# Patient Record
Sex: MALE | Race: BLACK OR AFRICAN AMERICAN | NOT HISPANIC OR LATINO | Employment: UNEMPLOYED | URBAN - METROPOLITAN AREA
[De-identification: names, ages, dates, MRNs, and addresses within clinical notes are randomized per-mention and may not be internally consistent; named-entity substitution may affect disease eponyms.]

---

## 2019-05-12 ENCOUNTER — HOSPITAL ENCOUNTER (INPATIENT)
Facility: HOSPITAL | Age: 44
LOS: 3 days | Discharge: HOME | End: 2019-05-15
Attending: EMERGENCY MEDICINE | Admitting: INTERNAL MEDICINE

## 2019-05-12 DIAGNOSIS — N28.9 ACUTE RENAL INSUFFICIENCY: ICD-10-CM

## 2019-05-12 DIAGNOSIS — L02.212 ABSCESS OF BACK: Primary | ICD-10-CM

## 2019-05-12 DIAGNOSIS — L03.312 CELLULITIS OF BACK: ICD-10-CM

## 2019-05-12 DIAGNOSIS — L02.214 ABSCESS OF LEFT GROIN: ICD-10-CM

## 2019-05-12 DIAGNOSIS — E66.01 OBESITY, CLASS III, BMI 40-49.9 (MORBID OBESITY) (HCC): ICD-10-CM

## 2019-05-12 DIAGNOSIS — E11.65 TYPE 2 DIABETES MELLITUS WITH HYPERGLYCEMIA, WITHOUT LONG-TERM CURRENT USE OF INSULIN (HCC): ICD-10-CM

## 2019-05-12 DIAGNOSIS — A41.9 SEPSIS AFFECTING SKIN: ICD-10-CM

## 2019-05-12 PROBLEM — E87.20 LACTIC ACIDOSIS: Status: ACTIVE | Noted: 2019-05-12

## 2019-05-12 PROBLEM — M54.9 ACUTE RIGHT-SIDED BACK PAIN: Status: ACTIVE | Noted: 2019-05-12

## 2019-05-12 LAB
ALBUMIN SERPL-MCNC: 3.1 G/DL (ref 3.5–5.2)
ALBUMIN/GLOB SERPL: 0.6 G/DL
ALP SERPL-CCNC: 123 U/L (ref 39–117)
ALT SERPL W P-5'-P-CCNC: 25 U/L (ref 1–41)
ANION GAP SERPL CALCULATED.3IONS-SCNC: 13 MMOL/L
ANION GAP SERPL CALCULATED.3IONS-SCNC: 17 MMOL/L
AST SERPL-CCNC: 19 U/L (ref 1–40)
BASOPHILS # BLD AUTO: 0.03 10*3/MM3 (ref 0–0.2)
BASOPHILS NFR BLD AUTO: 0.3 % (ref 0–1.5)
BILIRUB SERPL-MCNC: 0.4 MG/DL (ref 0.2–1.2)
BUN BLD-MCNC: 23 MG/DL (ref 6–20)
BUN BLD-MCNC: 24 MG/DL (ref 6–20)
BUN/CREAT SERPL: 18 (ref 7–25)
BUN/CREAT SERPL: 21.1 (ref 7–25)
CALCIUM SPEC-SCNC: 8.5 MG/DL (ref 8.6–10.5)
CALCIUM SPEC-SCNC: 9.5 MG/DL (ref 8.6–10.5)
CHLORIDE SERPL-SCNC: 100 MMOL/L (ref 98–107)
CHLORIDE SERPL-SCNC: 92 MMOL/L (ref 98–107)
CK SERPL-CCNC: 58 U/L (ref 20–200)
CO2 SERPL-SCNC: 20 MMOL/L (ref 22–29)
CO2 SERPL-SCNC: 22 MMOL/L (ref 22–29)
CREAT BLD-MCNC: 1.14 MG/DL (ref 0.76–1.27)
CREAT BLD-MCNC: 1.28 MG/DL (ref 0.76–1.27)
D-LACTATE SERPL-SCNC: 2.2 MMOL/L (ref 0.5–2)
D-LACTATE SERPL-SCNC: 3.2 MMOL/L (ref 0.5–2)
DEPRECATED RDW RBC AUTO: 44.8 FL (ref 37–54)
EOSINOPHIL # BLD AUTO: 0.15 10*3/MM3 (ref 0–0.4)
EOSINOPHIL NFR BLD AUTO: 1.3 % (ref 0.3–6.2)
ERYTHROCYTE [DISTWIDTH] IN BLOOD BY AUTOMATED COUNT: 13.7 % (ref 12.3–15.4)
GFR SERPL CREATININE-BSD FRML MDRD: 74 ML/MIN/1.73
GFR SERPL CREATININE-BSD FRML MDRD: 85 ML/MIN/1.73
GLOBULIN UR ELPH-MCNC: 4.9 GM/DL
GLUCOSE BLD-MCNC: 349 MG/DL (ref 65–99)
GLUCOSE BLD-MCNC: 571 MG/DL (ref 65–99)
GLUCOSE BLDC GLUCOMTR-MCNC: 321 MG/DL (ref 70–130)
GLUCOSE BLDC GLUCOMTR-MCNC: 334 MG/DL (ref 70–130)
HBA1C MFR BLD: 13 % (ref 4.8–5.6)
HCT VFR BLD AUTO: 41.7 % (ref 37.5–51)
HGB BLD-MCNC: 14.3 G/DL (ref 13–17.7)
HOLD SPECIMEN: NORMAL
IMM GRANULOCYTES # BLD AUTO: 0.03 10*3/MM3 (ref 0–0.05)
IMM GRANULOCYTES NFR BLD AUTO: 0.3 % (ref 0–0.5)
LYMPHOCYTES # BLD AUTO: 1.8 10*3/MM3 (ref 0.7–3.1)
LYMPHOCYTES NFR BLD AUTO: 15.2 % (ref 19.6–45.3)
MAGNESIUM SERPL-MCNC: 1.5 MG/DL (ref 1.6–2.6)
MCH RBC QN AUTO: 30.6 PG (ref 26.6–33)
MCHC RBC AUTO-ENTMCNC: 34.3 G/DL (ref 31.5–35.7)
MCV RBC AUTO: 89.3 FL (ref 79–97)
MONOCYTES # BLD AUTO: 0.6 10*3/MM3 (ref 0.1–0.9)
MONOCYTES NFR BLD AUTO: 5.1 % (ref 5–12)
NEUTROPHILS # BLD AUTO: 9.25 10*3/MM3 (ref 1.7–7)
NEUTROPHILS NFR BLD AUTO: 78.1 % (ref 42.7–76)
PHOSPHATE SERPL-MCNC: 2.7 MG/DL (ref 2.5–4.5)
PLATELET # BLD AUTO: 180 10*3/MM3 (ref 140–450)
PMV BLD AUTO: 13 FL (ref 6–12)
POTASSIUM BLD-SCNC: 3.9 MMOL/L (ref 3.5–5.2)
POTASSIUM BLD-SCNC: 4.4 MMOL/L (ref 3.5–5.2)
PROCALCITONIN SERPL-MCNC: 0.35 NG/ML (ref 0.1–0.25)
PROT SERPL-MCNC: 8 G/DL (ref 6–8.5)
RBC # BLD AUTO: 4.67 10*6/MM3 (ref 4.14–5.8)
SODIUM BLD-SCNC: 129 MMOL/L (ref 136–145)
SODIUM BLD-SCNC: 135 MMOL/L (ref 136–145)
WBC NRBC COR # BLD: 11.83 10*3/MM3 (ref 3.4–10.8)
WHOLE BLOOD HOLD SPECIMEN: NORMAL
WHOLE BLOOD HOLD SPECIMEN: NORMAL

## 2019-05-12 PROCEDURE — 83036 HEMOGLOBIN GLYCOSYLATED A1C: CPT | Performed by: INTERNAL MEDICINE

## 2019-05-12 PROCEDURE — 87205 SMEAR GRAM STAIN: CPT | Performed by: EMERGENCY MEDICINE

## 2019-05-12 PROCEDURE — 25010000002 VANCOMYCIN 10 G RECONSTITUTED SOLUTION: Performed by: PHYSICIAN ASSISTANT

## 2019-05-12 PROCEDURE — 25010000002 HEPARIN (PORCINE) PER 1000 UNITS: Performed by: INTERNAL MEDICINE

## 2019-05-12 PROCEDURE — 25010000002 ONDANSETRON PER 1 MG: Performed by: EMERGENCY MEDICINE

## 2019-05-12 PROCEDURE — 87076 CULTURE ANAEROBE IDENT EACH: CPT | Performed by: EMERGENCY MEDICINE

## 2019-05-12 PROCEDURE — 83735 ASSAY OF MAGNESIUM: CPT | Performed by: INTERNAL MEDICINE

## 2019-05-12 PROCEDURE — 84145 PROCALCITONIN (PCT): CPT | Performed by: PHYSICIAN ASSISTANT

## 2019-05-12 PROCEDURE — 99223 1ST HOSP IP/OBS HIGH 75: CPT | Performed by: INTERNAL MEDICINE

## 2019-05-12 PROCEDURE — 63710000001 INSULIN REGULAR HUMAN PER 5 UNITS: Performed by: PHYSICIAN ASSISTANT

## 2019-05-12 PROCEDURE — 87040 BLOOD CULTURE FOR BACTERIA: CPT | Performed by: EMERGENCY MEDICINE

## 2019-05-12 PROCEDURE — 25010000002 CEFTRIAXONE PER 250 MG: Performed by: PHYSICIAN ASSISTANT

## 2019-05-12 PROCEDURE — 0J970ZX DRAINAGE OF BACK SUBCUTANEOUS TISSUE AND FASCIA, OPEN APPROACH, DIAGNOSTIC: ICD-10-PCS | Performed by: PHYSICIAN ASSISTANT

## 2019-05-12 PROCEDURE — 87040 BLOOD CULTURE FOR BACTERIA: CPT | Performed by: PHYSICIAN ASSISTANT

## 2019-05-12 PROCEDURE — 25010000002 MORPHINE PER 10 MG: Performed by: EMERGENCY MEDICINE

## 2019-05-12 PROCEDURE — 80048 BASIC METABOLIC PNL TOTAL CA: CPT | Performed by: INTERNAL MEDICINE

## 2019-05-12 PROCEDURE — 82570 ASSAY OF URINE CREATININE: CPT | Performed by: INTERNAL MEDICINE

## 2019-05-12 PROCEDURE — 81001 URINALYSIS AUTO W/SCOPE: CPT | Performed by: INTERNAL MEDICINE

## 2019-05-12 PROCEDURE — 82962 GLUCOSE BLOOD TEST: CPT

## 2019-05-12 PROCEDURE — 80053 COMPREHEN METABOLIC PANEL: CPT | Performed by: EMERGENCY MEDICINE

## 2019-05-12 PROCEDURE — 63710000001 INSULIN LISPRO (HUMAN) PER 5 UNITS: Performed by: INTERNAL MEDICINE

## 2019-05-12 PROCEDURE — 99284 EMERGENCY DEPT VISIT MOD MDM: CPT

## 2019-05-12 PROCEDURE — 83605 ASSAY OF LACTIC ACID: CPT | Performed by: PHYSICIAN ASSISTANT

## 2019-05-12 PROCEDURE — 82550 ASSAY OF CK (CPK): CPT | Performed by: INTERNAL MEDICINE

## 2019-05-12 PROCEDURE — 25010000002 HYDROMORPHONE 1 MG/ML SOLUTION: Performed by: EMERGENCY MEDICINE

## 2019-05-12 PROCEDURE — 85025 COMPLETE CBC W/AUTO DIFF WBC: CPT | Performed by: EMERGENCY MEDICINE

## 2019-05-12 PROCEDURE — 87070 CULTURE OTHR SPECIMN AEROBIC: CPT | Performed by: EMERGENCY MEDICINE

## 2019-05-12 PROCEDURE — 84100 ASSAY OF PHOSPHORUS: CPT | Performed by: INTERNAL MEDICINE

## 2019-05-12 PROCEDURE — 0J9C0ZX DRAINAGE OF PELVIC REGION SUBCUTANEOUS TISSUE AND FASCIA, OPEN APPROACH, DIAGNOSTIC: ICD-10-PCS | Performed by: PHYSICIAN ASSISTANT

## 2019-05-12 PROCEDURE — 84300 ASSAY OF URINE SODIUM: CPT | Performed by: INTERNAL MEDICINE

## 2019-05-12 RX ORDER — ACETAMINOPHEN 325 MG/1
650 TABLET ORAL EVERY 6 HOURS PRN
Status: DISCONTINUED | OUTPATIENT
Start: 2019-05-12 | End: 2019-05-15 | Stop reason: HOSPADM

## 2019-05-12 RX ORDER — METOPROLOL TARTRATE 50 MG/1
50 TABLET, FILM COATED ORAL EVERY MORNING
COMMUNITY
End: 2021-01-26 | Stop reason: HOSPADM

## 2019-05-12 RX ORDER — SODIUM CHLORIDE 0.9 % (FLUSH) 0.9 %
3-10 SYRINGE (ML) INJECTION AS NEEDED
Status: DISCONTINUED | OUTPATIENT
Start: 2019-05-12 | End: 2019-05-15 | Stop reason: HOSPADM

## 2019-05-12 RX ORDER — MAGNESIUM SULFATE HEPTAHYDRATE 40 MG/ML
2 INJECTION, SOLUTION INTRAVENOUS AS NEEDED
Status: DISCONTINUED | OUTPATIENT
Start: 2019-05-12 | End: 2019-05-15 | Stop reason: HOSPADM

## 2019-05-12 RX ORDER — MORPHINE SULFATE 4 MG/ML
4 INJECTION, SOLUTION INTRAMUSCULAR; INTRAVENOUS ONCE
Status: COMPLETED | OUTPATIENT
Start: 2019-05-12 | End: 2019-05-12

## 2019-05-12 RX ORDER — LANOLIN ALCOHOL/MO/W.PET/CERES
1000 CREAM (GRAM) TOPICAL DAILY
Status: ON HOLD | COMMUNITY
End: 2021-01-19

## 2019-05-12 RX ORDER — HEPARIN SODIUM 5000 [USP'U]/ML
5000 INJECTION, SOLUTION INTRAVENOUS; SUBCUTANEOUS EVERY 12 HOURS SCHEDULED
Status: DISCONTINUED | OUTPATIENT
Start: 2019-05-12 | End: 2019-05-15 | Stop reason: HOSPADM

## 2019-05-12 RX ORDER — HYDROMORPHONE HYDROCHLORIDE 1 MG/ML
0.5 INJECTION, SOLUTION INTRAMUSCULAR; INTRAVENOUS; SUBCUTANEOUS
Status: DISCONTINUED | OUTPATIENT
Start: 2019-05-12 | End: 2019-05-15 | Stop reason: HOSPADM

## 2019-05-12 RX ORDER — LANOLIN ALCOHOL/MO/W.PET/CERES
1000 CREAM (GRAM) TOPICAL DAILY
Status: DISCONTINUED | OUTPATIENT
Start: 2019-05-13 | End: 2019-05-15 | Stop reason: HOSPADM

## 2019-05-12 RX ORDER — DEXTROSE MONOHYDRATE 25 G/50ML
25 INJECTION, SOLUTION INTRAVENOUS
Status: DISCONTINUED | OUTPATIENT
Start: 2019-05-12 | End: 2019-05-15 | Stop reason: HOSPADM

## 2019-05-12 RX ORDER — SODIUM CHLORIDE 0.9 % (FLUSH) 0.9 %
3 SYRINGE (ML) INJECTION EVERY 12 HOURS SCHEDULED
Status: DISCONTINUED | OUTPATIENT
Start: 2019-05-12 | End: 2019-05-15 | Stop reason: HOSPADM

## 2019-05-12 RX ORDER — HYDROCHLOROTHIAZIDE 50 MG/1
50 TABLET ORAL NIGHTLY
COMMUNITY
End: 2021-01-26 | Stop reason: HOSPADM

## 2019-05-12 RX ORDER — METOPROLOL SUCCINATE 50 MG/1
50 TABLET, EXTENDED RELEASE ORAL DAILY
COMMUNITY
End: 2019-05-12

## 2019-05-12 RX ORDER — LOSARTAN POTASSIUM 50 MG/1
100 TABLET ORAL NIGHTLY
Status: DISCONTINUED | OUTPATIENT
Start: 2019-05-12 | End: 2019-05-12

## 2019-05-12 RX ORDER — MAGNESIUM SULFATE HEPTAHYDRATE 40 MG/ML
4 INJECTION, SOLUTION INTRAVENOUS AS NEEDED
Status: DISCONTINUED | OUTPATIENT
Start: 2019-05-12 | End: 2019-05-15 | Stop reason: HOSPADM

## 2019-05-12 RX ORDER — METOPROLOL TARTRATE 50 MG/1
50 TABLET, FILM COATED ORAL EVERY MORNING
Status: DISCONTINUED | OUTPATIENT
Start: 2019-05-13 | End: 2019-05-15 | Stop reason: HOSPADM

## 2019-05-12 RX ORDER — NICOTINE POLACRILEX 4 MG
15 LOZENGE BUCCAL
Status: DISCONTINUED | OUTPATIENT
Start: 2019-05-12 | End: 2019-05-15 | Stop reason: HOSPADM

## 2019-05-12 RX ORDER — LIDOCAINE HYDROCHLORIDE 10 MG/ML
10 INJECTION, SOLUTION EPIDURAL; INFILTRATION; INTRACAUDAL; PERINEURAL ONCE
Status: COMPLETED | OUTPATIENT
Start: 2019-05-12 | End: 2019-05-12

## 2019-05-12 RX ORDER — GLIMEPIRIDE 4 MG/1
4 TABLET ORAL 2 TIMES DAILY
COMMUNITY
End: 2023-01-29 | Stop reason: HOSPADM

## 2019-05-12 RX ORDER — HYDROCODONE BITARTRATE AND ACETAMINOPHEN 5; 325 MG/1; MG/1
1 TABLET ORAL EVERY 4 HOURS PRN
Status: DISCONTINUED | OUTPATIENT
Start: 2019-05-12 | End: 2019-05-15 | Stop reason: HOSPADM

## 2019-05-12 RX ORDER — SODIUM CHLORIDE 0.9 % (FLUSH) 0.9 %
10 SYRINGE (ML) INJECTION AS NEEDED
Status: DISCONTINUED | OUTPATIENT
Start: 2019-05-12 | End: 2019-05-15 | Stop reason: HOSPADM

## 2019-05-12 RX ORDER — ONDANSETRON 2 MG/ML
4 INJECTION INTRAMUSCULAR; INTRAVENOUS ONCE
Status: COMPLETED | OUTPATIENT
Start: 2019-05-12 | End: 2019-05-12

## 2019-05-12 RX ORDER — ATORVASTATIN CALCIUM 40 MG/1
40 TABLET, FILM COATED ORAL NIGHTLY
Status: DISCONTINUED | OUTPATIENT
Start: 2019-05-12 | End: 2019-05-15 | Stop reason: HOSPADM

## 2019-05-12 RX ORDER — SODIUM CHLORIDE 9 MG/ML
100 INJECTION, SOLUTION INTRAVENOUS CONTINUOUS
Status: DISCONTINUED | OUTPATIENT
Start: 2019-05-12 | End: 2019-05-15 | Stop reason: HOSPADM

## 2019-05-12 RX ADMIN — SODIUM CHLORIDE 1000 ML: 9 INJECTION, SOLUTION INTRAVENOUS at 16:29

## 2019-05-12 RX ADMIN — VANCOMYCIN HYDROCHLORIDE 2750 MG: 10 INJECTION, POWDER, LYOPHILIZED, FOR SOLUTION INTRAVENOUS at 19:24

## 2019-05-12 RX ADMIN — SODIUM CHLORIDE 2397 ML: 9 INJECTION, SOLUTION INTRAVENOUS at 18:27

## 2019-05-12 RX ADMIN — SODIUM CHLORIDE 100 ML/HR: 9 INJECTION, SOLUTION INTRAVENOUS at 21:46

## 2019-05-12 RX ADMIN — ONDANSETRON 4 MG: 2 INJECTION INTRAMUSCULAR; INTRAVENOUS at 15:20

## 2019-05-12 RX ADMIN — SODIUM CHLORIDE 2 G: 900 INJECTION INTRAVENOUS at 16:29

## 2019-05-12 RX ADMIN — ATORVASTATIN CALCIUM 40 MG: 40 TABLET, FILM COATED ORAL at 21:17

## 2019-05-12 RX ADMIN — INSULIN HUMAN 10 UNITS: 100 INJECTION, SOLUTION PARENTERAL at 17:48

## 2019-05-12 RX ADMIN — HYDROMORPHONE HYDROCHLORIDE 1 MG: 1 INJECTION, SOLUTION INTRAMUSCULAR; INTRAVENOUS; SUBCUTANEOUS at 16:27

## 2019-05-12 RX ADMIN — MORPHINE SULFATE 4 MG: 4 INJECTION INTRAVENOUS at 15:23

## 2019-05-12 RX ADMIN — LIDOCAINE HYDROCHLORIDE 10 ML: 10 INJECTION, SOLUTION EPIDURAL; INFILTRATION; INTRACAUDAL; PERINEURAL at 15:17

## 2019-05-12 RX ADMIN — SODIUM CHLORIDE 1000 ML: 9 INJECTION, SOLUTION INTRAVENOUS at 15:22

## 2019-05-12 RX ADMIN — HEPARIN SODIUM 5000 UNITS: 5000 INJECTION INTRAVENOUS; SUBCUTANEOUS at 21:17

## 2019-05-12 RX ADMIN — INSULIN LISPRO 5 UNITS: 100 INJECTION, SOLUTION INTRAVENOUS; SUBCUTANEOUS at 21:16

## 2019-05-12 NOTE — ED PROVIDER NOTES
Subjective   Pt is a 44 yo male presenting to ED with left back abscess. Pt noticed a small area to left back last week but over the last few days larger and more painful. The area has not been draining. He also has a small abscess to left groin that just started a few days ago with no drainage or significant redness / swelling. He denies hx of prior abscesses or Staph / MRSA.  He has had chills but no specific fever. He denies recent antibiotics. He denies IV drug use. PMHx significant for DM(has been on insulin in the past but took himself off), HTN, HLD, and obesity. He states his blood sugar typically runs about 200-300 at home.         History provided by:  Patient and spouse      Review of Systems   Constitutional: Positive for chills. Negative for fever.   HENT: Negative for congestion, ear pain, sore throat and trouble swallowing.    Eyes: Negative for pain, redness and visual disturbance.   Respiratory: Negative for cough and shortness of breath.    Cardiovascular: Negative for chest pain and leg swelling.   Gastrointestinal: Negative for abdominal pain, blood in stool, constipation, diarrhea, nausea and vomiting.   Genitourinary: Negative for difficulty urinating, dysuria and flank pain.   Musculoskeletal: Negative for arthralgias, back pain and joint swelling.   Skin: Positive for wound (Left back and right groin abscesses). Negative for rash.   Allergic/Immunologic: Negative.    Neurological: Negative for dizziness, syncope, weakness, numbness and headaches.   Psychiatric/Behavioral: Negative for confusion.   All other systems reviewed and are negative.      Past Medical History:   Diagnosis Date   • Diabetes mellitus (CMS/HCC)    • Hyperlipidemia    • Hypertension        Allergies   Allergen Reactions   • Lisinopril Swelling       History reviewed. No pertinent surgical history.    History reviewed. No pertinent family history.    Social History     Socioeconomic History   • Marital status:       Spouse name: Not on file   • Number of children: Not on file   • Years of education: Not on file   • Highest education level: Not on file   Tobacco Use   • Smoking status: Never Smoker   • Smokeless tobacco: Never Used   Substance and Sexual Activity   • Alcohol use: No   • Drug use: No           Objective   Physical Exam   Constitutional: He is oriented to person, place, and time. He appears well-developed and well-nourished.   Obesity   HENT:   Head: Atraumatic.   Nose: Nose normal.   Eyes: Conjunctivae, EOM and lids are normal. Pupils are equal, round, and reactive to light.   Neck: Normal range of motion. Neck supple.   Cardiovascular: Regular rhythm and normal heart sounds. Tachycardia present.   Pulmonary/Chest: Effort normal and breath sounds normal.   Abdominal: Soft. He exhibits no distension. There is no tenderness. There is no rebound and no guarding.       Musculoskeletal: Normal range of motion. He exhibits no edema, tenderness or deformity.   Neurological: He is alert and oriented to person, place, and time. He has normal strength. No sensory deficit.   Skin: Skin is warm, dry and intact.        Psychiatric: He has a normal mood and affect. His behavior is normal.   Nursing note and vitals reviewed.      Incision & Drainage  Date/Time: 5/12/2019 4:50 PM  Performed by: Zelda Aden PA  Authorized by: Macario Flowers MD     Consent:     Consent obtained:  Verbal    Consent given by:  Patient    Risks discussed:  Bleeding, incomplete drainage, infection, pain and damage to other organs  Location:     Type:  Abscess    Size:  2 cm    Location: Left groin   Pre-procedure details:     Skin preparation:  Betadine  Sedation:     Sedation type: Pre procedure Morphine IV.  Anesthesia (see MAR for exact dosages):     Anesthesia method:  Local infiltration    Local anesthetic:  Lidocaine 1% w/o epi  Procedure type:     Complexity:  Complex  Procedure details:     Incision types:  Single straight    Incision  depth:  Subcutaneous    Scalpel blade:  11    Wound management:  Probed and deloculated and irrigated with saline    Drainage:  Purulent    Drainage amount:  Moderate    Packing materials:  1/4 in iodoform gauze  Post-procedure details:     Patient tolerance of procedure:  Tolerated well, no immediate complications  Incision & Drainage  Date/Time: 5/12/2019 4:51 PM  Performed by: Zelda Aden PA  Authorized by: Macario Flowers MD     Consent:     Consent obtained:  Verbal    Consent given by:  Patient    Risks discussed:  Bleeding, incomplete drainage, pain, infection and damage to other organs  Location:     Type:  Abscess    Size:  6 cm     Location: Left mid back   Pre-procedure details:     Skin preparation:  Betadine  Sedation:     Sedation type: Pre procedure Morphine IV.  Anesthesia (see MAR for exact dosages):     Anesthesia method:  Local infiltration    Local anesthetic:  Lidocaine 1% w/o epi  Procedure type:     Complexity:  Complex  Procedure details:     Incision types:  Single straight    Incision depth:  Subcutaneous    Scalpel blade:  11    Wound management:  Probed and deloculated and irrigated with saline    Drainage:  Purulent    Drainage amount:  Moderate    Packing materials:  1/4 in iodoform gauze  Post-procedure details:     Patient tolerance of procedure:  Tolerated well, no immediate complications               ED Course      Re-examined patient several times and discussed results and tx plan. Will admit for IV antibiotics and management of hyperglycemia. Pt agreeable. Vancomycin and Rocephin initiated in ED. Insulin SC given and patient hydrated with saline.     Discussed patient with Dr. Flowers who is agreeable with ED course and admission.     Discussed patient with hospitalist Dr. aBrber    Reviewed old records. Noted Cr increase from 0.7 to 1.28 from several years ago.     Recent Results (from the past 24 hour(s))   Comprehensive Metabolic Panel    Collection Time: 05/12/19  1:44 PM    Result Value Ref Range    Glucose 571 (C) 65 - 99 mg/dL    BUN 23 (H) 6 - 20 mg/dL    Creatinine 1.28 (H) 0.76 - 1.27 mg/dL    Sodium 129 (L) 136 - 145 mmol/L    Potassium 4.4 3.5 - 5.2 mmol/L    Chloride 92 (L) 98 - 107 mmol/L    CO2 20.0 (L) 22.0 - 29.0 mmol/L    Calcium 9.5 8.6 - 10.5 mg/dL    Total Protein 8.0 6.0 - 8.5 g/dL    Albumin 3.10 (L) 3.50 - 5.20 g/dL    ALT (SGPT) 25 1 - 41 U/L    AST (SGOT) 19 1 - 40 U/L    Alkaline Phosphatase 123 (H) 39 - 117 U/L    Total Bilirubin 0.4 0.2 - 1.2 mg/dL    eGFR  African Amer 74 >60 mL/min/1.73    Globulin 4.9 gm/dL    A/G Ratio 0.6 g/dL    BUN/Creatinine Ratio 18.0 7.0 - 25.0    Anion Gap 17.0 mmol/L   CBC Auto Differential    Collection Time: 05/12/19  1:44 PM   Result Value Ref Range    WBC 11.83 (H) 3.40 - 10.80 10*3/mm3    RBC 4.67 4.14 - 5.80 10*6/mm3    Hemoglobin 14.3 13.0 - 17.7 g/dL    Hematocrit 41.7 37.5 - 51.0 %    MCV 89.3 79.0 - 97.0 fL    MCH 30.6 26.6 - 33.0 pg    MCHC 34.3 31.5 - 35.7 g/dL    RDW 13.7 12.3 - 15.4 %    RDW-SD 44.8 37.0 - 54.0 fl    MPV 13.0 (H) 6.0 - 12.0 fL    Platelets 180 140 - 450 10*3/mm3    Neutrophil % 78.1 (H) 42.7 - 76.0 %    Lymphocyte % 15.2 (L) 19.6 - 45.3 %    Monocyte % 5.1 5.0 - 12.0 %    Eosinophil % 1.3 0.3 - 6.2 %    Basophil % 0.3 0.0 - 1.5 %    Immature Grans % 0.3 0.0 - 0.5 %    Neutrophils, Absolute 9.25 (H) 1.70 - 7.00 10*3/mm3    Lymphocytes, Absolute 1.80 0.70 - 3.10 10*3/mm3    Monocytes, Absolute 0.60 0.10 - 0.90 10*3/mm3    Eosinophils, Absolute 0.15 0.00 - 0.40 10*3/mm3    Basophils, Absolute 0.03 0.00 - 0.20 10*3/mm3    Immature Grans, Absolute 0.03 0.00 - 0.05 10*3/mm3   Light Blue Top    Collection Time: 05/12/19  1:44 PM   Result Value Ref Range    Extra Tube hold for add-on    Green Top (Gel)    Collection Time: 05/12/19  1:44 PM   Result Value Ref Range    Extra Tube Hold for add-ons.    Lavender Top    Collection Time: 05/12/19  1:44 PM   Result Value Ref Range    Extra Tube hold for add-on   "  Gold Top - SST    Collection Time: 05/12/19  1:44 PM   Result Value Ref Range    Extra Tube Hold for add-ons.    Procalcitonin    Collection Time: 05/12/19  1:44 PM   Result Value Ref Range    Procalcitonin 0.35 (H) 0.10 - 0.25 ng/mL   Lactic Acid, Plasma    Collection Time: 05/12/19  2:54 PM   Result Value Ref Range    Lactate 3.2 (C) 0.5 - 2.0 mmol/L     Note: In addition to lab results from this visit, the labs listed above may include labs taken at another facility or during a different encounter within the last 24 hours. Please correlate lab times with ED admission and discharge times for further clarification of the services performed during this visit.    No orders to display     Vitals:    05/12/19 1336 05/12/19 1512 05/12/19 1621   BP: 140/89     BP Location: Left arm     Patient Position: Sitting     Pulse: 112 91 87   Resp: 18     Temp: 99.2 °F (37.3 °C)     TempSrc: Oral     SpO2: 96% 96% 98%   Weight: (!) 141 kg (311 lb)     Height: 185.4 cm (73\")       Medications   sodium chloride 0.9 % flush 10 mL (not administered)   vancomycin 2750 mg/500 mL 0.9% NS IVPB ([facility]) (not administered)   insulin regular (humuLIN R,novoLIN R) injection 10 Units (not administered)   sodium chloride 0.9% - IBW for BMI > 30 bolus 2,397 mL (not administered)   Morphine sulfate (PF) injection 4 mg (4 mg Intravenous Given 5/12/19 1523)   ondansetron (ZOFRAN) injection 4 mg (4 mg Intravenous Given 5/12/19 1520)   lidocaine PF 1% (XYLOCAINE) injection 10 mL (10 mL Injection Given 5/12/19 1517)   sodium chloride 0.9 % bolus 1,000 mL (0 mL Intravenous Stopped 5/12/19 1630)   sodium chloride 0.9 % bolus 1,000 mL (1,000 mL Intravenous New Bag 5/12/19 1629)   HYDROmorphone (DILAUDID) injection 1 mg (1 mg Intravenous Given 5/12/19 1627)   cefTRIAXone (ROCEPHIN) 2 g/100 mL 0.9% NS VTB (ZOEY) (2 g Intravenous New Bag 5/12/19 1986)     ECG/EMG Results (last 24 hours)     ** No results found for the last 24 hours. **        No orders to " display                   MDM      Final diagnoses:   Abscess of back   Cellulitis of back   Abscess of left groin   Type 2 diabetes mellitus with hyperglycemia, without long-term current use of insulin (CMS/[facility])   Acute renal insufficiency            Zelda Aden PA  05/12/19 5671

## 2019-05-13 ENCOUNTER — APPOINTMENT (OUTPATIENT)
Dept: ULTRASOUND IMAGING | Facility: HOSPITAL | Age: 44
End: 2019-05-13

## 2019-05-13 LAB
ANION GAP SERPL CALCULATED.3IONS-SCNC: 10 MMOL/L
BACTERIA UR QL AUTO: NORMAL /HPF
BILIRUB UR QL STRIP: NEGATIVE
BUN BLD-MCNC: 20 MG/DL (ref 6–20)
BUN/CREAT SERPL: 21.3 (ref 7–25)
CALCIUM SPEC-SCNC: 8.7 MG/DL (ref 8.6–10.5)
CHLORIDE SERPL-SCNC: 100 MMOL/L (ref 98–107)
CLARITY UR: CLEAR
CO2 SERPL-SCNC: 22 MMOL/L (ref 22–29)
COLOR UR: YELLOW
CREAT BLD-MCNC: 0.94 MG/DL (ref 0.76–1.27)
CREAT UR-MCNC: 68.5 MG/DL
D-LACTATE SERPL-SCNC: 0.9 MMOL/L (ref 0.5–2)
GFR SERPL CREATININE-BSD FRML MDRD: 106 ML/MIN/1.73
GLUCOSE BLD-MCNC: 336 MG/DL (ref 65–99)
GLUCOSE BLDC GLUCOMTR-MCNC: 313 MG/DL (ref 70–130)
GLUCOSE BLDC GLUCOMTR-MCNC: 316 MG/DL (ref 70–130)
GLUCOSE BLDC GLUCOMTR-MCNC: 317 MG/DL (ref 70–130)
GLUCOSE BLDC GLUCOMTR-MCNC: 373 MG/DL (ref 70–130)
GLUCOSE BLDC GLUCOMTR-MCNC: 387 MG/DL (ref 70–130)
GLUCOSE UR STRIP-MCNC: ABNORMAL MG/DL
HGB UR QL STRIP.AUTO: NEGATIVE
HYALINE CASTS UR QL AUTO: NORMAL /LPF
KETONES UR QL STRIP: NEGATIVE
LEUKOCYTE ESTERASE UR QL STRIP.AUTO: NEGATIVE
MAGNESIUM SERPL-MCNC: 2.6 MG/DL (ref 1.6–2.6)
MRSA DNA SPEC QL NAA+PROBE: NEGATIVE
NITRITE UR QL STRIP: NEGATIVE
PH UR STRIP.AUTO: 5.5 [PH] (ref 5–8)
PHOSPHATE SERPL-MCNC: 2.8 MG/DL (ref 2.5–4.5)
POTASSIUM BLD-SCNC: 3.7 MMOL/L (ref 3.5–5.2)
PROT UR QL STRIP: ABNORMAL
RBC # UR: NORMAL /HPF
REF LAB TEST METHOD: NORMAL
SODIUM BLD-SCNC: 132 MMOL/L (ref 136–145)
SODIUM UR-SCNC: 79 MMOL/L
SP GR UR STRIP: 1.03 (ref 1–1.03)
SQUAMOUS #/AREA URNS HPF: NORMAL /HPF
UROBILINOGEN UR QL STRIP: ABNORMAL
WBC UR QL AUTO: NORMAL /HPF

## 2019-05-13 PROCEDURE — 83605 ASSAY OF LACTIC ACID: CPT | Performed by: INTERNAL MEDICINE

## 2019-05-13 PROCEDURE — 25010000002 HEPARIN (PORCINE) PER 1000 UNITS: Performed by: INTERNAL MEDICINE

## 2019-05-13 PROCEDURE — 82962 GLUCOSE BLOOD TEST: CPT

## 2019-05-13 PROCEDURE — 63710000001 INSULIN DETEMIR PER 5 UNITS: Performed by: INTERNAL MEDICINE

## 2019-05-13 PROCEDURE — 25010000002 HYDROMORPHONE PER 4 MG: Performed by: INTERNAL MEDICINE

## 2019-05-13 PROCEDURE — 84100 ASSAY OF PHOSPHORUS: CPT | Performed by: INTERNAL MEDICINE

## 2019-05-13 PROCEDURE — G0108 DIAB MANAGE TRN  PER INDIV: HCPCS | Performed by: REGISTERED NURSE

## 2019-05-13 PROCEDURE — 25010000002 VANCOMYCIN 10 G RECONSTITUTED SOLUTION

## 2019-05-13 PROCEDURE — 25010000002 MAGNESIUM SULFATE 2 GM/50ML SOLUTION: Performed by: PHYSICIAN ASSISTANT

## 2019-05-13 PROCEDURE — 63710000001 INSULIN LISPRO (HUMAN) PER 5 UNITS: Performed by: INTERNAL MEDICINE

## 2019-05-13 PROCEDURE — 83735 ASSAY OF MAGNESIUM: CPT | Performed by: INTERNAL MEDICINE

## 2019-05-13 PROCEDURE — 99233 SBSQ HOSP IP/OBS HIGH 50: CPT | Performed by: INTERNAL MEDICINE

## 2019-05-13 PROCEDURE — 80048 BASIC METABOLIC PNL TOTAL CA: CPT | Performed by: INTERNAL MEDICINE

## 2019-05-13 PROCEDURE — 87641 MR-STAPH DNA AMP PROBE: CPT | Performed by: INTERNAL MEDICINE

## 2019-05-13 PROCEDURE — 25010000002 CEFTRIAXONE PER 250 MG: Performed by: INTERNAL MEDICINE

## 2019-05-13 PROCEDURE — 76604 US EXAM CHEST: CPT

## 2019-05-13 RX ADMIN — INSULIN LISPRO 5 UNITS: 100 INJECTION, SOLUTION INTRAVENOUS; SUBCUTANEOUS at 08:14

## 2019-05-13 RX ADMIN — HYDROMORPHONE HYDROCHLORIDE 0.5 MG: 1 INJECTION, SOLUTION INTRAMUSCULAR; INTRAVENOUS; SUBCUTANEOUS at 16:55

## 2019-05-13 RX ADMIN — MAGNESIUM SULFATE HEPTAHYDRATE 2 G: 40 INJECTION, SOLUTION INTRAVENOUS at 00:50

## 2019-05-13 RX ADMIN — MAGNESIUM SULFATE HEPTAHYDRATE 2 G: 40 INJECTION, SOLUTION INTRAVENOUS at 03:03

## 2019-05-13 RX ADMIN — INSULIN LISPRO 5 UNITS: 100 INJECTION, SOLUTION INTRAVENOUS; SUBCUTANEOUS at 12:54

## 2019-05-13 RX ADMIN — VANCOMYCIN HYDROCHLORIDE 1500 MG: 10 INJECTION, POWDER, LYOPHILIZED, FOR SOLUTION INTRAVENOUS at 20:02

## 2019-05-13 RX ADMIN — SODIUM CHLORIDE 100 ML/HR: 9 INJECTION, SOLUTION INTRAVENOUS at 08:14

## 2019-05-13 RX ADMIN — HYDROCODONE BITARTRATE AND ACETAMINOPHEN 1 TABLET: 5; 325 TABLET ORAL at 16:18

## 2019-05-13 RX ADMIN — HEPARIN SODIUM 5000 UNITS: 5000 INJECTION INTRAVENOUS; SUBCUTANEOUS at 20:02

## 2019-05-13 RX ADMIN — VANCOMYCIN HYDROCHLORIDE 1500 MG: 10 INJECTION, POWDER, LYOPHILIZED, FOR SOLUTION INTRAVENOUS at 08:13

## 2019-05-13 RX ADMIN — INSULIN DETEMIR 15 UNITS: 100 INJECTION, SOLUTION SUBCUTANEOUS at 21:27

## 2019-05-13 RX ADMIN — HYDROCODONE BITARTRATE AND ACETAMINOPHEN 1 TABLET: 5; 325 TABLET ORAL at 01:07

## 2019-05-13 RX ADMIN — ATORVASTATIN CALCIUM 40 MG: 40 TABLET, FILM COATED ORAL at 20:02

## 2019-05-13 RX ADMIN — METOPROLOL TARTRATE 50 MG: 50 TABLET ORAL at 05:00

## 2019-05-13 RX ADMIN — INSULIN LISPRO 5 UNITS: 100 INJECTION, SOLUTION INTRAVENOUS; SUBCUTANEOUS at 04:57

## 2019-05-13 RX ADMIN — CYANOCOBALAMIN TAB 1000 MCG 1000 MCG: 1000 TAB at 08:13

## 2019-05-13 RX ADMIN — SODIUM CHLORIDE 2 G: 900 INJECTION INTRAVENOUS at 16:18

## 2019-05-13 RX ADMIN — HYDROCODONE BITARTRATE AND ACETAMINOPHEN 1 TABLET: 5; 325 TABLET ORAL at 08:13

## 2019-05-13 RX ADMIN — MAGNESIUM SULFATE HEPTAHYDRATE 2 G: 40 INJECTION, SOLUTION INTRAVENOUS at 04:57

## 2019-05-13 RX ADMIN — SODIUM CHLORIDE 100 ML/HR: 9 INJECTION, SOLUTION INTRAVENOUS at 19:36

## 2019-05-13 RX ADMIN — INSULIN LISPRO 5 UNITS: 100 INJECTION, SOLUTION INTRAVENOUS; SUBCUTANEOUS at 00:51

## 2019-05-13 NOTE — PLAN OF CARE
Problem: Sepsis/Septic Shock (Adult)  Goal: Signs and Symptoms of Listed Potential Problems Will be Absent, Minimized or Managed (Sepsis/Septic Shock)  Outcome: Ongoing (interventions implemented as appropriate)   05/13/19 1651   Goal/Outcome Evaluation   Problems Assessed (Sepsis) all   Problems Present (Sepsis) glycemic control impaired;infection progression       Problem: Patient Care Overview  Goal: Plan of Care Review  Outcome: Ongoing (interventions implemented as appropriate)   05/13/19 1651   Coping/Psychosocial   Plan of Care Reviewed With patient   OTHER   Outcome Summary patient pain controlled with po pain meds, VSS, finger sticks changed ACHS, will continue to monitor    Plan of Care Review   Progress no change

## 2019-05-13 NOTE — PROGRESS NOTES
[facility] Medicine Services  PROGRESS NOTE    Patient Name: Angel Blair  : 1975  MRN: [id number]    Date of Admission: 2019  Length of Stay: 1  Primary Care Physician: Jessica Redding PA    Subjective   Subjective     CC:  Back pain    HPI:  Doing okay this am, states that back pain is still present. No F/C overnight.     Review of Systems  Gen- No fevers, chills  CV- No chest pain, palpitations  Resp- No cough, dyspnea  GI- No N/V/D, abd pain    Otherwise ROS is negative except as mentioned in the HPI.    Objective   Objective     Vital Signs:   Temp:  [98.1 °F (36.7 °C)-99.2 °F (37.3 °C)] 98.5 °F (36.9 °C)  Heart Rate:  [] 75  Resp:  [16-18] 16  BP: (138-161)/(80-96) 146/96        Physical Exam:  Constitutional: No acute distress, awake, alert, obese  HENT: NCAT, mucous membranes moist  Respiratory: Clear to auscultation bilaterally, respiratory effort normal   Cardiovascular: RRR, no murmurs, rubs, or gallops, palpable pedal pulses bilaterally  Gastrointestinal: Positive bowel sounds, soft, nontender, nondistended  Musculoskeletal: No bilateral ankle edema  Psychiatric: Appropriate affect, cooperative  Neurologic: Oriented x 3, strength symmetric in all extremities, Cranial Nerves grossly intact to confrontation, speech clear  Skin: left flank with soiled bandage over previously I&D'd area, wound site packed with soiled dressing, surrounded induration and mild erythema    Results Reviewed:  I have personally reviewed current lab, radiology, and data and agree.    Results from last 7 days   Lab Units 19  1344   WBC 10*3/mm3 11.83*   HEMOGLOBIN g/dL 14.3   HEMATOCRIT % 41.7   PLATELETS 10*3/mm3 180   PROCALCITONIN ng/mL 0.35*     Results from last 7 days   Lab Units 19  0546 19  2019 19  1344   SODIUM mmol/L 132* 135* 129*   POTASSIUM mmol/L 3.7 3.9 4.4   CHLORIDE mmol/L 100 100 92*   CO2 mmol/L 22.0 22.0 20.0*   BUN mg/dL 20 24* 23*    CREATININE mg/dL 0.94 1.14 1.28*   GLUCOSE mg/dL 336* 349* 571*   CALCIUM mg/dL 8.7 8.5* 9.5   ALT (SGPT) U/L  --   --  25   AST (SGOT) U/L  --   --  19     Estimated Creatinine Clearance: 151.9 mL/min (by C-G formula based on SCr of 0.94 mg/dL).    Microbiology Results Abnormal     Procedure Component Value - Date/Time    Wound Culture - Wound, Back [[id number]] Collected:  05/12/19 1630    Lab Status:  Preliminary result Specimen:  Wound from Back Updated:  05/12/19 2119     Gram Stain No WBCs or organisms seen          Imaging Results (last 24 hours)     ** No results found for the last 24 hours. **               I have reviewed the medications:    Current Facility-Administered Medications:   •  [START ON 5/14/2019] !Vancomycin trough 5/14 @ 0730. Please hold dose until pharmacy evaluates level, , Does not apply, Once, Reji Ordaz, [facility]  •  acetaminophen (TYLENOL) tablet 650 mg, 650 mg, Oral, Q6H PRN, Na Bennett MD  •  atorvastatin (LIPITOR) tablet 40 mg, 40 mg, Oral, Nightly, Na Bennett MD, 40 mg at 05/12/19 2117  •  cefTRIAXone (ROCEPHIN) 2 g/100 mL 0.9% NS VTB (ZOEY), 2 g, Intravenous, Q24H, Na Bennett MD  •  dextrose (D50W) 25 g/ 50mL Intravenous Solution 25 g, 25 g, Intravenous, Q15 Min PRN, Na Bennett MD  •  dextrose (GLUTOSE) oral gel 15 g, 15 g, Oral, Q15 Min PRN, Na Bennett MD  •  glucagon (human recombinant) (GLUCAGEN DIAGNOSTIC) injection 1 mg, 1 mg, Subcutaneous, PRN, Na Bennett MD  •  heparin (porcine) 5000 UNIT/ML injection 5,000 Units, 5,000 Units, Subcutaneous, Q12H, Na Bennett MD, 5,000 Units at 05/12/19 2117  •  HYDROcodone-acetaminophen (NORCO) 5-325 MG per tablet 1 tablet, 1 tablet, Oral, Q4H PRN, Na Bennett MD, 1 tablet at 05/13/19 0107  •  HYDROmorphone (DILAUDID) injection 0.5 mg, 0.5 mg, Intravenous, Q2H PRN, Na Bennett MD  •  insulin lispro (humaLOG) injection 0-7 Units, 0-7 Units, Subcutaneous, Q4H, Na Bennett MD, 5  Units at 05/13/19 0457  •  Magnesium Sulfate 2 gram Bolus, followed by 8 gram infusion (total Mg dose 10 grams)- Mg less than or equal to 1mg/dL, 2 g, Intravenous, PRN **OR** Magnesium Sulfate 2 gram / 50mL Infusion (GIVE X 3 BAGS TO EQUAL 6GM TOTAL DOSE) - Mg 1.1 - 1.5 mg/dl, 2 g, Intravenous, PRN, Last Rate: 25 mL/hr at 05/13/19 0457, 2 g at 05/13/19 0457 **OR** Magnesium Sulfate 4 gram infusion- Mg 1.6-1.9 mg/dL, 4 g, Intravenous, PRN, Queta Turner PA-C  •  metoprolol tartrate (LOPRESSOR) tablet 50 mg, 50 mg, Oral, QAMDonald Kathryn E, MD, 50 mg at 05/13/19 0500  •  Pharmacy to dose vancomycin, , Does not apply, Continuous PRN, Na Bennett MD  •  sodium chloride 0.9 % flush 10 mL, 10 mL, Intravenous, PRN, Macario Flowers MD  •  sodium chloride 0.9 % flush 3 mL, 3 mL, Intravenous, Q12H, Na Bennett MD  •  sodium chloride 0.9 % flush 3-10 mL, 3-10 mL, Intravenous, PRN, Na Bennett MD  •  sodium chloride 0.9 % infusion, 100 mL/hr, Intravenous, Continuous, Na Bennett MD, Last Rate: 100 mL/hr at 05/12/19 2146, 100 mL/hr at 05/12/19 2146  •  vancomycin 1500 mg/500 mL 0.9% NS IVPB ([facility]), 1,500 mg, Intravenous, Q12H, Reji Ordaz, [facility]  •  vitamin B-12 (CYANOCOBALAMIN) tablet 1,000 mcg, 1,000 mcg, Oral, Daily, Na Bennett MD      Assessment/Plan   Assessment / Plan     Active Hospital Problems    Diagnosis POA   • Sepsis affecting skin (CMS/HCC) [A41.9] Yes     Priority: High   • Abscess of back [L02.212] Yes     Priority: High   • Type 2 diabetes mellitus with hyperglycemia (CMS/HCC) [E11.65] Yes     Priority: Medium   • Left-sided back pain [M54.9] Yes     Priority: Medium   • Lactic acidosis [E87.2] Yes     Priority: Medium   • Obesity, Class III, BMI 40-49.9 (morbid obesity) (CMS/HCC) [E66.01] Yes     Priority: Low          Brief Hospital Course to date:  Angel Blair is a 43 y.o. male with PMH of T2DM and HTN who presented to the ED with complaints of back pain, was found to be  septic from back abscess.  Admitted to our service for further evaluation.    Plan:    Sepsis due to back abscess  --s/p I&D in ED  --continue Vanc/Rocephin for now  --likely d/c Vanc once MRSA nasal swab results return  --lactic acidosis was improving, but still not within normal. Repeat this am  --consulted General Surgery, appreciate their input    T2DM  --poorly controlled with HbA1C 13%  --consult DM educator  --adjust basal/bolus per SSI requirements      DVT Prophylaxis:  MICHAEL    Disposition: I expect the patient to be discharged TBD    CODE STATUS:   Code Status and Medical Interventions:   Ordered at: 05/12/19 2034     Code Status:    CPR     Medical Interventions (Level of Support Prior to Arrest):    Full         Electronically signed by Edwige Pond MD, 05/13/19, 7:46 AM.

## 2019-05-13 NOTE — CONSULTS
"Diabetes Education  Assessment/Teaching    Patient Name:  Angel Blair  YOB: 1975  MRN: [id number]  Admit Date:  5/12/2019      Assessment Date:  5/13/2019    Most Recent Value   General Information    Referral From:  A1c, Blood glucose, MD order   Height  185.4 cm (73\")   Height Method  Stated   Weight  144 kg (318 lb 3.2 oz)  (Abnormal)    Weight Method  Stated   Pregnancy Assessment   Diabetes History   What type of diabetes do you have?  Type 2   Length of Diabetes Diagnosis  1 - 5 years   Current DM knowledge  fair   Have you had diabetes education/teaching in the past?  no   Do you test your blood sugar at home?  yes   Frequency of checks  BID   Meter type  Reli On   Who performs the test?  Self   Typical readings   200s to 300s   Have you had low blood sugar? (<70mg/dl)  no   Have you had high blood sugar? (>140mg/dl)  yes   How often do you have high blood sugar?  frequently   When was your last high blood sugar?  today   Education Preferences   What areas of diabetes would you like to learn about?  avoiding high blood sugar, diabetes complications, medications for diabetes   Nutrition Information   Assessment Topics   Healthy Eating - Assessment  Needs education   Being Active - Assessment  Needs education   Taking Medication - Assessment  Needs education   Problem Solving - Assessment  Needs education   Reducing Risk - Assessment  Needs education   Healthy Coping - Assessment  Needs education   DM Goals   Contact Plan  Follow-up medical care [FU with physician at discharge]            Most Recent Value   DM Education Needs   Meter  Has own   Meter Type  Other (comment) [Reli On]   Frequency of Testing  Other (comment) [2 to 4 times daily]   Blood Glucose Target Range  Ranges per ADA guidelines   Medication  Insulin, Actions, Side effects, Administration, Vial, Pen   Problem Solving  Hypoglycemia, Hyperglycemia, Sick days, Signs, Symptoms, Treatment   Reducing Risks  A1C testing, Blood " pressure, Lipids, Eye exam, Foot care, Immunizations, Cardiovascular, Neuropathy, Retinopathy   Physical Activity  Walking   Physical Activity Frequency  Occasionally   Healthy Coping  Appropriate   Discharge Plan  Home   Motivation  Moderate   Teaching Method  Explanation, Discussion, Handouts, Teach back   Patient Response  Verbalized understanding, Needs reinforcement        Mr. Blair states he has had Type 2 diabetes for several years. His current A1c is 13%. He states  He takes glimepiride and metformin at home. He also states he is supposed to be taking Lantus insulin, but can't afford it. He reports he last took insulin about 3 to 4 weeks ago. Discussed insulins that are more affordable. Left information of prices from [employer] on vials of insulins. Discussed with CM Nikki, to help assist with affording insulin. He reports he has lost about 70 pounds and is now about 311#. He walks at work.   Discussed and taught patient about type 2 diabetes self-management, risk factors, and importance of blood glucose control to reduce complications. Target blood glucose readings and A1c goals per ADA were reviewed. Reviewed current A1c and discussed its significance. Signs, symptoms and treatment of hyperglycemia and hypoglycemia were discussed. Lifestyle changes such as physical activity with MD approval and healthy eating were encouraged. Stressed the importance of strict blood sugar control  to promote healing.Instructed to  check blood sugar 2 to4 times per day and always prior to administering insulin. Encouraged to keep record of blood glucose readings to take to follow up appointment with PCP. Encouraged him to attend and OP diabetes education class at discharge, left information about classes.       Electronically signed by:  Chani Wagoner RN  05/13/19 12:49 PM

## 2019-05-13 NOTE — H&P
[facility] Medicine Services  HISTORY AND PHYSICAL    Patient Name: Angel Blair  : 1975  MRN: [id number]  Primary Care Physician: Jessica Redding PA    Subjective   Subjective     Chief Complaint:back pain and abscess      HPI:  Angel Blair is a 43 y.o. male with history of hypertension for several years, hypertension and hyperlipidemia who has not felt well for the last week.  He started having pain on the back of his left ribs about a week ago.  It then progressed to have swelling in increasing pain and then it changed colors today so he came to the emergency room for evaluation wariness diagnosed with sepsis as well as an abscess.  The emergency room did perform an I&D of the abscess, sent specimen for cultures.  Patient was found to have extremely elevated glucose as well as lactic acidosis and is admitted for treatment of sepsis.  Patient reports he has been taking his prescribed medications as directed.  He has not checked his glucose lately he denies having polyuria or polydipsia like he usually does when his glucose is elevated.  He was been unable to afford insulin prescribed recently so is only been taking medications by mouth.  Otherwise he reports feeling fine and has no other complaints.  He denies fevers but did have chills.  Review of Systems   Otherwise complete 10-system ROS is negative except as mentioned in the HPI.    Personal History     Past Medical History:   Diagnosis Date   • Diabetes mellitus (CMS/HCC)    • Hyperlipidemia    • Hypertension        Past Surgical History:   Procedure Laterality Date   • SOFT TISSUE TUMOR RESECTION         Family History: family history is not on file.  His mother  in her 60s from chronic illness.  Father still living    Social History:  reports that he has never smoked. He has never used smokeless tobacco. He reports that he does not drink alcohol or use drugs.  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  Medications:  Medications Prior to Admission   Medication Sig Dispense Refill Last Dose   • aspirin 81 MG chewable tablet Chew 81 mg Every Night.   5/11/2019 at Unknown time   • atorvastatin (LIPITOR) 40 MG tablet Take 40 mg by mouth Every Night.   Past Week at Unknown time   • Garlic 1000 MG capsule Take 1 capsule by mouth Every Night.   5/11/2019 at Unknown time   • glimepiride (AMARYL) 4 MG tablet Take 4 mg by mouth 2 (Two) Times a Day.   5/12/2019 at Unknown time   • hydrochlorothiazide (HYDRODIURIL) 50 MG tablet Take 50 mg by mouth Every Night.   5/11/2019 at Unknown time   • losartan (COZAAR) 100 MG tablet Take 100 mg by mouth Every Night.   5/11/2019 at Unknown time   • metFORMIN (GLUCOPHAGE) 1000 MG tablet Take 1,000 mg by mouth 2 (two) times a day with meals.   5/12/2019 at Unknown time   • metoprolol tartrate (LOPRESSOR) 50 MG tablet Take 50 mg by mouth Every Morning.   5/12/2019 at Unknown time   • Multiple Vitamins-Minerals (MULTIVITAMIN ADULT PO) Take 1 tablet by mouth Daily.   5/11/2019 at Unknown time   • vitamin B-12 (CYANOCOBALAMIN) 1000 MCG tablet Take 1,000 mcg by mouth Daily.   5/11/2019 at Unknown time       Allergies   Allergen Reactions   • Lisinopril Swelling       Objective   Objective     Vital Signs:   Temp:  [98.5 °F (36.9 °C)-99.2 °F (37.3 °C)] 98.5 °F (36.9 °C)  Heart Rate:  [] 87  Resp:  [18] 18  BP: (138-140)/(89) 138/89   Physical Exam   Patient is alert and talkative in no distress at rest-he is obese laying on his right side in the bed.  Neck is without mass or JVD  Heart is Reg wo murmur  Lungs are clear wo wheeze or crackle  Abd is soft without HSM or mass, not tender or distended  MAEW  Skin is without rash-he does have an area of induration along the left posterior rib area of fluctuation palpable beyond the opening.    Was packed in his left groin without any surrounding erythema or fullness.  Neurologic exam in nonfocal -he has normal strength and sensation in  his lower extremities.  Speech is clear  Mood is appropriate    Results Reviewed:  I have personally reviewed current lab, radiology, and data and agree.    Results from last 7 days   Lab Units 05/12/19  1344   WBC 10*3/mm3 11.83*   HEMOGLOBIN g/dL 14.3   HEMATOCRIT % 41.7   PLATELETS 10*3/mm3 180     Results from last 7 days   Lab Units 05/12/19  1344   SODIUM mmol/L 129*   POTASSIUM mmol/L 4.4   CHLORIDE mmol/L 92*   CO2 mmol/L 20.0*   BUN mg/dL 23*   CREATININE mg/dL 1.28*   GLUCOSE mg/dL 571*   CALCIUM mg/dL 9.5   ALT (SGPT) U/L 25   AST (SGOT) U/L 19       Imaging Results (last 24 hours)     ** No results found for the last 24 hours. **             Assessment/Plan   Assessment / Plan     Active Hospital Problems    Diagnosis   • Sepsis affecting skin (CMS/[facility])   • Type 2 diabetes mellitus with hyperglycemia (CMS/[facility])   • Left-sided back pain   • Obesity, Class III, BMI 40-49.9 (morbid obesity) (CMS/[facility])   • Abscess of back         Assessment & Plan:  43-year-old with history of diabetes and hypertension who presented to the emergency room with sepsis, lactic acidosis and hyperglycemia related to his diabetes, as well as  mild renal insufficiency.  He has been cultured and started on vancomycin and Rocephin.  We will continue these medications until cultures return.   - Have ordered an MRSA nasal swab so hopefully we can stop the vancomycin very soon.  -Continue aggressive IV hydration as well as current fingersticks and every 4 hour insulin correction.  -Hold his ARB for his renal insufficiency or until creatinine normalizes  -Surgery to see him tomorrow as he may need further surgical debridement of this abscess  -cont local wound care  -Suspect he may also need some oxygen when he sleeps and a sleep study when he is discharged  -We will continue Lortab and Dilaudid for pain control-as well as Tylenol      DVT prophylaxis:HEP SQ  CODE STATUS:FUll code    Admission Status: INPATIENT status due to the need for  care which can only be reasonably provided in an hospital setting --he needs aggressive wound care, glucose control and serial exams to make sure he progresses in a reasonable manner.  I do believe this will last 2 midnights.     Electronically signed by Na Bennett MD 05/12/19 8:35 PM

## 2019-05-13 NOTE — PLAN OF CARE
Problem: Patient Care Overview  Goal: Plan of Care Review  Outcome: Ongoing (interventions implemented as appropriate)   05/13/19 2534   Coping/Psychosocial   Plan of Care Reviewed With patient   OTHER   Outcome Summary pt alert and oriented, stable vital signs, room air, abscess on his right mid back and left abdominal fold, general surgery consult in the morning, NPO, q4 accucheck, Mag replaced, Mag redraw in the morning. Will continue to monitor.

## 2019-05-13 NOTE — PROGRESS NOTES
Continued Stay Note   [facility]     Patient Name: Angel Blair  MRN: [id number]  Today's Date: 5/13/2019    Admit Date: 5/12/2019    Discharge Plan     Row Name 05/13/19 1610       Plan    Plan  Social work provided resources for insulin that has a cost of $25.00 at the [facility] that includes 70/30 insulin.    Patient/Family in Agreement with Plan  yes        Discharge Codes    No documentation.             JING Rodríguez

## 2019-05-13 NOTE — PLAN OF CARE
Problem: Patient Care Overview  Goal: Plan of Care Review  Outcome: Ongoing (interventions implemented as appropriate)   05/13/19 1712   Coping/Psychosocial   Plan of Care Reviewed With patient;other (see comments)  (CRYS Shaikh)   OTHER   Outcome Summary WOC nurse for I&D wounds to left mid lateral back and left lateral pannus skin fold. Back wound 1x.1x 2.5cm large amount of purulent bloody drainage periwound hard indurated red tender, left pannus .5x.1x.5cm no drainage, mayela wound soft minimal tender. Patient was premedicated with Lortab 30 minutes before wound care, then IV Dilaudid during wound care. Both wounds irrigated with NS, packed with 1/4 inch iodoform strip packing. Dry dressing to cover. This should be changed everyday. Leave tail of strip packing out of wound to wick out drainge. Will write orders for wound care for nursing. Educated patient on the need for medication for constipation if needed due to Narcotic use, also educated on increase of Blood Sugar could be a sign of infection and to seek treatment.  No WOC Nurse need. Please reconsult if needed. Thank you   Plan of Care Review   Progress no change

## 2019-05-13 NOTE — PROGRESS NOTES
Discharge Planning Assessment  [facility]     Patient Name: Angel Blair  MRN: [id number]  Today's Date: 5/13/2019    Admit Date: 5/12/2019    Discharge Needs Assessment     Row Name 05/13/19 1014       Living Environment    Lives With  spouse    Name(s) of Who Lives With Patient  SpouseWilda and children    Current Living Arrangements  home/apartment/condo    Primary Care Provided by  self    Provides Primary Care For  no one    Family Caregiver if Needed  spouse    Family Caregiver Names  Spouse, Wilda Blair    Quality of Family Relationships  supportive;involved;helpful    Able to Return to Prior Arrangements  yes    Living Arrangement Comments  Lives with spouse and children in house with steps he is able to manage without difficulty       Resource/Environmental Concerns    Transportation Concerns  car, none       Transition Planning    Patient/Family Anticipates Transition to  home with family    Patient/Family Anticipated Services at Transition  none    Transportation Anticipated  family or friend will provide       Discharge Needs Assessment    Readmission Within the Last 30 Days  no previous admission in last 30 days    Concerns to be Addressed  discharge planning    Equipment Currently Used at Home  bipap/cpap    Anticipated Changes Related to Illness  none    Equipment Needed After Discharge  none        Discharge Plan     Row Name 05/13/19 1016       Plan    Plan  Plan is home with family at discharge    Patient/Family in Agreement with Plan  yes    Plan Comments  Met with patient at bedside to initiate initial discharge planning.  Pt lives with spouse and children in house with steps he is able to manage independently.  Has a CPAP as only DME.  PCP is Eva Goldman.  No home health.  Following for D/C needs.      Final Discharge Disposition Code  01 - home or self-care        Destination      No service coordination in this encounter.      Durable Medical Equipment      No service coordination in  this encounter.      Dialysis/Infusion      No service coordination in this encounter.      Home Medical Care      No service coordination in this encounter.      Therapy      No service coordination in this encounter.      Community Resources      No service coordination in this encounter.          Demographic Summary     Row Name 05/13/19 1013       General Information    Admission Type  inpatient    Arrived From  emergency department    Referral Source  admission list    Reason for Consult  discharge planning    Preferred Language  English        Functional Status     Row Name 05/13/19 1014       Functional Status    Usual Activity Tolerance  good    Current Activity Tolerance  good    Functional Status Comments  Independent with ADL       Functional Status, IADL    Medications  independent    Meal Preparation  independent    Housekeeping  independent    Laundry  independent    Shopping  independent        Psychosocial    No documentation.       Abuse/Neglect    No documentation.       Legal    No documentation.       Substance Abuse    No documentation.       Patient Forms    No documentation.           Nikki Mandujano RN

## 2019-05-14 LAB
ANION GAP SERPL CALCULATED.3IONS-SCNC: 10 MMOL/L
BUN BLD-MCNC: 14 MG/DL (ref 6–20)
BUN/CREAT SERPL: 13.9 (ref 7–25)
CALCIUM SPEC-SCNC: 9 MG/DL (ref 8.6–10.5)
CHLORIDE SERPL-SCNC: 103 MMOL/L (ref 98–107)
CO2 SERPL-SCNC: 23 MMOL/L (ref 22–29)
CREAT BLD-MCNC: 1.01 MG/DL (ref 0.76–1.27)
DEPRECATED RDW RBC AUTO: 44.4 FL (ref 37–54)
ERYTHROCYTE [DISTWIDTH] IN BLOOD BY AUTOMATED COUNT: 13.5 % (ref 12.3–15.4)
GFR SERPL CREATININE-BSD FRML MDRD: 98 ML/MIN/1.73
GLUCOSE BLD-MCNC: 314 MG/DL (ref 65–99)
GLUCOSE BLDC GLUCOMTR-MCNC: 251 MG/DL (ref 70–130)
GLUCOSE BLDC GLUCOMTR-MCNC: 304 MG/DL (ref 70–130)
GLUCOSE BLDC GLUCOMTR-MCNC: 323 MG/DL (ref 70–130)
GLUCOSE BLDC GLUCOMTR-MCNC: 330 MG/DL (ref 70–130)
HCT VFR BLD AUTO: 35.5 % (ref 37.5–51)
HGB BLD-MCNC: 12.1 G/DL (ref 13–17.7)
MCH RBC QN AUTO: 30.3 PG (ref 26.6–33)
MCHC RBC AUTO-ENTMCNC: 34.1 G/DL (ref 31.5–35.7)
MCV RBC AUTO: 88.8 FL (ref 79–97)
PLATELET # BLD AUTO: 193 10*3/MM3 (ref 140–450)
PMV BLD AUTO: 12 FL (ref 6–12)
POTASSIUM BLD-SCNC: 4.2 MMOL/L (ref 3.5–5.2)
RBC # BLD AUTO: 4 10*6/MM3 (ref 4.14–5.8)
SODIUM BLD-SCNC: 136 MMOL/L (ref 136–145)
VANCOMYCIN TROUGH SERPL-MCNC: 16.1 MCG/ML (ref 5–20)
WBC NRBC COR # BLD: 7.97 10*3/MM3 (ref 3.4–10.8)

## 2019-05-14 PROCEDURE — 99233 SBSQ HOSP IP/OBS HIGH 50: CPT | Performed by: INTERNAL MEDICINE

## 2019-05-14 PROCEDURE — 85027 COMPLETE CBC AUTOMATED: CPT | Performed by: INTERNAL MEDICINE

## 2019-05-14 PROCEDURE — 25010000002 VANCOMYCIN 10 G RECONSTITUTED SOLUTION

## 2019-05-14 PROCEDURE — 82962 GLUCOSE BLOOD TEST: CPT

## 2019-05-14 PROCEDURE — 25010000002 HYDROMORPHONE PER 4 MG: Performed by: INTERNAL MEDICINE

## 2019-05-14 PROCEDURE — 63710000001 INSULIN DETEMIR PER 5 UNITS: Performed by: INTERNAL MEDICINE

## 2019-05-14 PROCEDURE — 25010000002 HEPARIN (PORCINE) PER 1000 UNITS: Performed by: INTERNAL MEDICINE

## 2019-05-14 PROCEDURE — 80048 BASIC METABOLIC PNL TOTAL CA: CPT | Performed by: INTERNAL MEDICINE

## 2019-05-14 PROCEDURE — 80202 ASSAY OF VANCOMYCIN: CPT

## 2019-05-14 RX ORDER — DOXYCYCLINE 100 MG/1
100 CAPSULE ORAL EVERY 12 HOURS SCHEDULED
Status: DISCONTINUED | OUTPATIENT
Start: 2019-05-14 | End: 2019-05-15 | Stop reason: HOSPADM

## 2019-05-14 RX ADMIN — INSULIN DETEMIR 20 UNITS: 100 INJECTION, SOLUTION SUBCUTANEOUS at 21:00

## 2019-05-14 RX ADMIN — DOXYCYCLINE 100 MG: 100 CAPSULE ORAL at 20:33

## 2019-05-14 RX ADMIN — CYANOCOBALAMIN TAB 1000 MCG 1000 MCG: 1000 TAB at 08:21

## 2019-05-14 RX ADMIN — ACETAMINOPHEN 650 MG: 325 TABLET ORAL at 15:26

## 2019-05-14 RX ADMIN — HYDROMORPHONE HYDROCHLORIDE 0.5 MG: 1 INJECTION, SOLUTION INTRAMUSCULAR; INTRAVENOUS; SUBCUTANEOUS at 06:03

## 2019-05-14 RX ADMIN — DOXYCYCLINE 100 MG: 100 CAPSULE ORAL at 11:43

## 2019-05-14 RX ADMIN — HYDROCODONE BITARTRATE AND ACETAMINOPHEN 1 TABLET: 5; 325 TABLET ORAL at 20:38

## 2019-05-14 RX ADMIN — ATORVASTATIN CALCIUM 40 MG: 40 TABLET, FILM COATED ORAL at 20:33

## 2019-05-14 RX ADMIN — VANCOMYCIN HYDROCHLORIDE 1500 MG: 10 INJECTION, POWDER, LYOPHILIZED, FOR SOLUTION INTRAVENOUS at 09:54

## 2019-05-14 RX ADMIN — METOPROLOL TARTRATE 50 MG: 50 TABLET ORAL at 05:53

## 2019-05-14 RX ADMIN — HEPARIN SODIUM 5000 UNITS: 5000 INJECTION INTRAVENOUS; SUBCUTANEOUS at 20:33

## 2019-05-14 RX ADMIN — SODIUM CHLORIDE 100 ML/HR: 9 INJECTION, SOLUTION INTRAVENOUS at 09:53

## 2019-05-14 RX ADMIN — HEPARIN SODIUM 5000 UNITS: 5000 INJECTION INTRAVENOUS; SUBCUTANEOUS at 08:21

## 2019-05-14 RX ADMIN — HYDROCODONE BITARTRATE AND ACETAMINOPHEN 1 TABLET: 5; 325 TABLET ORAL at 13:56

## 2019-05-14 RX ADMIN — HYDROCODONE BITARTRATE AND ACETAMINOPHEN 1 TABLET: 5; 325 TABLET ORAL at 05:53

## 2019-05-14 NOTE — PLAN OF CARE
Problem: Sepsis/Septic Shock (Adult)  Goal: Signs and Symptoms of Listed Potential Problems Will be Absent, Minimized or Managed (Sepsis/Septic Shock)  Outcome: Ongoing (interventions implemented as appropriate)   05/14/19 1741   Goal/Outcome Evaluation   Problems Assessed (Sepsis) all   Problems Present (Sepsis) glycemic control impaired;infection progression       Problem: Patient Care Overview  Goal: Plan of Care Review  Outcome: Ongoing (interventions implemented as appropriate)   05/14/19 1741   Coping/Psychosocial   Plan of Care Reviewed With patient   OTHER   Outcome Summary patient complains of a head ache, medication given, dressing changed this am per night shift nurse, VSS, , will continue to monitor    Plan of Care Review   Progress no change

## 2019-05-14 NOTE — PROGRESS NOTES
Continued Stay Note   [facility]     Patient Name: Angel Blair  MRN: [id number]  Today's Date: 5/14/2019    Admit Date: 5/12/2019    Discharge Plan     Row Name 05/14/19 1225       Plan    Plan  Plan is home with family at discharge.      Patient/Family in Agreement with Plan  yes    Plan Comments  Met with patient at bedside.  Feeling better.  Patient's wife will need to be taught to do wound care.  Following    Final Discharge Disposition Code  01 - home or self-care        Discharge Codes    No documentation.             Nikki Mandujano RN

## 2019-05-14 NOTE — PROGRESS NOTES
"Pharmacy Consult-Vancomycin Dosing  Angel Blair is a  43 y.o. male receiving vancomycin therapy.     Indication: Skin and soft tissue infection, possible abscess  Consulting Provider: Hospitalist  ID Consult: No    Goal Trough: 10-15 mcg/mL    Current Antimicrobial Therapy    Vancomycin (pharmacy dosing) - day 3  Ceftriaxone 1 g IV every 24 hours - day 3    Allergies  Allergies as of 05/12/2019 - Reviewed 05/12/2019   Allergen Reaction Noted   • Lisinopril Swelling 07/08/2016     Labs    Results from last 7 days   Lab Units 05/13/19  0546 05/12/19 2019 05/12/19  1344   BUN mg/dL 20 24* 23*   CREATININE mg/dL 0.94 1.14 1.28*     Results from last 7 days   Lab Units 05/14/19  0841 05/12/19  1344   WBC 10*3/mm3 7.97 11.83*     Evaluation of Dosing    Ht - 185.4 cm (73\")  Wt - (!) 144 kg (318 lb 3.2 oz)    Estimated Creatinine Clearance: 151.9 mL/min (by C-G formula based on SCr of 0.94 mg/dL).    Intake & Output (last 3 days)         05/11 0701 - 05/12 0700 05/12 0701 - 05/13 0700 05/13 0701 - 05/14 0700 05/14 0701 - 05/15 0700    I.V. (mL/kg)  1426.5 (9.9) 1263.4 (8.8) 757.1 (5.3)    IV Piggyback  2722      Total Intake(mL/kg)  4148.5 (28.8) 1263.4 (8.8) 757.1 (5.3)    Urine (mL/kg/hr)  1000      Total Output  1000      Net  +3148.5 +1263.4 +757.1            Urine Unmeasured Occurrence   2 x     Stool Unmeasured Occurrence   1 x           Microbiology and Radiology  Microbiology Results (last 10 days)       Procedure Component Value - Date/Time    MRSA Screen, PCR - Swab, Nares [[id number]]  (Normal) Collected:  05/13/19 0630    Lab Status:  Final result Specimen:  Swab from Nares Updated:  05/13/19 0925     MRSA, PCR Negative    Narrative:       MRSA Negative    Wound Culture - Wound, Back [[id number]] Collected:  05/12/19 1630    Lab Status:  Preliminary result Specimen:  Wound from Back Updated:  05/14/19 1003     Wound Culture No growth at 2 days     Gram Stain No WBCs or organisms seen    Blood Culture - Blood, " Blood, Venous Line [[id number]] Collected:  05/12/19 1454    Lab Status:  Preliminary result Specimen:  Blood, Venous Line Updated:  05/13/19 1516     Blood Culture No growth at 24 hours    Blood Culture - Blood, Arm, Left [[id number]] Collected:  05/12/19 1344    Lab Status:  Preliminary result Specimen:  Blood from Arm, Left Updated:  05/13/19 1415     Blood Culture No growth at 24 hours          Evaluation of Level    Lab Results   Component Value Date    [facility] 16.10 05/14/2019 5/14 Vancomycin trough (~12.5 hour level) = 16.1 mcg/mL       Vancomycin dose decreased to 1250 mg IV every 12 hours    Assessment/Plan:    Pharmacy to dose vancomycin for skin and soft tissue infection.  Patient received a loading dose of vancomycin 2750 mg IV x 1 in the ED, and was started on a maintenance dose of vancomycin 1500 mg IV every 12 hours.  Vancomycin trough was drawn appropriately (~12.5 hour level) and was slightly supratherapeutic at 16.1 mcg/mL. Anticipate some further accumulation as patient approaches steady state.   Will decrease dose to vancomycin 1250 mg IV every 12 hours.  Monitor renal function, cultures and sensitivities, and clinical status, and adjust regimen as necessary.  Pharmacy will continue to follow.    Berenice Aguirre, PharmD  5/14/2019  11:24 AM

## 2019-05-14 NOTE — PROGRESS NOTES
[facility]  PROGRESS NOTE    Patient Name: Angel Blair  : 1975  MRN: [id number]    Date of Admission: 2019  Length of Stay: 2  Primary Care Physician: Jessica Redding PA    Subjective   Subjective     CC:  Back pain    HPI:  Doing okay this am, denies any issues overnight.     Review of Systems  Gen- No fevers, chills  CV- No chest pain, palpitations  Resp- No cough, dyspnea  GI- No N/V/D, abd pain    Otherwise ROS is negative except as mentioned in the HPI.    Objective   Objective     Vital Signs:   Temp:  [97.6 °F (36.4 °C)-98.2 °F (36.8 °C)] 97.6 °F (36.4 °C)  Heart Rate:  [74-97] 74  Resp:  [18] 18  BP: (138-172)/() 140/92        Physical Exam:  Constitutional: No acute distress, awake, alert, obese  HENT: NCAT, mucous membranes moist  Respiratory: Clear to auscultation bilaterally, respiratory effort normal   Cardiovascular: RRR, no murmurs, rubs, or gallops, palpable pedal pulses bilaterally  Gastrointestinal: Positive bowel sounds, soft, nontender, nondistended  Musculoskeletal: No bilateral ankle edema  Psychiatric: Appropriate affect, cooperative  Neurologic: Oriented x 3, strength symmetric in all extremities, Cranial Nerves grossly intact to confrontation, speech clear  Skin: left flank with soiled bandage over previously I&D'd area, wound site packed with soiled dressing, surrounded induration and mild erythema    Results Reviewed:  I have personally reviewed current lab, radiology, and data and agree.    Results from last 7 days   Lab Units 19  0841 19  1344   WBC 10*3/mm3 7.97 11.83*   HEMOGLOBIN g/dL 12.1* 14.3   HEMATOCRIT % 35.5* 41.7   PLATELETS 10*3/mm3 193 180   PROCALCITONIN ng/mL  --  0.35*     Results from last 7 days   Lab Units 19  0546 19  2019 19  1344   SODIUM mmol/L 132* 135* 129*   POTASSIUM mmol/L 3.7 3.9 4.4   CHLORIDE mmol/L 100 100 92*   CO2 mmol/L 22.0 22.0 20.0*   BUN mg/dL 20 24* 23*    CREATININE mg/dL 0.94 1.14 1.28*   GLUCOSE mg/dL 336* 349* 571*   CALCIUM mg/dL 8.7 8.5* 9.5   ALT (SGPT) U/L  --   --  25   AST (SGOT) U/L  --   --  19     Estimated Creatinine Clearance: 151.9 mL/min (by C-G formula based on SCr of 0.94 mg/dL).    Microbiology Results Abnormal     Procedure Component Value - Date/Time    Wound Culture - Wound, Back [[id number]] Collected:  05/12/19 1630    Lab Status:  Preliminary result Specimen:  Wound from Back Updated:  05/14/19 1003     Wound Culture No growth at 2 days     Gram Stain No WBCs or organisms seen    Blood Culture - Blood, Blood, Venous Line [[id number]] Collected:  05/12/19 1454    Lab Status:  Preliminary result Specimen:  Blood, Venous Line Updated:  05/13/19 1516     Blood Culture No growth at 24 hours    Blood Culture - Blood, Arm, Left [[id number]] Collected:  05/12/19 1344    Lab Status:  Preliminary result Specimen:  Blood from Arm, Left Updated:  05/13/19 1415     Blood Culture No growth at 24 hours    MRSA Screen, PCR - Swab, Nares [[id number]]  (Normal) Collected:  05/13/19 0630    Lab Status:  Final result Specimen:  Swab from Nares Updated:  05/13/19 0925     MRSA, PCR Negative    Narrative:       MRSA Negative          Imaging Results (last 24 hours)     Procedure Component Value Units Date/Time    US Chest [[id number]] Collected:  05/14/19 0834     Updated:  05/14/19 1026    Narrative:       EXAMINATION: US CHEST-05/13/2019:     INDICATION: Evaluate for residual fluid collection at the  posterior/lateral aspect of the left chest.; L02.212-Cutaneous abscess  of back (any part, except buttock); L03.312-Cellulitis of back (any part  except buttock); L02.214-Cutaneous abscess of groin; E11.65-Type 2  diabetes mellitus with hyperglycemia; N28.9-Disorder of kidney and  ureter, unspecified.     TECHNIQUE: Directed ultrasound to the area of previously drained abscess  along the posterolateral left chest.     COMPARISON: NONE.     FINDINGS: There is a small round  "mixed echogenicity \"mass\" approximately  12 mm in diameter in the subcutaneous tissues in the area indicated,  with no obvious fluid level or other drainable fluid collection. The  outline is slightly irregular. There is clear evidence of internal color  Doppler flow suggesting at least part of this area is solid soft tissue.  This could represent a necrotic lymph node, rather than residual densely  debris filled collection. There is no evidence of cyst or mass  elsewhere. There is mild generalized edema.       Impression:       12 mm mixed echogenicity, at least partially solid appearing  \"mass\" in the area indicated, possibly necrotic lymph node, or finally  multiloculated debris-containing collection. Evidence of central color  Doppler flow suggests residual necrotic node or other small mass.  Consider follow-up imaging.     D:  05/14/2019  E:  05/14/2019                         I have reviewed the medications:    Current Facility-Administered Medications:   •  acetaminophen (TYLENOL) tablet 650 mg, 650 mg, Oral, Q6H PRN, Na Bennett MD  •  atorvastatin (LIPITOR) tablet 40 mg, 40 mg, Oral, Nightly, Na Bennett MD, 40 mg at 05/13/19 2002  •  dextrose (D50W) 25 g/ 50mL Intravenous Solution 25 g, 25 g, Intravenous, Q15 Min PRN, Na Bennett MD  •  dextrose (GLUTOSE) oral gel 15 g, 15 g, Oral, Q15 Min PRN, Na Bennett MD  •  doxycycline (MONODOX) capsule 100 mg, 100 mg, Oral, Q12H, Edwige Pond MD  •  glucagon (human recombinant) (GLUCAGEN DIAGNOSTIC) injection 1 mg, 1 mg, Subcutaneous, PRN, Na Bennett MD  •  heparin (porcine) 5000 UNIT/ML injection 5,000 Units, 5,000 Units, Subcutaneous, Q12H, Na Bennett MD, 5,000 Units at 05/14/19 0821  •  HYDROcodone-acetaminophen (NORCO) 5-325 MG per tablet 1 tablet, 1 tablet, Oral, Q4H PRN, Na Bennett MD, 1 tablet at 05/14/19 0553  •  HYDROmorphone (DILAUDID) injection 0.5 mg, 0.5 mg, Intravenous, Q2H PRN, Na Bennett " MD, 0.5 mg at 05/14/19 0603  •  insulin detemir (LEVEMIR) injection 20 Units, 20 Units, Subcutaneous, Nightly, Edwige Pond MD  •  insulin lispro (humaLOG) injection 0-7 Units, 0-7 Units, Subcutaneous, 4x Daily With Meals & Nightly, Edwige Pond MD, 5 Units at 05/14/19 0820  •  insulin lispro (humaLOG) injection 10 Units, 10 Units, Subcutaneous, TID With Meals, Edwige Pond MD  •  Magnesium Sulfate 2 gram Bolus, followed by 8 gram infusion (total Mg dose 10 grams)- Mg less than or equal to 1mg/dL, 2 g, Intravenous, PRN **OR** Magnesium Sulfate 2 gram / 50mL Infusion (GIVE X 3 BAGS TO EQUAL 6GM TOTAL DOSE) - Mg 1.1 - 1.5 mg/dl, 2 g, Intravenous, PRN, Last Rate: 25 mL/hr at 05/13/19 0457, 2 g at 05/13/19 0457 **OR** Magnesium Sulfate 4 gram infusion- Mg 1.6-1.9 mg/dL, 4 g, Intravenous, PRN, Queta Turner PA-C  •  metoprolol tartrate (LOPRESSOR) tablet 50 mg, 50 mg, Oral, QAM, Na Bennett MD, 50 mg at 05/14/19 0553  •  Pharmacy to dose vancomycin, , Does not apply, Continuous PRN, Edwige Pond MD  •  sodium chloride 0.9 % flush 10 mL, 10 mL, Intravenous, PRN, Macario Flowers MD  •  sodium chloride 0.9 % flush 3 mL, 3 mL, Intravenous, Q12H, Na Bennett MD  •  sodium chloride 0.9 % flush 3-10 mL, 3-10 mL, Intravenous, PRN, Na Bennett MD  •  sodium chloride 0.9 % infusion, 100 mL/hr, Intravenous, Continuous, Na Bennett MD, Last Rate: 100 mL/hr at 05/14/19 0953, 100 mL/hr at 05/14/19 0953  •  vitamin B-12 (CYANOCOBALAMIN) tablet 1,000 mcg, 1,000 mcg, Oral, Daily, Na Bennett MD, 1,000 mcg at 05/14/19 0821      Assessment/Plan   Assessment / Plan     Active Hospital Problems    Diagnosis POA   • Sepsis affecting skin (CMS/[facility]) [A41.9] Yes     Priority: High   • Abscess of back [L02.212] Yes     Priority: High   • Type 2 diabetes mellitus with hyperglycemia (CMS/[facility]) [E11.65] Yes     Priority: Medium   • Left-sided back pain [M54.9] Yes     Priority:  Medium   • Lactic acidosis [E87.2] Yes     Priority: Medium   • Obesity, Class III, BMI 40-49.9 (morbid obesity) (CMS/[facility]) [E66.01] Yes     Priority: Low          Brief Hospital Course to date:  Angel Blair is a 43 y.o. male with PMH of T2DM and HTN who presented to the ED with complaints of back pain, was found to be septic from back abscess.  Admitted to our service for further evaluation.    Plan:    Sepsis due to back abscess  --s/p I&D in ED  -- transition to oral Doxycycline today, stop Vanc/Rocephin.  Monitor.  Cultures thus far unremarkable.   --lactic acidosis resolved  --consulted General Surgery, appreciate their input. No further intervention per Dr. Jiang, they have now signed off.     T2DM  --poorly controlled with HbA1C 13%  --consulted DM educator  --adjusted basal/bolus per SSI requirements. Will need new Rx at discharge, previously unable to afford insulin.      DVT Prophylaxis:  MICHAEL    Disposition: I expect the patient to be discharged home tomorrow if doing okay on oral ABX     CODE STATUS:   Code Status and Medical Interventions:   Ordered at: 05/12/19 2034     Code Status:    CPR     Medical Interventions (Level of Support Prior to Arrest):    Full         Electronically signed by Edwige Pond MD, 05/14/19, 11:30 AM.

## 2019-05-14 NOTE — PLAN OF CARE
Problem: Patient Care Overview  Goal: Plan of Care Review  Outcome: Ongoing (interventions implemented as appropriate)   05/14/19 3828   Coping/Psychosocial   Plan of Care Reviewed With patient   OTHER   Outcome Summary patient alert and oriented, stable vital signs, wound care twice daily on his Left back and left lower abd fold, IV abx, US chest pending, independent, will continue to monitor

## 2019-05-14 NOTE — CONSULTS
General Surgery Consultation Note    Date of Service: 5/13/2019    Angel Johnnie  [id number]  1975      Referring Provider: Edwige Pond MD    Location of Consult: [facility]     Reason for Consultation: Abscess       History of Present Illness:  I am seeing, Angel Blair, in consultation for Edwige Pond MD regarding left posterior chest wall abscess.  The patient is a 43 year old male with a history of obesity and diabetes who presented to the ED with complaints of left back pain for one week.  The patient first noticed a small nodule at that location that has progressively enlarged over the past week. He denies recent insect bites or trauma.  There is no history of prior abscess in the past.  He presented to [facility] ED at which time he was noted to have a mild leukocytosis and abscess.  A bedside I&D was performed by the ED and the wound was packed.  General surgery was asked to evaluate the patient due to concerns the wound may need operative drainage.      Past Medical History:   Diagnosis Date   • Diabetes mellitus (CMS/HCC)    • Hyperlipidemia    • Hypertension    • Sleep apnea        Past Surgical History:   Procedure Laterality Date   • SOFT TISSUE TUMOR RESECTION  2010       Allergies   Allergen Reactions   • Lisinopril Swelling       No current facility-administered medications on file prior to encounter.      Current Outpatient Medications on File Prior to Encounter   Medication Sig Dispense Refill   • aspirin 81 MG chewable tablet Chew 81 mg Every Night.     • atorvastatin (LIPITOR) 40 MG tablet Take 40 mg by mouth Every Night.     • Garlic 1000 MG capsule Take 1 capsule by mouth Every Night.     • glimepiride (AMARYL) 4 MG tablet Take 4 mg by mouth 2 (Two) Times a Day.     • hydrochlorothiazide (HYDRODIURIL) 50 MG tablet Take 50 mg by mouth Every Night.     • losartan (COZAAR) 100 MG tablet Take 100 mg by mouth Every Night.     • metFORMIN (GLUCOPHAGE) 1000 MG tablet Take 1,000 mg by mouth  2 (two) times a day with meals.     • metoprolol tartrate (LOPRESSOR) 50 MG tablet Take 50 mg by mouth Every Morning.     • Multiple Vitamins-Minerals (MULTIVITAMIN ADULT PO) Take 1 tablet by mouth Daily.     • vitamin B-12 (CYANOCOBALAMIN) 1000 MCG tablet Take 1,000 mcg by mouth Daily.           Current Facility-Administered Medications:   •  [START ON 5/14/2019] ! vanc trough 5/14 @ 0830, hold 0900 dose until pharmacy assesses level, , Does not apply, Once, Berenice Aguirre, [facility]  •  acetaminophen (TYLENOL) tablet 650 mg, 650 mg, Oral, Q6H PRN, Na Bennett MD  •  atorvastatin (LIPITOR) tablet 40 mg, 40 mg, Oral, Nightly, Na Bennett MD, 40 mg at 05/13/19 2002  •  cefTRIAXone (ROCEPHIN) 2 g/100 mL 0.9% NS VTB (ZOEY), 2 g, Intravenous, Q24H, Na Bennett MD, 2 g at 05/13/19 1618  •  dextrose (D50W) 25 g/ 50mL Intravenous Solution 25 g, 25 g, Intravenous, Q15 Min PRN, Na Bennett MD  •  dextrose (GLUTOSE) oral gel 15 g, 15 g, Oral, Q15 Min PRN, Na Bennett MD  •  glucagon (human recombinant) (GLUCAGEN DIAGNOSTIC) injection 1 mg, 1 mg, Subcutaneous, PRN, Na Bennett MD  •  heparin (porcine) 5000 UNIT/ML injection 5,000 Units, 5,000 Units, Subcutaneous, Q12H, Na Bennett MD, 5,000 Units at 05/13/19 2002  •  HYDROcodone-acetaminophen (NORCO) 5-325 MG per tablet 1 tablet, 1 tablet, Oral, Q4H PRN, Na Bennett MD, 1 tablet at 05/13/19 1618  •  HYDROmorphone (DILAUDID) injection 0.5 mg, 0.5 mg, Intravenous, Q2H PRN, Na Bennett MD, 0.5 mg at 05/13/19 1655  •  insulin detemir (LEVEMIR) injection 15 Units, 15 Units, Subcutaneous, Nightly, Edwige Pond MD, 15 Units at 05/13/19 2127  •  insulin lispro (humaLOG) injection 0-7 Units, 0-7 Units, Subcutaneous, 4x Daily With Meals & Nightly, Edwige Pond MD, 6 Units at 05/13/19 2127  •  insulin lispro (humaLOG) injection 5 Units, 5 Units, Subcutaneous, TID With Meals, Edwige Pond MD, 5 Units at  05/13/19 1625  •  Magnesium Sulfate 2 gram Bolus, followed by 8 gram infusion (total Mg dose 10 grams)- Mg less than or equal to 1mg/dL, 2 g, Intravenous, PRN **OR** Magnesium Sulfate 2 gram / 50mL Infusion (GIVE X 3 BAGS TO EQUAL 6GM TOTAL DOSE) - Mg 1.1 - 1.5 mg/dl, 2 g, Intravenous, PRN, Last Rate: 25 mL/hr at 05/13/19 0457, 2 g at 05/13/19 0457 **OR** Magnesium Sulfate 4 gram infusion- Mg 1.6-1.9 mg/dL, 4 g, Intravenous, PRN, Queta Turner PA-C  •  metoprolol tartrate (LOPRESSOR) tablet 50 mg, 50 mg, Oral, QAMDonald Kathryn E, MD, 50 mg at 05/13/19 0500  •  Pharmacy to dose vancomycin, , Does not apply, Continuous PRN, Edwige Pond MD  •  sodium chloride 0.9 % flush 10 mL, 10 mL, Intravenous, PRN, Macario Flowers MD  •  sodium chloride 0.9 % flush 3 mL, 3 mL, Intravenous, Q12H, Na Bennett MD  •  sodium chloride 0.9 % flush 3-10 mL, 3-10 mL, Intravenous, PRN, Na Bennett MD  •  sodium chloride 0.9 % infusion, 100 mL/hr, Intravenous, Continuous, Na Bennett MD, Last Rate: 100 mL/hr at 05/13/19 1936, 100 mL/hr at 05/13/19 1936  •  vancomycin 1500 mg/500 mL 0.9% NS IVPB ([facility]), 15 mg/kg (Adjusted), Intravenous, Q12H, Berenice Aguirre RP, 1,500 mg at 05/13/19 2002  •  vitamin B-12 (CYANOCOBALAMIN) tablet 1,000 mcg, 1,000 mcg, Oral, Daily, Na Bennett MD, 1,000 mcg at 05/13/19 0813    Past Surgical History:   • SOFT TISSUE TUMOR RESECTION       History reviewed. No pertinent family history.    Social History     Socioeconomic History   • Marital status:      Spouse name: Not on file   • Number of children: Not on file   • Years of education: Not on file   • Highest education level: Not on file   Tobacco Use   • Smoking status: Never Smoker   • Smokeless tobacco: Never Used   Substance and Sexual Activity   • Alcohol use: No   • Drug use: No   Social History Narrative    , [place] in [place] 5 kids, 2 grands, one adopted daughter       Review of  "Systems:  Review of Systems   Constitutional: Positive for fatigue. Negative for fever.   HENT: Negative.    Eyes: Negative.    Respiratory: Negative.    Cardiovascular: Negative.    Gastrointestinal: Negative.    Endocrine: Positive for polyuria.   Genitourinary: Negative.    Musculoskeletal: Positive for back pain.   Skin: Negative.    Allergic/Immunologic: Negative.    Neurological: Negative.    Hematological: Negative.          BP (!) 172/107 (BP Location: Right arm, Patient Position: Sitting)   Pulse 78   Temp 97.8 °F (36.6 °C) (Oral)   Resp 18   Ht 185.4 cm (73\")   Wt (!) 144 kg (318 lb 3.2 oz)   SpO2 95%   BMI 41.98 kg/m²   Body mass index is 41.98 kg/m².    General: NAD, AAO x 3   Eyes:  PER, no icterus, and normal sclera.  Ears, Nose, Mouth, & Throat:  Normal appearance of ears.  Nares patent.  Nasal mucosa, septum, and turbinates normal.  Mucous membranes moist without exudate.  Neck supple without adenopathy.   Cardiovascular: Regular rate and rhythm without murmurs, rubs, or gallops.  Palpable pedal pulses.  Extremities warm and well perfused.  No edema.    Respiratory: Clear to auscultation bilaterally without rales, rhonchi, or wheezes.  Gastrointestinal: Soft, NT, ND.  Neurologic: CN II - XII grossly intact.  No focal neuro deficits.  Skin: There is an area of faint cellulitis surrounding an open skin defect of the left upper posterior chest wall.  There is no active drainage.  There is an I&D site of the left groin without surrounding cellulitis.      CBC  Results from last 7 days   Lab Units 05/12/19  1344   WBC 10*3/mm3 11.83*   HEMOGLOBIN g/dL 14.3   HEMATOCRIT % 41.7   PLATELETS 10*3/mm3 180       CMP  Results from last 7 days   Lab Units 05/13/19  0546  05/12/19  1344   SODIUM mmol/L 132*   < > 129*   POTASSIUM mmol/L 3.7   < > 4.4   CHLORIDE mmol/L 100   < > 92*   CO2 mmol/L 22.0   < > 20.0*   BUN mg/dL 20   < > 23*   CREATININE mg/dL 0.94   < > 1.28*   CALCIUM mg/dL 8.7   < > 9.5 "   BILIRUBIN mg/dL  --   --  0.4   ALK PHOS U/L  --   --  123*   ALT (SGPT) U/L  --   --  25   AST (SGOT) U/L  --   --  19   GLUCOSE mg/dL 336*   < > 571*    < > = values in this interval not displayed.       Radiology  Imaging Results (last 72 hours)     Procedure Component Value Units Date/Time    US Chest [[id number]] Updated:  05/13/19 185            Assessment:  Mr. Blair is a 43 year old male with a history of obesity and diabetes mellitus admitted for treatment of a posterior left chest wall abscess.  On examination, the abscess appears well drained.  Would recommend an US to assess for complete drainage of the abscess.  Will follow.    Plan:  - US to assess for continued fluid collection  - Continue wound care to left posterior chest and groin abscess  - Continue IV antibiotics per ID  - Will follow      Jennifer Jiang MD  05/13/19  10:08 PM

## 2019-05-14 NOTE — PROGRESS NOTES
"General Surgery Daily Progress Note    Subjective:  No complaints.    Objective:  /84 (BP Location: Right arm, Patient Position: Lying)   Pulse 75   Temp 98 °F (36.7 °C) (Oral)   Resp 18   Ht 185.4 cm (73\")   Wt (!) 144 kg (318 lb 3.2 oz)   SpO2 98%   BMI 41.98 kg/m²     Physical Exam:  General: NAD, AAO x 3   Skin: Erythema surrounding aopen skin defect of the left upper posterior chest wall has resolved.  There is surrounding induration without fluctuance.  There is active drainage.  There is an I&D site of the left groin without surrounding cellulitis.        Imaging Results (last 24 hours)     Procedure Component Value Units Date/Time    US Chest [[id number]] Collected:  05/14/19 0834     Updated:  05/14/19 1026    Narrative:       EXAMINATION: US CHEST-05/13/2019:     INDICATION: Evaluate for residual fluid collection at the  posterior/lateral aspect of the left chest.; L02.212-Cutaneous abscess  of back (any part, except buttock); L03.312-Cellulitis of back (any part  except buttock); L02.214-Cutaneous abscess of groin; E11.65-Type 2  diabetes mellitus with hyperglycemia; N28.9-Disorder of kidney and  ureter, unspecified.     TECHNIQUE: Directed ultrasound to the area of previously drained abscess  along the posterolateral left chest.     COMPARISON: NONE.     FINDINGS: There is a small round mixed echogenicity \"mass\" approximately  12 mm in diameter in the subcutaneous tissues in the area indicated,  with no obvious fluid level or other drainable fluid collection. The  outline is slightly irregular. There is clear evidence of internal color  Doppler flow suggesting at least part of this area is solid soft tissue.  This could represent a necrotic lymph node, rather than residual densely  debris filled collection. There is no evidence of cyst or mass  elsewhere. There is mild generalized edema.       Impression:       12 mm mixed echogenicity, at least partially solid appearing  \"mass\" in the area " indicated, possibly necrotic lymph node, or finally  multiloculated debris-containing collection. Evidence of central color  Doppler flow suggests residual necrotic node or other small mass.  Consider follow-up imaging.     D:  05/14/2019  E:  05/14/2019                    CBC:  Results from last 7 days   Lab Units 05/14/19  0841   WBC 10*3/mm3 7.97   HEMOGLOBIN g/dL 12.1*   HEMATOCRIT % 35.5*   PLATELETS 10*3/mm3 193       CMP:  Results from last 7 days   Lab Units 05/13/19  0546  05/12/19  1344   SODIUM mmol/L 132*   < > 129*   POTASSIUM mmol/L 3.7   < > 4.4   CHLORIDE mmol/L 100   < > 92*   CO2 mmol/L 22.0   < > 20.0*   BUN mg/dL 20   < > 23*   CREATININE mg/dL 0.94   < > 1.28*   CALCIUM mg/dL 8.7   < > 9.5   BILIRUBIN mg/dL  --   --  0.4   ALK PHOS U/L  --   --  123*   ALT (SGPT) U/L  --   --  25   AST (SGOT) U/L  --   --  19   GLUCOSE mg/dL 336*   < > 571*    < > = values in this interval not displayed.         Assessment  HD #1 for 43 year old male with a history of obesity and diabetes mellitus admitted for treatment of a posterior left chest wall abscess. US without residual fluid collection.  From a surgical standpoint, the collection is well drained.  Patient should continue with wound care to left posterior chest wall abscess and groin abscess.  There is no need for surgical follow-up.  Antibiotics per ID.  Will sign off.    Plan:   - Will sign off as abscess is well drained  - No need for surgical follow-up    Jennifer Jiang MD - 5/14/2019, 10:41 AM

## 2019-05-15 VITALS
HEART RATE: 82 BPM | HEIGHT: 73 IN | TEMPERATURE: 98.3 F | DIASTOLIC BLOOD PRESSURE: 103 MMHG | WEIGHT: 315 LBS | SYSTOLIC BLOOD PRESSURE: 146 MMHG | BODY MASS INDEX: 41.75 KG/M2 | RESPIRATION RATE: 16 BRPM | OXYGEN SATURATION: 97 %

## 2019-05-15 PROBLEM — A41.9 SEPSIS AFFECTING SKIN: Status: RESOLVED | Noted: 2019-05-12 | Resolved: 2019-05-15

## 2019-05-15 PROBLEM — E87.20 LACTIC ACIDOSIS: Status: RESOLVED | Noted: 2019-05-12 | Resolved: 2019-05-15

## 2019-05-15 LAB
ANION GAP SERPL CALCULATED.3IONS-SCNC: 9 MMOL/L
BUN BLD-MCNC: 11 MG/DL (ref 6–20)
BUN/CREAT SERPL: 15.9 (ref 7–25)
CALCIUM SPEC-SCNC: 9 MG/DL (ref 8.6–10.5)
CHLORIDE SERPL-SCNC: 105 MMOL/L (ref 98–107)
CO2 SERPL-SCNC: 21 MMOL/L (ref 22–29)
CREAT BLD-MCNC: 0.69 MG/DL (ref 0.76–1.27)
DEPRECATED RDW RBC AUTO: 44.2 FL (ref 37–54)
ERYTHROCYTE [DISTWIDTH] IN BLOOD BY AUTOMATED COUNT: 13.6 % (ref 12.3–15.4)
GFR SERPL CREATININE-BSD FRML MDRD: >150 ML/MIN/1.73
GLUCOSE BLD-MCNC: 266 MG/DL (ref 65–99)
GLUCOSE BLDC GLUCOMTR-MCNC: 240 MG/DL (ref 70–130)
GLUCOSE BLDC GLUCOMTR-MCNC: 284 MG/DL (ref 70–130)
HCT VFR BLD AUTO: 38.5 % (ref 37.5–51)
HGB BLD-MCNC: 12.8 G/DL (ref 13–17.7)
MCH RBC QN AUTO: 29.6 PG (ref 26.6–33)
MCHC RBC AUTO-ENTMCNC: 33.2 G/DL (ref 31.5–35.7)
MCV RBC AUTO: 88.9 FL (ref 79–97)
PLATELET # BLD AUTO: 217 10*3/MM3 (ref 140–450)
PMV BLD AUTO: 12.1 FL (ref 6–12)
POTASSIUM BLD-SCNC: 4.1 MMOL/L (ref 3.5–5.2)
RBC # BLD AUTO: 4.33 10*6/MM3 (ref 4.14–5.8)
SODIUM BLD-SCNC: 135 MMOL/L (ref 136–145)
WBC NRBC COR # BLD: 6.97 10*3/MM3 (ref 3.4–10.8)

## 2019-05-15 PROCEDURE — 85027 COMPLETE CBC AUTOMATED: CPT | Performed by: INTERNAL MEDICINE

## 2019-05-15 PROCEDURE — 99239 HOSP IP/OBS DSCHRG MGMT >30: CPT | Performed by: NURSE PRACTITIONER

## 2019-05-15 PROCEDURE — 25010000002 HEPARIN (PORCINE) PER 1000 UNITS: Performed by: INTERNAL MEDICINE

## 2019-05-15 PROCEDURE — 80048 BASIC METABOLIC PNL TOTAL CA: CPT

## 2019-05-15 PROCEDURE — 82962 GLUCOSE BLOOD TEST: CPT

## 2019-05-15 RX ORDER — LOSARTAN POTASSIUM 50 MG/1
100 TABLET ORAL
Status: DISCONTINUED | OUTPATIENT
Start: 2019-05-15 | End: 2019-05-15 | Stop reason: HOSPADM

## 2019-05-15 RX ORDER — HYDROCODONE BITARTRATE AND ACETAMINOPHEN 5; 325 MG/1; MG/1
1 TABLET ORAL EVERY 4 HOURS PRN
Qty: 10 TABLET | Refills: 0 | Status: SHIPPED | OUTPATIENT
Start: 2019-05-15 | End: 2019-05-20

## 2019-05-15 RX ORDER — HYDRALAZINE HYDROCHLORIDE 20 MG/ML
10 INJECTION INTRAMUSCULAR; INTRAVENOUS EVERY 6 HOURS PRN
Status: DISCONTINUED | OUTPATIENT
Start: 2019-05-15 | End: 2019-05-15 | Stop reason: HOSPADM

## 2019-05-15 RX ORDER — DOXYCYCLINE 100 MG/1
100 CAPSULE ORAL EVERY 12 HOURS SCHEDULED
Qty: 11 CAPSULE | Refills: 0 | Status: SHIPPED | OUTPATIENT
Start: 2019-05-15 | End: 2019-05-21

## 2019-05-15 RX ADMIN — METOPROLOL TARTRATE 50 MG: 50 TABLET ORAL at 05:54

## 2019-05-15 RX ADMIN — LOSARTAN POTASSIUM 100 MG: 50 TABLET ORAL at 11:04

## 2019-05-15 RX ADMIN — SODIUM CHLORIDE 100 ML/HR: 9 INJECTION, SOLUTION INTRAVENOUS at 08:59

## 2019-05-15 RX ADMIN — CYANOCOBALAMIN TAB 1000 MCG 1000 MCG: 1000 TAB at 08:14

## 2019-05-15 RX ADMIN — DOXYCYCLINE 100 MG: 100 CAPSULE ORAL at 08:14

## 2019-05-15 RX ADMIN — SODIUM CHLORIDE, PRESERVATIVE FREE 3 ML: 5 INJECTION INTRAVENOUS at 08:15

## 2019-05-15 RX ADMIN — HEPARIN SODIUM 5000 UNITS: 5000 INJECTION INTRAVENOUS; SUBCUTANEOUS at 08:15

## 2019-05-15 RX ADMIN — HYDROCODONE BITARTRATE AND ACETAMINOPHEN 1 TABLET: 5; 325 TABLET ORAL at 12:30

## 2019-05-15 NOTE — PROGRESS NOTES
Case Management Discharge Note    Final Note: Home    Destination      No service has been selected for the patient.      Durable Medical Equipment      No service has been selected for the patient.      Dialysis/Infusion      No service has been selected for the patient.      Home Medical Care      No service has been selected for the patient.      Therapy      No service has been selected for the patient.      Community Resources      No service has been selected for the patient.             Final Discharge Disposition Code: 01 - home or self-care

## 2019-05-15 NOTE — DISCHARGE SUMMARY
[facility] Medicine Services  DISCHARGE SUMMARY    Patient Name: Angel Blair  : 1975  MRN: [id number]    Date of Admission: 2019  Date of Discharge: 5/15/2019  Primary Care Physician: Jessica Redding PA    Consults     Date and Time Order Name Status Description    2019 0030 Inpatient General Surgery Consult Completed         Hospital Course   Presenting Problem:   Abscess of back [L02.212]    Active Hospital Problems    Diagnosis  POA   • Type 2 diabetes mellitus with hyperglycemia (CMS/[facility]) [E11.65]  Yes   • Left-sided back pain [M54.9]  Yes   • Obesity, Class III, BMI 40-49.9 (morbid obesity) (CMS/[facility]) [E66.01]  Yes   • Abscess of back [L02.212]  Yes      Resolved Hospital Problems    Diagnosis Date Resolved POA   • Sepsis affecting skin (CMS/[facility]) [A41.9] 05/15/2019 Yes   • Lactic acidosis [E87.2] 05/15/2019 Yes      Hospital Course:  Angel Blair is a 43 y.o. male with history of hypertension and hyperlipidemia who has not felt well for the last week.  He started having pain on the back of his left ribs about a week ago.  It then progressed to have swelling in increasing pain and then it changed colors today so he came to the emergency room for evaluation and was diagnosed with sepsis, as well as, an abscess.  The emergency room did perform an I&D of the abscess, sent specimen for cultures.  Patient was found to have extremely elevated glucose as well as lactic acidosis and is admitted for treatment of sepsis.  Patient reported he has been taking his prescribed medications as directed.  He has not checked his glucose lately he denies having polyuria or polydipsia like he usually does when his glucose is elevated.  He was been unable to afford insulin prescribed recently so is only been taking oral medications.  Otherwise he reported feeling fine and had no other complaints.  He denied fevers but did have chills.    Patient was started on IV antibiotics.  Wound was  I&D'd and was packed daily. Patient was feeling much better and transitioned to oral doxycycline.  Patient's wife was going to come in to be taught how to change the dressing, so it should be able to repack it.  Patient was MRSA negative.  Patient was unable to afford his insulin, so  was attempting to work with him to get some lower priced insulin.  Patient will be discharged home today and transported by his wife.  Discharge appointments and recommendations are listed below.    Discharge Follow Up Recommendations for labs/diagnostics:  --Follow-up with your family doctor within 1 week of discharge from the hospital  --Change your wound packing 1-2 times daily  --Take your antibiotic, doxycycline, until completely gone  Day of Discharge   HPI:   Patient sitting up in bed in NAD.  Positive BM.  No adverse events overnight.  Wife is supposed to come in and learn how to pack patient's wound.  Patient states he is ready for discharge.  No family in the room.    Review of Systems  Gen- No fevers, chills  CV- No chest pain, palpitations  Resp- No cough, dyspnea  GI- No N/V/D, abd pain  Back- +middle back tenderness  Otherwise ROS is negative except as mentioned in the HPI.    Vital Signs:   Temp:  [97.6 °F (36.4 °C)-98.5 °F (36.9 °C)] 98.4 °F (36.9 °C)  Heart Rate:  [71-93] 81  Resp:  [16-18] 16  BP: (140-178)/() 146/103   Physical Exam:  Constitutional: Alert, WD, WN obese male in NAD  Head: NCAT  ENT: [place], moist mucous membranes   Neck: Supple, non-tender, no thyromegaly, no lymphadenopathy, trachea midline  Respiratory: Non-labored, symmetrical chest expansion, CTAB  Cardiovascular: RRR, no M/R/G, +DP pulses bilaterally  Gastrointestinal: Obese, soft, NT, ND +BS  Musculoskeletal: KLEIN; no LE edema bilaterally  Neurologic: Oriented x4, strength symmetric in all extremities, follows all commands, CN II-XII intact, speech clear  Skin: No rashes on exposed skin; small bandage to middle back area  CDI  Psychiatric:Pleasant and cooperative; normal affect  Pertinent  and/or Most Recent Results     Results from last 7 days   Lab Units 05/15/19  0822 05/14/19  1403 05/14/19  0841 05/13/19  0546 05/12/19 2019 05/12/19  1344   WBC 10*3/mm3 6.97  --  7.97  --   --  11.83*   HEMOGLOBIN g/dL 12.8*  --  12.1*  --   --  14.3   HEMATOCRIT % 38.5  --  35.5*  --   --  41.7   PLATELETS 10*3/mm3 217  --  193  --   --  180   SODIUM mmol/L 135* 136  --  132* 135* 129*   POTASSIUM mmol/L 4.1 4.2  --  3.7 3.9 4.4   CHLORIDE mmol/L 105 103  --  100 100 92*   CO2 mmol/L 21.0* 23.0  --  22.0 22.0 20.0*   BUN mg/dL 11 14  --  20 24* 23*   CREATININE mg/dL 0.69* 1.01  --  0.94 1.14 1.28*   GLUCOSE mg/dL 266* 314*  --  336* 349* 571*   CALCIUM mg/dL 9.0 9.0  --  8.7 8.5* 9.5     Results from last 7 days   Lab Units 05/12/19  1344   BILIRUBIN mg/dL 0.4   ALK PHOS U/L 123*   ALT (SGPT) U/L 25   AST (SGOT) U/L 19           Invalid input(s): TG, LDLCALC, LDLREALC  Results from last 7 days   Lab Units 05/13/19  1001 05/12/19 2019 05/12/19  1900 05/12/19  1454 05/12/19  1344   HEMOGLOBIN A1C %  --  13.00*  --   --   --    PROCALCITONIN ng/mL  --   --   --   --  0.35*   LACTATE mmol/L 0.9  --  2.2* 3.2*  --        Brief Urine Lab Results  (Last result in the past 365 days)      Color   Clarity   Blood   Leuk Est   Nitrite   Protein   CREAT   Urine HCG        05/12/19 2201             68.5       05/12/19 2201 Yellow Clear Negative Negative Negative 100 mg/dL (2+)               Microbiology Results Abnormal     Procedure Component Value - Date/Time    Blood Culture - Blood, Blood, Venous Line [[id number]] Collected:  05/12/19 1454    Lab Status:  Preliminary result Specimen:  Blood, Venous Line Updated:  05/14/19 1515     Blood Culture No growth at 2 days    Blood Culture - Blood, Arm, Left [[id number]] Collected:  05/12/19 1344    Lab Status:  Preliminary result Specimen:  Blood from Arm, Left Updated:  05/14/19 1415     Blood Culture No growth  "at 2 days    Wound Culture - Wound, Back [[id number]] Collected:  05/12/19 1630    Lab Status:  Preliminary result Specimen:  Wound from Back Updated:  05/14/19 1003     Wound Culture No growth at 2 days     Gram Stain No WBCs or organisms seen    MRSA Screen, PCR - Swab, Nares [[id number]]  (Normal) Collected:  05/13/19 0630    Lab Status:  Final result Specimen:  Swab from Nares Updated:  05/13/19 0925     MRSA, PCR Negative    Narrative:       MRSA Negative          Imaging Results (all)     Procedure Component Value Units Date/Time    US Chest [[id number]] Collected:  05/14/19 0834     Updated:  05/14/19 2215    Narrative:       EXAMINATION: US CHEST-05/13/2019:     INDICATION: Evaluate for residual fluid collection at the  posterior/lateral aspect of the left chest.; L02.212-Cutaneous abscess  of back (any part, except buttock); L03.312-Cellulitis of back (any part  except buttock); L02.214-Cutaneous abscess of groin; E11.65-Type 2  diabetes mellitus with hyperglycemia; N28.9-Disorder of kidney and  ureter, unspecified.     TECHNIQUE: Directed ultrasound to the area of previously drained abscess  along the posterolateral left chest.     COMPARISON: NONE.     FINDINGS: There is a small round mixed echogenicity \"mass\" approximately  12 mm in diameter in the subcutaneous tissues in the area indicated,  with no obvious fluid level or other drainable fluid collection. The  outline is slightly irregular. There is clear evidence of internal color  Doppler flow suggesting at least part of this area is solid soft tissue.  This could represent a necrotic lymph node, rather than residual densely  debris filled collection. There is no evidence of cyst or mass  elsewhere. There is mild generalized edema.       Impression:       12 mm mixed echogenicity, at least partially solid appearing  \"mass\" in the area indicated, possibly necrotic lymph node, or finely  multiloculated debris-containing collection. Evidence of central " color  Doppler flow suggests residual necrotic node or other small mass.  Consider follow-up imaging.     D:  05/14/2019  E:  05/14/2019            This report was finalized on 5/14/2019 10:11 PM by DR. Ruperto Veloz MD.            Order Current Status    Blood Culture - Blood, Arm, Left Preliminary result    Blood Culture - Blood, Blood, Venous Line Preliminary result    Wound Culture - Wound, Back Preliminary result        Discharge Details        Discharge Medications      New Medications      Instructions Start Date   doxycycline 100 MG capsule  Commonly known as:  MONODOX   100 mg, Oral, Every 12 Hours Scheduled      HYDROcodone-acetaminophen 5-325 MG per tablet  Commonly known as:  NORCO   1 tablet, Oral, Every 4 Hours PRN      insulin detemir 100 UNIT/ML injection  Commonly known as:  LEVEMIR   20 Units, Subcutaneous, Nightly      insulin lispro 100 UNIT/ML injection  Commonly known as:  humaLOG   10 Units, Subcutaneous, 3 Times Daily With Meals         Continue These Medications      Instructions Start Date   aspirin 81 MG chewable tablet   81 mg, Oral, Nightly      atorvastatin 40 MG tablet  Commonly known as:  LIPITOR   40 mg, Oral, Nightly      Garlic 1000 MG capsule   1 capsule, Oral, Nightly      glimepiride 4 MG tablet  Commonly known as:  AMARYL   4 mg, Oral, 2 Times Daily      hydrochlorothiazide 50 MG tablet  Commonly known as:  HYDRODIURIL   50 mg, Oral, Nightly      losartan 100 MG tablet  Commonly known as:  COZAAR   100 mg, Oral, Nightly      metFORMIN 1000 MG tablet  Commonly known as:  GLUCOPHAGE   1,000 mg, Oral, 2 Times Daily With Meals      metoprolol tartrate 50 MG tablet  Commonly known as:  LOPRESSOR   50 mg, Oral, Every Morning      MULTIVITAMIN ADULT PO   1 tablet, Oral, Daily      vitamin B-12 1000 MCG tablet  Commonly known as:  CYANOCOBALAMIN   1,000 mcg, Oral, Daily           Allergies   Allergen Reactions   • Lisinopril Swelling     Discharge Disposition:  Home or Self  Care    Discharge Diet:  Diet Order   Procedures   • Diet Regular; Consistent Carbohydrate     Discharge Activity:   Activity Instructions     Activity as Tolerated          CODE STATUS:    Code Status and Medical Interventions:   Ordered at: 05/12/19 2034     Code Status:    CPR     Medical Interventions (Level of Support Prior to Arrest):    Full     No future appointments.    Additional Instructions for the Follow-ups that You Need to Schedule     Discharge Follow-up with PCP   As directed       Currently Documented PCP:    Jessica Redding PA    PCP Phone Number:    [phone]     Follow Up Details:  1 week; please schedule appointment prior to patient's discharge             Time Spent on Discharge:  35 minutes    Electronically signed by ROBBY Lozano, 05/15/19, 11:06 AM.

## 2019-05-15 NOTE — PLAN OF CARE
Problem: Patient Care Overview  Goal: Plan of Care Review  Outcome: Ongoing (interventions implemented as appropriate)   05/15/19 0018   Coping/Psychosocial   Plan of Care Reviewed With patient   OTHER   Outcome Summary pt alert and oriented, stable vital signs, room air/ 2L NC at night, bp elevated, PO abx doxy, independent, wound care in the morning to demonstrate to the wife how to pack iodoform strips wound care, possible d/c in the am. will continue to monitor

## 2019-05-16 ENCOUNTER — READMISSION MANAGEMENT (OUTPATIENT)
Dept: CALL CENTER | Facility: HOSPITAL | Age: 44
End: 2019-05-16

## 2019-05-16 LAB
BACTERIA SPEC AEROBE CULT: ABNORMAL
GRAM STN SPEC: ABNORMAL

## 2019-05-16 NOTE — OUTREACH NOTE
Prep Survey      Responses   Facility patient discharged from?  [facility]   Is patient eligible?  Yes   Discharge diagnosis  Sepsis s/p abcess of back,  I&D of abcess   Does the patient have one of the following disease processes/diagnoses(primary or secondary)?  Sepsis   Does the patient have Home health ordered?  No   Is there a DME ordered?  No   Medication alerts for this patient  Has not been taking insulin due to cost   Prep survey completed?  Yes          Gilma Black RN

## 2019-05-17 ENCOUNTER — READMISSION MANAGEMENT (OUTPATIENT)
Dept: CALL CENTER | Facility: HOSPITAL | Age: 44
End: 2019-05-17

## 2019-05-17 LAB
BACTERIA SPEC AEROBE CULT: NORMAL
BACTERIA SPEC AEROBE CULT: NORMAL

## 2019-05-17 NOTE — OUTREACH NOTE
Sepsis Week 1 Survey      Responses   Facility patient discharged from?  [facility]   Does the patient have one of the following disease processes/diagnoses(primary or secondary)?  Sepsis   Is there a successful TCM telephone encounter documented?  No   Week 1 attempt successful?  No   Unsuccessful attempts  Attempt 1          Nancy Cardoso RN

## 2019-05-20 ENCOUNTER — READMISSION MANAGEMENT (OUTPATIENT)
Dept: CALL CENTER | Facility: HOSPITAL | Age: 44
End: 2019-05-20

## 2019-05-20 NOTE — OUTREACH NOTE
Sepsis Week 1 Survey      Responses   Facility patient discharged from?  [facility]   Does the patient have one of the following disease processes/diagnoses(primary or secondary)?  Sepsis   Is there a successful TCM telephone encounter documented?  No   Week 1 attempt successful?  No   Unsuccessful attempts  Attempt 2          Seble Oneill RN

## 2019-05-22 ENCOUNTER — READMISSION MANAGEMENT (OUTPATIENT)
Dept: CALL CENTER | Facility: HOSPITAL | Age: 44
End: 2019-05-22

## 2019-05-22 NOTE — OUTREACH NOTE
Sepsis Week 2 Survey      Responses   Facility patient discharged from?  [facility]   Does the patient have one of the following disease processes/diagnoses(primary or secondary)?  Sepsis   Week 2 attempt successful?  No   Unsuccessful attempts  Attempt 1          Nikki Julian RN

## 2019-05-23 ENCOUNTER — READMISSION MANAGEMENT (OUTPATIENT)
Dept: CALL CENTER | Facility: HOSPITAL | Age: 44
End: 2019-05-23

## 2019-05-23 NOTE — OUTREACH NOTE
Sepsis Week 2 Survey      Responses   Facility patient discharged from?  [facility]   Does the patient have one of the following disease processes/diagnoses(primary or secondary)?  Sepsis   Week 2 attempt successful?  No   Unsuccessful attempts  Attempt 2          Willem Jorgensen RN

## 2019-05-24 ENCOUNTER — READMISSION MANAGEMENT (OUTPATIENT)
Dept: CALL CENTER | Facility: HOSPITAL | Age: 44
End: 2019-05-24

## 2019-05-24 NOTE — OUTREACH NOTE
Sepsis Week 2 Survey      Responses   Facility patient discharged from?  [facility]   Does the patient have one of the following disease processes/diagnoses(primary or secondary)?  Sepsis   Week 2 attempt successful?  No   Unsuccessful attempts  Attempt 3          Jaqueline Gustafson RN

## 2020-02-09 ENCOUNTER — OFFICE VISIT (OUTPATIENT)
Dept: RETAIL CLINIC | Facility: CLINIC | Age: 45
End: 2020-02-09

## 2020-02-09 VITALS
SYSTOLIC BLOOD PRESSURE: 136 MMHG | BODY MASS INDEX: 39.81 KG/M2 | HEART RATE: 114 BPM | TEMPERATURE: 99.7 F | OXYGEN SATURATION: 98 % | HEIGHT: 73 IN | WEIGHT: 300.4 LBS | DIASTOLIC BLOOD PRESSURE: 84 MMHG | RESPIRATION RATE: 24 BRPM

## 2020-02-09 DIAGNOSIS — J10.1 INFLUENZA A: Primary | ICD-10-CM

## 2020-02-09 LAB
EXPIRATION DATE: ABNORMAL
FLUAV AG NPH QL: POSITIVE
FLUBV AG NPH QL: NEGATIVE
INTERNAL CONTROL: ABNORMAL
Lab: ABNORMAL

## 2020-02-09 PROCEDURE — 87804 INFLUENZA ASSAY W/OPTIC: CPT | Performed by: NURSE PRACTITIONER

## 2020-02-09 PROCEDURE — 99203 OFFICE O/P NEW LOW 30 MIN: CPT | Performed by: NURSE PRACTITIONER

## 2020-02-09 RX ORDER — BENZONATATE 100 MG/1
200 CAPSULE ORAL 3 TIMES DAILY PRN
Qty: 42 CAPSULE | Refills: 0 | Status: SHIPPED | OUTPATIENT
Start: 2020-02-09 | End: 2020-02-16

## 2020-02-09 RX ORDER — DEXTROMETHORPHAN HYDROBROMIDE AND PROMETHAZINE HYDROCHLORIDE 15; 6.25 MG/5ML; MG/5ML
5 SYRUP ORAL NIGHTLY PRN
Qty: 120 ML | Refills: 0 | Status: SHIPPED | OUTPATIENT
Start: 2020-02-09 | End: 2020-02-16

## 2020-02-09 RX ORDER — OSELTAMIVIR PHOSPHATE 75 MG/1
75 CAPSULE ORAL 2 TIMES DAILY
Qty: 10 CAPSULE | Refills: 0 | Status: SHIPPED | OUTPATIENT
Start: 2020-02-09 | End: 2020-02-14

## 2020-02-09 NOTE — PATIENT INSTRUCTIONS
"Influenza, Adult  Influenza is also called \"the flu.\" It is an infection in the lungs, nose, and throat (respiratory tract). It is caused by a virus. The flu causes symptoms that are similar to symptoms of a cold. It also causes a high fever and body aches.  The flu spreads easily from person to person (is contagious). Getting a flu shot (influenza vaccination) every year is the best way to prevent the flu.  What are the causes?  This condition is caused by the influenza virus. You can get the virus by:  · Breathing in droplets that are in the air from the cough or sneeze of a person who has the virus.  · Touching something that has the virus on it (is contaminated) and then touching your mouth, nose, or eyes.  What increases the risk?  Certain things may make you more likely to get the flu. These include:  · Not washing your hands often.  · Having close contact with many people during cold and flu season.  · Touching your mouth, eyes, or nose without first washing your hands.  · Not getting a flu shot every year.  You may have a higher risk for the flu, along with serious problems such as a lung infection (pneumonia), if you:  · Are older than 65.  · Are pregnant.  · Have a weakened disease-fighting system (immune system) because of a disease or taking certain medicines.  · Have a long-term (chronic) illness, such as:  ? Heart, kidney, or lung disease.  ? Diabetes.  ? Asthma.  · Have a liver disorder.  · Are very overweight (morbidly obese).  · Have anemia. This is a condition that affects your red blood cells.  What are the signs or symptoms?  Symptoms usually begin suddenly and last 4-14 days. They may include:  · Fever and chills.  · Headaches, body aches, or muscle aches.  · Sore throat.  · Cough.  · Runny or stuffy (congested) nose.  · Chest discomfort.  · Not wanting to eat as much as normal (poor appetite).  · Weakness or feeling tired (fatigue).  · Dizziness.  · Feeling sick to your stomach (nauseous) or " throwing up (vomiting).  How is this treated?  If the flu is found early, you can be treated with medicine that can help reduce how bad the illness is and how long it lasts (antiviral medicine). This may be given by mouth (orally) or through an IV tube.  Taking care of yourself at home can help your symptoms get better. Your doctor may suggest:  · Taking over-the-counter medicines.  · Drinking plenty of fluids.  The flu often goes away on its own. If you have very bad symptoms or other problems, you may be treated in a hospital.  Follow these instructions at home:         Activity  · Rest as needed. Get plenty of sleep.  · Stay home from work or school as told by your doctor.  ? Do not leave home until you do not have a fever for 24 hours without taking medicine.  ? Leave home only to visit your doctor.  Eating and drinking  · Take an ORS (oral rehydration solution). This is a drink that is sold at pharmacies and stores.  · Drink enough fluid to keep your pee (urine) pale yellow.  · Drink clear fluids in small amounts as you are able. Clear fluids include:  ? Water.  ? Ice chips.  ? Fruit juice that has water added (diluted fruit juice).  ? Low-calorie sports drinks.  · Eat bland, easy-to-digest foods in small amounts as you are able. These foods include:  ? Bananas.  ? Applesauce.  ? Rice.  ? Lean meats.  ? Toast.  ? Crackers.  · Do not eat or drink:  ? Fluids that have a lot of sugar or caffeine.  ? Alcohol.  ? Spicy or fatty foods.  General instructions  · Take over-the-counter and prescription medicines only as told by your doctor.  · Use a cool mist humidifier to add moisture to the air in your home. This can make it easier for you to breathe.  · Cover your mouth and nose when you cough or sneeze.  · Wash your hands with soap and water often, especially after you cough or sneeze. If you cannot use soap and water, use alcohol-based hand .  · Keep all follow-up visits as told by your doctor. This is  "important.  How is this prevented?    · Get a flu shot every year. You may get the flu shot in late summer, fall, or winter. Ask your doctor when you should get your flu shot.  · Avoid contact with people who are sick during fall and winter (cold and flu season).  Contact a doctor if:  · You get new symptoms.  · You have:  ? Chest pain.  ? Watery poop (diarrhea).  ? A fever.  · Your cough gets worse.  · You start to have more mucus.  · You feel sick to your stomach.  · You throw up.  Get help right away if you:  · Have shortness of breath.  · Have trouble breathing.  · Have skin or nails that turn a bluish color.  · Have very bad pain or stiffness in your neck.  · Get a sudden headache.  · Get sudden pain in your face or ear.  · Cannot eat or drink without throwing up.  Summary  · Influenza (\"the flu\") is an infection in the lungs, nose, and throat. It is caused by a virus.  · Take over-the-counter and prescription medicines only as told by your doctor.  · Getting a flu shot every year is the best way to avoid getting the flu.  This information is not intended to replace advice given to you by your health care provider. Make sure you discuss any questions you have with your health care provider.  Document Released: 09/26/2009 Document Revised: 06/05/2019 Document Reviewed: 06/05/2019  "[employer] Interactive Patient Education © 2019 "[employer]    "

## 2020-02-09 NOTE — PROGRESS NOTES
Subjective   Angel Blair is a 44 y.o. male.   Chief Complaint   Patient presents with   • Flu Symptoms      43 yo male presents with complaint of body aches, headache, cough, and congestion duration of 2 days.  He reports temperature was 100.1 previous date.  He has taken no medication prior to arrival at clinic.  Refer to HPI/ROS for additional information.    Flu Symptoms   This is a new problem. Episode onset: 2 days. The problem has been gradually worsening. Associated symptoms include arthralgias, congestion, coughing, headaches and myalgias. The symptoms are aggravated by exertion. He has tried nothing for the symptoms.        The following portions of the patient's history were reviewed and updated as appropriate: allergies, current medications, past family history, past medical history, past social history, past surgical history and problem list.    Current Outpatient Medications:   •  aspirin 81 MG chewable tablet, Chew 81 mg Every Night., Disp: , Rfl:   •  Garlic 1000 MG capsule, Take 1 capsule by mouth Every Night., Disp: , Rfl:   •  glimepiride (AMARYL) 4 MG tablet, Take 4 mg by mouth 2 (Two) Times a Day., Disp: , Rfl:   •  hydrochlorothiazide (HYDRODIURIL) 50 MG tablet, Take 50 mg by mouth Every Night., Disp: , Rfl:   •  insulin detemir (LEVEMIR) 100 UNIT/ML injection, Inject 20 Units under the skin into the appropriate area as directed Every Night., Disp: 10 mL, Rfl: 12  •  insulin lispro (humaLOG) 100 UNIT/ML injection, Inject 10 Units under the skin into the appropriate area as directed 3 (Three) Times a Day With Meals., Disp: 10 mL, Rfl: 12  •  losartan (COZAAR) 100 MG tablet, Take 100 mg by mouth Every Night., Disp: , Rfl:   •  metFORMIN (GLUCOPHAGE) 1000 MG tablet, Take 1,000 mg by mouth 2 (two) times a day with meals., Disp: , Rfl:   •  metoprolol tartrate (LOPRESSOR) 50 MG tablet, Take 50 mg by mouth Every Morning., Disp: , Rfl:   •  Multiple Vitamins-Minerals (MULTIVITAMIN ADULT PO), Take 1  "tablet by mouth Daily., Disp: , Rfl:   •  vitamin B-12 (CYANOCOBALAMIN) 1000 MCG tablet, Take 1,000 mcg by mouth Daily., Disp: , Rfl:   •  atorvastatin (LIPITOR) 40 MG tablet, Take 40 mg by mouth Every Night., Disp: , Rfl:   •  benzonatate (TESSALON PERLES) 100 MG capsule, Take 2 capsules by mouth 3 (Three) Times a Day As Needed for Cough for up to 7 days., Disp: 42 capsule, Rfl: 0  •  oseltamivir (TAMIFLU) 75 MG capsule, Take 1 capsule by mouth 2 (Two) Times a Day for 5 days., Disp: 10 capsule, Rfl: 0  •  promethazine-dextromethorphan (PROMETHAZINE-DM) 6.25-15 MG/5ML syrup, Take 5 mL by mouth At Night As Needed for Cough for up to 7 days., Disp: 120 mL, Rfl: 0    Review of Systems   Constitutional: Negative.    HENT: Positive for congestion.    Eyes: Negative.    Respiratory: Positive for cough. Negative for apnea, choking, chest tightness, shortness of breath, wheezing and stridor.    Cardiovascular: Negative.    Gastrointestinal: Negative.    Musculoskeletal: Positive for arthralgias and myalgias.   Skin: Negative.    Neurological: Positive for headaches.   Psychiatric/Behavioral: Negative.      /84   Pulse 114   Temp 99.7 °F (37.6 °C) (Oral)   Resp 24   Ht 185.4 cm (73\")   Wt (!) 136 kg (300 lb 6.4 oz)   SpO2 98%   BMI 39.63 kg/m²      Objective   Allergies   Allergen Reactions   • Lisinopril Swelling       Physical Exam   Constitutional: He appears well-developed and well-nourished. He is cooperative. He appears ill.   HENT:   Head: Normocephalic.   Right Ear: External ear normal.   Left Ear: External ear normal.   Nose: Mucosal edema present. Right sinus exhibits no maxillary sinus tenderness and no frontal sinus tenderness. Left sinus exhibits no maxillary sinus tenderness and no frontal sinus tenderness.   Mouth/Throat: Oropharynx is clear and moist. No oropharyngeal exudate.   Eyes: Conjunctivae are normal.   Neck: Normal range of motion. Neck supple. No JVD present. No thyromegaly present. "   Cardiovascular: Regular rhythm and normal heart sounds. Tachycardia present.   Pulmonary/Chest: Effort normal and breath sounds normal. No stridor. No respiratory distress. He has no wheezes. He has no rales. He exhibits no tenderness.   Abdominal: Soft. Bowel sounds are normal. He exhibits no distension and no mass. There is no tenderness. There is no rebound and no guarding. No hernia.   Lymphadenopathy:     He has no cervical adenopathy.   Neurological: He is alert.   Skin: Skin is warm. Capillary refill takes less than 2 seconds.   Psychiatric: He has a normal mood and affect. His behavior is normal. Judgment and thought content normal.       Assessment/Plan   Angel was seen today for flu symptoms.    Diagnoses and all orders for this visit:    Influenza A  -     POCT Influenza A/B    Other orders  -     oseltamivir (TAMIFLU) 75 MG capsule; Take 1 capsule by mouth 2 (Two) Times a Day for 5 days.  -     promethazine-dextromethorphan (PROMETHAZINE-DM) 6.25-15 MG/5ML syrup; Take 5 mL by mouth At Night As Needed for Cough for up to 7 days.  -     benzonatate (TESSALON PERLES) 100 MG capsule; Take 2 capsules by mouth 3 (Three) Times a Day As Needed for Cough for up to 7 days.        Results for orders placed or performed in visit on 02/09/20   POCT Influenza A/B   Result Value Ref Range    Rapid Influenza A Ag Positive (A) Negative    Rapid Influenza B Ag Negative Negative    Internal Control Passed Passed    Lot Number [id number]     Expiration Date 5/6/22               An After Visit Summary was printed, reviewed, and given to the patient. Understanding verbalized and agrees with treatment plan.  If no improvement or becomes worse, follow up with primary or go to [facility]/ER.   February 9, 2020 2:18 PM

## 2021-01-19 ENCOUNTER — APPOINTMENT (OUTPATIENT)
Dept: GENERAL RADIOLOGY | Facility: HOSPITAL | Age: 46
End: 2021-01-19

## 2021-01-19 ENCOUNTER — APPOINTMENT (OUTPATIENT)
Dept: CT IMAGING | Facility: HOSPITAL | Age: 46
End: 2021-01-19

## 2021-01-19 ENCOUNTER — HOSPITAL ENCOUNTER (INPATIENT)
Facility: HOSPITAL | Age: 46
LOS: 7 days | Discharge: HOME | End: 2021-01-26
Attending: EMERGENCY MEDICINE | Admitting: INTERNAL MEDICINE

## 2021-01-19 DIAGNOSIS — N17.9 AKI (ACUTE KIDNEY INJURY) (HCC): ICD-10-CM

## 2021-01-19 DIAGNOSIS — J12.82 PNEUMONIA DUE TO COVID-19 VIRUS: Primary | ICD-10-CM

## 2021-01-19 DIAGNOSIS — N17.9 SEPSIS WITH ACUTE RENAL FAILURE, DUE TO UNSPECIFIED ORGANISM, UNSPECIFIED ACUTE RENAL FAILURE TYPE, UNSPECIFIED WHETHER SEPTIC SHOCK PRESENT (HCC): ICD-10-CM

## 2021-01-19 DIAGNOSIS — U07.1 PNEUMONIA DUE TO COVID-19 VIRUS: Primary | ICD-10-CM

## 2021-01-19 DIAGNOSIS — R65.20 SEPSIS WITH ACUTE RENAL FAILURE, DUE TO UNSPECIFIED ORGANISM, UNSPECIFIED ACUTE RENAL FAILURE TYPE, UNSPECIFIED WHETHER SEPTIC SHOCK PRESENT (HCC): ICD-10-CM

## 2021-01-19 DIAGNOSIS — A41.9 SEPSIS WITH ACUTE RENAL FAILURE, DUE TO UNSPECIFIED ORGANISM, UNSPECIFIED ACUTE RENAL FAILURE TYPE, UNSPECIFIED WHETHER SEPTIC SHOCK PRESENT (HCC): ICD-10-CM

## 2021-01-19 DIAGNOSIS — R77.8 ELEVATED TROPONIN: ICD-10-CM

## 2021-01-19 LAB
ALBUMIN SERPL-MCNC: 2.9 G/DL (ref 3.5–5.2)
ALBUMIN/GLOB SERPL: 0.6 G/DL
ALP SERPL-CCNC: 94 U/L (ref 39–117)
ALT SERPL W P-5'-P-CCNC: 18 U/L (ref 1–41)
ANION GAP SERPL CALCULATED.3IONS-SCNC: 15 MMOL/L (ref 5–15)
AST SERPL-CCNC: 21 U/L (ref 1–40)
B-OH-BUTYR SERPL-SCNC: 0.14 MMOL/L (ref 0.02–0.27)
BASOPHILS # BLD AUTO: 0.01 10*3/MM3 (ref 0–0.2)
BASOPHILS NFR BLD AUTO: 0.1 % (ref 0–1.5)
BILIRUB SERPL-MCNC: 0.2 MG/DL (ref 0–1.2)
BUN SERPL-MCNC: 29 MG/DL (ref 6–20)
BUN/CREAT SERPL: 12.5 (ref 7–25)
CALCIUM SPEC-SCNC: 9.3 MG/DL (ref 8.6–10.5)
CHLORIDE SERPL-SCNC: 92 MMOL/L (ref 98–107)
CO2 SERPL-SCNC: 24 MMOL/L (ref 22–29)
CREAT SERPL-MCNC: 2.32 MG/DL (ref 0.76–1.27)
D-LACTATE SERPL-SCNC: 0.9 MMOL/L (ref 0.5–2)
D-LACTATE SERPL-SCNC: 3.2 MMOL/L (ref 0.5–2)
DEPRECATED RDW RBC AUTO: 43.8 FL (ref 37–54)
EOSINOPHIL # BLD AUTO: 0 10*3/MM3 (ref 0–0.4)
EOSINOPHIL NFR BLD AUTO: 0 % (ref 0.3–6.2)
ERYTHROCYTE [DISTWIDTH] IN BLOOD BY AUTOMATED COUNT: 13.2 % (ref 12.3–15.4)
FERRITIN SERPL-MCNC: 666.9 NG/ML (ref 30–400)
GFR SERPL CREATININE-BSD FRML MDRD: 37 ML/MIN/1.73
GLOBULIN UR ELPH-MCNC: 4.8 GM/DL
GLUCOSE BLDC GLUCOMTR-MCNC: 265 MG/DL (ref 70–130)
GLUCOSE BLDC GLUCOMTR-MCNC: 276 MG/DL (ref 70–130)
GLUCOSE SERPL-MCNC: 431 MG/DL (ref 65–99)
HCT VFR BLD AUTO: 42.6 % (ref 37.5–51)
HGB BLD-MCNC: 13.9 G/DL (ref 13–17.7)
HOLD SPECIMEN: NORMAL
HOLD SPECIMEN: NORMAL
IMM GRANULOCYTES # BLD AUTO: 0.02 10*3/MM3 (ref 0–0.05)
IMM GRANULOCYTES NFR BLD AUTO: 0.3 % (ref 0–0.5)
LACTATE HOLD SPECIMEN: NORMAL
LDH SERPL-CCNC: 288 U/L (ref 135–225)
LYMPHOCYTES # BLD AUTO: 1.18 10*3/MM3 (ref 0.7–3.1)
LYMPHOCYTES NFR BLD AUTO: 14.8 % (ref 19.6–45.3)
MCH RBC QN AUTO: 29.3 PG (ref 26.6–33)
MCHC RBC AUTO-ENTMCNC: 32.6 G/DL (ref 31.5–35.7)
MCV RBC AUTO: 89.9 FL (ref 79–97)
MONOCYTES # BLD AUTO: 0.65 10*3/MM3 (ref 0.1–0.9)
MONOCYTES NFR BLD AUTO: 8.1 % (ref 5–12)
NEUTROPHILS NFR BLD AUTO: 6.13 10*3/MM3 (ref 1.7–7)
NEUTROPHILS NFR BLD AUTO: 76.7 % (ref 42.7–76)
NRBC BLD AUTO-RTO: 0 /100 WBC (ref 0–0.2)
NT-PROBNP SERPL-MCNC: 901.9 PG/ML (ref 0–450)
PLATELET # BLD AUTO: 120 10*3/MM3 (ref 140–450)
PMV BLD AUTO: 12.1 FL (ref 6–12)
POTASSIUM SERPL-SCNC: 4.7 MMOL/L (ref 3.5–5.2)
PROCALCITONIN SERPL-MCNC: 0.39 NG/ML (ref 0–0.25)
PROT SERPL-MCNC: 7.7 G/DL (ref 6–8.5)
QT INTERVAL: 330 MS
QTC INTERVAL: 458 MS
RBC # BLD AUTO: 4.74 10*6/MM3 (ref 4.14–5.8)
SODIUM SERPL-SCNC: 131 MMOL/L (ref 136–145)
TROPONIN T SERPL-MCNC: 0.07 NG/ML (ref 0–0.03)
WBC # BLD AUTO: 7.99 10*3/MM3 (ref 3.4–10.8)
WHOLE BLOOD HOLD SPECIMEN: NORMAL
WHOLE BLOOD HOLD SPECIMEN: NORMAL

## 2021-01-19 PROCEDURE — 63710000001 INSULIN LISPRO (HUMAN) PER 5 UNITS: Performed by: INTERNAL MEDICINE

## 2021-01-19 PROCEDURE — 63710000001 INSULIN REGULAR HUMAN PER 5 UNITS: Performed by: NURSE PRACTITIONER

## 2021-01-19 PROCEDURE — 80053 COMPREHEN METABOLIC PANEL: CPT | Performed by: EMERGENCY MEDICINE

## 2021-01-19 PROCEDURE — 84145 PROCALCITONIN (PCT): CPT | Performed by: NURSE PRACTITIONER

## 2021-01-19 PROCEDURE — 83615 LACTATE (LD) (LDH) ENZYME: CPT | Performed by: INTERNAL MEDICINE

## 2021-01-19 PROCEDURE — 84484 ASSAY OF TROPONIN QUANT: CPT | Performed by: EMERGENCY MEDICINE

## 2021-01-19 PROCEDURE — 25010000002 DEXAMETHASONE PER 1 MG: Performed by: INTERNAL MEDICINE

## 2021-01-19 PROCEDURE — 85025 COMPLETE CBC W/AUTO DIFF WBC: CPT | Performed by: EMERGENCY MEDICINE

## 2021-01-19 PROCEDURE — 25010000002 AZITHROMYCIN PER 500 MG: Performed by: NURSE PRACTITIONER

## 2021-01-19 PROCEDURE — 25010000002 HEPARIN (PORCINE) PER 1000 UNITS: Performed by: INTERNAL MEDICINE

## 2021-01-19 PROCEDURE — 93005 ELECTROCARDIOGRAM TRACING: CPT | Performed by: EMERGENCY MEDICINE

## 2021-01-19 PROCEDURE — 25010000002 CEFTRIAXONE: Performed by: NURSE PRACTITIONER

## 2021-01-19 PROCEDURE — 82010 KETONE BODYS QUAN: CPT | Performed by: NURSE PRACTITIONER

## 2021-01-19 PROCEDURE — 87040 BLOOD CULTURE FOR BACTERIA: CPT | Performed by: NURSE PRACTITIONER

## 2021-01-19 PROCEDURE — 94660 CPAP INITIATION&MGMT: CPT

## 2021-01-19 PROCEDURE — 25010000002 HYDRALAZINE PER 20 MG: Performed by: PHYSICIAN ASSISTANT

## 2021-01-19 PROCEDURE — 82962 GLUCOSE BLOOD TEST: CPT

## 2021-01-19 PROCEDURE — 83605 ASSAY OF LACTIC ACID: CPT | Performed by: EMERGENCY MEDICINE

## 2021-01-19 PROCEDURE — 0202U NFCT DS 22 TRGT SARS-COV-2: CPT | Performed by: INTERNAL MEDICINE

## 2021-01-19 PROCEDURE — 83880 ASSAY OF NATRIURETIC PEPTIDE: CPT | Performed by: EMERGENCY MEDICINE

## 2021-01-19 PROCEDURE — 99223 1ST HOSP IP/OBS HIGH 75: CPT | Performed by: INTERNAL MEDICINE

## 2021-01-19 PROCEDURE — 82728 ASSAY OF FERRITIN: CPT | Performed by: INTERNAL MEDICINE

## 2021-01-19 PROCEDURE — 63710000001 INSULIN DETEMIR PER 5 UNITS: Performed by: INTERNAL MEDICINE

## 2021-01-19 PROCEDURE — 99284 EMERGENCY DEPT VISIT MOD MDM: CPT

## 2021-01-19 PROCEDURE — 71045 X-RAY EXAM CHEST 1 VIEW: CPT

## 2021-01-19 RX ORDER — ATORVASTATIN CALCIUM 40 MG/1
40 TABLET, FILM COATED ORAL NIGHTLY
Status: DISCONTINUED | OUTPATIENT
Start: 2021-01-19 | End: 2021-01-26 | Stop reason: HOSPADM

## 2021-01-19 RX ORDER — SODIUM CHLORIDE 0.9 % (FLUSH) 0.9 %
10 SYRINGE (ML) INJECTION AS NEEDED
Status: DISCONTINUED | OUTPATIENT
Start: 2021-01-19 | End: 2021-01-26 | Stop reason: HOSPADM

## 2021-01-19 RX ORDER — ASPIRIN 81 MG/1
81 TABLET, CHEWABLE ORAL DAILY
Status: DISCONTINUED | OUTPATIENT
Start: 2021-01-20 | End: 2021-01-26 | Stop reason: HOSPADM

## 2021-01-19 RX ORDER — NICOTINE POLACRILEX 4 MG
15 LOZENGE BUCCAL
Status: DISCONTINUED | OUTPATIENT
Start: 2021-01-19 | End: 2021-01-20

## 2021-01-19 RX ORDER — SODIUM CHLORIDE 9 MG/ML
100 INJECTION, SOLUTION INTRAVENOUS CONTINUOUS
Status: DISCONTINUED | OUTPATIENT
Start: 2021-01-19 | End: 2021-01-20

## 2021-01-19 RX ORDER — ACETAMINOPHEN 650 MG/1
650 SUPPOSITORY RECTAL EVERY 4 HOURS PRN
Status: DISCONTINUED | OUTPATIENT
Start: 2021-01-19 | End: 2021-01-26 | Stop reason: HOSPADM

## 2021-01-19 RX ORDER — ACETAMINOPHEN 325 MG/1
650 TABLET ORAL EVERY 4 HOURS PRN
Status: DISCONTINUED | OUTPATIENT
Start: 2021-01-19 | End: 2021-01-26 | Stop reason: HOSPADM

## 2021-01-19 RX ORDER — ACETAMINOPHEN 160 MG/5ML
650 SOLUTION ORAL EVERY 4 HOURS PRN
Status: DISCONTINUED | OUTPATIENT
Start: 2021-01-19 | End: 2021-01-26 | Stop reason: HOSPADM

## 2021-01-19 RX ORDER — HYDRALAZINE HYDROCHLORIDE 20 MG/ML
10 INJECTION INTRAMUSCULAR; INTRAVENOUS ONCE
Status: COMPLETED | OUTPATIENT
Start: 2021-01-19 | End: 2021-01-19

## 2021-01-19 RX ORDER — DEXAMETHASONE SODIUM PHOSPHATE 4 MG/ML
6 INJECTION, SOLUTION INTRA-ARTICULAR; INTRALESIONAL; INTRAMUSCULAR; INTRAVENOUS; SOFT TISSUE DAILY
Status: DISCONTINUED | OUTPATIENT
Start: 2021-01-19 | End: 2021-01-20

## 2021-01-19 RX ORDER — METOPROLOL TARTRATE 50 MG/1
50 TABLET, FILM COATED ORAL EVERY 12 HOURS SCHEDULED
Status: DISCONTINUED | OUTPATIENT
Start: 2021-01-19 | End: 2021-01-26 | Stop reason: HOSPADM

## 2021-01-19 RX ORDER — DEXTROSE MONOHYDRATE 25 G/50ML
25 INJECTION, SOLUTION INTRAVENOUS
Status: DISCONTINUED | OUTPATIENT
Start: 2021-01-19 | End: 2021-01-20

## 2021-01-19 RX ORDER — SODIUM CHLORIDE 0.9 % (FLUSH) 0.9 %
10 SYRINGE (ML) INJECTION EVERY 12 HOURS SCHEDULED
Status: DISCONTINUED | OUTPATIENT
Start: 2021-01-19 | End: 2021-01-26 | Stop reason: HOSPADM

## 2021-01-19 RX ORDER — HEPARIN SODIUM 5000 [USP'U]/ML
5000 INJECTION, SOLUTION INTRAVENOUS; SUBCUTANEOUS EVERY 8 HOURS SCHEDULED
Status: DISCONTINUED | OUTPATIENT
Start: 2021-01-19 | End: 2021-01-26 | Stop reason: HOSPADM

## 2021-01-19 RX ADMIN — DEXAMETHASONE SODIUM PHOSPHATE 6 MG: 4 INJECTION, SOLUTION INTRA-ARTICULAR; INTRALESIONAL; INTRAMUSCULAR; INTRAVENOUS; SOFT TISSUE at 18:24

## 2021-01-19 RX ADMIN — METOPROLOL TARTRATE 50 MG: 50 TABLET, FILM COATED ORAL at 21:02

## 2021-01-19 RX ADMIN — ATORVASTATIN CALCIUM 40 MG: 40 TABLET, FILM COATED ORAL at 21:02

## 2021-01-19 RX ADMIN — CEFTRIAXONE 2 G: 2 INJECTION, POWDER, FOR SOLUTION INTRAMUSCULAR; INTRAVENOUS at 16:51

## 2021-01-19 RX ADMIN — HYDRALAZINE HYDROCHLORIDE 10 MG: 20 INJECTION INTRAMUSCULAR; INTRAVENOUS at 21:51

## 2021-01-19 RX ADMIN — INSULIN HUMAN 8 UNITS: 100 INJECTION, SOLUTION PARENTERAL at 16:07

## 2021-01-19 RX ADMIN — SODIUM CHLORIDE, PRESERVATIVE FREE 10 ML: 5 INJECTION INTRAVENOUS at 21:01

## 2021-01-19 RX ADMIN — AZITHROMYCIN 500 MG: 500 INJECTION, POWDER, LYOPHILIZED, FOR SOLUTION INTRAVENOUS at 18:24

## 2021-01-19 RX ADMIN — ACETAMINOPHEN 650 MG: 325 TABLET, FILM COATED ORAL at 21:02

## 2021-01-19 RX ADMIN — HEPARIN SODIUM 5000 UNITS: 5000 INJECTION INTRAVENOUS; SUBCUTANEOUS at 21:02

## 2021-01-19 RX ADMIN — INSULIN LISPRO 6 UNITS: 100 INJECTION, SOLUTION INTRAVENOUS; SUBCUTANEOUS at 21:51

## 2021-01-19 RX ADMIN — INSULIN DETEMIR 15 UNITS: 100 INJECTION, SOLUTION SUBCUTANEOUS at 21:52

## 2021-01-19 RX ADMIN — SODIUM CHLORIDE 100 ML/HR: 9 INJECTION, SOLUTION INTRAVENOUS at 21:01

## 2021-01-19 RX ADMIN — SODIUM CHLORIDE 1000 ML: 9 INJECTION, SOLUTION INTRAVENOUS at 16:06

## 2021-01-20 ENCOUNTER — APPOINTMENT (OUTPATIENT)
Dept: CT IMAGING | Facility: HOSPITAL | Age: 46
End: 2021-01-20

## 2021-01-20 ENCOUNTER — APPOINTMENT (OUTPATIENT)
Dept: ULTRASOUND IMAGING | Facility: HOSPITAL | Age: 46
End: 2021-01-20

## 2021-01-20 LAB
ALBUMIN SERPL-MCNC: 2.9 G/DL (ref 3.5–5.2)
ALBUMIN/GLOB SERPL: 0.6 G/DL
ALP SERPL-CCNC: 89 U/L (ref 39–117)
ALT SERPL W P-5'-P-CCNC: 17 U/L (ref 1–41)
ANION GAP SERPL CALCULATED.3IONS-SCNC: 14 MMOL/L (ref 5–15)
AST SERPL-CCNC: 19 U/L (ref 1–40)
B PARAPERT DNA SPEC QL NAA+PROBE: NOT DETECTED
B PERT DNA SPEC QL NAA+PROBE: NOT DETECTED
BASOPHILS # BLD AUTO: 0.02 10*3/MM3 (ref 0–0.2)
BASOPHILS NFR BLD AUTO: 0.2 % (ref 0–1.5)
BILIRUB SERPL-MCNC: 0.2 MG/DL (ref 0–1.2)
BUN SERPL-MCNC: 33 MG/DL (ref 6–20)
BUN/CREAT SERPL: 16.5 (ref 7–25)
C PNEUM DNA NPH QL NAA+NON-PROBE: NOT DETECTED
CALCIUM SPEC-SCNC: 8.7 MG/DL (ref 8.6–10.5)
CHLORIDE SERPL-SCNC: 96 MMOL/L (ref 98–107)
CK SERPL-CCNC: 77 U/L (ref 20–200)
CO2 SERPL-SCNC: 21 MMOL/L (ref 22–29)
CREAT SERPL-MCNC: 2 MG/DL (ref 0.76–1.27)
CRP SERPL-MCNC: 14.01 MG/DL (ref 0–0.5)
D DIMER PPP FEU-MCNC: 0.89 MCGFEU/ML (ref 0–0.56)
DEPRECATED RDW RBC AUTO: 43.4 FL (ref 37–54)
EOSINOPHIL # BLD AUTO: 0 10*3/MM3 (ref 0–0.4)
EOSINOPHIL NFR BLD AUTO: 0 % (ref 0.3–6.2)
ERYTHROCYTE [DISTWIDTH] IN BLOOD BY AUTOMATED COUNT: 13.4 % (ref 12.3–15.4)
FERRITIN SERPL-MCNC: 719.2 NG/ML (ref 30–400)
FIBRINOGEN PPP-MCNC: >900 MG/DL (ref 215–430)
FLUAV H1 2009 PAND RNA NPH QL NAA+PROBE: NOT DETECTED
FLUAV H1 HA GENE NPH QL NAA+PROBE: NOT DETECTED
FLUAV H3 RNA NPH QL NAA+PROBE: NOT DETECTED
FLUAV SUBTYP SPEC NAA+PROBE: NOT DETECTED
FLUBV RNA ISLT QL NAA+PROBE: NOT DETECTED
GFR SERPL CREATININE-BSD FRML MDRD: 44 ML/MIN/1.73
GLOBULIN UR ELPH-MCNC: 4.6 GM/DL
GLUCOSE BLDC GLUCOMTR-MCNC: 310 MG/DL (ref 70–130)
GLUCOSE BLDC GLUCOMTR-MCNC: 328 MG/DL (ref 70–130)
GLUCOSE BLDC GLUCOMTR-MCNC: 328 MG/DL (ref 70–130)
GLUCOSE BLDC GLUCOMTR-MCNC: 373 MG/DL (ref 70–130)
GLUCOSE BLDC GLUCOMTR-MCNC: 420 MG/DL (ref 70–130)
GLUCOSE BLDC GLUCOMTR-MCNC: 441 MG/DL (ref 70–130)
GLUCOSE SERPL-MCNC: 340 MG/DL (ref 65–99)
HADV DNA SPEC NAA+PROBE: NOT DETECTED
HBA1C MFR BLD: 11.2 % (ref 4.8–5.6)
HCOV 229E RNA SPEC QL NAA+PROBE: NOT DETECTED
HCOV HKU1 RNA SPEC QL NAA+PROBE: NOT DETECTED
HCOV NL63 RNA SPEC QL NAA+PROBE: NOT DETECTED
HCOV OC43 RNA SPEC QL NAA+PROBE: NOT DETECTED
HCT VFR BLD AUTO: 41 % (ref 37.5–51)
HGB BLD-MCNC: 13.5 G/DL (ref 13–17.7)
HMPV RNA NPH QL NAA+NON-PROBE: NOT DETECTED
HPIV1 RNA SPEC QL NAA+PROBE: NOT DETECTED
HPIV2 RNA SPEC QL NAA+PROBE: NOT DETECTED
HPIV3 RNA NPH QL NAA+PROBE: NOT DETECTED
HPIV4 P GENE NPH QL NAA+PROBE: NOT DETECTED
IMM GRANULOCYTES # BLD AUTO: 0.04 10*3/MM3 (ref 0–0.05)
IMM GRANULOCYTES NFR BLD AUTO: 0.5 % (ref 0–0.5)
LDH SERPL-CCNC: 287 U/L (ref 135–225)
LYMPHOCYTES # BLD AUTO: 1.02 10*3/MM3 (ref 0.7–3.1)
LYMPHOCYTES NFR BLD AUTO: 11.5 % (ref 19.6–45.3)
M PNEUMO IGG SER IA-ACNC: NOT DETECTED
MCH RBC QN AUTO: 29.1 PG (ref 26.6–33)
MCHC RBC AUTO-ENTMCNC: 32.9 G/DL (ref 31.5–35.7)
MCV RBC AUTO: 88.4 FL (ref 79–97)
MONOCYTES # BLD AUTO: 0.37 10*3/MM3 (ref 0.1–0.9)
MONOCYTES NFR BLD AUTO: 4.2 % (ref 5–12)
NEUTROPHILS NFR BLD AUTO: 7.41 10*3/MM3 (ref 1.7–7)
NEUTROPHILS NFR BLD AUTO: 83.6 % (ref 42.7–76)
NRBC BLD AUTO-RTO: 0 /100 WBC (ref 0–0.2)
PLATELET # BLD AUTO: 127 10*3/MM3 (ref 140–450)
PMV BLD AUTO: 12.2 FL (ref 6–12)
POTASSIUM SERPL-SCNC: 4.2 MMOL/L (ref 3.5–5.2)
PROT SERPL-MCNC: 7.5 G/DL (ref 6–8.5)
RBC # BLD AUTO: 4.64 10*6/MM3 (ref 4.14–5.8)
RHINOVIRUS RNA SPEC NAA+PROBE: NOT DETECTED
RSV RNA NPH QL NAA+NON-PROBE: NOT DETECTED
SARS-COV-2 RNA NPH QL NAA+NON-PROBE: DETECTED
SODIUM SERPL-SCNC: 131 MMOL/L (ref 136–145)
WBC # BLD AUTO: 8.86 10*3/MM3 (ref 3.4–10.8)

## 2021-01-20 PROCEDURE — 94660 CPAP INITIATION&MGMT: CPT

## 2021-01-20 PROCEDURE — 99232 SBSQ HOSP IP/OBS MODERATE 35: CPT | Performed by: HOSPITALIST

## 2021-01-20 PROCEDURE — 63710000001 INSULIN DETEMIR PER 5 UNITS: Performed by: HOSPITALIST

## 2021-01-20 PROCEDURE — 94799 UNLISTED PULMONARY SVC/PX: CPT

## 2021-01-20 PROCEDURE — 76775 US EXAM ABDO BACK WALL LIM: CPT

## 2021-01-20 PROCEDURE — 85379 FIBRIN DEGRADATION QUANT: CPT | Performed by: INTERNAL MEDICINE

## 2021-01-20 PROCEDURE — 85025 COMPLETE CBC W/AUTO DIFF WBC: CPT | Performed by: INTERNAL MEDICINE

## 2021-01-20 PROCEDURE — 63710000001 INSULIN LISPRO (HUMAN) PER 5 UNITS: Performed by: NURSE PRACTITIONER

## 2021-01-20 PROCEDURE — 86140 C-REACTIVE PROTEIN: CPT | Performed by: INTERNAL MEDICINE

## 2021-01-20 PROCEDURE — 71250 CT THORAX DX C-: CPT

## 2021-01-20 PROCEDURE — 25010000002 DEXAMETHASONE PER 1 MG: Performed by: INTERNAL MEDICINE

## 2021-01-20 PROCEDURE — 83036 HEMOGLOBIN GLYCOSYLATED A1C: CPT | Performed by: INTERNAL MEDICINE

## 2021-01-20 PROCEDURE — 82550 ASSAY OF CK (CPK): CPT | Performed by: INTERNAL MEDICINE

## 2021-01-20 PROCEDURE — 85384 FIBRINOGEN ACTIVITY: CPT | Performed by: INTERNAL MEDICINE

## 2021-01-20 PROCEDURE — 82962 GLUCOSE BLOOD TEST: CPT

## 2021-01-20 PROCEDURE — 25010000002 HEPARIN (PORCINE) PER 1000 UNITS: Performed by: INTERNAL MEDICINE

## 2021-01-20 PROCEDURE — 80053 COMPREHEN METABOLIC PANEL: CPT | Performed by: INTERNAL MEDICINE

## 2021-01-20 PROCEDURE — 83615 LACTATE (LD) (LDH) ENZYME: CPT | Performed by: INTERNAL MEDICINE

## 2021-01-20 PROCEDURE — 63710000001 INSULIN LISPRO (HUMAN) PER 5 UNITS: Performed by: INTERNAL MEDICINE

## 2021-01-20 PROCEDURE — 82728 ASSAY OF FERRITIN: CPT | Performed by: INTERNAL MEDICINE

## 2021-01-20 RX ORDER — DEXTROSE MONOHYDRATE 25 G/50ML
25 INJECTION, SOLUTION INTRAVENOUS
Status: DISCONTINUED | OUTPATIENT
Start: 2021-01-20 | End: 2021-01-26 | Stop reason: HOSPADM

## 2021-01-20 RX ORDER — SODIUM CHLORIDE 9 MG/ML
50 INJECTION, SOLUTION INTRAVENOUS CONTINUOUS
Status: ACTIVE | OUTPATIENT
Start: 2021-01-20 | End: 2021-01-21

## 2021-01-20 RX ORDER — BENZONATATE 100 MG/1
100 CAPSULE ORAL 3 TIMES DAILY PRN
Status: DISCONTINUED | OUTPATIENT
Start: 2021-01-20 | End: 2021-01-26 | Stop reason: HOSPADM

## 2021-01-20 RX ORDER — TERAZOSIN 2 MG/1
2 CAPSULE ORAL NIGHTLY
Status: DISCONTINUED | OUTPATIENT
Start: 2021-01-20 | End: 2021-01-21

## 2021-01-20 RX ORDER — TERAZOSIN 2 MG/1
2 CAPSULE ORAL ONCE
Status: COMPLETED | OUTPATIENT
Start: 2021-01-20 | End: 2021-01-20

## 2021-01-20 RX ORDER — BUTALBITAL, ACETAMINOPHEN AND CAFFEINE 50; 325; 40 MG/1; MG/1; MG/1
1 TABLET ORAL ONCE
Status: COMPLETED | OUTPATIENT
Start: 2021-01-20 | End: 2021-01-20

## 2021-01-20 RX ORDER — ISOSORBIDE MONONITRATE 30 MG/1
30 TABLET, EXTENDED RELEASE ORAL ONCE
Status: COMPLETED | OUTPATIENT
Start: 2021-01-20 | End: 2021-01-20

## 2021-01-20 RX ORDER — NICOTINE POLACRILEX 4 MG
15 LOZENGE BUCCAL
Status: DISCONTINUED | OUTPATIENT
Start: 2021-01-20 | End: 2021-01-26 | Stop reason: HOSPADM

## 2021-01-20 RX ADMIN — SODIUM CHLORIDE 50 ML/HR: 9 INJECTION, SOLUTION INTRAVENOUS at 18:19

## 2021-01-20 RX ADMIN — ACETAMINOPHEN 650 MG: 325 TABLET, FILM COATED ORAL at 09:23

## 2021-01-20 RX ADMIN — BENZONATATE 100 MG: 100 CAPSULE ORAL at 20:55

## 2021-01-20 RX ADMIN — TERAZOSIN HYDROCHLORIDE 2 MG: 2 CAPSULE ORAL at 17:35

## 2021-01-20 RX ADMIN — ASPIRIN 81 MG CHEWABLE TABLET 81 MG: 81 TABLET CHEWABLE at 09:23

## 2021-01-20 RX ADMIN — TERAZOSIN HYDROCHLORIDE 2 MG: 2 CAPSULE ORAL at 20:09

## 2021-01-20 RX ADMIN — METOPROLOL TARTRATE 50 MG: 50 TABLET, FILM COATED ORAL at 09:23

## 2021-01-20 RX ADMIN — INSULIN LISPRO 7 UNITS: 100 INJECTION, SOLUTION INTRAVENOUS; SUBCUTANEOUS at 11:29

## 2021-01-20 RX ADMIN — SODIUM CHLORIDE, PRESERVATIVE FREE 10 ML: 5 INJECTION INTRAVENOUS at 09:24

## 2021-01-20 RX ADMIN — DEXAMETHASONE SODIUM PHOSPHATE 6 MG: 4 INJECTION, SOLUTION INTRA-ARTICULAR; INTRALESIONAL; INTRAMUSCULAR; INTRAVENOUS; SOFT TISSUE at 09:23

## 2021-01-20 RX ADMIN — HEPARIN SODIUM 5000 UNITS: 5000 INJECTION INTRAVENOUS; SUBCUTANEOUS at 04:09

## 2021-01-20 RX ADMIN — BUTALBITAL, ACETAMINOPHEN, AND CAFFEINE 1 TABLET: 50; 325; 40 TABLET ORAL at 11:28

## 2021-01-20 RX ADMIN — INSULIN LISPRO 24 UNITS: 100 INJECTION, SOLUTION INTRAVENOUS; SUBCUTANEOUS at 20:55

## 2021-01-20 RX ADMIN — HEPARIN SODIUM 5000 UNITS: 5000 INJECTION INTRAVENOUS; SUBCUTANEOUS at 20:09

## 2021-01-20 RX ADMIN — INSULIN LISPRO 7 UNITS: 100 INJECTION, SOLUTION INTRAVENOUS; SUBCUTANEOUS at 09:23

## 2021-01-20 RX ADMIN — HEPARIN SODIUM 5000 UNITS: 5000 INJECTION INTRAVENOUS; SUBCUTANEOUS at 11:28

## 2021-01-20 RX ADMIN — INSULIN DETEMIR 22 UNITS: 100 INJECTION, SOLUTION SUBCUTANEOUS at 20:12

## 2021-01-20 RX ADMIN — ATORVASTATIN CALCIUM 40 MG: 40 TABLET, FILM COATED ORAL at 20:09

## 2021-01-20 RX ADMIN — ISOSORBIDE MONONITRATE 30 MG: 30 TABLET, EXTENDED RELEASE ORAL at 09:29

## 2021-01-20 RX ADMIN — ACETAMINOPHEN 650 MG: 325 TABLET, FILM COATED ORAL at 17:34

## 2021-01-20 RX ADMIN — METOPROLOL TARTRATE 50 MG: 50 TABLET, FILM COATED ORAL at 20:09

## 2021-01-20 RX ADMIN — INSULIN LISPRO 8 UNITS: 100 INJECTION, SOLUTION INTRAVENOUS; SUBCUTANEOUS at 17:35

## 2021-01-21 LAB
ABO GROUP BLD: NORMAL
ABO GROUP BLD: NORMAL
ALBUMIN SERPL-MCNC: 2.5 G/DL (ref 3.5–5.2)
ALBUMIN/GLOB SERPL: 0.6 G/DL
ALP SERPL-CCNC: 73 U/L (ref 39–117)
ALT SERPL W P-5'-P-CCNC: 13 U/L (ref 1–41)
ANION GAP SERPL CALCULATED.3IONS-SCNC: 12 MMOL/L (ref 5–15)
AST SERPL-CCNC: 15 U/L (ref 1–40)
BASOPHILS # BLD AUTO: 0.01 10*3/MM3 (ref 0–0.2)
BASOPHILS NFR BLD AUTO: 0.1 % (ref 0–1.5)
BILIRUB SERPL-MCNC: <0.2 MG/DL (ref 0–1.2)
BLD GP AB SCN SERPL QL: NEGATIVE
BUN SERPL-MCNC: 42 MG/DL (ref 6–20)
BUN/CREAT SERPL: 20 (ref 7–25)
CALCIUM SPEC-SCNC: 8.6 MG/DL (ref 8.6–10.5)
CHLORIDE SERPL-SCNC: 95 MMOL/L (ref 98–107)
CK MB SERPL-CCNC: 1.19 NG/ML
CK SERPL-CCNC: 58 U/L (ref 20–200)
CK SERPL-CCNC: 60 U/L (ref 20–200)
CO2 SERPL-SCNC: 22 MMOL/L (ref 22–29)
CREAT SERPL-MCNC: 2.1 MG/DL (ref 0.76–1.27)
CRP SERPL-MCNC: 8.09 MG/DL (ref 0–0.5)
D-LACTATE SERPL-SCNC: 1.2 MMOL/L (ref 0.5–2)
DEPRECATED RDW RBC AUTO: 43.9 FL (ref 37–54)
EOSINOPHIL # BLD AUTO: 0 10*3/MM3 (ref 0–0.4)
EOSINOPHIL NFR BLD AUTO: 0 % (ref 0.3–6.2)
ERYTHROCYTE [DISTWIDTH] IN BLOOD BY AUTOMATED COUNT: 13.6 % (ref 12.3–15.4)
FERRITIN SERPL-MCNC: 715.1 NG/ML (ref 30–400)
GFR SERPL CREATININE-BSD FRML MDRD: 42 ML/MIN/1.73
GLOBULIN UR ELPH-MCNC: 4.2 GM/DL
GLUCOSE BLDC GLUCOMTR-MCNC: 224 MG/DL (ref 70–130)
GLUCOSE BLDC GLUCOMTR-MCNC: 254 MG/DL (ref 70–130)
GLUCOSE BLDC GLUCOMTR-MCNC: 283 MG/DL (ref 70–130)
GLUCOSE BLDC GLUCOMTR-MCNC: 355 MG/DL (ref 70–130)
GLUCOSE SERPL-MCNC: 186 MG/DL (ref 65–99)
HCT VFR BLD AUTO: 35.6 % (ref 37.5–51)
HGB BLD-MCNC: 11.6 G/DL (ref 13–17.7)
IMM GRANULOCYTES # BLD AUTO: 0.06 10*3/MM3 (ref 0–0.05)
IMM GRANULOCYTES NFR BLD AUTO: 0.5 % (ref 0–0.5)
LYMPHOCYTES # BLD AUTO: 0.92 10*3/MM3 (ref 0.7–3.1)
LYMPHOCYTES NFR BLD AUTO: 8 % (ref 19.6–45.3)
MAGNESIUM SERPL-MCNC: 1.8 MG/DL (ref 1.6–2.6)
MCH RBC QN AUTO: 28.6 PG (ref 26.6–33)
MCHC RBC AUTO-ENTMCNC: 32.6 G/DL (ref 31.5–35.7)
MCV RBC AUTO: 87.7 FL (ref 79–97)
MONOCYTES # BLD AUTO: 0.67 10*3/MM3 (ref 0.1–0.9)
MONOCYTES NFR BLD AUTO: 5.8 % (ref 5–12)
NEUTROPHILS NFR BLD AUTO: 85.6 % (ref 42.7–76)
NEUTROPHILS NFR BLD AUTO: 9.8 10*3/MM3 (ref 1.7–7)
NRBC BLD AUTO-RTO: 0 /100 WBC (ref 0–0.2)
OSMOLALITY SERPL: 292 MOSM/KG (ref 275–295)
PHOSPHATE SERPL-MCNC: 3.2 MG/DL (ref 2.5–4.5)
PLATELET # BLD AUTO: 115 10*3/MM3 (ref 140–450)
PMV BLD AUTO: 12.8 FL (ref 6–12)
POTASSIUM SERPL-SCNC: 3.9 MMOL/L (ref 3.5–5.2)
PROCALCITONIN SERPL-MCNC: 0.42 NG/ML (ref 0–0.25)
PROT SERPL-MCNC: 6.7 G/DL (ref 6–8.5)
RBC # BLD AUTO: 4.06 10*6/MM3 (ref 4.14–5.8)
RH BLD: POSITIVE
RH BLD: POSITIVE
SODIUM SERPL-SCNC: 129 MMOL/L (ref 136–145)
T&S EXPIRATION DATE: NORMAL
TROPONIN T SERPL-MCNC: 0.04 NG/ML (ref 0–0.03)
WBC # BLD AUTO: 11.46 10*3/MM3 (ref 3.4–10.8)

## 2021-01-21 PROCEDURE — 82550 ASSAY OF CK (CPK): CPT | Performed by: INTERNAL MEDICINE

## 2021-01-21 PROCEDURE — 83735 ASSAY OF MAGNESIUM: CPT | Performed by: HOSPITALIST

## 2021-01-21 PROCEDURE — 84145 PROCALCITONIN (PCT): CPT | Performed by: HOSPITALIST

## 2021-01-21 PROCEDURE — 99232 SBSQ HOSP IP/OBS MODERATE 35: CPT | Performed by: HOSPITALIST

## 2021-01-21 PROCEDURE — 82553 CREATINE MB FRACTION: CPT | Performed by: HOSPITALIST

## 2021-01-21 PROCEDURE — 63710000001 INSULIN DETEMIR PER 5 UNITS: Performed by: HOSPITALIST

## 2021-01-21 PROCEDURE — 63710000001 INSULIN LISPRO (HUMAN) PER 5 UNITS: Performed by: HOSPITALIST

## 2021-01-21 PROCEDURE — 82728 ASSAY OF FERRITIN: CPT | Performed by: INTERNAL MEDICINE

## 2021-01-21 PROCEDURE — 25010000002 CEFTRIAXONE PER 250 MG: Performed by: HOSPITALIST

## 2021-01-21 PROCEDURE — 83605 ASSAY OF LACTIC ACID: CPT | Performed by: HOSPITALIST

## 2021-01-21 PROCEDURE — 82962 GLUCOSE BLOOD TEST: CPT

## 2021-01-21 PROCEDURE — 86140 C-REACTIVE PROTEIN: CPT | Performed by: INTERNAL MEDICINE

## 2021-01-21 PROCEDURE — 86900 BLOOD TYPING SEROLOGIC ABO: CPT | Performed by: INTERNAL MEDICINE

## 2021-01-21 PROCEDURE — 83930 ASSAY OF BLOOD OSMOLALITY: CPT | Performed by: HOSPITALIST

## 2021-01-21 PROCEDURE — 86901 BLOOD TYPING SEROLOGIC RH(D): CPT | Performed by: INTERNAL MEDICINE

## 2021-01-21 PROCEDURE — 82550 ASSAY OF CK (CPK): CPT | Performed by: HOSPITALIST

## 2021-01-21 PROCEDURE — 86900 BLOOD TYPING SEROLOGIC ABO: CPT

## 2021-01-21 PROCEDURE — 85025 COMPLETE CBC W/AUTO DIFF WBC: CPT | Performed by: INTERNAL MEDICINE

## 2021-01-21 PROCEDURE — 84100 ASSAY OF PHOSPHORUS: CPT | Performed by: HOSPITALIST

## 2021-01-21 PROCEDURE — 63710000001 DEXAMETHASONE PER 0.25 MG: Performed by: HOSPITALIST

## 2021-01-21 PROCEDURE — 86901 BLOOD TYPING SEROLOGIC RH(D): CPT

## 2021-01-21 PROCEDURE — 80053 COMPREHEN METABOLIC PANEL: CPT | Performed by: INTERNAL MEDICINE

## 2021-01-21 PROCEDURE — XW033E5 INTRODUCTION OF REMDESIVIR ANTI-INFECTIVE INTO PERIPHERAL VEIN, PERCUTANEOUS APPROACH, NEW TECHNOLOGY GROUP 5: ICD-10-PCS | Performed by: INTERNAL MEDICINE

## 2021-01-21 PROCEDURE — 25010000002 HEPARIN (PORCINE) PER 1000 UNITS: Performed by: INTERNAL MEDICINE

## 2021-01-21 PROCEDURE — 63710000001 INSULIN LISPRO (HUMAN) PER 5 UNITS: Performed by: NURSE PRACTITIONER

## 2021-01-21 PROCEDURE — 86850 RBC ANTIBODY SCREEN: CPT | Performed by: INTERNAL MEDICINE

## 2021-01-21 PROCEDURE — 84484 ASSAY OF TROPONIN QUANT: CPT | Performed by: HOSPITALIST

## 2021-01-21 RX ORDER — ACETAMINOPHEN 325 MG/1
325 TABLET ORAL ONCE
Status: COMPLETED | OUTPATIENT
Start: 2021-01-21 | End: 2021-01-21

## 2021-01-21 RX ORDER — TERAZOSIN 2 MG/1
4 CAPSULE ORAL NIGHTLY
Status: DISCONTINUED | OUTPATIENT
Start: 2021-01-21 | End: 2021-01-22

## 2021-01-21 RX ORDER — DOXYCYCLINE 100 MG/1
100 CAPSULE ORAL EVERY 12 HOURS SCHEDULED
Status: DISCONTINUED | OUTPATIENT
Start: 2021-01-21 | End: 2021-01-22

## 2021-01-21 RX ORDER — ISOSORBIDE MONONITRATE 30 MG/1
30 TABLET, EXTENDED RELEASE ORAL
Status: DISCONTINUED | OUTPATIENT
Start: 2021-01-21 | End: 2021-01-22

## 2021-01-21 RX ADMIN — SODIUM CHLORIDE, PRESERVATIVE FREE 10 ML: 5 INJECTION INTRAVENOUS at 20:01

## 2021-01-21 RX ADMIN — METOPROLOL TARTRATE 50 MG: 50 TABLET, FILM COATED ORAL at 08:42

## 2021-01-21 RX ADMIN — INSULIN LISPRO 2 UNITS: 100 INJECTION, SOLUTION INTRAVENOUS; SUBCUTANEOUS at 17:23

## 2021-01-21 RX ADMIN — INSULIN LISPRO 2 UNITS: 100 INJECTION, SOLUTION INTRAVENOUS; SUBCUTANEOUS at 11:56

## 2021-01-21 RX ADMIN — DEXAMETHASONE 6 MG: 4 TABLET ORAL at 08:41

## 2021-01-21 RX ADMIN — ASPIRIN 81 MG CHEWABLE TABLET 81 MG: 81 TABLET CHEWABLE at 08:42

## 2021-01-21 RX ADMIN — ACETAMINOPHEN 325 MG: 325 TABLET, FILM COATED ORAL at 04:38

## 2021-01-21 RX ADMIN — INSULIN LISPRO 2 UNITS: 100 INJECTION, SOLUTION INTRAVENOUS; SUBCUTANEOUS at 08:42

## 2021-01-21 RX ADMIN — INSULIN LISPRO 20 UNITS: 100 INJECTION, SOLUTION INTRAVENOUS; SUBCUTANEOUS at 20:00

## 2021-01-21 RX ADMIN — INSULIN LISPRO 8 UNITS: 100 INJECTION, SOLUTION INTRAVENOUS; SUBCUTANEOUS at 08:42

## 2021-01-21 RX ADMIN — HEPARIN SODIUM 5000 UNITS: 5000 INJECTION INTRAVENOUS; SUBCUTANEOUS at 11:50

## 2021-01-21 RX ADMIN — INSULIN DETEMIR 22 UNITS: 100 INJECTION, SOLUTION SUBCUTANEOUS at 20:04

## 2021-01-21 RX ADMIN — INSULIN LISPRO 12 UNITS: 100 INJECTION, SOLUTION INTRAVENOUS; SUBCUTANEOUS at 11:57

## 2021-01-21 RX ADMIN — BENZONATATE 100 MG: 100 CAPSULE ORAL at 20:00

## 2021-01-21 RX ADMIN — SODIUM CHLORIDE, PRESERVATIVE FREE 10 ML: 5 INJECTION INTRAVENOUS at 08:43

## 2021-01-21 RX ADMIN — DOXYCYCLINE 100 MG: 100 CAPSULE ORAL at 20:00

## 2021-01-21 RX ADMIN — REMDESIVIR 200 MG: 100 INJECTION, POWDER, LYOPHILIZED, FOR SOLUTION INTRAVENOUS at 17:25

## 2021-01-21 RX ADMIN — ACETAMINOPHEN 650 MG: 325 TABLET, FILM COATED ORAL at 08:42

## 2021-01-21 RX ADMIN — SODIUM CHLORIDE 1 G: 900 INJECTION INTRAVENOUS at 11:50

## 2021-01-21 RX ADMIN — ISOSORBIDE MONONITRATE 30 MG: 30 TABLET, EXTENDED RELEASE ORAL at 11:50

## 2021-01-21 RX ADMIN — DOXYCYCLINE 100 MG: 100 CAPSULE ORAL at 11:50

## 2021-01-21 RX ADMIN — TERAZOSIN HYDROCHLORIDE 4 MG: 2 CAPSULE ORAL at 20:00

## 2021-01-21 RX ADMIN — ACETAMINOPHEN 650 MG: 325 TABLET, FILM COATED ORAL at 17:23

## 2021-01-21 RX ADMIN — METOPROLOL TARTRATE 50 MG: 50 TABLET, FILM COATED ORAL at 20:00

## 2021-01-21 RX ADMIN — HEPARIN SODIUM 5000 UNITS: 5000 INJECTION INTRAVENOUS; SUBCUTANEOUS at 06:05

## 2021-01-21 RX ADMIN — ACETAMINOPHEN 650 MG: 325 TABLET, FILM COATED ORAL at 02:23

## 2021-01-21 RX ADMIN — ATORVASTATIN CALCIUM 40 MG: 40 TABLET, FILM COATED ORAL at 20:00

## 2021-01-21 RX ADMIN — INSULIN LISPRO 12 UNITS: 100 INJECTION, SOLUTION INTRAVENOUS; SUBCUTANEOUS at 17:23

## 2021-01-21 RX ADMIN — HEPARIN SODIUM 5000 UNITS: 5000 INJECTION INTRAVENOUS; SUBCUTANEOUS at 20:00

## 2021-01-22 LAB
ALBUMIN SERPL-MCNC: 2.4 G/DL (ref 3.5–5.2)
ALBUMIN/GLOB SERPL: 0.5 G/DL
ALP SERPL-CCNC: 69 U/L (ref 39–117)
ALT SERPL W P-5'-P-CCNC: 16 U/L (ref 1–41)
ANION GAP SERPL CALCULATED.3IONS-SCNC: 12 MMOL/L (ref 5–15)
AST SERPL-CCNC: 21 U/L (ref 1–40)
BASOPHILS # BLD AUTO: 0.02 10*3/MM3 (ref 0–0.2)
BASOPHILS NFR BLD AUTO: 0.2 % (ref 0–1.5)
BILIRUB SERPL-MCNC: 0.2 MG/DL (ref 0–1.2)
BUN SERPL-MCNC: 44 MG/DL (ref 6–20)
BUN/CREAT SERPL: 23.2 (ref 7–25)
CALCIUM SPEC-SCNC: 8.7 MG/DL (ref 8.6–10.5)
CHLORIDE SERPL-SCNC: 98 MMOL/L (ref 98–107)
CK SERPL-CCNC: 58 U/L (ref 20–200)
CO2 SERPL-SCNC: 22 MMOL/L (ref 22–29)
CREAT SERPL-MCNC: 1.9 MG/DL (ref 0.76–1.27)
CRP SERPL-MCNC: 12.63 MG/DL (ref 0–0.5)
D DIMER PPP FEU-MCNC: 1.23 MCGFEU/ML (ref 0–0.56)
DEPRECATED RDW RBC AUTO: 42.6 FL (ref 37–54)
EOSINOPHIL # BLD AUTO: 0.05 10*3/MM3 (ref 0–0.4)
EOSINOPHIL NFR BLD AUTO: 0.4 % (ref 0.3–6.2)
ERYTHROCYTE [DISTWIDTH] IN BLOOD BY AUTOMATED COUNT: 13.2 % (ref 12.3–15.4)
FERRITIN SERPL-MCNC: 866 NG/ML (ref 30–400)
FIBRINOGEN PPP-MCNC: 848 MG/DL (ref 215–430)
GFR SERPL CREATININE-BSD FRML MDRD: 47 ML/MIN/1.73
GLOBULIN UR ELPH-MCNC: 4.4 GM/DL
GLUCOSE BLDC GLUCOMTR-MCNC: 125 MG/DL (ref 70–130)
GLUCOSE BLDC GLUCOMTR-MCNC: 229 MG/DL (ref 70–130)
GLUCOSE BLDC GLUCOMTR-MCNC: 263 MG/DL (ref 70–130)
GLUCOSE BLDC GLUCOMTR-MCNC: 348 MG/DL (ref 70–130)
GLUCOSE SERPL-MCNC: 135 MG/DL (ref 65–99)
HCT VFR BLD AUTO: 33.1 % (ref 37.5–51)
HGB BLD-MCNC: 10.8 G/DL (ref 13–17.7)
IMM GRANULOCYTES # BLD AUTO: 0.18 10*3/MM3 (ref 0–0.05)
IMM GRANULOCYTES NFR BLD AUTO: 1.6 % (ref 0–0.5)
LDH SERPL-CCNC: 287 U/L (ref 135–225)
LYMPHOCYTES # BLD AUTO: 1 10*3/MM3 (ref 0.7–3.1)
LYMPHOCYTES NFR BLD AUTO: 8.7 % (ref 19.6–45.3)
MCH RBC QN AUTO: 28.9 PG (ref 26.6–33)
MCHC RBC AUTO-ENTMCNC: 32.6 G/DL (ref 31.5–35.7)
MCV RBC AUTO: 88.5 FL (ref 79–97)
MONOCYTES # BLD AUTO: 0.68 10*3/MM3 (ref 0.1–0.9)
MONOCYTES NFR BLD AUTO: 5.9 % (ref 5–12)
NEUTROPHILS NFR BLD AUTO: 83.2 % (ref 42.7–76)
NEUTROPHILS NFR BLD AUTO: 9.59 10*3/MM3 (ref 1.7–7)
NRBC BLD AUTO-RTO: 0 /100 WBC (ref 0–0.2)
PLATELET # BLD AUTO: 132 10*3/MM3 (ref 140–450)
PMV BLD AUTO: 12 FL (ref 6–12)
POTASSIUM SERPL-SCNC: 3.9 MMOL/L (ref 3.5–5.2)
PROT SERPL-MCNC: 6.8 G/DL (ref 6–8.5)
RBC # BLD AUTO: 3.74 10*6/MM3 (ref 4.14–5.8)
SODIUM SERPL-SCNC: 132 MMOL/L (ref 136–145)
WBC # BLD AUTO: 11.52 10*3/MM3 (ref 3.4–10.8)

## 2021-01-22 PROCEDURE — 85379 FIBRIN DEGRADATION QUANT: CPT | Performed by: INTERNAL MEDICINE

## 2021-01-22 PROCEDURE — 25010000002 CEFTRIAXONE PER 250 MG: Performed by: HOSPITALIST

## 2021-01-22 PROCEDURE — 99232 SBSQ HOSP IP/OBS MODERATE 35: CPT | Performed by: HOSPITALIST

## 2021-01-22 PROCEDURE — 82728 ASSAY OF FERRITIN: CPT | Performed by: INTERNAL MEDICINE

## 2021-01-22 PROCEDURE — 80053 COMPREHEN METABOLIC PANEL: CPT | Performed by: INTERNAL MEDICINE

## 2021-01-22 PROCEDURE — 83615 LACTATE (LD) (LDH) ENZYME: CPT | Performed by: INTERNAL MEDICINE

## 2021-01-22 PROCEDURE — 63710000001 INSULIN LISPRO (HUMAN) PER 5 UNITS: Performed by: HOSPITALIST

## 2021-01-22 PROCEDURE — 63710000001 INSULIN LISPRO (HUMAN) PER 5 UNITS: Performed by: NURSE PRACTITIONER

## 2021-01-22 PROCEDURE — 86140 C-REACTIVE PROTEIN: CPT | Performed by: INTERNAL MEDICINE

## 2021-01-22 PROCEDURE — XW13325 TRANSFUSION OF CONVALESCENT PLASMA (NONAUTOLOGOUS) INTO PERIPHERAL VEIN, PERCUTANEOUS APPROACH, NEW TECHNOLOGY GROUP 5: ICD-10-PCS | Performed by: INTERNAL MEDICINE

## 2021-01-22 PROCEDURE — 25010000002 HEPARIN (PORCINE) PER 1000 UNITS: Performed by: INTERNAL MEDICINE

## 2021-01-22 PROCEDURE — 63710000001 INSULIN DETEMIR PER 5 UNITS: Performed by: HOSPITALIST

## 2021-01-22 PROCEDURE — 82962 GLUCOSE BLOOD TEST: CPT

## 2021-01-22 PROCEDURE — 82550 ASSAY OF CK (CPK): CPT | Performed by: INTERNAL MEDICINE

## 2021-01-22 PROCEDURE — 85025 COMPLETE CBC W/AUTO DIFF WBC: CPT | Performed by: INTERNAL MEDICINE

## 2021-01-22 PROCEDURE — 63710000001 DEXAMETHASONE PER 0.25 MG: Performed by: HOSPITALIST

## 2021-01-22 PROCEDURE — 85384 FIBRINOGEN ACTIVITY: CPT | Performed by: INTERNAL MEDICINE

## 2021-01-22 RX ORDER — ISOSORBIDE MONONITRATE 60 MG/1
60 TABLET, EXTENDED RELEASE ORAL
Status: DISCONTINUED | OUTPATIENT
Start: 2021-01-23 | End: 2021-01-26 | Stop reason: HOSPADM

## 2021-01-22 RX ORDER — TERAZOSIN 5 MG/1
5 CAPSULE ORAL NIGHTLY
Status: DISCONTINUED | OUTPATIENT
Start: 2021-01-22 | End: 2021-01-26 | Stop reason: HOSPADM

## 2021-01-22 RX ADMIN — ACETAMINOPHEN 650 MG: 325 TABLET, FILM COATED ORAL at 02:38

## 2021-01-22 RX ADMIN — INSULIN LISPRO 2 UNITS: 100 INJECTION, SOLUTION INTRAVENOUS; SUBCUTANEOUS at 11:59

## 2021-01-22 RX ADMIN — SODIUM CHLORIDE, PRESERVATIVE FREE 10 ML: 5 INJECTION INTRAVENOUS at 20:56

## 2021-01-22 RX ADMIN — INSULIN LISPRO 16 UNITS: 100 INJECTION, SOLUTION INTRAVENOUS; SUBCUTANEOUS at 20:55

## 2021-01-22 RX ADMIN — HEPARIN SODIUM 5000 UNITS: 5000 INJECTION INTRAVENOUS; SUBCUTANEOUS at 20:58

## 2021-01-22 RX ADMIN — INSULIN LISPRO 2 UNITS: 100 INJECTION, SOLUTION INTRAVENOUS; SUBCUTANEOUS at 07:42

## 2021-01-22 RX ADMIN — INSULIN LISPRO 2 UNITS: 100 INJECTION, SOLUTION INTRAVENOUS; SUBCUTANEOUS at 18:08

## 2021-01-22 RX ADMIN — DOXYCYCLINE 100 MG: 100 CAPSULE ORAL at 07:39

## 2021-01-22 RX ADMIN — HEPARIN SODIUM 5000 UNITS: 5000 INJECTION INTRAVENOUS; SUBCUTANEOUS at 13:47

## 2021-01-22 RX ADMIN — INSULIN LISPRO 8 UNITS: 100 INJECTION, SOLUTION INTRAVENOUS; SUBCUTANEOUS at 18:08

## 2021-01-22 RX ADMIN — METOPROLOL TARTRATE 50 MG: 50 TABLET, FILM COATED ORAL at 07:39

## 2021-01-22 RX ADMIN — ASPIRIN 81 MG CHEWABLE TABLET 81 MG: 81 TABLET CHEWABLE at 07:40

## 2021-01-22 RX ADMIN — ACETAMINOPHEN 650 MG: 325 TABLET, FILM COATED ORAL at 18:08

## 2021-01-22 RX ADMIN — INSULIN LISPRO 12 UNITS: 100 INJECTION, SOLUTION INTRAVENOUS; SUBCUTANEOUS at 11:59

## 2021-01-22 RX ADMIN — REMDESIVIR 100 MG: 100 INJECTION, POWDER, LYOPHILIZED, FOR SOLUTION INTRAVENOUS at 11:59

## 2021-01-22 RX ADMIN — HEPARIN SODIUM 5000 UNITS: 5000 INJECTION INTRAVENOUS; SUBCUTANEOUS at 06:16

## 2021-01-22 RX ADMIN — SODIUM CHLORIDE 1 G: 900 INJECTION INTRAVENOUS at 11:07

## 2021-01-22 RX ADMIN — DEXAMETHASONE 6 MG: 4 TABLET ORAL at 07:39

## 2021-01-22 RX ADMIN — TERAZOSIN HYDROCHLORIDE 5 MG: 5 CAPSULE ORAL at 20:57

## 2021-01-22 RX ADMIN — METOPROLOL TARTRATE 50 MG: 50 TABLET, FILM COATED ORAL at 20:57

## 2021-01-22 RX ADMIN — ACETAMINOPHEN 650 MG: 325 TABLET, FILM COATED ORAL at 11:07

## 2021-01-22 RX ADMIN — ATORVASTATIN CALCIUM 40 MG: 40 TABLET, FILM COATED ORAL at 20:57

## 2021-01-22 RX ADMIN — INSULIN DETEMIR 30 UNITS: 100 INJECTION, SOLUTION SUBCUTANEOUS at 20:57

## 2021-01-22 RX ADMIN — ISOSORBIDE MONONITRATE 30 MG: 30 TABLET, EXTENDED RELEASE ORAL at 07:39

## 2021-01-23 LAB
ALBUMIN SERPL-MCNC: 2.5 G/DL (ref 3.5–5.2)
ALBUMIN/GLOB SERPL: 0.6 G/DL
ALP SERPL-CCNC: 68 U/L (ref 39–117)
ALT SERPL W P-5'-P-CCNC: 17 U/L (ref 1–41)
ANION GAP SERPL CALCULATED.3IONS-SCNC: 12 MMOL/L (ref 5–15)
AST SERPL-CCNC: 23 U/L (ref 1–40)
BACTERIA UR QL AUTO: ABNORMAL /HPF
BASOPHILS # BLD AUTO: 0.02 10*3/MM3 (ref 0–0.2)
BASOPHILS NFR BLD AUTO: 0.2 % (ref 0–1.5)
BH BB BLOOD EXPIRATION DATE: NORMAL
BH BB BLOOD EXPIRATION DATE: NORMAL
BH BB BLOOD TYPE BARCODE: 600
BH BB BLOOD TYPE BARCODE: 600
BH BB DISPENSE STATUS: NORMAL
BH BB DISPENSE STATUS: NORMAL
BH BB PRODUCT CODE: NORMAL
BH BB PRODUCT CODE: NORMAL
BH BB UNIT NUMBER: NORMAL
BH BB UNIT NUMBER: NORMAL
BILIRUB SERPL-MCNC: 0.2 MG/DL (ref 0–1.2)
BILIRUB UR QL STRIP: NEGATIVE
BUN SERPL-MCNC: 46 MG/DL (ref 6–20)
BUN/CREAT SERPL: 25.7 (ref 7–25)
CALCIUM SPEC-SCNC: 8.8 MG/DL (ref 8.6–10.5)
CHLORIDE SERPL-SCNC: 100 MMOL/L (ref 98–107)
CK SERPL-CCNC: 59 U/L (ref 20–200)
CLARITY UR: CLEAR
CO2 SERPL-SCNC: 22 MMOL/L (ref 22–29)
COLOR UR: YELLOW
CREAT SERPL-MCNC: 1.79 MG/DL (ref 0.76–1.27)
CRP SERPL-MCNC: 12.5 MG/DL (ref 0–0.5)
DEPRECATED RDW RBC AUTO: 43.5 FL (ref 37–54)
EOSINOPHIL # BLD AUTO: 0 10*3/MM3 (ref 0–0.4)
EOSINOPHIL NFR BLD AUTO: 0 % (ref 0.3–6.2)
ERYTHROCYTE [DISTWIDTH] IN BLOOD BY AUTOMATED COUNT: 13.6 % (ref 12.3–15.4)
FERRITIN SERPL-MCNC: 999.2 NG/ML (ref 30–400)
GFR SERPL CREATININE-BSD FRML MDRD: 50 ML/MIN/1.73
GLOBULIN UR ELPH-MCNC: 4.2 GM/DL
GLUCOSE BLDC GLUCOMTR-MCNC: 103 MG/DL (ref 70–130)
GLUCOSE BLDC GLUCOMTR-MCNC: 118 MG/DL (ref 70–130)
GLUCOSE BLDC GLUCOMTR-MCNC: 185 MG/DL (ref 70–130)
GLUCOSE BLDC GLUCOMTR-MCNC: 196 MG/DL (ref 70–130)
GLUCOSE SERPL-MCNC: 118 MG/DL (ref 65–99)
GLUCOSE UR STRIP-MCNC: ABNORMAL MG/DL
HCT VFR BLD AUTO: 33.6 % (ref 37.5–51)
HGB BLD-MCNC: 10.7 G/DL (ref 13–17.7)
HGB UR QL STRIP.AUTO: ABNORMAL
HYALINE CASTS UR QL AUTO: ABNORMAL /LPF
IMM GRANULOCYTES # BLD AUTO: 0.16 10*3/MM3 (ref 0–0.05)
IMM GRANULOCYTES NFR BLD AUTO: 1.4 % (ref 0–0.5)
KETONES UR QL STRIP: NEGATIVE
LEUKOCYTE ESTERASE UR QL STRIP.AUTO: NEGATIVE
LYMPHOCYTES # BLD AUTO: 1.3 10*3/MM3 (ref 0.7–3.1)
LYMPHOCYTES NFR BLD AUTO: 11.2 % (ref 19.6–45.3)
MAGNESIUM SERPL-MCNC: 1.9 MG/DL (ref 1.6–2.6)
MCH RBC QN AUTO: 27.9 PG (ref 26.6–33)
MCHC RBC AUTO-ENTMCNC: 31.8 G/DL (ref 31.5–35.7)
MCV RBC AUTO: 87.7 FL (ref 79–97)
MONOCYTES # BLD AUTO: 0.72 10*3/MM3 (ref 0.1–0.9)
MONOCYTES NFR BLD AUTO: 6.2 % (ref 5–12)
NEUTROPHILS NFR BLD AUTO: 81 % (ref 42.7–76)
NEUTROPHILS NFR BLD AUTO: 9.38 10*3/MM3 (ref 1.7–7)
NITRITE UR QL STRIP: NEGATIVE
NRBC BLD AUTO-RTO: 0 /100 WBC (ref 0–0.2)
PH UR STRIP.AUTO: 6 [PH] (ref 5–8)
PLATELET # BLD AUTO: 168 10*3/MM3 (ref 140–450)
PMV BLD AUTO: 12.3 FL (ref 6–12)
POTASSIUM SERPL-SCNC: 3.9 MMOL/L (ref 3.5–5.2)
PROT SERPL-MCNC: 6.7 G/DL (ref 6–8.5)
PROT UR QL STRIP: ABNORMAL
RBC # BLD AUTO: 3.83 10*6/MM3 (ref 4.14–5.8)
RBC # UR: ABNORMAL /HPF
REF LAB TEST METHOD: ABNORMAL
RENAL EPI CELLS #/AREA URNS HPF: ABNORMAL /HPF
SODIUM SERPL-SCNC: 134 MMOL/L (ref 136–145)
SP GR UR STRIP: 1.02 (ref 1–1.03)
SQUAMOUS #/AREA URNS HPF: ABNORMAL /HPF
UNIT  ABO: NORMAL
UNIT  ABO: NORMAL
UNIT  RH: NORMAL
UNIT  RH: NORMAL
UROBILINOGEN UR QL STRIP: ABNORMAL
WBC # BLD AUTO: 11.58 10*3/MM3 (ref 3.4–10.8)
WBC UR QL AUTO: ABNORMAL /HPF

## 2021-01-23 PROCEDURE — 25010000002 HEPARIN (PORCINE) PER 1000 UNITS: Performed by: INTERNAL MEDICINE

## 2021-01-23 PROCEDURE — 82728 ASSAY OF FERRITIN: CPT | Performed by: INTERNAL MEDICINE

## 2021-01-23 PROCEDURE — 63710000001 INSULIN LISPRO (HUMAN) PER 5 UNITS: Performed by: HOSPITALIST

## 2021-01-23 PROCEDURE — 86140 C-REACTIVE PROTEIN: CPT | Performed by: INTERNAL MEDICINE

## 2021-01-23 PROCEDURE — 82962 GLUCOSE BLOOD TEST: CPT

## 2021-01-23 PROCEDURE — 80053 COMPREHEN METABOLIC PANEL: CPT | Performed by: INTERNAL MEDICINE

## 2021-01-23 PROCEDURE — 85025 COMPLETE CBC W/AUTO DIFF WBC: CPT | Performed by: INTERNAL MEDICINE

## 2021-01-23 PROCEDURE — 99232 SBSQ HOSP IP/OBS MODERATE 35: CPT | Performed by: HOSPITALIST

## 2021-01-23 PROCEDURE — 63710000001 INSULIN DETEMIR PER 5 UNITS: Performed by: HOSPITALIST

## 2021-01-23 PROCEDURE — 82550 ASSAY OF CK (CPK): CPT | Performed by: INTERNAL MEDICINE

## 2021-01-23 PROCEDURE — 83735 ASSAY OF MAGNESIUM: CPT | Performed by: HOSPITALIST

## 2021-01-23 PROCEDURE — 63710000001 INSULIN LISPRO (HUMAN) PER 5 UNITS: Performed by: NURSE PRACTITIONER

## 2021-01-23 PROCEDURE — 81001 URINALYSIS AUTO W/SCOPE: CPT | Performed by: HOSPITALIST

## 2021-01-23 RX ADMIN — HEPARIN SODIUM 5000 UNITS: 5000 INJECTION INTRAVENOUS; SUBCUTANEOUS at 20:01

## 2021-01-23 RX ADMIN — BENZONATATE 100 MG: 100 CAPSULE ORAL at 18:42

## 2021-01-23 RX ADMIN — ASPIRIN 81 MG CHEWABLE TABLET 81 MG: 81 TABLET CHEWABLE at 08:11

## 2021-01-23 RX ADMIN — INSULIN LISPRO 10 UNITS: 100 INJECTION, SOLUTION INTRAVENOUS; SUBCUTANEOUS at 08:10

## 2021-01-23 RX ADMIN — INSULIN LISPRO 4 UNITS: 100 INJECTION, SOLUTION INTRAVENOUS; SUBCUTANEOUS at 21:55

## 2021-01-23 RX ADMIN — ACETAMINOPHEN ORAL SOLUTION 650 MG: 650 SOLUTION ORAL at 18:43

## 2021-01-23 RX ADMIN — INSULIN LISPRO 10 UNITS: 100 INJECTION, SOLUTION INTRAVENOUS; SUBCUTANEOUS at 11:51

## 2021-01-23 RX ADMIN — METOPROLOL TARTRATE 50 MG: 50 TABLET, FILM COATED ORAL at 20:01

## 2021-01-23 RX ADMIN — METOPROLOL TARTRATE 50 MG: 50 TABLET, FILM COATED ORAL at 08:10

## 2021-01-23 RX ADMIN — SODIUM CHLORIDE, PRESERVATIVE FREE 10 ML: 5 INJECTION INTRAVENOUS at 08:11

## 2021-01-23 RX ADMIN — INSULIN LISPRO 4 UNITS: 100 INJECTION, SOLUTION INTRAVENOUS; SUBCUTANEOUS at 18:38

## 2021-01-23 RX ADMIN — HEPARIN SODIUM 5000 UNITS: 5000 INJECTION INTRAVENOUS; SUBCUTANEOUS at 13:48

## 2021-01-23 RX ADMIN — REMDESIVIR 100 MG: 100 INJECTION, POWDER, LYOPHILIZED, FOR SOLUTION INTRAVENOUS at 11:51

## 2021-01-23 RX ADMIN — HEPARIN SODIUM 5000 UNITS: 5000 INJECTION INTRAVENOUS; SUBCUTANEOUS at 05:15

## 2021-01-23 RX ADMIN — INSULIN LISPRO 10 UNITS: 100 INJECTION, SOLUTION INTRAVENOUS; SUBCUTANEOUS at 18:37

## 2021-01-23 RX ADMIN — INSULIN DETEMIR 30 UNITS: 100 INJECTION, SOLUTION SUBCUTANEOUS at 20:01

## 2021-01-23 RX ADMIN — TERAZOSIN HYDROCHLORIDE 5 MG: 5 CAPSULE ORAL at 20:01

## 2021-01-23 RX ADMIN — DEXAMETHASONE 6 MG: 4 TABLET ORAL at 08:10

## 2021-01-23 RX ADMIN — ATORVASTATIN CALCIUM 40 MG: 40 TABLET, FILM COATED ORAL at 20:01

## 2021-01-23 RX ADMIN — ACETAMINOPHEN 650 MG: 325 TABLET, FILM COATED ORAL at 03:55

## 2021-01-23 RX ADMIN — SODIUM CHLORIDE, PRESERVATIVE FREE 10 ML: 5 INJECTION INTRAVENOUS at 20:51

## 2021-01-23 RX ADMIN — ISOSORBIDE MONONITRATE 60 MG: 60 TABLET, EXTENDED RELEASE ORAL at 08:10

## 2021-01-24 LAB
ALBUMIN SERPL-MCNC: 2.1 G/DL (ref 3.5–5.2)
ALBUMIN/GLOB SERPL: 0.4 G/DL
ALP SERPL-CCNC: 70 U/L (ref 39–117)
ALT SERPL W P-5'-P-CCNC: 17 U/L (ref 1–41)
ANION GAP SERPL CALCULATED.3IONS-SCNC: 11 MMOL/L (ref 5–15)
AST SERPL-CCNC: 28 U/L (ref 1–40)
BACTERIA SPEC AEROBE CULT: NORMAL
BACTERIA SPEC AEROBE CULT: NORMAL
BASOPHILS # BLD AUTO: 0.02 10*3/MM3 (ref 0–0.2)
BASOPHILS NFR BLD AUTO: 0.2 % (ref 0–1.5)
BILIRUB SERPL-MCNC: 0.2 MG/DL (ref 0–1.2)
BUN SERPL-MCNC: 44 MG/DL (ref 6–20)
BUN/CREAT SERPL: 28.2 (ref 7–25)
CALCIUM SPEC-SCNC: 9.1 MG/DL (ref 8.6–10.5)
CHLORIDE SERPL-SCNC: 96 MMOL/L (ref 98–107)
CK SERPL-CCNC: 73 U/L (ref 20–200)
CO2 SERPL-SCNC: 22 MMOL/L (ref 22–29)
CREAT SERPL-MCNC: 1.56 MG/DL (ref 0.76–1.27)
CRP SERPL-MCNC: 13.44 MG/DL (ref 0–0.5)
D DIMER PPP FEU-MCNC: 1.92 MCGFEU/ML (ref 0–0.56)
DEPRECATED RDW RBC AUTO: 43.8 FL (ref 37–54)
EOSINOPHIL # BLD AUTO: 0.01 10*3/MM3 (ref 0–0.4)
EOSINOPHIL NFR BLD AUTO: 0.1 % (ref 0.3–6.2)
ERYTHROCYTE [DISTWIDTH] IN BLOOD BY AUTOMATED COUNT: 13.5 % (ref 12.3–15.4)
FERRITIN SERPL-MCNC: 1024 NG/ML (ref 30–400)
FIBRINOGEN PPP-MCNC: >900 MG/DL (ref 215–430)
GFR SERPL CREATININE-BSD FRML MDRD: 59 ML/MIN/1.73
GLOBULIN UR ELPH-MCNC: 4.7 GM/DL
GLUCOSE BLDC GLUCOMTR-MCNC: 107 MG/DL (ref 70–130)
GLUCOSE BLDC GLUCOMTR-MCNC: 130 MG/DL (ref 70–130)
GLUCOSE BLDC GLUCOMTR-MCNC: 133 MG/DL (ref 70–130)
GLUCOSE BLDC GLUCOMTR-MCNC: 163 MG/DL (ref 70–130)
GLUCOSE BLDC GLUCOMTR-MCNC: 189 MG/DL (ref 70–130)
GLUCOSE SERPL-MCNC: 126 MG/DL (ref 65–99)
HCT VFR BLD AUTO: 31.8 % (ref 37.5–51)
HGB BLD-MCNC: 10.1 G/DL (ref 13–17.7)
IMM GRANULOCYTES # BLD AUTO: 0.28 10*3/MM3 (ref 0–0.05)
IMM GRANULOCYTES NFR BLD AUTO: 2.4 % (ref 0–0.5)
LDH SERPL-CCNC: 302 U/L (ref 135–225)
LYMPHOCYTES # BLD AUTO: 1.26 10*3/MM3 (ref 0.7–3.1)
LYMPHOCYTES NFR BLD AUTO: 10.8 % (ref 19.6–45.3)
MCH RBC QN AUTO: 28.1 PG (ref 26.6–33)
MCHC RBC AUTO-ENTMCNC: 31.8 G/DL (ref 31.5–35.7)
MCV RBC AUTO: 88.6 FL (ref 79–97)
MONOCYTES # BLD AUTO: 0.73 10*3/MM3 (ref 0.1–0.9)
MONOCYTES NFR BLD AUTO: 6.3 % (ref 5–12)
NEUTROPHILS NFR BLD AUTO: 80.2 % (ref 42.7–76)
NEUTROPHILS NFR BLD AUTO: 9.33 10*3/MM3 (ref 1.7–7)
NRBC BLD AUTO-RTO: 0 /100 WBC (ref 0–0.2)
PLATELET # BLD AUTO: 219 10*3/MM3 (ref 140–450)
PMV BLD AUTO: 11.8 FL (ref 6–12)
POTASSIUM SERPL-SCNC: 3.7 MMOL/L (ref 3.5–5.2)
PROT SERPL-MCNC: 6.8 G/DL (ref 6–8.5)
RBC # BLD AUTO: 3.59 10*6/MM3 (ref 4.14–5.8)
SODIUM SERPL-SCNC: 129 MMOL/L (ref 136–145)
WBC # BLD AUTO: 11.63 10*3/MM3 (ref 3.4–10.8)

## 2021-01-24 PROCEDURE — 63710000001 INSULIN DETEMIR PER 5 UNITS: Performed by: HOSPITALIST

## 2021-01-24 PROCEDURE — 80053 COMPREHEN METABOLIC PANEL: CPT | Performed by: INTERNAL MEDICINE

## 2021-01-24 PROCEDURE — 99232 SBSQ HOSP IP/OBS MODERATE 35: CPT | Performed by: HOSPITALIST

## 2021-01-24 PROCEDURE — 85379 FIBRIN DEGRADATION QUANT: CPT | Performed by: INTERNAL MEDICINE

## 2021-01-24 PROCEDURE — 63710000001 INSULIN LISPRO (HUMAN) PER 5 UNITS: Performed by: HOSPITALIST

## 2021-01-24 PROCEDURE — 25010000002 HEPARIN (PORCINE) PER 1000 UNITS: Performed by: INTERNAL MEDICINE

## 2021-01-24 PROCEDURE — 82728 ASSAY OF FERRITIN: CPT | Performed by: INTERNAL MEDICINE

## 2021-01-24 PROCEDURE — 85384 FIBRINOGEN ACTIVITY: CPT | Performed by: INTERNAL MEDICINE

## 2021-01-24 PROCEDURE — 63710000001 INSULIN LISPRO (HUMAN) PER 5 UNITS: Performed by: NURSE PRACTITIONER

## 2021-01-24 PROCEDURE — 82962 GLUCOSE BLOOD TEST: CPT

## 2021-01-24 PROCEDURE — 63710000001 DEXAMETHASONE PER 0.25 MG: Performed by: HOSPITALIST

## 2021-01-24 PROCEDURE — 85025 COMPLETE CBC W/AUTO DIFF WBC: CPT | Performed by: INTERNAL MEDICINE

## 2021-01-24 PROCEDURE — 82550 ASSAY OF CK (CPK): CPT | Performed by: INTERNAL MEDICINE

## 2021-01-24 PROCEDURE — 86140 C-REACTIVE PROTEIN: CPT | Performed by: INTERNAL MEDICINE

## 2021-01-24 PROCEDURE — 83615 LACTATE (LD) (LDH) ENZYME: CPT | Performed by: INTERNAL MEDICINE

## 2021-01-24 RX ADMIN — TERAZOSIN HYDROCHLORIDE 5 MG: 5 CAPSULE ORAL at 20:54

## 2021-01-24 RX ADMIN — INSULIN LISPRO 10 UNITS: 100 INJECTION, SOLUTION INTRAVENOUS; SUBCUTANEOUS at 17:41

## 2021-01-24 RX ADMIN — ASPIRIN 81 MG CHEWABLE TABLET 81 MG: 81 TABLET CHEWABLE at 09:08

## 2021-01-24 RX ADMIN — METOPROLOL TARTRATE 50 MG: 50 TABLET, FILM COATED ORAL at 20:54

## 2021-01-24 RX ADMIN — METOPROLOL TARTRATE 50 MG: 50 TABLET, FILM COATED ORAL at 09:08

## 2021-01-24 RX ADMIN — INSULIN LISPRO 4 UNITS: 100 INJECTION, SOLUTION INTRAVENOUS; SUBCUTANEOUS at 17:42

## 2021-01-24 RX ADMIN — HEPARIN SODIUM 5000 UNITS: 5000 INJECTION INTRAVENOUS; SUBCUTANEOUS at 13:17

## 2021-01-24 RX ADMIN — HEPARIN SODIUM 5000 UNITS: 5000 INJECTION INTRAVENOUS; SUBCUTANEOUS at 20:55

## 2021-01-24 RX ADMIN — DEXAMETHASONE 6 MG: 4 TABLET ORAL at 09:08

## 2021-01-24 RX ADMIN — INSULIN LISPRO 10 UNITS: 100 INJECTION, SOLUTION INTRAVENOUS; SUBCUTANEOUS at 09:08

## 2021-01-24 RX ADMIN — INSULIN DETEMIR 30 UNITS: 100 INJECTION, SOLUTION SUBCUTANEOUS at 21:02

## 2021-01-24 RX ADMIN — INSULIN LISPRO 10 UNITS: 100 INJECTION, SOLUTION INTRAVENOUS; SUBCUTANEOUS at 12:11

## 2021-01-24 RX ADMIN — HEPARIN SODIUM 5000 UNITS: 5000 INJECTION INTRAVENOUS; SUBCUTANEOUS at 06:11

## 2021-01-24 RX ADMIN — SODIUM CHLORIDE, PRESERVATIVE FREE 10 ML: 5 INJECTION INTRAVENOUS at 20:55

## 2021-01-24 RX ADMIN — ATORVASTATIN CALCIUM 40 MG: 40 TABLET, FILM COATED ORAL at 20:54

## 2021-01-24 RX ADMIN — ISOSORBIDE MONONITRATE 60 MG: 60 TABLET, EXTENDED RELEASE ORAL at 09:08

## 2021-01-24 RX ADMIN — REMDESIVIR 100 MG: 100 INJECTION, POWDER, LYOPHILIZED, FOR SOLUTION INTRAVENOUS at 12:11

## 2021-01-24 RX ADMIN — INSULIN LISPRO 4 UNITS: 100 INJECTION, SOLUTION INTRAVENOUS; SUBCUTANEOUS at 21:02

## 2021-01-24 RX ADMIN — SODIUM CHLORIDE, PRESERVATIVE FREE 10 ML: 5 INJECTION INTRAVENOUS at 09:09

## 2021-01-25 LAB
ALBUMIN SERPL-MCNC: 2.4 G/DL (ref 3.5–5.2)
ALBUMIN/GLOB SERPL: 0.5 G/DL
ALP SERPL-CCNC: 112 U/L (ref 39–117)
ALT SERPL W P-5'-P-CCNC: 18 U/L (ref 1–41)
ANION GAP SERPL CALCULATED.3IONS-SCNC: 11 MMOL/L (ref 5–15)
AST SERPL-CCNC: 27 U/L (ref 1–40)
BASOPHILS # BLD AUTO: 0.02 10*3/MM3 (ref 0–0.2)
BASOPHILS NFR BLD AUTO: 0.2 % (ref 0–1.5)
BILIRUB SERPL-MCNC: 0.2 MG/DL (ref 0–1.2)
BUN SERPL-MCNC: 46 MG/DL (ref 6–20)
BUN/CREAT SERPL: 29.9 (ref 7–25)
CALCIUM SPEC-SCNC: 9.1 MG/DL (ref 8.6–10.5)
CHLORIDE SERPL-SCNC: 99 MMOL/L (ref 98–107)
CK SERPL-CCNC: 87 U/L (ref 20–200)
CO2 SERPL-SCNC: 23 MMOL/L (ref 22–29)
CREAT SERPL-MCNC: 1.54 MG/DL (ref 0.76–1.27)
CRP SERPL-MCNC: 11.96 MG/DL (ref 0–0.5)
DEPRECATED RDW RBC AUTO: 43.8 FL (ref 37–54)
EOSINOPHIL # BLD AUTO: 0.01 10*3/MM3 (ref 0–0.4)
EOSINOPHIL NFR BLD AUTO: 0.1 % (ref 0.3–6.2)
ERYTHROCYTE [DISTWIDTH] IN BLOOD BY AUTOMATED COUNT: 13.5 % (ref 12.3–15.4)
FERRITIN SERPL-MCNC: 1146 NG/ML (ref 30–400)
GFR SERPL CREATININE-BSD FRML MDRD: 60 ML/MIN/1.73
GLOBULIN UR ELPH-MCNC: 4.4 GM/DL
GLUCOSE BLDC GLUCOMTR-MCNC: 103 MG/DL (ref 70–130)
GLUCOSE BLDC GLUCOMTR-MCNC: 106 MG/DL (ref 70–130)
GLUCOSE BLDC GLUCOMTR-MCNC: 226 MG/DL (ref 70–130)
GLUCOSE BLDC GLUCOMTR-MCNC: 293 MG/DL (ref 70–130)
GLUCOSE SERPL-MCNC: 105 MG/DL (ref 65–99)
HCT VFR BLD AUTO: 34.7 % (ref 37.5–51)
HGB BLD-MCNC: 11 G/DL (ref 13–17.7)
IMM GRANULOCYTES # BLD AUTO: 0.5 10*3/MM3 (ref 0–0.05)
IMM GRANULOCYTES NFR BLD AUTO: 4.3 % (ref 0–0.5)
LYMPHOCYTES # BLD AUTO: 1.5 10*3/MM3 (ref 0.7–3.1)
LYMPHOCYTES NFR BLD AUTO: 13 % (ref 19.6–45.3)
MCH RBC QN AUTO: 28 PG (ref 26.6–33)
MCHC RBC AUTO-ENTMCNC: 31.7 G/DL (ref 31.5–35.7)
MCV RBC AUTO: 88.3 FL (ref 79–97)
MONOCYTES # BLD AUTO: 0.8 10*3/MM3 (ref 0.1–0.9)
MONOCYTES NFR BLD AUTO: 6.9 % (ref 5–12)
NEUTROPHILS NFR BLD AUTO: 75.5 % (ref 42.7–76)
NEUTROPHILS NFR BLD AUTO: 8.75 10*3/MM3 (ref 1.7–7)
NRBC BLD AUTO-RTO: 0 /100 WBC (ref 0–0.2)
PLATELET # BLD AUTO: 250 10*3/MM3 (ref 140–450)
PMV BLD AUTO: 11.7 FL (ref 6–12)
POTASSIUM SERPL-SCNC: 4.1 MMOL/L (ref 3.5–5.2)
PROT SERPL-MCNC: 6.8 G/DL (ref 6–8.5)
RBC # BLD AUTO: 3.93 10*6/MM3 (ref 4.14–5.8)
SODIUM SERPL-SCNC: 133 MMOL/L (ref 136–145)
WBC # BLD AUTO: 11.58 10*3/MM3 (ref 3.4–10.8)

## 2021-01-25 PROCEDURE — 25010000002 HEPARIN (PORCINE) PER 1000 UNITS: Performed by: INTERNAL MEDICINE

## 2021-01-25 PROCEDURE — 82962 GLUCOSE BLOOD TEST: CPT

## 2021-01-25 PROCEDURE — 63710000001 DEXAMETHASONE PER 0.25 MG: Performed by: HOSPITALIST

## 2021-01-25 PROCEDURE — 82550 ASSAY OF CK (CPK): CPT | Performed by: INTERNAL MEDICINE

## 2021-01-25 PROCEDURE — 82728 ASSAY OF FERRITIN: CPT | Performed by: INTERNAL MEDICINE

## 2021-01-25 PROCEDURE — 63710000001 INSULIN DETEMIR PER 5 UNITS: Performed by: HOSPITALIST

## 2021-01-25 PROCEDURE — 63710000001 INSULIN LISPRO (HUMAN) PER 5 UNITS: Performed by: NURSE PRACTITIONER

## 2021-01-25 PROCEDURE — 85025 COMPLETE CBC W/AUTO DIFF WBC: CPT | Performed by: INTERNAL MEDICINE

## 2021-01-25 PROCEDURE — 86140 C-REACTIVE PROTEIN: CPT | Performed by: INTERNAL MEDICINE

## 2021-01-25 PROCEDURE — 80053 COMPREHEN METABOLIC PANEL: CPT | Performed by: INTERNAL MEDICINE

## 2021-01-25 PROCEDURE — 99233 SBSQ HOSP IP/OBS HIGH 50: CPT | Performed by: INTERNAL MEDICINE

## 2021-01-25 PROCEDURE — 63710000001 INSULIN LISPRO (HUMAN) PER 5 UNITS: Performed by: HOSPITALIST

## 2021-01-25 RX ADMIN — HEPARIN SODIUM 5000 UNITS: 5000 INJECTION INTRAVENOUS; SUBCUTANEOUS at 13:05

## 2021-01-25 RX ADMIN — INSULIN LISPRO 10 UNITS: 100 INJECTION, SOLUTION INTRAVENOUS; SUBCUTANEOUS at 16:49

## 2021-01-25 RX ADMIN — DEXAMETHASONE 6 MG: 4 TABLET ORAL at 09:14

## 2021-01-25 RX ADMIN — HEPARIN SODIUM 5000 UNITS: 5000 INJECTION INTRAVENOUS; SUBCUTANEOUS at 05:57

## 2021-01-25 RX ADMIN — INSULIN LISPRO 10 UNITS: 100 INJECTION, SOLUTION INTRAVENOUS; SUBCUTANEOUS at 13:05

## 2021-01-25 RX ADMIN — REMDESIVIR 100 MG: 100 INJECTION, POWDER, LYOPHILIZED, FOR SOLUTION INTRAVENOUS at 13:05

## 2021-01-25 RX ADMIN — HEPARIN SODIUM 5000 UNITS: 5000 INJECTION INTRAVENOUS; SUBCUTANEOUS at 21:03

## 2021-01-25 RX ADMIN — INSULIN DETEMIR 30 UNITS: 100 INJECTION, SOLUTION SUBCUTANEOUS at 21:03

## 2021-01-25 RX ADMIN — INSULIN LISPRO 8 UNITS: 100 INJECTION, SOLUTION INTRAVENOUS; SUBCUTANEOUS at 16:49

## 2021-01-25 RX ADMIN — METOPROLOL TARTRATE 50 MG: 50 TABLET, FILM COATED ORAL at 21:03

## 2021-01-25 RX ADMIN — SODIUM CHLORIDE, PRESERVATIVE FREE 10 ML: 5 INJECTION INTRAVENOUS at 09:15

## 2021-01-25 RX ADMIN — TERAZOSIN HYDROCHLORIDE 5 MG: 5 CAPSULE ORAL at 21:03

## 2021-01-25 RX ADMIN — INSULIN LISPRO 10 UNITS: 100 INJECTION, SOLUTION INTRAVENOUS; SUBCUTANEOUS at 09:14

## 2021-01-25 RX ADMIN — INSULIN LISPRO 12 UNITS: 100 INJECTION, SOLUTION INTRAVENOUS; SUBCUTANEOUS at 21:03

## 2021-01-25 RX ADMIN — ATORVASTATIN CALCIUM 40 MG: 40 TABLET, FILM COATED ORAL at 21:03

## 2021-01-25 RX ADMIN — ISOSORBIDE MONONITRATE 60 MG: 60 TABLET, EXTENDED RELEASE ORAL at 09:14

## 2021-01-25 RX ADMIN — METOPROLOL TARTRATE 50 MG: 50 TABLET, FILM COATED ORAL at 09:14

## 2021-01-25 RX ADMIN — ASPIRIN 81 MG CHEWABLE TABLET 81 MG: 81 TABLET CHEWABLE at 09:14

## 2021-01-25 RX ADMIN — SODIUM CHLORIDE, PRESERVATIVE FREE 10 ML: 5 INJECTION INTRAVENOUS at 21:03

## 2021-01-26 ENCOUNTER — READMISSION MANAGEMENT (OUTPATIENT)
Dept: CALL CENTER | Facility: HOSPITAL | Age: 46
End: 2021-01-26

## 2021-01-26 VITALS
RESPIRATION RATE: 18 BRPM | SYSTOLIC BLOOD PRESSURE: 128 MMHG | BODY MASS INDEX: 39.49 KG/M2 | HEIGHT: 73 IN | WEIGHT: 298 LBS | TEMPERATURE: 98 F | HEART RATE: 74 BPM | DIASTOLIC BLOOD PRESSURE: 93 MMHG | OXYGEN SATURATION: 95 %

## 2021-01-26 LAB
ALBUMIN SERPL-MCNC: 2.4 G/DL (ref 3.5–5.2)
ALBUMIN/GLOB SERPL: 0.5 G/DL
ALP SERPL-CCNC: 72 U/L (ref 39–117)
ALT SERPL W P-5'-P-CCNC: 21 U/L (ref 1–41)
ANION GAP SERPL CALCULATED.3IONS-SCNC: 12 MMOL/L (ref 5–15)
AST SERPL-CCNC: 27 U/L (ref 1–40)
BASOPHILS # BLD AUTO: 0.04 10*3/MM3 (ref 0–0.2)
BASOPHILS NFR BLD AUTO: 0.3 % (ref 0–1.5)
BILIRUB SERPL-MCNC: 0.2 MG/DL (ref 0–1.2)
BUN SERPL-MCNC: 52 MG/DL (ref 6–20)
BUN/CREAT SERPL: 33.5 (ref 7–25)
CALCIUM SPEC-SCNC: 9.4 MG/DL (ref 8.6–10.5)
CHLORIDE SERPL-SCNC: 102 MMOL/L (ref 98–107)
CK SERPL-CCNC: 88 U/L (ref 20–200)
CO2 SERPL-SCNC: 21 MMOL/L (ref 22–29)
CREAT SERPL-MCNC: 1.55 MG/DL (ref 0.76–1.27)
CRP SERPL-MCNC: 6.11 MG/DL (ref 0–0.5)
D DIMER PPP FEU-MCNC: 2.99 MCGFEU/ML (ref 0–0.56)
DEPRECATED RDW RBC AUTO: 44.2 FL (ref 37–54)
EOSINOPHIL # BLD AUTO: 0.04 10*3/MM3 (ref 0–0.4)
EOSINOPHIL NFR BLD AUTO: 0.3 % (ref 0.3–6.2)
ERYTHROCYTE [DISTWIDTH] IN BLOOD BY AUTOMATED COUNT: 13.3 % (ref 12.3–15.4)
FERRITIN SERPL-MCNC: 1167 NG/ML (ref 30–400)
FIBRINOGEN PPP-MCNC: 862 MG/DL (ref 215–430)
GFR SERPL CREATININE-BSD FRML MDRD: 59 ML/MIN/1.73
GLOBULIN UR ELPH-MCNC: 4.7 GM/DL
GLUCOSE BLDC GLUCOMTR-MCNC: 117 MG/DL (ref 70–130)
GLUCOSE BLDC GLUCOMTR-MCNC: 134 MG/DL (ref 70–130)
GLUCOSE SERPL-MCNC: 139 MG/DL (ref 65–99)
HCT VFR BLD AUTO: 36.7 % (ref 37.5–51)
HGB BLD-MCNC: 11.9 G/DL (ref 13–17.7)
IMM GRANULOCYTES # BLD AUTO: 0.51 10*3/MM3 (ref 0–0.05)
IMM GRANULOCYTES NFR BLD AUTO: 3.8 % (ref 0–0.5)
LDH SERPL-CCNC: 344 U/L (ref 135–225)
LYMPHOCYTES # BLD AUTO: 1.72 10*3/MM3 (ref 0.7–3.1)
LYMPHOCYTES NFR BLD AUTO: 12.8 % (ref 19.6–45.3)
MCH RBC QN AUTO: 29 PG (ref 26.6–33)
MCHC RBC AUTO-ENTMCNC: 32.4 G/DL (ref 31.5–35.7)
MCV RBC AUTO: 89.5 FL (ref 79–97)
MONOCYTES # BLD AUTO: 0.56 10*3/MM3 (ref 0.1–0.9)
MONOCYTES NFR BLD AUTO: 4.2 % (ref 5–12)
NEUTROPHILS NFR BLD AUTO: 10.59 10*3/MM3 (ref 1.7–7)
NEUTROPHILS NFR BLD AUTO: 78.6 % (ref 42.7–76)
NRBC BLD AUTO-RTO: 0 /100 WBC (ref 0–0.2)
PLATELET # BLD AUTO: 289 10*3/MM3 (ref 140–450)
PMV BLD AUTO: 11.5 FL (ref 6–12)
POTASSIUM SERPL-SCNC: 4.2 MMOL/L (ref 3.5–5.2)
PROT SERPL-MCNC: 7.1 G/DL (ref 6–8.5)
RBC # BLD AUTO: 4.1 10*6/MM3 (ref 4.14–5.8)
SODIUM SERPL-SCNC: 135 MMOL/L (ref 136–145)
WBC # BLD AUTO: 13.46 10*3/MM3 (ref 3.4–10.8)

## 2021-01-26 PROCEDURE — 80053 COMPREHEN METABOLIC PANEL: CPT | Performed by: INTERNAL MEDICINE

## 2021-01-26 PROCEDURE — 99239 HOSP IP/OBS DSCHRG MGMT >30: CPT | Performed by: INTERNAL MEDICINE

## 2021-01-26 PROCEDURE — 25010000002 HEPARIN (PORCINE) PER 1000 UNITS: Performed by: INTERNAL MEDICINE

## 2021-01-26 PROCEDURE — 85025 COMPLETE CBC W/AUTO DIFF WBC: CPT | Performed by: INTERNAL MEDICINE

## 2021-01-26 PROCEDURE — 63710000001 INSULIN LISPRO (HUMAN) PER 5 UNITS: Performed by: HOSPITALIST

## 2021-01-26 PROCEDURE — 85384 FIBRINOGEN ACTIVITY: CPT | Performed by: INTERNAL MEDICINE

## 2021-01-26 PROCEDURE — 82962 GLUCOSE BLOOD TEST: CPT

## 2021-01-26 PROCEDURE — 85379 FIBRIN DEGRADATION QUANT: CPT | Performed by: INTERNAL MEDICINE

## 2021-01-26 PROCEDURE — 82550 ASSAY OF CK (CPK): CPT | Performed by: INTERNAL MEDICINE

## 2021-01-26 PROCEDURE — 86140 C-REACTIVE PROTEIN: CPT | Performed by: INTERNAL MEDICINE

## 2021-01-26 PROCEDURE — 83615 LACTATE (LD) (LDH) ENZYME: CPT | Performed by: INTERNAL MEDICINE

## 2021-01-26 PROCEDURE — 82728 ASSAY OF FERRITIN: CPT | Performed by: INTERNAL MEDICINE

## 2021-01-26 RX ORDER — METOPROLOL TARTRATE 50 MG/1
50 TABLET, FILM COATED ORAL EVERY 12 HOURS SCHEDULED
Qty: 60 TABLET | Refills: 0 | Status: ON HOLD | OUTPATIENT
Start: 2021-01-26 | End: 2023-01-23

## 2021-01-26 RX ORDER — DEXAMETHASONE 6 MG/1
6 TABLET ORAL
Qty: 2 TABLET | Refills: 0 | Status: SHIPPED | OUTPATIENT
Start: 2021-01-27 | End: 2021-01-29

## 2021-01-26 RX ORDER — ISOSORBIDE MONONITRATE 60 MG/1
60 TABLET, EXTENDED RELEASE ORAL
Qty: 30 TABLET | Refills: 0 | Status: ON HOLD | OUTPATIENT
Start: 2021-01-27 | End: 2023-01-23

## 2021-01-26 RX ORDER — BENZONATATE 100 MG/1
100 CAPSULE ORAL 3 TIMES DAILY PRN
Qty: 15 CAPSULE | Refills: 0 | Status: SHIPPED | OUTPATIENT
Start: 2021-01-26 | End: 2021-07-26

## 2021-01-26 RX ORDER — TERAZOSIN 5 MG/1
5 CAPSULE ORAL NIGHTLY
Qty: 30 CAPSULE | Refills: 0 | Status: SHIPPED | OUTPATIENT
Start: 2021-01-26 | End: 2023-01-29 | Stop reason: HOSPADM

## 2021-01-26 RX ADMIN — INSULIN LISPRO 10 UNITS: 100 INJECTION, SOLUTION INTRAVENOUS; SUBCUTANEOUS at 08:25

## 2021-01-26 RX ADMIN — INSULIN LISPRO 10 UNITS: 100 INJECTION, SOLUTION INTRAVENOUS; SUBCUTANEOUS at 13:39

## 2021-01-26 RX ADMIN — HEPARIN SODIUM 5000 UNITS: 5000 INJECTION INTRAVENOUS; SUBCUTANEOUS at 13:39

## 2021-01-26 RX ADMIN — ASPIRIN 81 MG CHEWABLE TABLET 81 MG: 81 TABLET CHEWABLE at 08:25

## 2021-01-26 RX ADMIN — SODIUM CHLORIDE, PRESERVATIVE FREE 10 ML: 5 INJECTION INTRAVENOUS at 08:27

## 2021-01-26 RX ADMIN — DEXAMETHASONE 6 MG: 4 TABLET ORAL at 08:25

## 2021-01-26 RX ADMIN — METOPROLOL TARTRATE 50 MG: 50 TABLET, FILM COATED ORAL at 08:25

## 2021-01-26 RX ADMIN — ISOSORBIDE MONONITRATE 60 MG: 60 TABLET, EXTENDED RELEASE ORAL at 08:25

## 2021-01-26 RX ADMIN — HEPARIN SODIUM 5000 UNITS: 5000 INJECTION INTRAVENOUS; SUBCUTANEOUS at 05:21

## 2021-01-27 ENCOUNTER — READMISSION MANAGEMENT (OUTPATIENT)
Dept: CALL CENTER | Facility: HOSPITAL | Age: 46
End: 2021-01-27

## 2021-01-27 NOTE — OUTREACH NOTE
Prep Survey      Responses   [facility] facility patient discharged from?  [facility]   Is LACE score < 7 ?  No   Emergency Room discharge w/ pulse ox?  No   Eligibility  Readm Mgmt   Discharge diagnosis  Pneumonia due to COVID-19 virus   Does the patient have one of the following disease processes/diagnoses(primary or secondary)?  COVID-19   Does the patient have Home health ordered?  No   Is there a DME ordered?  Yes   What DME was ordered?  O2 through [facility]    Prep survey completed?  Yes          Brenda Anthony RN

## 2021-01-27 NOTE — OUTREACH NOTE
COVID-19 Week 1 Survey      Responses   [facility], [place], operated by [facility] patient discharged from?  [place]   Does the patient have one of the following disease processes/diagnoses(primary or secondary)?  COVID-19   COVID-19 underlying condition?  None   Call Number  Call 1   Week 1 Call successful?  No   Discharge diagnosis  Pneumonia due to COVID-19 virus          Nataly Arenas RN

## 2021-01-28 ENCOUNTER — READMISSION MANAGEMENT (OUTPATIENT)
Dept: CALL CENTER | Facility: HOSPITAL | Age: 46
End: 2021-01-28

## 2021-01-28 NOTE — OUTREACH NOTE
COVID-19 Week 1 Survey      Responses   [facility] patient discharged from?  [place]   Does the patient have one of the following disease processes/diagnoses(primary or secondary)?  COVID-19   COVID-19 underlying condition?  None   Call Number  Call 2   Week 1 Call successful?  No          Juana Diehl RN

## 2021-01-29 ENCOUNTER — READMISSION MANAGEMENT (OUTPATIENT)
Dept: CALL CENTER | Facility: HOSPITAL | Age: 46
End: 2021-01-29

## 2021-01-29 NOTE — OUTREACH NOTE
COVID-19 Week 1 Survey      Responses   [facility], [place], operated by [facility] patient discharged from?  [place]   Does the patient have one of the following disease processes/diagnoses(primary or secondary)?  COVID-19   COVID-19 underlying condition?  None   Call Number  Call 3   Week 1 Call successful?  No   Discharge diagnosis  Pneumonia due to COVID-19 virus          Jaqueline Gustafson RN

## 2021-02-11 ENCOUNTER — HOSPITAL ENCOUNTER (OUTPATIENT)
Dept: GENERAL RADIOLOGY | Facility: HOSPITAL | Age: 46
Discharge: HOME | End: 2021-02-11
Admitting: PHYSICIAN ASSISTANT

## 2021-02-11 ENCOUNTER — TRANSCRIBE ORDERS (OUTPATIENT)
Dept: ADMINISTRATIVE | Facility: HOSPITAL | Age: 46
End: 2021-02-11

## 2021-02-11 DIAGNOSIS — U07.1 CLINICAL DIAGNOSIS OF COVID-19: ICD-10-CM

## 2021-02-11 DIAGNOSIS — U07.1 CLINICAL DIAGNOSIS OF COVID-19: Primary | ICD-10-CM

## 2021-02-11 PROCEDURE — 71046 X-RAY EXAM CHEST 2 VIEWS: CPT

## 2021-02-18 ENCOUNTER — APPOINTMENT (OUTPATIENT)
Dept: GENERAL RADIOLOGY | Facility: HOSPITAL | Age: 46
End: 2021-02-18

## 2021-02-18 ENCOUNTER — APPOINTMENT (OUTPATIENT)
Dept: CT IMAGING | Facility: HOSPITAL | Age: 46
End: 2021-02-18

## 2021-02-18 ENCOUNTER — HOSPITAL ENCOUNTER (EMERGENCY)
Facility: HOSPITAL | Age: 46
Discharge: HOME | End: 2021-02-18
Attending: EMERGENCY MEDICINE | Admitting: EMERGENCY MEDICINE

## 2021-02-18 VITALS
HEART RATE: 94 BPM | OXYGEN SATURATION: 97 % | BODY MASS INDEX: 41.48 KG/M2 | HEIGHT: 73 IN | TEMPERATURE: 98.6 F | SYSTOLIC BLOOD PRESSURE: 165 MMHG | WEIGHT: 313 LBS | RESPIRATION RATE: 18 BRPM | DIASTOLIC BLOOD PRESSURE: 101 MMHG

## 2021-02-18 DIAGNOSIS — I10 UNCONTROLLED HYPERTENSION: ICD-10-CM

## 2021-02-18 DIAGNOSIS — U09.9 PERSISTENT SHORTNESS OF BREATH AFTER COVID-19: ICD-10-CM

## 2021-02-18 DIAGNOSIS — Z86.16 HISTORY OF COVID-19: Primary | ICD-10-CM

## 2021-02-18 DIAGNOSIS — R06.02 PERSISTENT SHORTNESS OF BREATH AFTER COVID-19: ICD-10-CM

## 2021-02-18 LAB
ALBUMIN SERPL-MCNC: 3.1 G/DL (ref 3.5–5.2)
ALBUMIN/GLOB SERPL: 0.9 G/DL
ALP SERPL-CCNC: 80 U/L (ref 39–117)
ALT SERPL W P-5'-P-CCNC: 15 U/L (ref 1–41)
ANION GAP SERPL CALCULATED.3IONS-SCNC: 8 MMOL/L (ref 5–15)
AST SERPL-CCNC: 22 U/L (ref 1–40)
BASOPHILS # BLD AUTO: 0.04 10*3/MM3 (ref 0–0.2)
BASOPHILS NFR BLD AUTO: 0.7 % (ref 0–1.5)
BILIRUB SERPL-MCNC: 0.2 MG/DL (ref 0–1.2)
BUN SERPL-MCNC: 20 MG/DL (ref 6–20)
BUN/CREAT SERPL: 13.5 (ref 7–25)
CALCIUM SPEC-SCNC: 8.7 MG/DL (ref 8.6–10.5)
CHLORIDE SERPL-SCNC: 108 MMOL/L (ref 98–107)
CO2 SERPL-SCNC: 24 MMOL/L (ref 22–29)
CREAT SERPL-MCNC: 1.48 MG/DL (ref 0.76–1.27)
CRP SERPL-MCNC: 0.47 MG/DL (ref 0–0.5)
DEPRECATED RDW RBC AUTO: 51.1 FL (ref 37–54)
EOSINOPHIL # BLD AUTO: 0.18 10*3/MM3 (ref 0–0.4)
EOSINOPHIL NFR BLD AUTO: 3.2 % (ref 0.3–6.2)
ERYTHROCYTE [DISTWIDTH] IN BLOOD BY AUTOMATED COUNT: 15.6 % (ref 12.3–15.4)
FERRITIN SERPL-MCNC: 313.4 NG/ML (ref 30–400)
GFR SERPL CREATININE-BSD FRML MDRD: 62 ML/MIN/1.73
GLOBULIN UR ELPH-MCNC: 3.6 GM/DL
GLUCOSE SERPL-MCNC: 172 MG/DL (ref 65–99)
HCT VFR BLD AUTO: 33.6 % (ref 37.5–51)
HGB BLD-MCNC: 10.9 G/DL (ref 13–17.7)
HOLD SPECIMEN: NORMAL
IMM GRANULOCYTES # BLD AUTO: 0.01 10*3/MM3 (ref 0–0.05)
IMM GRANULOCYTES NFR BLD AUTO: 0.2 % (ref 0–0.5)
LDH SERPL-CCNC: 373 U/L (ref 135–225)
LYMPHOCYTES # BLD AUTO: 1.72 10*3/MM3 (ref 0.7–3.1)
LYMPHOCYTES NFR BLD AUTO: 30.7 % (ref 19.6–45.3)
MCH RBC QN AUTO: 29.4 PG (ref 26.6–33)
MCHC RBC AUTO-ENTMCNC: 32.4 G/DL (ref 31.5–35.7)
MCV RBC AUTO: 90.6 FL (ref 79–97)
MONOCYTES # BLD AUTO: 0.44 10*3/MM3 (ref 0.1–0.9)
MONOCYTES NFR BLD AUTO: 7.9 % (ref 5–12)
NEUTROPHILS NFR BLD AUTO: 3.21 10*3/MM3 (ref 1.7–7)
NEUTROPHILS NFR BLD AUTO: 57.3 % (ref 42.7–76)
NRBC BLD AUTO-RTO: 0 /100 WBC (ref 0–0.2)
NT-PROBNP SERPL-MCNC: 1284 PG/ML (ref 0–450)
PLATELET # BLD AUTO: 167 10*3/MM3 (ref 140–450)
PMV BLD AUTO: 12.6 FL (ref 6–12)
POTASSIUM SERPL-SCNC: 4.4 MMOL/L (ref 3.5–5.2)
PROCALCITONIN SERPL-MCNC: 0.12 NG/ML (ref 0–0.25)
PROT SERPL-MCNC: 6.7 G/DL (ref 6–8.5)
RBC # BLD AUTO: 3.71 10*6/MM3 (ref 4.14–5.8)
SODIUM SERPL-SCNC: 140 MMOL/L (ref 136–145)
TROPONIN T SERPL-MCNC: 0.07 NG/ML (ref 0–0.03)
WBC # BLD AUTO: 5.6 10*3/MM3 (ref 3.4–10.8)
WHOLE BLOOD HOLD SPECIMEN: NORMAL
WHOLE BLOOD HOLD SPECIMEN: NORMAL

## 2021-02-18 PROCEDURE — 99284 EMERGENCY DEPT VISIT MOD MDM: CPT

## 2021-02-18 PROCEDURE — 96375 TX/PRO/DX INJ NEW DRUG ADDON: CPT

## 2021-02-18 PROCEDURE — 0 IOPAMIDOL PER 1 ML: Performed by: EMERGENCY MEDICINE

## 2021-02-18 PROCEDURE — 82728 ASSAY OF FERRITIN: CPT | Performed by: PHYSICIAN ASSISTANT

## 2021-02-18 PROCEDURE — 25010000002 HYDRALAZINE PER 20 MG: Performed by: PHYSICIAN ASSISTANT

## 2021-02-18 PROCEDURE — 84484 ASSAY OF TROPONIN QUANT: CPT | Performed by: EMERGENCY MEDICINE

## 2021-02-18 PROCEDURE — 85025 COMPLETE CBC W/AUTO DIFF WBC: CPT | Performed by: EMERGENCY MEDICINE

## 2021-02-18 PROCEDURE — 83615 LACTATE (LD) (LDH) ENZYME: CPT | Performed by: PHYSICIAN ASSISTANT

## 2021-02-18 PROCEDURE — 80053 COMPREHEN METABOLIC PANEL: CPT | Performed by: EMERGENCY MEDICINE

## 2021-02-18 PROCEDURE — 96374 THER/PROPH/DIAG INJ IV PUSH: CPT

## 2021-02-18 PROCEDURE — 25010000002 DEXAMETHASONE PER 1 MG: Performed by: PHYSICIAN ASSISTANT

## 2021-02-18 PROCEDURE — 93005 ELECTROCARDIOGRAM TRACING: CPT | Performed by: EMERGENCY MEDICINE

## 2021-02-18 PROCEDURE — 71275 CT ANGIOGRAPHY CHEST: CPT

## 2021-02-18 PROCEDURE — 86140 C-REACTIVE PROTEIN: CPT | Performed by: PHYSICIAN ASSISTANT

## 2021-02-18 PROCEDURE — 83880 ASSAY OF NATRIURETIC PEPTIDE: CPT | Performed by: EMERGENCY MEDICINE

## 2021-02-18 PROCEDURE — 84145 PROCALCITONIN (PCT): CPT | Performed by: PHYSICIAN ASSISTANT

## 2021-02-18 PROCEDURE — 71045 X-RAY EXAM CHEST 1 VIEW: CPT

## 2021-02-18 RX ORDER — HYDRALAZINE HYDROCHLORIDE 20 MG/ML
20 INJECTION INTRAMUSCULAR; INTRAVENOUS ONCE
Status: COMPLETED | OUTPATIENT
Start: 2021-02-18 | End: 2021-02-18

## 2021-02-18 RX ORDER — DEXAMETHASONE SODIUM PHOSPHATE 4 MG/ML
6 INJECTION, SOLUTION INTRA-ARTICULAR; INTRALESIONAL; INTRAMUSCULAR; INTRAVENOUS; SOFT TISSUE ONCE
Status: COMPLETED | OUTPATIENT
Start: 2021-02-18 | End: 2021-02-18

## 2021-02-18 RX ORDER — SODIUM CHLORIDE 0.9 % (FLUSH) 0.9 %
10 SYRINGE (ML) INJECTION AS NEEDED
Status: DISCONTINUED | OUTPATIENT
Start: 2021-02-18 | End: 2021-02-18 | Stop reason: HOSPADM

## 2021-02-18 RX ORDER — CLONIDINE HYDROCHLORIDE 0.1 MG/1
0.1 TABLET ORAL ONCE
Status: COMPLETED | OUTPATIENT
Start: 2021-02-18 | End: 2021-02-18

## 2021-02-18 RX ORDER — AZITHROMYCIN 250 MG/1
TABLET, FILM COATED ORAL
Qty: 6 TABLET | Refills: 0 | Status: SHIPPED | OUTPATIENT
Start: 2021-02-18 | End: 2021-07-26

## 2021-02-18 RX ORDER — SPIRONOLACTONE 50 MG/1
50 TABLET, FILM COATED ORAL DAILY
COMMUNITY
End: 2022-08-15

## 2021-02-18 RX ADMIN — CLONIDINE HYDROCHLORIDE 0.1 MG: 0.1 TABLET ORAL at 17:11

## 2021-02-18 RX ADMIN — IOPAMIDOL 80 ML: 755 INJECTION, SOLUTION INTRAVENOUS at 15:55

## 2021-02-18 RX ADMIN — DEXAMETHASONE SODIUM PHOSPHATE 6 MG: 4 INJECTION, SOLUTION INTRAMUSCULAR; INTRAVENOUS at 16:52

## 2021-02-18 RX ADMIN — HYDRALAZINE HYDROCHLORIDE 20 MG: 20 INJECTION INTRAMUSCULAR; INTRAVENOUS at 18:01

## 2021-02-18 NOTE — DISCHARGE INSTRUCTIONS
Symptomatic care is recommended. Take all medications as prescribed and instructed. Follow up with your primary care and pulmonology as directed or return to Emergency Department with worsening of symptoms.

## 2021-02-19 NOTE — ED PROVIDER NOTES
Subjective   Patient is a 45-year-old male who presents emergency room today with complaints of increased shortness of breath.  Patient shares that he was diagnosed with COVID-19 on January 15 at the [facility].  He states that he was hospitalized and was feeling better.  He was discharged home with supplemental oxygen as needed.  He states that over the last several days he feels as if he has had increased shortness of breath and that since returning home from the hospital with any kind of exertion he feels short of breath.  He also reports that he has difficulty laying down flat and that propping himself upright helps to relieve some of his symptoms.  He denies any additional associated symptoms on interview and exam.      History provided by:  Patient  Shortness of Breath  Severity:  Moderate  Onset quality:  Gradual  Duration:  5 weeks  Timing:  Intermittent  Progression:  Unchanged  Chronicity:  Recurrent  Context: activity and URI    Relieved by:  Sitting up and oxygen  Worsened by:  Exertion (Laying flat)  Ineffective treatments:  None tried  Associated symptoms: no chest pain, no cough and no fever    Risk factors: obesity        Review of Systems   Constitutional: Positive for activity change. Negative for chills, fatigue and fever.   HENT: Negative.    Eyes: Negative.    Respiratory: Positive for shortness of breath. Negative for cough.    Cardiovascular: Negative for chest pain.   Gastrointestinal: Negative.    Genitourinary: Negative.    Musculoskeletal: Negative.    Skin: Negative.    Neurological: Negative.    Psychiatric/Behavioral: Negative.    All other systems reviewed and are negative.      Past Medical History:   Diagnosis Date   • Diabetes mellitus (CMS/HCC)    • Hyperlipidemia    • Hypertension    • Sleep apnea        Allergies   Allergen Reactions   • Lisinopril Swelling       Past Surgical History:   Procedure Laterality Date   • SOFT TISSUE TUMOR RESECTION  2010       Family History   Problem  Relation Age of Onset   • Lung disease Mother    • No Known Problems Father        Social History     Socioeconomic History   • Marital status:      Spouse name: Not on file   • Number of children: Not on file   • Years of education: Not on file   • Highest education level: Not on file   Tobacco Use   • Smoking status: Never Smoker   • Smokeless tobacco: Never Used   Substance and Sexual Activity   • Alcohol use: No   • Drug use: No   Social History Narrative    , works in factory in [place] 5 kids, 2 grands, one adopted daughter           Objective   Physical Exam  Vitals signs and nursing note reviewed.   Constitutional:       General: He is not in acute distress.     Appearance: Normal appearance. He is well-developed. He is obese. He is not ill-appearing or toxic-appearing.   HENT:      Head: Normocephalic and atraumatic.   Eyes:      General: No scleral icterus.     Extraocular Movements: Extraocular movements intact.      Conjunctiva/sclera: Conjunctivae normal.   Neck:      Musculoskeletal: Normal range of motion and neck supple.   Cardiovascular:      Rate and Rhythm: Normal rate and regular rhythm.   Pulmonary:      Effort: Pulmonary effort is normal. No respiratory distress.      Breath sounds: Normal breath sounds.   Chest:      Chest wall: No tenderness.   Abdominal:      General: There is no distension.      Palpations: Abdomen is soft.   Musculoskeletal: Normal range of motion.         General: No deformity.   Skin:     General: Skin is warm and dry.   Neurological:      General: No focal deficit present.      Mental Status: He is alert.   Psychiatric:         Mood and Affect: Mood normal.         Behavior: Behavior normal.         Thought Content: Thought content normal.         Judgment: Judgment normal.         Procedures           ED Course  ED Course as of Feb 18 2101   Thu Feb 18, 2021   5451 Patient received 1 dose of clonidine for blood pressure of 191/118.  Post administration  of clonidine on reassessment patient blood pressure 187/119.  Additional 20 mg hydralazine ordered.  Per review of patient's discharge disposition on discharge from the hospital patient was not hypertensive.  Patient shares that he has had uncontrolled hypertension for as long as he can remember.    [JG]   2053 Patient is a 45-year-old male who presents the emergency room today with complaints of increased shortness of breath following being diagnosed with COVID-19 earlier in January.  No acute emergent abnormalities appreciated on physical exam.  Patient maintain oxygen saturation at baseline and has home oxygen for supplemental use post Covid.  With exertion and 2 L O2 via nasal cannula patient maintains oxygen sats greater than 96%.  Elevated troponin consistent with previous labs and presentations, symptoms with him on EKG.  Otherwise without acute or emergent findings on labs.  CT scan of chest with contrast confirmed no pulmonary embolism but continued patchy opacifications consistent with Covid pneumonia.  Patient hypertensive with blood pressures elevated to 196/128 while in the ED and patient received clonidine and hydralazine with improvement in pressures to 165/101 at time of discharge disposition.  Patient instructed to continue to remain compliant with his antihypertensive medications.  Case reviewed with Dr. Roland who recommended coverage with antibiotic for possible superinfection and was agreeable with disposition home and outpatient follow-up to his primary care and pulmonology.  Patient is afebrile, nontoxic appearing, vital signs stable and able to maintain O2 sats of 97% on room air while at rest. Patient will be discharged home with outpatient follow up to their primary care provider and pulmonology as recommended. Patient is agreeable to plan of care of outpatient follow up, provided clear return precautions and demonstrated understanding.    [JG]      ED Course User Index  [JG] Alexi Lopez,  PA      Recent Results (from the past 24 hour(s))   Comprehensive Metabolic Panel    Collection Time: 02/18/21  1:24 PM    Specimen: Blood   Result Value Ref Range    Glucose 172 (H) 65 - 99 mg/dL    BUN 20 6 - 20 mg/dL    Creatinine 1.48 (H) 0.76 - 1.27 mg/dL    Sodium 140 136 - 145 mmol/L    Potassium 4.4 3.5 - 5.2 mmol/L    Chloride 108 (H) 98 - 107 mmol/L    CO2 24.0 22.0 - 29.0 mmol/L    Calcium 8.7 8.6 - 10.5 mg/dL    Total Protein 6.7 6.0 - 8.5 g/dL    Albumin 3.10 (L) 3.50 - 5.20 g/dL    ALT (SGPT) 15 1 - 41 U/L    AST (SGOT) 22 1 - 40 U/L    Alkaline Phosphatase 80 39 - 117 U/L    Total Bilirubin 0.2 0.0 - 1.2 mg/dL    eGFR  African Amer 62 >60 mL/min/1.73    Globulin 3.6 gm/dL    A/G Ratio 0.9 g/dL    BUN/Creatinine Ratio 13.5 7.0 - 25.0    Anion Gap 8.0 5.0 - 15.0 mmol/L   BNP    Collection Time: 02/18/21  1:24 PM    Specimen: Blood   Result Value Ref Range    proBNP 1,284.0 (H) 0.0 - 450.0 pg/mL   Troponin    Collection Time: 02/18/21  1:24 PM    Specimen: Blood   Result Value Ref Range    Troponin T 0.066 (C) 0.000 - 0.030 ng/mL   Light Blue Top    Collection Time: 02/18/21  1:24 PM   Result Value Ref Range    Extra Tube hold for add-on    Green Top (Gel)    Collection Time: 02/18/21  1:24 PM   Result Value Ref Range    Extra Tube Hold for add-ons.    Lavender Top    Collection Time: 02/18/21  1:24 PM   Result Value Ref Range    Extra Tube hold for add-on    Gold Top - SST    Collection Time: 02/18/21  1:24 PM   Result Value Ref Range    Extra Tube Hold for add-ons.    Gray Top - Ice    Collection Time: 02/18/21  1:24 PM   Result Value Ref Range    Extra Tube Hold for add-ons.    CBC Auto Differential    Collection Time: 02/18/21  1:24 PM    Specimen: Blood   Result Value Ref Range    WBC 5.60 3.40 - 10.80 10*3/mm3    RBC 3.71 (L) 4.14 - 5.80 10*6/mm3    Hemoglobin 10.9 (L) 13.0 - 17.7 g/dL    Hematocrit 33.6 (L) 37.5 - 51.0 %    MCV 90.6 79.0 - 97.0 fL    MCH 29.4 26.6 - 33.0 pg    MCHC 32.4 31.5 -  35.7 g/dL    RDW 15.6 (H) 12.3 - 15.4 %    RDW-SD 51.1 37.0 - 54.0 fl    MPV 12.6 (H) 6.0 - 12.0 fL    Platelets 167 140 - 450 10*3/mm3    Neutrophil % 57.3 42.7 - 76.0 %    Lymphocyte % 30.7 19.6 - 45.3 %    Monocyte % 7.9 5.0 - 12.0 %    Eosinophil % 3.2 0.3 - 6.2 %    Basophil % 0.7 0.0 - 1.5 %    Immature Grans % 0.2 0.0 - 0.5 %    Neutrophils, Absolute 3.21 1.70 - 7.00 10*3/mm3    Lymphocytes, Absolute 1.72 0.70 - 3.10 10*3/mm3    Monocytes, Absolute 0.44 0.10 - 0.90 10*3/mm3    Eosinophils, Absolute 0.18 0.00 - 0.40 10*3/mm3    Basophils, Absolute 0.04 0.00 - 0.20 10*3/mm3    Immature Grans, Absolute 0.01 0.00 - 0.05 10*3/mm3    nRBC 0.0 0.0 - 0.2 /100 WBC   Lactate Dehydrogenase    Collection Time: 02/18/21  1:24 PM    Specimen: Blood   Result Value Ref Range     (H) 135 - 225 U/L   Procalcitonin    Collection Time: 02/18/21  1:24 PM    Specimen: Blood   Result Value Ref Range    Procalcitonin 0.12 0.00 - 0.25 ng/mL   C-reactive Protein    Collection Time: 02/18/21  1:24 PM    Specimen: Blood   Result Value Ref Range    C-Reactive Protein 0.47 0.00 - 0.50 mg/dL   Ferritin    Collection Time: 02/18/21  1:24 PM    Specimen: Blood   Result Value Ref Range    Ferritin 313.40 30.00 - 400.00 ng/mL     Note: In addition to lab results from this visit, the labs listed above may include labs taken at another facility or during a different encounter within the last 24 hours. Please correlate lab times with ED admission and discharge times for further clarification of the services performed during this visit.    CT Angiogram Chest   Final Result   1. No PE.       2. Patchy opacifications within the mid and lower lung periphery   groundglass attenuation most consistent with Covid pneumonia or atypical   viral etiology.       3. Although atypical for Covid pneumonia presentation there are small to   moderate right and small left pleural effusions which may represent   components of combined mild decompensation or  interstitial edema   pattern.       D:  02/18/2021   E:  02/18/2021           This report was finalized on 2/18/2021 4:34 PM by Dr. Sunny Andrea.          XR Chest 1 View   Final Result   Bilateral mid and lower lung predominant pulmonary   opacifications stable to slightly increased from prior comparison of   airspace disease such as bronchopneumonia without large pleural   effusion.       D:  02/18/2021   E:  02/18/2021       This report was finalized on 2/18/2021 4:33 PM by Dr. Sunny Andrea.            Vitals:    02/18/21 1806 02/18/21 1807 02/18/21 1815 02/18/21 1830   BP: (!) 170/107  (!) 165/101 (!) 165/101   BP Location:       Patient Position:       Pulse: 78 79 90 94   Resp:       Temp:       TempSrc:       SpO2:  95% 96% 97%   Weight:       Height:         Medications   sodium chloride 0.9 % flush 10 mL (has no administration in time range)   iopamidol (ISOVUE-370) 76 % injection 100 mL (80 mL Intravenous Given 2/18/21 1555)   dexamethasone (DECADRON) injection 6 mg (6 mg Intravenous Given 2/18/21 1652)   cloNIDine (CATAPRES) tablet 0.1 mg (0.1 mg Oral Given 2/18/21 1711)   hydrALAZINE (APRESOLINE) injection 20 mg (20 mg Intravenous Given 2/18/21 1801)     ECG/EMG Results (last 24 hours)     Procedure Component Value Units Date/Time    ECG 12 Lead [[id number]] Collected: 02/18/21 1317     Updated: 02/18/21 1329        ECG 12 Lead                                                  MDM  Number of Diagnoses or Management Options  History of COVID-19: established and worsening  Persistent shortness of breath after COVID-19: new and requires workup  Uncontrolled hypertension: established and worsening     Amount and/or Complexity of Data Reviewed  Clinical lab tests: reviewed  Tests in the radiology section of CPT®: reviewed  Tests in the medicine section of CPT®: reviewed  Decide to obtain previous medical records or to obtain history from someone other than the patient: yes    Risk of Complications, Morbidity,  and/or Mortality  Presenting problems: moderate  Diagnostic procedures: moderate  Management options: moderate    Patient Progress  Patient progress: stable      Final diagnoses:   History of COVID-19   Persistent shortness of breath after COVID-19   Uncontrolled hypertension            Alexi Lopez PA  02/18/21 3636

## 2021-02-20 LAB
QT INTERVAL: 398 MS
QTC INTERVAL: 459 MS

## 2021-03-03 ENCOUNTER — OFFICE VISIT (OUTPATIENT)
Dept: PULMONOLOGY | Facility: CLINIC | Age: 46
End: 2021-03-03

## 2021-03-03 VITALS
SYSTOLIC BLOOD PRESSURE: 152 MMHG | TEMPERATURE: 97.3 F | BODY MASS INDEX: 41.75 KG/M2 | OXYGEN SATURATION: 96 % | RESPIRATION RATE: 18 BRPM | HEART RATE: 89 BPM | HEIGHT: 73 IN | DIASTOLIC BLOOD PRESSURE: 110 MMHG | WEIGHT: 315 LBS

## 2021-03-03 DIAGNOSIS — U07.1 PNEUMONIA DUE TO COVID-19 VIRUS: Primary | ICD-10-CM

## 2021-03-03 DIAGNOSIS — G47.33 OSA ON CPAP: ICD-10-CM

## 2021-03-03 DIAGNOSIS — R06.02 SHORTNESS OF BREATH: ICD-10-CM

## 2021-03-03 DIAGNOSIS — J90 BILATERAL PLEURAL EFFUSION: ICD-10-CM

## 2021-03-03 DIAGNOSIS — Z99.89 OSA ON CPAP: ICD-10-CM

## 2021-03-03 DIAGNOSIS — J90 BILATERAL PLEURAL EFFUSION: Primary | ICD-10-CM

## 2021-03-03 DIAGNOSIS — J12.82 PNEUMONIA DUE TO COVID-19 VIRUS: Primary | ICD-10-CM

## 2021-03-03 DIAGNOSIS — R60.9 PERIPHERAL EDEMA: ICD-10-CM

## 2021-03-03 LAB
ANION GAP SERPL CALCULATED.3IONS-SCNC: 8.6 MMOL/L (ref 5–15)
BASOPHILS # BLD AUTO: 0.07 10*3/MM3 (ref 0–0.2)
BASOPHILS NFR BLD AUTO: 0.9 % (ref 0–1.5)
BUN SERPL-MCNC: 21 MG/DL (ref 6–20)
BUN/CREAT SERPL: 13 (ref 7–25)
CALCIUM SPEC-SCNC: 9.8 MG/DL (ref 8.6–10.5)
CHLORIDE SERPL-SCNC: 104 MMOL/L (ref 98–107)
CO2 SERPL-SCNC: 25.4 MMOL/L (ref 22–29)
CREAT SERPL-MCNC: 1.61 MG/DL (ref 0.76–1.27)
DEPRECATED RDW RBC AUTO: 45.7 FL (ref 37–54)
EOSINOPHIL # BLD AUTO: 0.08 10*3/MM3 (ref 0–0.4)
EOSINOPHIL NFR BLD AUTO: 1 % (ref 0.3–6.2)
ERYTHROCYTE [DISTWIDTH] IN BLOOD BY AUTOMATED COUNT: 14.4 % (ref 12.3–15.4)
GFR SERPL CREATININE-BSD FRML MDRD: 57 ML/MIN/1.73
GLUCOSE SERPL-MCNC: 195 MG/DL (ref 65–99)
HCT VFR BLD AUTO: 34 % (ref 37.5–51)
HGB BLD-MCNC: 11.4 G/DL (ref 13–17.7)
INR PPP: 1.03 (ref 0.85–1.16)
LYMPHOCYTES # BLD AUTO: 1.79 10*3/MM3 (ref 0.7–3.1)
LYMPHOCYTES NFR BLD AUTO: 22.4 % (ref 19.6–45.3)
MCH RBC QN AUTO: 29.7 PG (ref 26.6–33)
MCHC RBC AUTO-ENTMCNC: 33.5 G/DL (ref 31.5–35.7)
MCV RBC AUTO: 88.5 FL (ref 79–97)
MONOCYTES # BLD AUTO: 0.43 10*3/MM3 (ref 0.1–0.9)
MONOCYTES NFR BLD AUTO: 5.4 % (ref 5–12)
NEUTROPHILS NFR BLD AUTO: 5.6 10*3/MM3 (ref 1.7–7)
NEUTROPHILS NFR BLD AUTO: 70 % (ref 42.7–76)
NT-PROBNP SERPL-MCNC: 976.8 PG/ML (ref 0–450)
PLATELET # BLD AUTO: 187 10*3/MM3 (ref 140–450)
PMV BLD AUTO: 13.6 FL (ref 6–12)
POTASSIUM SERPL-SCNC: 4.9 MMOL/L (ref 3.5–5.2)
PROTHROMBIN TIME: 13.2 SECONDS (ref 11.5–14)
RBC # BLD AUTO: 3.84 10*6/MM3 (ref 4.14–5.8)
SODIUM SERPL-SCNC: 138 MMOL/L (ref 136–145)
WBC # BLD AUTO: 7.99 10*3/MM3 (ref 3.4–10.8)

## 2021-03-03 PROCEDURE — 99205 OFFICE O/P NEW HI 60 MIN: CPT | Performed by: INTERNAL MEDICINE

## 2021-03-03 PROCEDURE — 83880 ASSAY OF NATRIURETIC PEPTIDE: CPT | Performed by: INTERNAL MEDICINE

## 2021-03-03 PROCEDURE — 94375 RESPIRATORY FLOW VOLUME LOOP: CPT | Performed by: INTERNAL MEDICINE

## 2021-03-03 PROCEDURE — 85610 PROTHROMBIN TIME: CPT | Performed by: INTERNAL MEDICINE

## 2021-03-03 PROCEDURE — 80048 BASIC METABOLIC PNL TOTAL CA: CPT | Performed by: INTERNAL MEDICINE

## 2021-03-03 PROCEDURE — 85025 COMPLETE CBC W/AUTO DIFF WBC: CPT | Performed by: INTERNAL MEDICINE

## 2021-03-03 PROCEDURE — 94726 PLETHYSMOGRAPHY LUNG VOLUMES: CPT | Performed by: INTERNAL MEDICINE

## 2021-03-03 PROCEDURE — 94729 DIFFUSING CAPACITY: CPT | Performed by: INTERNAL MEDICINE

## 2021-03-03 RX ORDER — FUROSEMIDE 40 MG/1
40 TABLET ORAL DAILY
Qty: 30 TABLET | Refills: 1 | Status: SHIPPED | OUTPATIENT
Start: 2021-03-03 | End: 2021-08-23

## 2021-03-03 RX ORDER — TRIAMCINOLONE ACETONIDE 1 MG/G
CREAM TOPICAL
COMMUNITY
Start: 2021-02-09 | End: 2021-07-26

## 2021-03-03 NOTE — PROGRESS NOTES
New Pulmonary Patient Office Visit      Patient Name: Angel Blair    Referring Physician: Jessica Redding PA    Chief Complaint:    Chief Complaint   Patient presents with   • Post COVID-10   • Pneumonia       History of Present Illness: Angel Blair is a 45 y.o. male who is here today to establish care with Pulmonary.     45-year-old male with past medical history of hypertension, diabetes, dyslipidemia, sleep apnea on CPAP therapy coming here for initial evaluation.  Patient was diagnosed with COVID-19 pneumonia on January 19.  Patient was admitted in the hospital with significant bilateral groundglass opacities and hypoxemia requiring supplemental oxygen.  Patient was treated with remdesivir, Decadron and 2 units of convalescent plasma.  ID team evaluated the patient as well.  Patient recovered and was still requiring supplemental oxygen and was discharged home on supplemental oxygen.  He states that when he went home he started developing more shortness of breath and difficulty doing things.  He is also started developing lower extremity edema.  Patient was not sent home on any blood thinners.  Patient has had fevers before going to the hospital.  In the hospital he did not have any fevers and none since.  States that all his family had the Covid infection but they have all recovered from it.  Patient works in  at a local [employer] but has not been able to go back to work because of his extreme shortness of breath.  He feels that he is unable to take a deep breath in.  Cough is minor.  Denies any sputum production.  Denies any hemoptysis.  Denies any fevers or chills.  Appetite is good and not losing any weight.  Denies any other ongoing exposures.  No history of smoking.  No secondhand smoke exposure.  No other illicit drug use.  Denies any chest pain.  Does feel tightness in his chest but no wheezing however.  No history of asthma in the past.    Patient was subsequently seen in the  emergency room on February 27 with complaints of shortness of breath.  He underwent chest x-rays and was noted to have bilateral pleural effusions on the CT scan as well.  His blood pressure was significantly elevated and was treated for that with clonidine but discharged home on oral antibiotics for possible secondary infection.  Patient still is not feeling well and is referred here for further evaluation.  Patient has been using oxygen 2 L at home intermittently.  He is also using his CPAP machine at nightly.      Subjective      Review of Systems:   Review of Systems   Constitutional: Positive for fatigue.   HENT: Negative.    Respiratory: Positive for shortness of breath.    Cardiovascular: Positive for leg swelling.   Gastrointestinal: Negative.    Endocrine: Negative.    Musculoskeletal: Positive for arthralgias.   Skin: Negative.    Neurological: Negative.    Hematological: Negative.    Psychiatric/Behavioral: Negative.    All other systems reviewed and are negative.      Past Medical History:   Past Medical History:   Diagnosis Date   • Diabetes mellitus (CMS/HCC)    • Hyperlipidemia    • Hypertension    • Sleep apnea        Past Surgical History:   Past Surgical History:   Procedure Laterality Date   • SOFT TISSUE TUMOR RESECTION  2010       Family History:   Family History   Problem Relation Age of Onset   • Lung disease Mother    • No Known Problems Father    • Asthma Brother        Social History:   Social History     Socioeconomic History   • Marital status:      Spouse name: Not on file   • Number of children: Not on file   • Years of education: Not on file   • Highest education level: Not on file   Tobacco Use   • Smoking status: Never Smoker   • Smokeless tobacco: Never Used   Substance and Sexual Activity   • Alcohol use: No   • Drug use: No   Social History Narrative    , works in factory in [place] 5 kids, 2 grands, one adopted daughter       Medications:     Current Outpatient  Medications:   •  aspirin 81 MG chewable tablet, Chew 81 mg Every Night., Disp: , Rfl:   •  atorvastatin (LIPITOR) 40 MG tablet, Take 40 mg by mouth Every Night., Disp: , Rfl:   •  azithromycin (Zithromax Z-Kan) 250 MG tablet, Take 2 tablets by mouth on day 1, then 1 tablet daily on days 2-5, Disp: 6 tablet, Rfl: 0  •  benzonatate (TESSALON) 100 MG capsule, Take 1 capsule by mouth 3 (Three) Times a Day As Needed for Cough., Disp: 15 capsule, Rfl: 0  •  glimepiride (AMARYL) 4 MG tablet, Take 4 mg by mouth 2 (Two) Times a Day., Disp: , Rfl:   •  insulin detemir (LEVEMIR) 100 UNIT/ML injection, Inject 20 Units under the skin into the appropriate area as directed Every Night., Disp: 10 mL, Rfl: 12  •  insulin lispro (humaLOG) 100 UNIT/ML injection, Inject 10 Units under the skin into the appropriate area as directed 3 (Three) Times a Day With Meals., Disp: 10 mL, Rfl: 12  •  isosorbide mononitrate (IMDUR) 60 MG 24 hr tablet, Take 1 tablet by mouth Daily., Disp: 30 tablet, Rfl: 0  •  metFORMIN (GLUCOPHAGE) 1000 MG tablet, Take 1,000 mg by mouth 2 (two) times a day with meals., Disp: , Rfl:   •  Multiple Vitamins-Minerals (MULTIVITAMIN ADULT PO), Take 1 tablet by mouth Daily., Disp: , Rfl:   •  spironolactone (ALDACTONE) 50 MG tablet, Take 50 mg by mouth Daily., Disp: , Rfl:   •  terazosin (HYTRIN) 5 MG capsule, Take 1 capsule by mouth Every Night., Disp: 30 capsule, Rfl: 0  •  triamcinolone (KENALOG) 0.1 % cream, APPLY TO RASH 3 TIMES A DAY, Disp: , Rfl:   •  furosemide (Lasix) 40 MG tablet, Take 1 tablet by mouth Daily., Disp: 30 tablet, Rfl: 1  •  metoprolol tartrate (LOPRESSOR) 50 MG tablet, Take 1 tablet by mouth Every 12 (Twelve) Hours for 30 days., Disp: 60 tablet, Rfl: 0    Allergies:   Allergies   Allergen Reactions   • Lisinopril Swelling       Objective     Physical Exam:  Vital Signs:   Vitals:    03/03/21 1005   BP: (!) 152/110   BP Location: Left arm   Patient Position: Sitting   Cuff Size: Adult   Pulse: 89  "  Resp: 18   Temp: 97.3 °F (36.3 °C)   SpO2: 96%  Comment: room air at rest   Weight: (!) 147 kg (324 lb)   Height: 185.4 cm (73\")       Physical Exam  Vitals signs and nursing note reviewed.   Constitutional:       General: He is not in acute distress.     Appearance: He is well-developed. He is obese. He is not diaphoretic.   HENT:      Head: Normocephalic and atraumatic.      Comments: Mallampati 3 airway     Nose: Nose normal.      Mouth/Throat:      Pharynx: No oropharyngeal exudate.   Eyes:      General: No scleral icterus.        Right eye: No discharge.         Left eye: No discharge.      Pupils: Pupils are equal, round, and reactive to light.   Neck:      Musculoskeletal: Neck supple.      Thyroid: No thyromegaly.      Trachea: No tracheal deviation.   Cardiovascular:      Rate and Rhythm: Normal rate and regular rhythm.      Heart sounds: Normal heart sounds. No murmur. No friction rub. No gallop.    Pulmonary:      Effort: Pulmonary effort is normal. No respiratory distress.      Breath sounds: No stridor. Rales (bilateral lower lobes) present. No wheezing.      Comments: Decreased breath sounds on bases more so on the right side    Chest:      Chest wall: No tenderness.   Abdominal:      General: There is no distension.      Palpations: Abdomen is soft.      Comments: obese   Musculoskeletal:         General: Swelling (3+ edema) present. No tenderness.      Comments: Clubbing: none   Lymphadenopathy:      Cervical: No cervical adenopathy.   Neurological:      Mental Status: He is alert and oriented to person, place, and time.      Cranial Nerves: No cranial nerve deficit.      Coordination: Coordination normal.   Psychiatric:         Behavior: Behavior normal.         Thought Content: Thought content normal.         Judgment: Judgment normal.         Results Review:   I reviewed the patient's new clinical results.    Chest x-ray done today reviewed personally and showed worsening pleural effusion on the " right side.  Diffuse bilateral lung opacities concerning for pulmonary congestion.  Left-sided effusion compared to before seems to be a little bit better.    Multiple CT scans reviewed.  Initial chest CT scan showed peripheral groundglass opacities suggesting of COVID-19 infection that was done in January.  Repeat CT scan done in February showed persisting opacities and groundglass inflammation along with bilateral pleural effusions which were new.  No pulmonary embolism noted however.    PFT IMPRESSION:   1. Available data suggests severe restriction.    2. Lung volume reveals restriction.       3. Airway resistance is normal.  4. DLCO is moderately reduced and could be suggestive of emphysema, interstitial lung disease, significant anemia, Pulmonary vascular disease etc. Clinical correlation will be required.      Assessment / Plan      Assessment:   Problem List Items Addressed This Visit        Other    Pneumonia due to COVID-19 virus - Primary    Relevant Medications    benzonatate (TESSALON) 100 MG capsule    Other Relevant Orders    XR Chest PA & Lateral    Pulmonary Function Test (Completed)      Other Visit Diagnoses     Shortness of breath        Relevant Orders    CBC & Differential    Basic Metabolic Panel    proBNP    Protime-INR    Adult Transthoracic Echo Complete w/ Color, Spectral and Contrast if necessary per protocol    D-dimer, Quantitative    CBC Auto Differential    Bilateral pleural effusion        Peripheral edema        LEFTY on CPAP              Plan:   1.  Patient with recent COVID-19 infection now presenting with worsening shortness of breath.  Pleural effusion is getting worse on the right side.  He does have significant pulmonary vascular congestion and bilateral crackles suggesting CHF pattern.  His proBNP level in the emergency room was significantly elevated as well again pointing towards fluid overload.  He did have kidney disease likely due to ongoing inflammation which was  improving at that time.  He has significant peripheral edema.  Recently apparently his spironolactone dose was increased to twice a day.  Advised that spironolactone is a week diuretic.  I would rather have him use Lasix to see if he can get rid of some extra fluid.  I will start him on 40 mg daily with close monitoring of his renal function.  We will check complete blood count, basic metabolic panel, proBNP level, PT/INR and D-dimer levels today.  His D-dimer levels were previously elevated but no definite evidence of thromboembolic disease at this time.  His other inflammatory markers such as CRP and ferritin levels were improving.  We will try to diurese him and see how things go.  I do not think there is any secondary infection here.  Does not look like post COVID-19 organizing pneumonia picture either.  No need for systemic steroids which could be more detrimental to him given underlying diabetes.  If things do not improve then will consider protracted course of steroids.    2.  Patient had mild troponin leak in the emergency room and along with proBNP level elevated I am concerned there is a cardiac involvement from COVID-19 infection.  I would like to get echocardiogram to evaluate his cardiac function and to evaluate for any systolic or diastolic congestive heart failure.    3.  I would also like to get thoracentesis done on the right side to give him relief as well as for diagnostic purposes.  Orders placed for intervention radiology to do that under image guidance due to patient's body habitus.    4.  Blood pressure is still elevated but improved from before.  That will need further adjustment of medications per primary care.    5.  Continue CPAP use at nighttime.    6.  Continue supplemental oxygen as needed with exertional activity.  Currently on rest at room air saturations are 96%.  Patient is not tachycardic and not toxic appearing either.  Patient does have inhaler at home and he can use that as  needed as well though there is no definite evidence of bronchospasm.  Intermittent wheezing could also be due to fluid overload.    Thank you for allowing me to participate in the care of this challenging patient.  We will follow him closely with above studies.  65 minutes of time spent visiting with the patient, reviewing studies, ordering labs and interpreting, multiple studies which were interpreted independently and coordinating patient's care.        Follow Up:   1 week with BMP And CXR    Discussed plan of care in detail with patient today. Patient verbally understands and agrees.     Michel Rubalcava MD  Pulmonary Critical Care and Sleep Medicine      Please note that portions of this note may have been completed with a voice recognition program. Efforts were made to edit the dictations, but occasionally words are mistranscribed.

## 2021-03-03 NOTE — H&P (VIEW-ONLY)
New Pulmonary Patient Office Visit      Patient Name: Angel Blair    Referring Physician: Jessica Redding PA    Chief Complaint:    Chief Complaint   Patient presents with   • Post COVID-10   • Pneumonia       History of Present Illness: Angel Blair is a 45 y.o. male who is here today to establish care with Pulmonary.     45-year-old male with past medical history of hypertension, diabetes, dyslipidemia, sleep apnea on CPAP therapy coming here for initial evaluation.  Patient was diagnosed with COVID-19 pneumonia on January 19.  Patient was admitted in the hospital with significant bilateral groundglass opacities and hypoxemia requiring supplemental oxygen.  Patient was treated with remdesivir, Decadron and 2 units of convalescent plasma.  ID team evaluated the patient as well.  Patient recovered and was still requiring supplemental oxygen and was discharged home on supplemental oxygen.  He states that when he went home he started developing more shortness of breath and difficulty doing things.  He is also started developing lower extremity edema.  Patient was not sent home on any blood thinners.  Patient has had fevers before going to the hospital.  In the hospital he did not have any fevers and none since.  States that all his family had the Covid infection but they have all recovered from it.  Patient works in  at a local [employer] but has not been able to go back to work because of his extreme shortness of breath.  He feels that he is unable to take a deep breath in.  Cough is minor.  Denies any sputum production.  Denies any hemoptysis.  Denies any fevers or chills.  Appetite is good and not losing any weight.  Denies any other ongoing exposures.  No history of smoking.  No secondhand smoke exposure.  No other illicit drug use.  Denies any chest pain.  Does feel tightness in his chest but no wheezing however.  No history of asthma in the past.    Patient was subsequently seen in the  emergency room on February 27 with complaints of shortness of breath.  He underwent chest x-rays and was noted to have bilateral pleural effusions on the CT scan as well.  His blood pressure was significantly elevated and was treated for that with clonidine but discharged home on oral antibiotics for possible secondary infection.  Patient still is not feeling well and is referred here for further evaluation.  Patient has been using oxygen 2 L at home intermittently.  He is also using his CPAP machine at nightly.      Subjective      Review of Systems:   Review of Systems   Constitutional: Positive for fatigue.   HENT: Negative.    Respiratory: Positive for shortness of breath.    Cardiovascular: Positive for leg swelling.   Gastrointestinal: Negative.    Endocrine: Negative.    Musculoskeletal: Positive for arthralgias.   Skin: Negative.    Neurological: Negative.    Hematological: Negative.    Psychiatric/Behavioral: Negative.    All other systems reviewed and are negative.      Past Medical History:   Past Medical History:   Diagnosis Date   • Diabetes mellitus (CMS/HCC)    • Hyperlipidemia    • Hypertension    • Sleep apnea        Past Surgical History:   Past Surgical History:   Procedure Laterality Date   • SOFT TISSUE TUMOR RESECTION  2010       Family History:   Family History   Problem Relation Age of Onset   • Lung disease Mother    • No Known Problems Father    • Asthma Brother        Social History:   Social History     Socioeconomic History   • Marital status:      Spouse name: Not on file   • Number of children: Not on file   • Years of education: Not on file   • Highest education level: Not on file   Tobacco Use   • Smoking status: Never Smoker   • Smokeless tobacco: Never Used   Substance and Sexual Activity   • Alcohol use: No   • Drug use: No   Social History Narrative    , works in factory in [place] 5 kids, 2 grands, one adopted daughter       Medications:     Current Outpatient  Medications:   •  aspirin 81 MG chewable tablet, Chew 81 mg Every Night., Disp: , Rfl:   •  atorvastatin (LIPITOR) 40 MG tablet, Take 40 mg by mouth Every Night., Disp: , Rfl:   •  azithromycin (Zithromax Z-Kan) 250 MG tablet, Take 2 tablets by mouth on day 1, then 1 tablet daily on days 2-5, Disp: 6 tablet, Rfl: 0  •  benzonatate (TESSALON) 100 MG capsule, Take 1 capsule by mouth 3 (Three) Times a Day As Needed for Cough., Disp: 15 capsule, Rfl: 0  •  glimepiride (AMARYL) 4 MG tablet, Take 4 mg by mouth 2 (Two) Times a Day., Disp: , Rfl:   •  insulin detemir (LEVEMIR) 100 UNIT/ML injection, Inject 20 Units under the skin into the appropriate area as directed Every Night., Disp: 10 mL, Rfl: 12  •  insulin lispro (humaLOG) 100 UNIT/ML injection, Inject 10 Units under the skin into the appropriate area as directed 3 (Three) Times a Day With Meals., Disp: 10 mL, Rfl: 12  •  isosorbide mononitrate (IMDUR) 60 MG 24 hr tablet, Take 1 tablet by mouth Daily., Disp: 30 tablet, Rfl: 0  •  metFORMIN (GLUCOPHAGE) 1000 MG tablet, Take 1,000 mg by mouth 2 (two) times a day with meals., Disp: , Rfl:   •  Multiple Vitamins-Minerals (MULTIVITAMIN ADULT PO), Take 1 tablet by mouth Daily., Disp: , Rfl:   •  spironolactone (ALDACTONE) 50 MG tablet, Take 50 mg by mouth Daily., Disp: , Rfl:   •  terazosin (HYTRIN) 5 MG capsule, Take 1 capsule by mouth Every Night., Disp: 30 capsule, Rfl: 0  •  triamcinolone (KENALOG) 0.1 % cream, APPLY TO RASH 3 TIMES A DAY, Disp: , Rfl:   •  furosemide (Lasix) 40 MG tablet, Take 1 tablet by mouth Daily., Disp: 30 tablet, Rfl: 1  •  metoprolol tartrate (LOPRESSOR) 50 MG tablet, Take 1 tablet by mouth Every 12 (Twelve) Hours for 30 days., Disp: 60 tablet, Rfl: 0    Allergies:   Allergies   Allergen Reactions   • Lisinopril Swelling       Objective     Physical Exam:  Vital Signs:   Vitals:    03/03/21 1005   BP: (!) 152/110   BP Location: Left arm   Patient Position: Sitting   Cuff Size: Adult   Pulse: 89  "  Resp: 18   Temp: 97.3 °F (36.3 °C)   SpO2: 96%  Comment: room air at rest   Weight: (!) 147 kg (324 lb)   Height: 185.4 cm (73\")       Physical Exam  Vitals signs and nursing note reviewed.   Constitutional:       General: He is not in acute distress.     Appearance: He is well-developed. He is obese. He is not diaphoretic.   HENT:      Head: Normocephalic and atraumatic.      Comments: Mallampati 3 airway     Nose: Nose normal.      Mouth/Throat:      Pharynx: No oropharyngeal exudate.   Eyes:      General: No scleral icterus.        Right eye: No discharge.         Left eye: No discharge.      Pupils: Pupils are equal, round, and reactive to light.   Neck:      Musculoskeletal: Neck supple.      Thyroid: No thyromegaly.      Trachea: No tracheal deviation.   Cardiovascular:      Rate and Rhythm: Normal rate and regular rhythm.      Heart sounds: Normal heart sounds. No murmur. No friction rub. No gallop.    Pulmonary:      Effort: Pulmonary effort is normal. No respiratory distress.      Breath sounds: No stridor. Rales (bilateral lower lobes) present. No wheezing.      Comments: Decreased breath sounds on bases more so on the right side    Chest:      Chest wall: No tenderness.   Abdominal:      General: There is no distension.      Palpations: Abdomen is soft.      Comments: obese   Musculoskeletal:         General: Swelling (3+ edema) present. No tenderness.      Comments: Clubbing: none   Lymphadenopathy:      Cervical: No cervical adenopathy.   Neurological:      Mental Status: He is alert and oriented to person, place, and time.      Cranial Nerves: No cranial nerve deficit.      Coordination: Coordination normal.   Psychiatric:         Behavior: Behavior normal.         Thought Content: Thought content normal.         Judgment: Judgment normal.         Results Review:   I reviewed the patient's new clinical results.    Chest x-ray done today reviewed personally and showed worsening pleural effusion on the " right side.  Diffuse bilateral lung opacities concerning for pulmonary congestion.  Left-sided effusion compared to before seems to be a little bit better.    Multiple CT scans reviewed.  Initial chest CT scan showed peripheral groundglass opacities suggesting of COVID-19 infection that was done in January.  Repeat CT scan done in February showed persisting opacities and groundglass inflammation along with bilateral pleural effusions which were new.  No pulmonary embolism noted however.    PFT IMPRESSION:   1. Available data suggests severe restriction.    2. Lung volume reveals restriction.       3. Airway resistance is normal.  4. DLCO is moderately reduced and could be suggestive of emphysema, interstitial lung disease, significant anemia, Pulmonary vascular disease etc. Clinical correlation will be required.      Assessment / Plan      Assessment:   Problem List Items Addressed This Visit        Other    Pneumonia due to COVID-19 virus - Primary    Relevant Medications    benzonatate (TESSALON) 100 MG capsule    Other Relevant Orders    XR Chest PA & Lateral    Pulmonary Function Test (Completed)      Other Visit Diagnoses     Shortness of breath        Relevant Orders    CBC & Differential    Basic Metabolic Panel    proBNP    Protime-INR    Adult Transthoracic Echo Complete w/ Color, Spectral and Contrast if necessary per protocol    D-dimer, Quantitative    CBC Auto Differential    Bilateral pleural effusion        Peripheral edema        LEFTY on CPAP              Plan:   1.  Patient with recent COVID-19 infection now presenting with worsening shortness of breath.  Pleural effusion is getting worse on the right side.  He does have significant pulmonary vascular congestion and bilateral crackles suggesting CHF pattern.  His proBNP level in the emergency room was significantly elevated as well again pointing towards fluid overload.  He did have kidney disease likely due to ongoing inflammation which was  improving at that time.  He has significant peripheral edema.  Recently apparently his spironolactone dose was increased to twice a day.  Advised that spironolactone is a week diuretic.  I would rather have him use Lasix to see if he can get rid of some extra fluid.  I will start him on 40 mg daily with close monitoring of his renal function.  We will check complete blood count, basic metabolic panel, proBNP level, PT/INR and D-dimer levels today.  His D-dimer levels were previously elevated but no definite evidence of thromboembolic disease at this time.  His other inflammatory markers such as CRP and ferritin levels were improving.  We will try to diurese him and see how things go.  I do not think there is any secondary infection here.  Does not look like post COVID-19 organizing pneumonia picture either.  No need for systemic steroids which could be more detrimental to him given underlying diabetes.  If things do not improve then will consider protracted course of steroids.    2.  Patient had mild troponin leak in the emergency room and along with proBNP level elevated I am concerned there is a cardiac involvement from COVID-19 infection.  I would like to get echocardiogram to evaluate his cardiac function and to evaluate for any systolic or diastolic congestive heart failure.    3.  I would also like to get thoracentesis done on the right side to give him relief as well as for diagnostic purposes.  Orders placed for intervention radiology to do that under image guidance due to patient's body habitus.    4.  Blood pressure is still elevated but improved from before.  That will need further adjustment of medications per primary care.    5.  Continue CPAP use at nighttime.    6.  Continue supplemental oxygen as needed with exertional activity.  Currently on rest at room air saturations are 96%.  Patient is not tachycardic and not toxic appearing either.  Patient does have inhaler at home and he can use that as  needed as well though there is no definite evidence of bronchospasm.  Intermittent wheezing could also be due to fluid overload.    Thank you for allowing me to participate in the care of this challenging patient.  We will follow him closely with above studies.  65 minutes of time spent visiting with the patient, reviewing studies, ordering labs and interpreting, multiple studies which were interpreted independently and coordinating patient's care.        Follow Up:   1 week with BMP And CXR    Discussed plan of care in detail with patient today. Patient verbally understands and agrees.     Michel Rubalcava MD  Pulmonary Critical Care and Sleep Medicine      Please note that portions of this note may have been completed with a voice recognition program. Efforts were made to edit the dictations, but occasionally words are mistranscribed.

## 2021-03-16 ENCOUNTER — APPOINTMENT (OUTPATIENT)
Dept: PREADMISSION TESTING | Facility: HOSPITAL | Age: 46
End: 2021-03-16

## 2021-03-19 ENCOUNTER — HOSPITAL ENCOUNTER (OUTPATIENT)
Dept: CT IMAGING | Facility: HOSPITAL | Age: 46
Discharge: HOME | End: 2021-03-19
Admitting: NURSE PRACTITIONER

## 2021-03-19 VITALS
BODY MASS INDEX: 40.61 KG/M2 | DIASTOLIC BLOOD PRESSURE: 108 MMHG | SYSTOLIC BLOOD PRESSURE: 164 MMHG | HEIGHT: 73 IN | RESPIRATION RATE: 18 BRPM | TEMPERATURE: 96 F | WEIGHT: 306.4 LBS | HEART RATE: 88 BPM | OXYGEN SATURATION: 94 %

## 2021-03-19 DIAGNOSIS — J90 BILATERAL PLEURAL EFFUSION: ICD-10-CM

## 2021-03-19 LAB — GLUCOSE BLDC GLUCOMTR-MCNC: 192 MG/DL (ref 70–130)

## 2021-03-19 PROCEDURE — 82962 GLUCOSE BLOOD TEST: CPT

## 2021-03-19 PROCEDURE — 75989 ABSCESS DRAINAGE UNDER X-RAY: CPT

## 2021-03-19 RX ORDER — FENTANYL CITRATE 50 UG/ML
INJECTION, SOLUTION INTRAMUSCULAR; INTRAVENOUS
Status: DISCONTINUED
Start: 2021-03-19 | End: 2021-03-19 | Stop reason: WASHOUT

## 2021-03-19 RX ORDER — SODIUM CHLORIDE 0.9 % (FLUSH) 0.9 %
3 SYRINGE (ML) INJECTION EVERY 12 HOURS SCHEDULED
Status: DISCONTINUED | OUTPATIENT
Start: 2021-03-19 | End: 2021-03-20 | Stop reason: HOSPADM

## 2021-03-19 RX ORDER — MIDAZOLAM HYDROCHLORIDE 1 MG/ML
INJECTION INTRAMUSCULAR; INTRAVENOUS
Status: DISCONTINUED
Start: 2021-03-19 | End: 2021-03-19 | Stop reason: WASHOUT

## 2021-03-19 RX ORDER — SODIUM CHLORIDE 0.9 % (FLUSH) 0.9 %
10 SYRINGE (ML) INJECTION AS NEEDED
Status: DISCONTINUED | OUTPATIENT
Start: 2021-03-19 | End: 2021-03-20 | Stop reason: HOSPADM

## 2021-03-19 NOTE — POST-PROCEDURE NOTE
Interventional Radiology Operative Note    Date: 03/19/21     Time: 10:32 EDT     Pre-op Diagnosis: 45-year-old male with past medical history of hypertension, diabetes, dyslipidemia, sleep apnea on CPAP therapy coming here for initial evaluation.  Patient was diagnosed with COVID-19 pneumonia on January 19.  Patient was admitted in the hospital with significant bilateral groundglass opacities and hypoxemia requiring supplemental oxygen.   Right side pleural fluid collection seen on CXR 3/3/21  Post-op Diagnosis: Same    Procedure: CT guided right side thoracentesis - Aborted; insufficient fluid for safe procedure    Surgeon: SHIRA Meehan M.D.  Assistants: None    Sedation: None    Estimated Blood Loss (EBL): Trace     Urine Output (UOP): N/A (short procedure)    IVF: N/A (short procedure)    Findings: Trace right side pleural fluid    Specimens: None    Complications: No immediate    Disposition: Recovery. Stable.

## 2021-03-19 NOTE — NURSING NOTE
Pt brought to CT3 for right thoracentesis.  Images taken and reviewed by Dr Meehan and thoracentesis was canceled due to lack of fluid.  Report called to [facility] and patient returned to room for discharge.

## 2021-03-19 NOTE — NURSING NOTE
Patient did not require thoracentesis today.  IV removed and patient sent home.  No additional instructions needed. Encouraged patient to contact PCP or go to ER with the development of SOA, cough or chest pain.

## 2021-04-23 RX ORDER — FUROSEMIDE 40 MG/1
TABLET ORAL
Qty: 30 TABLET | Refills: 1 | OUTPATIENT
Start: 2021-04-23

## 2021-07-26 ENCOUNTER — OFFICE VISIT (OUTPATIENT)
Dept: CARDIOLOGY | Facility: CLINIC | Age: 46
End: 2021-07-26

## 2021-07-26 VITALS
HEIGHT: 73 IN | DIASTOLIC BLOOD PRESSURE: 104 MMHG | SYSTOLIC BLOOD PRESSURE: 156 MMHG | OXYGEN SATURATION: 97 % | WEIGHT: 315 LBS | HEART RATE: 67 BPM | BODY MASS INDEX: 41.75 KG/M2

## 2021-07-26 DIAGNOSIS — I10 ESSENTIAL HYPERTENSION: ICD-10-CM

## 2021-07-26 DIAGNOSIS — R06.09 DYSPNEA ON EXERTION: Primary | ICD-10-CM

## 2021-07-26 DIAGNOSIS — E78.00 PURE HYPERCHOLESTEROLEMIA: ICD-10-CM

## 2021-07-26 DIAGNOSIS — E66.01 OBESITY, CLASS III, BMI 40-49.9 (MORBID OBESITY) (HCC): ICD-10-CM

## 2021-07-26 PROCEDURE — 93000 ELECTROCARDIOGRAM COMPLETE: CPT | Performed by: INTERNAL MEDICINE

## 2021-07-26 PROCEDURE — 99244 OFF/OP CNSLTJ NEW/EST MOD 40: CPT | Performed by: INTERNAL MEDICINE

## 2021-07-26 RX ORDER — CHLORTHALIDONE 25 MG/1
25 TABLET ORAL DAILY
Qty: 30 TABLET | Refills: 5 | Status: SHIPPED | OUTPATIENT
Start: 2021-07-26 | End: 2022-01-24

## 2021-07-26 NOTE — PROGRESS NOTES
Subjective:     Encounter Date:07/26/2021    Primary Care Physician: Jessica Redding PA      Patient ID: Angel Blair is a 46 y.o. male.    Chief Complaint:Shortness of Breath    PROBLEM LIST:  1. Dyspnea  a. 3/11/2021 echo EF 55%.  Mild LVH.  Mild MR.  Left atrium moderately dilated.  b. 3/3/2021 PFTs with severe restriction.  Normal airway resistance, moderate reduction in DLCO.  2. Hypertension  3. Dyslipidemia  4. Sleep apnea, on CPAP  5. Morbid obesity  6. COVID-19 1/2021  7. Surgeries:  a. 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      Allergies   Allergen Reactions   • Lisinopril Swelling         Current Outpatient Medications:   •  aspirin 81 MG chewable tablet, Chew 81 mg Every Night., Disp: , Rfl:   •  atorvastatin (LIPITOR) 40 MG tablet, Take 40 mg by mouth Every Night., Disp: , Rfl:   •  furosemide (Lasix) 40 MG tablet, Take 1 tablet by mouth Daily., Disp: 30 tablet, Rfl: 1  •  glimepiride (AMARYL) 4 MG tablet, Take 4 mg by mouth 2 (Two) Times a Day., Disp: , Rfl:   •  insulin detemir (LEVEMIR) 100 UNIT/ML injection, Inject 20 Units under the skin into the appropriate area as directed Every Night., Disp: 10 mL, Rfl: 12  •  insulin lispro (humaLOG) 100 UNIT/ML injection, Inject 10 Units under the skin into the appropriate area as directed 3 (Three) Times a Day With Meals., Disp: 10 mL, Rfl: 12  •  isosorbide mononitrate (IMDUR) 60 MG 24 hr tablet, Take 1 tablet by mouth Daily., Disp: 30 tablet, Rfl: 0  •  metFORMIN (GLUCOPHAGE) 1000 MG tablet, Take 1,000 mg by mouth 2 (two) times a day with meals., Disp: , Rfl:   •  metoprolol tartrate (LOPRESSOR) 50 MG tablet, Take 1 tablet by mouth Every 12 (Twelve) Hours for 30 days., Disp: 60 tablet, Rfl: 0  •  Multiple Vitamins-Minerals (MULTIVITAMIN ADULT PO), Take 1 tablet by mouth Daily., Disp: , Rfl:   •  spironolactone (ALDACTONE) 50 MG tablet, Take 50 mg by mouth Daily., Disp: , Rfl:   •  terazosin (HYTRIN) 5 MG capsule, Take 1 capsule by mouth Every  Night., Disp: 30 capsule, Rfl: 0        History of Present Illness    Patient is a 46-year-old -American male who we are seeing today for further evaluation of dyspnea.  Patient has no previous history of coronary artery disease.  He denies any known history of ischemic evaluation.  He was in his usual state of health until January whenever he was diagnosed with COVID-19.  Since that time he notes persistent shortness of breath.  He underwent an echocardiogram which showed normal LVEF.  He had some mild LVH but no significant valvular disease.  He also underwent PFTs which suggested abnormalities.  He was then subsequently referred here for further cardiac evaluation.  Denies any significant chest pain, pressure.  Primarily complains of shortness of breath and fatigue with exertion.  No reported syncope, near syncope.  Has lower extremity edema which worsens throughout the day and then improves with elevation.  Due to his symptoms and concerns for post Covid cardiac involvement we were asked to see him for further evaluation.    The following portions of the patient's history were reviewed and updated as appropriate: allergies, current medications, past family history, past medical history, past social history, past surgical history and problem list.    Family History   Problem Relation Age of Onset   • Lung disease Mother    • No Known Problems Father    • Asthma Brother        Social History     Tobacco Use   • Smoking status: Never Smoker   • Smokeless tobacco: Never Used   Vaping Use   • Vaping Use: Never used   Substance Use Topics   • Alcohol use: No   • Drug use: No         Review of Systems   Constitutional: Positive for malaise/fatigue. Negative for fever.   HENT: Negative for nosebleeds.    Eyes: Negative for redness and visual disturbance.   Cardiovascular: Positive for dyspnea on exertion and leg swelling. Negative for orthopnea, palpitations and paroxysmal nocturnal dyspnea.   Respiratory:  "Positive for shortness of breath and snoring. Negative for cough, sputum production and wheezing.    Hematologic/Lymphatic: Negative for bleeding problem.   Skin: Negative for flushing, itching and rash.   Musculoskeletal: Negative for falls, joint pain and muscle cramps.   Gastrointestinal: Negative for abdominal pain, diarrhea, heartburn, nausea and vomiting.   Genitourinary: Negative for hematuria.   Neurological: Positive for headaches. Negative for excessive daytime sleepiness, dizziness, tremors and weakness.   Psychiatric/Behavioral: Negative for substance abuse. The patient is not nervous/anxious.           Objective:   BP (!) 156/104 (BP Location: Left arm, Patient Position: Sitting, Cuff Size: Adult)   Pulse 67   Ht 185.4 cm (73\")   Wt (!) 144 kg (317 lb 3.2 oz)   SpO2 97%   BMI 41.85 kg/m²         Vitals reviewed.   Constitutional:       Appearance: Healthy appearance. Well-developed and not in distress.   Eyes:      Conjunctiva/sclera: Conjunctivae normal.      Pupils: Pupils are equal, round, and reactive to light.   HENT:      Head: Normocephalic and atraumatic.    Mouth/Throat:      Pharynx: Oropharynx is clear.   Neck:      Thyroid: Thyroid normal. No thyromegaly.      Vascular: Normal carotid pulses. No carotid bruit or JVD. JVD normal.      Lymphadenopathy: No cervical adenopathy.   Pulmonary:      Effort: No respiratory distress.      Breath sounds: No wheezing. No rales.   Chest:      Chest wall: Not tender to palpatation.   Cardiovascular:      Normal rate. Regular rhythm.      No gallop.   Pulses:     Carotid: 2+ bilaterally.     Dorsalis pedis: 2+ bilaterally.     Posterior tibial: 2+ bilaterally.  Abdominal:      General: There is no distension or abdominal bruit.      Palpations: There is no abdominal mass.      Tenderness: There is no abdominal tenderness. There is no rebound.   Musculoskeletal:         General: No tenderness or deformity.      Extremities: No clubbing present.Skin:    "  General: Skin is warm and dry. There is no cyanosis.      Findings: No rash.   Neurological:      Mental Status: Alert, oriented to person, place, and time and oriented to person, place and time.           ECG 12 Lead    Date/Time: 7/26/2021 4:33 PM  Performed by: Mirza Amezquita MD  Authorized by: Mirza Amezquita MD   Comparison: compared with previous ECG from 2/18/2021  Similar to previous ECG  Rhythm: sinus rhythm  Other findings: left ventricular hypertrophy    Clinical impression: abnormal EKG  Comments: Lateral ST and T wave changes.                  Assessment:   Assessment/Plan      Diagnoses and all orders for this visit:    1. Dyspnea on exertion (Primary)  -     ECG 12 Lead    2. Essential hypertension    3. Obesity, Class III, BMI 40-49.9 (morbid obesity) (CMS/[facility])    4. Pure hypercholesterolemia      1.  Dyspnea on exertion, unclear etiology, possible angina equivalent versus due to residual Covid pulmonary disease, versus hypertension/diastolic heart failure.  New onset in the last 6 months, started with his Covid pneumonia.  2.  Hypertension, with LVH.  Poorly controlled.  3.  History of volume overload/possible heart failure.  Significant p.o. water and sodium intake.  Improved post diuresis.  4.  Dyslipidemia on high-dose statin  5.  Insulin-dependent diabetes  6.  Morbid obesity  7.  Pulmonary function test 3/21 showing severe restriction and moderate reduction in DLCO    Recommendations:  1.  Given multiple cardiac risk factors and new symptoms we will check a cardiac PET nuclear perfusion study (morbid obesity) at patient's earliest convenience.  2.  Long discussion regarding need to significant decrease water and sodium intake.  3.  Discussed daily weights, and to take his furosemide as needed weight gain.  (Apparently some renal dysfunction is occurring on daily furosemide).  4.  For hypertension and volume issues will institute chlorthalidone 25 mg daily.  5.  If above fails to correct his  hypertension we will add a calcium channel blocker.  (Reluctant to add ARB given his history of angioedema with his lisinopril)  6.  Revisit after the above, and of cardiovascular testing fails to recognize a source for his symptoms, would repeat his pulmonary function test and consider evaluation for renal artery stenosis       Ale BUSTAMANTE scribed portions of this dictation for Dr. Mirza Amezquita.     I have seen and examined the patient, I have reviewed the note, discussed the case with the advance practice clinician, made necessary changes and I agree with the final note.    Mirza Amezquita MD  07/26/21  17:04 EDT        Dictated utilizing Dragon dictation

## 2021-08-06 ENCOUNTER — HOSPITAL ENCOUNTER (OUTPATIENT)
Dept: CARDIOLOGY | Facility: HOSPITAL | Age: 46
Discharge: HOME | End: 2021-08-06
Admitting: INTERNAL MEDICINE

## 2021-08-06 VITALS
HEIGHT: 73 IN | WEIGHT: 315 LBS | DIASTOLIC BLOOD PRESSURE: 99 MMHG | SYSTOLIC BLOOD PRESSURE: 162 MMHG | BODY MASS INDEX: 41.75 KG/M2 | HEART RATE: 67 BPM

## 2021-08-06 DIAGNOSIS — R06.09 DYSPNEA ON EXERTION: ICD-10-CM

## 2021-08-06 DIAGNOSIS — E66.01 OBESITY, CLASS III, BMI 40-49.9 (MORBID OBESITY) (HCC): ICD-10-CM

## 2021-08-06 DIAGNOSIS — I10 ESSENTIAL HYPERTENSION: ICD-10-CM

## 2021-08-06 LAB
BH CV REST NUCLEAR ISOTOPE DOSE: 33 MCI
BH CV STRESS BP STAGE 1: NORMAL
BH CV STRESS BP STAGE 3: NORMAL
BH CV STRESS COMMENTS STAGE 1: NORMAL
BH CV STRESS DOSE REGADENOSON STAGE 1: 0.4
BH CV STRESS DURATION MIN STAGE 1: 1
BH CV STRESS DURATION MIN STAGE 2: 1
BH CV STRESS DURATION MIN STAGE 3: 1
BH CV STRESS DURATION MIN STAGE 4: 1
BH CV STRESS HR STAGE 1: 89
BH CV STRESS HR STAGE 2: 90
BH CV STRESS HR STAGE 3: 89
BH CV STRESS HR STAGE 4: 88
BH CV STRESS NUCLEAR ISOTOPE DOSE: 32.7 MCI
BH CV STRESS O2 STAGE 1: 100
BH CV STRESS O2 STAGE 2: 100
BH CV STRESS O2 STAGE 3: 100
BH CV STRESS O2 STAGE 4: 100
BH CV STRESS PROTOCOL 1: NORMAL
BH CV STRESS RECOVERY BP: NORMAL MMHG
BH CV STRESS RECOVERY HR: 83 BPM
BH CV STRESS RECOVERY O2: 100 %
BH CV STRESS STAGE 1: 1
BH CV STRESS STAGE 2: 2
BH CV STRESS STAGE 3: 3
BH CV STRESS STAGE 4: 4
MAXIMAL PREDICTED HEART RATE: 174 BPM
PERCENT MAX PREDICTED HR: 52.87 %
STRESS BASELINE BP: NORMAL MMHG
STRESS BASELINE HR: 67 BPM
STRESS O2 SAT REST: 99 %
STRESS PERCENT HR: 62 %
STRESS POST EXERCISE DUR MIN: 4 MIN
STRESS POST EXERCISE DUR SEC: 0 SEC
STRESS POST O2 SAT PEAK: 100 %
STRESS POST PEAK BP: NORMAL MMHG
STRESS POST PEAK HR: 92 BPM
STRESS TARGET HR: 148 BPM

## 2021-08-06 PROCEDURE — 78492 MYOCRD IMG PET MLT RST&STRS: CPT

## 2021-08-06 PROCEDURE — 93018 CV STRESS TEST I&R ONLY: CPT | Performed by: INTERNAL MEDICINE

## 2021-08-06 PROCEDURE — A9555 RB82 RUBIDIUM: HCPCS | Performed by: INTERNAL MEDICINE

## 2021-08-06 PROCEDURE — 78492 MYOCRD IMG PET MLT RST&STRS: CPT | Performed by: INTERNAL MEDICINE

## 2021-08-06 PROCEDURE — 93016 CV STRESS TEST SUPVJ ONLY: CPT | Performed by: INTERNAL MEDICINE

## 2021-08-06 PROCEDURE — 93017 CV STRESS TEST TRACING ONLY: CPT

## 2021-08-06 PROCEDURE — 0 RUBIDIUM CHLORIDE: Performed by: INTERNAL MEDICINE

## 2021-08-06 PROCEDURE — 25010000002 REGADENOSON 0.4 MG/5ML SOLUTION: Performed by: INTERNAL MEDICINE

## 2021-08-06 RX ADMIN — REGADENOSON 0.4 MG: 0.08 INJECTION, SOLUTION INTRAVENOUS at 10:41

## 2021-08-06 RX ADMIN — RUBIDIUM CHLORIDE RB-82 1 DOSE: 150 INJECTION, SOLUTION INTRAVENOUS at 10:44

## 2021-08-06 RX ADMIN — RUBIDIUM CHLORIDE RB-82 1 DOSE: 150 INJECTION, SOLUTION INTRAVENOUS at 10:32

## 2021-08-23 RX ORDER — FUROSEMIDE 40 MG/1
TABLET ORAL
Qty: 30 TABLET | Refills: 1 | Status: SHIPPED | OUTPATIENT
Start: 2021-08-23 | End: 2021-09-07

## 2021-09-07 ENCOUNTER — OFFICE VISIT (OUTPATIENT)
Dept: CARDIOLOGY | Facility: CLINIC | Age: 46
End: 2021-09-07

## 2021-09-07 VITALS
DIASTOLIC BLOOD PRESSURE: 108 MMHG | WEIGHT: 310 LBS | BODY MASS INDEX: 41.08 KG/M2 | SYSTOLIC BLOOD PRESSURE: 172 MMHG | HEART RATE: 65 BPM | HEIGHT: 73 IN

## 2021-09-07 DIAGNOSIS — R06.09 DOE (DYSPNEA ON EXERTION): ICD-10-CM

## 2021-09-07 DIAGNOSIS — I10 ESSENTIAL HYPERTENSION: ICD-10-CM

## 2021-09-07 DIAGNOSIS — E66.01 OBESITY, CLASS III, BMI 40-49.9 (MORBID OBESITY) (HCC): Primary | ICD-10-CM

## 2021-09-07 DIAGNOSIS — E78.00 PURE HYPERCHOLESTEROLEMIA: ICD-10-CM

## 2021-09-07 DIAGNOSIS — I10 ESSENTIAL HYPERTENSION: Primary | ICD-10-CM

## 2021-09-07 PROCEDURE — 99214 OFFICE O/P EST MOD 30 MIN: CPT | Performed by: INTERNAL MEDICINE

## 2021-09-07 RX ORDER — NIFEDIPINE 30 MG/1
30 TABLET, FILM COATED, EXTENDED RELEASE ORAL DAILY
Qty: 30 TABLET | Refills: 5 | Status: SHIPPED | OUTPATIENT
Start: 2021-09-07 | End: 2022-03-04

## 2021-09-07 NOTE — PROGRESS NOTES
Subjective:     Encounter Date:09/07/2021      Patient ID: Angel Blair is a 46 y.o. male.    Chief Complaint: Follow-up      PROBLEM LIST:  1. Dyspnea  a. 3/11/2021 echo EF 55%.  Mild LVH.  Mild MR.  Left atrium moderately dilated.  b. 3/3/2021 PFTs with severe restriction.  Normal airway resistance, moderate reduction in DLCO.  c. Cardiac PET: Normal perfusion.  Decreased LVEF-37% at rest/48%  stress  2. Hypertension  3. Dyslipidemia  4. Sleep apnea, on CPAP  5. Morbid obesity  6. COVID-19 1/2021  7. Surgeries:  a. 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    History of Present Illness  Patient returns today for follow up with a history of hypertension and dyspnea on exertion here for follow-up.  Since her last visit, he has had a normal echocardiogram and myocardial PET study ruling out ischemia.  He has been significantly hypertensive.  He notes no change in his chronic functional class III dyspnea being short of breath walking 1 flight of stairs.  He has not seen his pulmonologist in the interim.  Edema has improved significantly..     Allergies   Allergen Reactions   • Lisinopril Swelling         Current Outpatient Medications:   •  aspirin 81 MG chewable tablet, Chew 81 mg Every Night., Disp: , Rfl:   •  atorvastatin (LIPITOR) 40 MG tablet, Take 40 mg by mouth Every Night., Disp: , Rfl:   •  chlorthalidone (HYGROTON) 25 MG tablet, Take 1 tablet by mouth Daily., Disp: 30 tablet, Rfl: 5  •  glimepiride (AMARYL) 4 MG tablet, Take 4 mg by mouth 2 (Two) Times a Day., Disp: , Rfl:   •  insulin detemir (LEVEMIR) 100 UNIT/ML injection, Inject 20 Units under the skin into the appropriate area as directed Every Night., Disp: 10 mL, Rfl: 12  •  insulin lispro (humaLOG) 100 UNIT/ML injection, Inject 10 Units under the skin into the appropriate area as directed 3 (Three) Times a Day With Meals., Disp: 10 mL, Rfl: 12  •  isosorbide mononitrate (IMDUR) 60 MG 24 hr tablet, Take 1 tablet by mouth Daily., Disp: 30  "tablet, Rfl: 0  •  metFORMIN (GLUCOPHAGE) 1000 MG tablet, Take 1,000 mg by mouth 2 (two) times a day with meals., Disp: , Rfl:   •  Multiple Vitamins-Minerals (MULTIVITAMIN ADULT PO), Take 1 tablet by mouth Daily., Disp: , Rfl:   •  spironolactone (ALDACTONE) 50 MG tablet, Take 50 mg by mouth Daily., Disp: , Rfl:   •  terazosin (HYTRIN) 5 MG capsule, Take 1 capsule by mouth Every Night., Disp: 30 capsule, Rfl: 0  •  furosemide (LASIX) 40 MG tablet, TAKE 1 TABLET BY MOUTH EVERY DAY, Disp: 30 tablet, Rfl: 1  •  metoprolol tartrate (LOPRESSOR) 50 MG tablet, Take 1 tablet by mouth Every 12 (Twelve) Hours for 30 days., Disp: 60 tablet, Rfl: 0    The following portions of the patient's history were reviewed and updated as appropriate: allergies, current medications, past family history, past medical history, past social history, past surgical history and problem list.    ROS       Objective:   Blood pressure (!) 172/108, pulse 65, height 185.4 cm (73\"), weight (!) 141 kg (310 lb).      Vitals reviewed.   Constitutional:       Appearance: Well-developed and not in distress.   Neck:      Thyroid: No thyromegaly.      Vascular: No carotid bruit or JVD.   Pulmonary:      Breath sounds: Normal breath sounds.   Cardiovascular:      Regular rhythm.      S4 Gallop. No S3 gallop.   Abdominal:      General: Bowel sounds are normal.      Palpations: Abdomen is soft. There is no abdominal mass.      Tenderness: There is no abdominal tenderness.   Musculoskeletal:         General: No deformity.      Extremities: No clubbing present.Skin:     General: Skin is warm and dry.      Findings: No rash.   Neurological:      Mental Status: Alert and oriented to person, place, and time.         Lab Review:    Procedures        Assessment:   Diagnoses and all orders for this visit:    1. Obesity, Class III, BMI 40-49.9 (morbid obesity) (CMS/HCC) (Primary)    2. GILL (dyspnea on exertion)    3. Essential hypertension    4. Pure " hypercholesterolemia        Impression  1. Hypertension, severe, uncontrolled  2. 2.  Morbid obesity  3. 3.  Sleep apnea on CPAP  4. 4.  Recent Covid pneumonia with dyspnea on exertion persisting after this.  PFTs performed this spring showed severe restriction.  5. Dyspnea on exertion, unclear if due to pulmonary disease or severe hypertension/new hypertensive cardiomyopathy  6. Dyslipidemia on high-dose statin        Plan:  1. Add nifedipine 30 mg XL for blood pressure.  2. Repeat blood pressure check in 2 weeks if still elevated increase nifedipine dose  3. Check renal artery duplex  4. Check chest x-ray and pulmonary function test to verify persistently abnormal, if so needs ASAP pulmonary reevaluation  5. Revisit in 1 MO, or sooner as needed.    Mirza Amezquita MD

## 2021-09-23 ENCOUNTER — TRANSCRIBE ORDERS (OUTPATIENT)
Dept: CARDIOLOGY | Facility: CLINIC | Age: 46
End: 2021-09-23

## 2021-09-23 ENCOUNTER — HOSPITAL ENCOUNTER (OUTPATIENT)
Dept: GENERAL RADIOLOGY | Facility: HOSPITAL | Age: 46
Discharge: HOME | End: 2021-09-23
Admitting: INTERNAL MEDICINE

## 2021-09-23 DIAGNOSIS — Z13.83 SCREENING FOR CHRONIC BRONCHITIS AND EMPHYSEMA: Primary | ICD-10-CM

## 2021-09-23 DIAGNOSIS — Z13.83 SCREENING FOR CHRONIC BRONCHITIS AND EMPHYSEMA: ICD-10-CM

## 2021-09-23 PROCEDURE — 71046 X-RAY EXAM CHEST 2 VIEWS: CPT

## 2021-09-27 ENCOUNTER — HOSPITAL ENCOUNTER (OUTPATIENT)
Dept: CARDIOLOGY | Facility: HOSPITAL | Age: 46
Discharge: HOME | End: 2021-09-27
Admitting: INTERNAL MEDICINE

## 2021-09-27 VITALS — HEIGHT: 73 IN | WEIGHT: 310.85 LBS | BODY MASS INDEX: 41.2 KG/M2

## 2021-09-27 DIAGNOSIS — R06.09 DOE (DYSPNEA ON EXERTION): ICD-10-CM

## 2021-09-27 DIAGNOSIS — I10 ESSENTIAL HYPERTENSION: ICD-10-CM

## 2021-09-27 LAB
BH CV ECHO MEAS - DIST REN A EDV LEFT: 14.1 CM/SEC
BH CV ECHO MEAS - DIST REN A PSV LEFT: 106 CM/SEC
BH CV ECHO MEAS - DIST REN A RI LEFT: 0.87
BH CV ECHO MEAS - HILAR A PSV LEFT: 10.5 CM/SEC
BH CV ECHO MEAS - MID REN A EDV LEFT: 20.2 CM/SEC
BH CV ECHO MEAS - MID REN A PSV LEFT: 105 CM/SEC
BH CV ECHO MEAS - MID REN A RI LEFT: 0.81
BH CV ECHO MEAS - PROX REN A EDV LEFT: 14.7 CM/SEC
BH CV ECHO MEAS - PROX REN A PSV LEFT: 105 CM/SEC
BH CV ECHO MEAS - PROX REN A RI LEFT: 0.86
BH CV VAS BP RIGHT ARM: NORMAL MMHG
BH CV VAS RENAL AORTIC MID EDV: 17 CM/S
BH CV VAS RENAL AORTIC MID PSV: 59 CM/S
BH CV VAS RENAL HILUM LEFT EDV: 10 CM/S
BH CV VAS RENAL HILUM LEFT PSV: 53.4 CM/S
BH CV VAS RENAL HILUM RIGHT EDV: 18.7 CM/S
BH CV VAS RENAL HILUM RIGHT PSV: 62.5 CM/S
BH CV XCLRA SUP ARC RI LEFT: 0.68
BH CV XLRA MEAS - KID L LEFT: 11.6 CM
BH CV XLRA MEAS - SUP ARC PSV LEFT: 15.7 CM/SEC
BH CV XLRA MEAS - SUP SEG EDV LEFT: 10.5 CM/SEC
BH CV XLRA MEAS - SUP SEG PSV LEFT: 36.9 CM/SEC
BH CV XLRA MEAS - SUP SEG RI LEFT: 0.72
BH CV XLRA MEAS DIST REN A EDV RIGHT: 17.9 CM/SEC
BH CV XLRA MEAS DIST REN A PSV RIGHT: 82.2 CM/SEC
BH CV XLRA MEAS DIST REN A RI RIGHT: 0.78
BH CV XLRA MEAS HILAR A EDV RIGHT: 18.7 CM/SEC
BH CV XLRA MEAS HILAR A PSV RIGHT: 62.5 CM/SEC
BH CV XLRA MEAS HILAR A RI RIGHT: 0.7
BH CV XLRA MEAS INF ARC EDV LEFT: 3.7 CM/SEC
BH CV XLRA MEAS INF ARC EDV RIGHT: 4.8 CM/SEC
BH CV XLRA MEAS INF ARC PSV LEFT: 11.1 CM/SEC
BH CV XLRA MEAS INF ARC PSV RIGHT: 15.6 CM/SEC
BH CV XLRA MEAS INF ARC RI LEFT: 0.67
BH CV XLRA MEAS INF ARC RI RIGHT: 0.69
BH CV XLRA MEAS INF SEG EDV LEFT: 7 CM/SEC
BH CV XLRA MEAS INF SEG EDV RIGHT: 10 CM/SEC
BH CV XLRA MEAS INF SEG PSV LEFT: 30.9 CM/SEC
BH CV XLRA MEAS INF SEG PSV RIGHT: 34.7 CM/SEC
BH CV XLRA MEAS INF SEG RI LEFT: 0.77
BH CV XLRA MEAS INF SEG RI RIGHT: 0.71
BH CV XLRA MEAS KID H RIGHT: 7.8 CM
BH CV XLRA MEAS KID L RIGHT: 11.1 CM
BH CV XLRA MEAS KID W RIGHT: 6.3 CM
BH CV XLRA MEAS MID REN A EDV RIGHT: 23.7 CM/S
BH CV XLRA MEAS MID REN A PSV RIGHT: 85.1 CM/SEC
BH CV XLRA MEAS PROX REN A EDV RIGHT: 26.4 CM/SEC
BH CV XLRA MEAS PROX REN A PSV RIGHT: 128 CM/SEC
BH CV XLRA MEAS PROX REN A RI RIGHT: 0.79
BH CV XLRA MEAS RAR LEFT: 1.8
BH CV XLRA MEAS RAR RIGHT: 2.16
BH CV XLRA MEAS RENAL A ORG EDV LEFT: 15.3 CM/S
BH CV XLRA MEAS RENAL A ORG EDV RIGHT: 19.6 CM/SEC
BH CV XLRA MEAS RENAL A ORG PSV LEFT: 80.7 CM/SEC
BH CV XLRA MEAS RENAL A ORG PSV RIGHT: 88.5 CM/SEC
BH CV XLRA MEAS RENAL A ORG RI RIGHT: 0.78
BH CV XLRA MEAS SUP ARC EDV RIGHT: 6 CM/SEC
BH CV XLRA MEAS SUP ARC PSV RIGHT: 19.6 CM/SEC
BH CV XLRA MEAS SUP ARC RI RIGHT: 0.69
BH CV XLRA MEAS SUP SEG EDV RIGHT: 16.9 CM/SEC
BH CV XLRA MEAS SUP SEG PSV RIGHT: 61.6 CM/SEC
BH CV XLRA MEAS SUP SEG RI RIGHT: 0.73
BH CV XLRA SUP ARC EDV LEFT: 5 CM/SEC
LEFT RENAL UPPER PARENCHYMA MAX: 105.7 CM/S
LEFT RENAL UPPER PARENCHYMA MIN: 5 CM/S
LEFT RENAL UPPER PARENCHYMA RI: 0.95
RIGHT RENAL UPPER PARENCHYMA MAX: 19.6 CM/S
RIGHT RENAL UPPER PARENCHYMA MIN: 6 CM/S
RIGHT RENAL UPPER PARENCHYMA RI: 0.69

## 2021-09-27 PROCEDURE — 93975 VASCULAR STUDY: CPT | Performed by: INTERNAL MEDICINE

## 2021-09-27 PROCEDURE — 93975 VASCULAR STUDY: CPT

## 2021-09-28 ENCOUNTER — TELEPHONE (OUTPATIENT)
Dept: CARDIOLOGY | Facility: CLINIC | Age: 46
End: 2021-09-28

## 2021-09-28 NOTE — TELEPHONE ENCOUNTER
Spoke with patient, advised of below  ----- Message from Mirza Amezquita MD sent at 9/28/2021  9:48 AM EDT -----  No evidence of renal artery stenosis      Patient wants to know if ok to return to work, he states BP much better since starting nifedipine.

## 2022-01-24 RX ORDER — CHLORTHALIDONE 25 MG/1
TABLET ORAL
Qty: 90 TABLET | Refills: 0 | Status: SHIPPED | OUTPATIENT
Start: 2022-01-24 | End: 2022-04-25

## 2022-03-04 RX ORDER — NIFEDIPINE 30 MG/1
TABLET, FILM COATED, EXTENDED RELEASE ORAL
Qty: 90 TABLET | Refills: 1 | Status: SHIPPED | OUTPATIENT
Start: 2022-03-04 | End: 2022-09-14

## 2022-03-23 ENCOUNTER — OFFICE VISIT (OUTPATIENT)
Dept: CARDIOLOGY | Facility: CLINIC | Age: 47
End: 2022-03-23

## 2022-03-23 VITALS
SYSTOLIC BLOOD PRESSURE: 140 MMHG | OXYGEN SATURATION: 97 % | DIASTOLIC BLOOD PRESSURE: 88 MMHG | WEIGHT: 313 LBS | HEIGHT: 73 IN | BODY MASS INDEX: 41.48 KG/M2 | HEART RATE: 75 BPM

## 2022-03-23 DIAGNOSIS — R06.09 DOE (DYSPNEA ON EXERTION): Primary | ICD-10-CM

## 2022-03-23 DIAGNOSIS — Z86.79 HISTORY OF HEART FAILURE: ICD-10-CM

## 2022-03-23 DIAGNOSIS — R06.09 DOE (DYSPNEA ON EXERTION): ICD-10-CM

## 2022-03-23 DIAGNOSIS — I10 ESSENTIAL HYPERTENSION: Primary | ICD-10-CM

## 2022-03-23 DIAGNOSIS — I10 ESSENTIAL HYPERTENSION: ICD-10-CM

## 2022-03-23 DIAGNOSIS — E66.01 OBESITY, CLASS III, BMI 40-49.9 (MORBID OBESITY): ICD-10-CM

## 2022-03-23 PROCEDURE — 99214 OFFICE O/P EST MOD 30 MIN: CPT | Performed by: INTERNAL MEDICINE

## 2022-03-23 NOTE — PROGRESS NOTES
Subjective:     Encounter Date:03/23/2022    Primary Care Physician: Jessica Redding PA      Patient ID: Angel Blair is a 46 y.o. male.    Chief Complaint:Follow-up    PROBLEM LIST:  1. Dyspnea/NICM  a. 3/11/2021 echo EF 55%.  Mild LVH.  Mild MR.  Left atrium moderately dilated.  b. 3/3/2021 PFTs with severe restriction.  Normal airway resistance, moderate reduction in DLCO.  c. Cardiac PET: Normal perfusion.  Decreased LVEF-37% at rest/48%  stress  2. Hypertension  3. Dyslipidemia  4. Diabetes, insulin-dependent  5. Sleep apnea, on CPAP  6. Morbid obesity  7. COVID-19 1/2021  8. Surgeries:  a. 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      Allergies   Allergen Reactions   • Lisinopril Swelling         Current Outpatient Medications:   •  aspirin 81 MG chewable tablet, Chew 81 mg Every Night., Disp: , Rfl:   •  atorvastatin (LIPITOR) 40 MG tablet, Take 40 mg by mouth Every Night., Disp: , Rfl:   •  chlorthalidone (HYGROTON) 25 MG tablet, TAKE 1 TABLET BY MOUTH EVERY DAY, Disp: 90 tablet, Rfl: 0  •  glimepiride (AMARYL) 4 MG tablet, Take 4 mg by mouth 2 (Two) Times a Day., Disp: , Rfl:   •  insulin detemir (LEVEMIR) 100 UNIT/ML injection, Inject 20 Units under the skin into the appropriate area as directed Every Night., Disp: 10 mL, Rfl: 12  •  insulin lispro (humaLOG) 100 UNIT/ML injection, Inject 10 Units under the skin into the appropriate area as directed 3 (Three) Times a Day With Meals., Disp: 10 mL, Rfl: 12  •  isosorbide mononitrate (IMDUR) 60 MG 24 hr tablet, Take 1 tablet by mouth Daily., Disp: 30 tablet, Rfl: 0  •  metFORMIN (GLUCOPHAGE) 1000 MG tablet, Take 1,000 mg by mouth 2 (two) times a day with meals., Disp: , Rfl:   •  metoprolol tartrate (LOPRESSOR) 50 MG tablet, Take 1 tablet by mouth Every 12 (Twelve) Hours for 30 days., Disp: 60 tablet, Rfl: 0  •  Multiple Vitamins-Minerals (MULTIVITAMIN ADULT PO), Take 1 tablet by mouth Daily., Disp: , Rfl:   •  NIFEdipine CC (ADALAT CC) 30 MG 24 hr  "tablet, TAKE 1 TABLET BY MOUTH EVERY DAY, Disp: 90 tablet, Rfl: 1  •  spironolactone (ALDACTONE) 50 MG tablet, Take 50 mg by mouth Daily., Disp: , Rfl:   •  terazosin (HYTRIN) 5 MG capsule, Take 1 capsule by mouth Every Night., Disp: 30 capsule, Rfl: 0        History of Present Illness    Patient returns today for follow-up of dyspnea on exertion and previous nonischemic cardiomyopathy thought to be hypertensive induced.  Since her last visit, he notes he overall is \"doing okay\".  Denies any further dyspnea orthopnea PND.  Denies chest pain.  Blood pressure has been significantly better controlled.  Overall is happy with how he is feeling currently.  Is considering weight loss surgery.    The following portions of the patient's history were reviewed and updated as appropriate: allergies, current medications, past family history, past medical history, past social history, past surgical history and problem list.      Social History     Tobacco Use   • Smoking status: Never Smoker   • Smokeless tobacco: Never Used   Vaping Use   • Vaping Use: Never used   Substance Use Topics   • Alcohol use: No   • Drug use: No         ROS       Objective:   /88   Pulse 75   Ht 185.4 cm (73\")   Wt (!) 142 kg (313 lb)   SpO2 97%   BMI 41.30 kg/m²         Vitals reviewed.   Constitutional:       Appearance: Well-developed and not in distress.   Neck:      Thyroid: No thyromegaly.      Vascular: No carotid bruit or JVD.   Pulmonary:      Breath sounds: Normal breath sounds.   Cardiovascular:      Regular rhythm.      S4 Gallop. No S3 gallop.   Abdominal:      General: Bowel sounds are normal.      Palpations: Abdomen is soft. There is no abdominal mass.      Tenderness: There is no abdominal tenderness.   Musculoskeletal:         General: No deformity.      Extremities: No clubbing present.Skin:     General: Skin is warm and dry.      Findings: No rash.   Neurological:      Mental Status: Alert and oriented to person, place, " and time.         Procedures          Assessment:   Assessment/Plan      Diagnoses and all orders for this visit:    1. Essential hypertension (Primary)    2. Obesity, Class III, BMI 40-49.9 (morbid obesity) (HCC)    3. GILL (dyspnea on exertion)      1.  Dyspnea exertion, chronic.  Combination of hypertension morbid obesity   2.  History of cardiomyopathy, presumed hypertensive.  Resolved.  No current volume overload.  Did have history of heart failure which is now resolved  3.  Class IV renal failure.  Most recent creatinine of 3.0, with creatinine clearance of 27.  4.  Morbid obesity  5.  Hypertension, reasonly well controlled currently.    Recommendations:  1.  At this time we will make no change in medical therapy.  2.  Continue to encourage weight loss and increase in activity  3.  We will check echocardiogram prior to next visit to evaluate LVEF.  4.  Multiple coronary disease risk factors but no ischemia by PET last year.  No current symptoms we will continue to manage medically  5.  Revisit annually apparent symptom change    Mirza Amezquita MD             Dictated utilizing Dragon dictation

## 2022-04-25 RX ORDER — CHLORTHALIDONE 25 MG/1
TABLET ORAL
Qty: 90 TABLET | Refills: 0 | Status: SHIPPED | OUTPATIENT
Start: 2022-04-25 | End: 2022-07-22

## 2022-07-22 RX ORDER — CHLORTHALIDONE 25 MG/1
TABLET ORAL
Qty: 90 TABLET | Refills: 0 | Status: SHIPPED | OUTPATIENT
Start: 2022-07-22 | End: 2022-10-05

## 2022-08-15 ENCOUNTER — OFFICE VISIT (OUTPATIENT)
Dept: ORTHOPEDIC SURGERY | Facility: CLINIC | Age: 47
End: 2022-08-15

## 2022-08-15 VITALS
BODY MASS INDEX: 40.16 KG/M2 | DIASTOLIC BLOOD PRESSURE: 88 MMHG | HEIGHT: 73 IN | WEIGHT: 303 LBS | SYSTOLIC BLOOD PRESSURE: 140 MMHG

## 2022-08-15 DIAGNOSIS — M17.12 PRIMARY OSTEOARTHRITIS OF LEFT KNEE: Primary | ICD-10-CM

## 2022-08-15 PROCEDURE — 99204 OFFICE O/P NEW MOD 45 MIN: CPT | Performed by: ORTHOPAEDIC SURGERY

## 2022-08-15 PROCEDURE — 20610 DRAIN/INJ JOINT/BURSA W/O US: CPT | Performed by: ORTHOPAEDIC SURGERY

## 2022-08-15 RX ORDER — TRIAMCINOLONE ACETONIDE 40 MG/ML
40 INJECTION, SUSPENSION INTRA-ARTICULAR; INTRAMUSCULAR
Status: COMPLETED | OUTPATIENT
Start: 2022-08-15 | End: 2022-08-15

## 2022-08-15 RX ORDER — PREDNISONE 20 MG/1
TABLET ORAL
Status: ON HOLD | COMMUNITY
Start: 2022-08-09 | End: 2023-01-23

## 2022-08-15 RX ORDER — ROPIVACAINE HYDROCHLORIDE 5 MG/ML
4 INJECTION, SOLUTION EPIDURAL; INFILTRATION; PERINEURAL
Status: COMPLETED | OUTPATIENT
Start: 2022-08-15 | End: 2022-08-15

## 2022-08-15 RX ADMIN — TRIAMCINOLONE ACETONIDE 40 MG: 40 INJECTION, SUSPENSION INTRA-ARTICULAR; INTRAMUSCULAR at 14:40

## 2022-08-15 RX ADMIN — ROPIVACAINE HYDROCHLORIDE 4 ML: 5 INJECTION, SOLUTION EPIDURAL; INFILTRATION; PERINEURAL at 14:40

## 2022-08-15 NOTE — PROGRESS NOTES
Procedure   Large Joint Arthrocentesis: L knee  Date/Time: 8/15/2022 2:40 PM  Consent given by: patient  Site marked: site marked  Timeout: Immediately prior to procedure a time out was called to verify the correct patient, procedure, equipment, support staff and site/side marked as required   Supporting Documentation  Indications: pain   Procedure Details  Location: knee - L knee  Preparation: Patient was prepped and draped in the usual sterile fashion  Needle size: 22 G  Approach: anterolateral  Medications administered: 4 mL ropivacaine 0.5 %; 40 mg triamcinolone acetonide 40 MG/ML  Patient tolerance: patient tolerated the procedure well with no immediate complications

## 2022-08-15 NOTE — PROGRESS NOTES
[facility] Note    Subjective     Chief Complaint   Patient presents with   • Left Knee - Pain        HPI    Angel Balir is a 47 y.o. male who presents with new problem of: left knee pain.  Onset: atraumatic and gradual in nature. The issue has been ongoing for 1 month(s). Pain is a 4/10 on the pain scale. Pain is described as aching, throbbing and shooting. Associated symptoms include pain, swelling, grinding and giving way/buckling. The pain is worse with walking, standing, climbing stairs, sleeping, working and rising from seated position; nothing improve the pain. Previous treatments have included: nothing.  No previous injections.  He has been using Voltaren gel and ice, as well as Tylenol arthritis, with improved.  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  He has been unable to work.  Pain improved over the past couple of weeks.    I have reviewed the following portions of the patient's history and agree with: History of Present Illness and Review of Systems    Patient Active Problem List   Diagnosis   • Type 2 diabetes mellitus with hyperglycemia (HCC)   • Left-sided back pain   • Obesity, Class III, BMI 40-49.9 (morbid obesity) (HCC)   • Abscess of back   • COVID-19   • Pneumonia due to COVID-19 virus   • Essential hypertension     Past Medical History:   Diagnosis Date   • Diabetes mellitus (HCC)    • Hyperlipidemia    • Hypertension    • Knee swelling 7/20/22   • Sleep apnea       Past Surgical History:   Procedure Laterality Date   • SOFT TISSUE TUMOR RESECTION  2010      Family History   Problem Relation Age of Onset   • Lung disease Mother    • No Known Problems Father    • Asthma Brother      Social History     Socioeconomic History   • Marital status:    Tobacco Use   • Smoking status: Never Smoker   • Smokeless tobacco: Never Used   Vaping Use   • Vaping Use: Never used   Substance and Sexual Activity   • Alcohol use: No   • Drug use: No   • Sexual  activity: Yes     Partners: Female      Current Outpatient Medications on File Prior to Visit   Medication Sig Dispense Refill   • aspirin 81 MG chewable tablet Chew 81 mg Every Night.     • atorvastatin (LIPITOR) 40 MG tablet Take 40 mg by mouth Every Night.     • chlorthalidone (HYGROTON) 25 MG tablet TAKE 1 TABLET BY MOUTH EVERY DAY 90 tablet 0   • glimepiride (AMARYL) 4 MG tablet Take 4 mg by mouth 2 (Two) Times a Day.     • insulin detemir (LEVEMIR) 100 UNIT/ML injection Inject 20 Units under the skin into the appropriate area as directed Every Night. 10 mL 12   • insulin lispro (humaLOG) 100 UNIT/ML injection Inject 10 Units under the skin into the appropriate area as directed 3 (Three) Times a Day With Meals. 10 mL 12   • isosorbide mononitrate (IMDUR) 60 MG 24 hr tablet Take 1 tablet by mouth Daily. 30 tablet 0   • metFORMIN (GLUCOPHAGE) 1000 MG tablet Take 1,000 mg by mouth 2 (two) times a day with meals.     • Multiple Vitamins-Minerals (MULTIVITAMIN ADULT PO) Take 1 tablet by mouth Daily.     • mupirocin (BACTROBAN) 2 % ointment APPLY TO LESION 2-3 TIMES DAILY.     • NIFEdipine CC (ADALAT CC) 30 MG 24 hr tablet TAKE 1 TABLET BY MOUTH EVERY DAY 90 tablet 1   • terazosin (HYTRIN) 5 MG capsule Take 1 capsule by mouth Every Night. 30 capsule 0   • metoprolol tartrate (LOPRESSOR) 50 MG tablet Take 1 tablet by mouth Every 12 (Twelve) Hours for 30 days. 60 tablet 0   • predniSONE (DELTASONE) 20 MG tablet      • [DISCONTINUED] spironolactone (ALDACTONE) 50 MG tablet Take 50 mg by mouth Daily.       No current facility-administered medications on file prior to visit.      Allergies   Allergen Reactions   • Lisinopril Swelling        Review of Systems   Constitutional: Negative for activity change, appetite change, chills, diaphoresis, fatigue, fever and unexpected weight change.   HENT: Negative for congestion, dental problem, drooling, ear discharge, ear pain, facial swelling, hearing loss, mouth sores,  "nosebleeds, postnasal drip, rhinorrhea, sinus pressure, sneezing, sore throat, tinnitus, trouble swallowing and voice change.    Eyes: Negative for photophobia, pain, discharge, redness, itching and visual disturbance.   Respiratory: Negative for apnea, cough, choking, chest tightness, shortness of breath, wheezing and stridor.    Cardiovascular: Negative for chest pain, palpitations and leg swelling.   Gastrointestinal: Negative for abdominal distention, abdominal pain, anal bleeding, blood in stool, constipation, diarrhea, nausea, rectal pain and vomiting.   Endocrine: Negative for cold intolerance, heat intolerance, polydipsia, polyphagia and polyuria.   Genitourinary: Negative for decreased urine volume, difficulty urinating, dysuria, enuresis, flank pain, frequency, genital sores, hematuria and urgency.   Musculoskeletal: Positive for arthralgias. Negative for back pain, gait problem, joint swelling, myalgias, neck pain and neck stiffness.   Skin: Negative for color change, pallor, rash and wound.   Allergic/Immunologic: Negative for environmental allergies, food allergies and immunocompromised state.   Neurological: Negative for dizziness, tremors, seizures, syncope, facial asymmetry, speech difficulty, weakness, light-headedness, numbness and headaches.   Hematological: Negative for adenopathy. Does not bruise/bleed easily.   Psychiatric/Behavioral: Negative for agitation, behavioral problems, confusion, decreased concentration, dysphoric mood, hallucinations, self-injury, sleep disturbance and suicidal ideas. The patient is not nervous/anxious and is not hyperactive.         Objective      Physical Exam  /88   Ht 185.4 cm (72.99\")   Wt (!) 137 kg (303 lb)   BMI 39.98 kg/m²     Body mass index is 39.98 kg/m².    General:   Mental Status:  Alert   Appearance: Cooperative, in no acute distress   Build and Nutrition: Obese by BMI male   Orientation: Alert and oriented to person, place and " time   Posture: Normal   Gait: Mild limp on the left    Integument:   Left knee: No skin lesions, no rash, no ecchymosis    Neurologic:   Sensation:    Left foot: Intact to light touch on the dorsal and plantar aspect   Motor:  Left lower extremity: 5/5 quadriceps, hamstrings, ankle dorsiflexors, and ankle plantar flexors  Vascular:   Left lower extremity: 2+ dorsalis pedis pulse, prompt capillary refill    Lower Extremities:   Left Knee:    Tenderness:  None    Effusion:  None    Swelling:  None    Crepitus:  Soft    Atrophy:  None    Range of motion:  Extension: 0°       Flexion: 120°  Instability:  No varus laxity, no valgus laxity, negative anterior drawer  Deformities:  None      Imaging/Studies      Imaging Results (Last 24 Hours)     Procedure Component Value Units Date/Time    XR Knee 4+ View Left [[id number]] Resulted: 08/15/22 1422     Updated: 08/15/22 1423    Narrative:      Left Knee Radiographs  Indication: left knee pain  Views: Standing AP's and skiers of both knees, with lateral and sunrise   views of the left knee    Comparison: no prior studies available    Findings:   Near bone-on-bone contact medial compartment, tricompartmental   degeneration, no acute bony abnormalities.  No unusual bony features.          Assessment and Plan     Diagnoses and all orders for this visit:    1. Primary osteoarthritis of left knee (Primary)  -     XR Knee 4+ View Left  -     Large Joint Arthrocentesis: L knee        1. Primary osteoarthritis of left knee        I reviewed my findings with the patient.  He has degenerative changes in the left knee, and most likely an arthritic flare with some improvement with the use of Voltaren gel and ice as well as the Tylenol arthritis.  At this point I offered him an intra-articular injection, and he wished to proceed.  He works on assembly line at [employer], and I did provide a work note for light duty starting tomorrow.  I will see him back in 2 weeks primarily for  work assessment and to see if he has had any improvement after the injection.  I will see him back sooner for any problems.    Procedure Note:  The potential benefits of performing a therapeutic left knee joint injection, as well as potential risks (including, but not limited to infection, swelling, pain, bleeding, bruising, nerve/blood vessel damage, skin color changes, transient elevation in blood glucose levels, and fat atrophy) were discussed with the patient.  After informed consent, timeout procedure was performed, and the skin on the left knee was prepped with chlorhexidine soap and alcohol, after which ethyl chloride was applied to the skin at the injection site. Via the anterolateral approach, 1ml of Kenalog 40mg/ml mixed with 4ml 0.5% ropivacaine plain was injected into the knee joint.  The patient tolerated the procedure well, experiencing 80% improvement a few minutes following the injection. There were no complications.  Band-Aid was applied to the injection site. Post-procedural instructions were given to the patient and/or their caregiver.      Return in about 2 weeks (around 8/29/2022).      Karson Hines MD  08/15/22  15:15 EDT

## 2022-09-14 RX ORDER — NIFEDIPINE 30 MG/1
TABLET, FILM COATED, EXTENDED RELEASE ORAL
Qty: 90 TABLET | Refills: 1 | Status: SHIPPED | OUTPATIENT
Start: 2022-09-14 | End: 2023-01-29 | Stop reason: HOSPADM

## 2022-10-05 RX ORDER — CHLORTHALIDONE 25 MG/1
TABLET ORAL
Qty: 90 TABLET | Refills: 0 | Status: SHIPPED | OUTPATIENT
Start: 2022-10-05 | End: 2023-01-29 | Stop reason: HOSPADM

## 2023-01-03 RX ORDER — CHLORTHALIDONE 25 MG/1
TABLET ORAL
Qty: 90 TABLET | Refills: 0 | OUTPATIENT
Start: 2023-01-03

## 2023-01-22 ENCOUNTER — APPOINTMENT (OUTPATIENT)
Dept: GENERAL RADIOLOGY | Facility: HOSPITAL | Age: 48
DRG: 280 | End: 2023-01-22

## 2023-01-22 ENCOUNTER — HOSPITAL ENCOUNTER (INPATIENT)
Facility: HOSPITAL | Age: 48
LOS: 7 days | Discharge: HOME | DRG: 280 | End: 2023-01-29
Attending: EMERGENCY MEDICINE | Admitting: STUDENT IN AN ORGANIZED HEALTH CARE EDUCATION/TRAINING PROGRAM
Payer: COMMERCIAL

## 2023-01-22 DIAGNOSIS — R06.00 DYSPNEA, UNSPECIFIED TYPE: ICD-10-CM

## 2023-01-22 DIAGNOSIS — I50.9 ACUTE CONGESTIVE HEART FAILURE, UNSPECIFIED HEART FAILURE TYPE: Primary | ICD-10-CM

## 2023-01-22 DIAGNOSIS — R77.8 ELEVATED TROPONIN: ICD-10-CM

## 2023-01-22 DIAGNOSIS — N17.9 AKI (ACUTE KIDNEY INJURY): ICD-10-CM

## 2023-01-22 PROBLEM — N18.30 CKD (CHRONIC KIDNEY DISEASE) STAGE 3, GFR 30-59 ML/MIN (HCC): Status: ACTIVE | Noted: 2023-01-22

## 2023-01-22 PROBLEM — I50.23 ACUTE ON CHRONIC SYSTOLIC CONGESTIVE HEART FAILURE (HCC): Status: ACTIVE | Noted: 2023-01-22

## 2023-01-22 PROBLEM — R79.89 ELEVATED TROPONIN: Status: ACTIVE | Noted: 2023-01-22

## 2023-01-22 LAB
ALBUMIN SERPL-MCNC: 3.6 G/DL (ref 3.5–5.2)
ALBUMIN/GLOB SERPL: 0.9 G/DL
ALP SERPL-CCNC: 109 U/L (ref 39–117)
ALT SERPL W P-5'-P-CCNC: 13 U/L (ref 1–41)
ANION GAP SERPL CALCULATED.3IONS-SCNC: 12 MMOL/L (ref 5–15)
AST SERPL-CCNC: 15 U/L (ref 1–40)
BACTERIA UR QL AUTO: NORMAL /HPF
BASOPHILS # BLD AUTO: 0.06 10*3/MM3 (ref 0–0.2)
BASOPHILS NFR BLD AUTO: 0.6 % (ref 0–1.5)
BILIRUB SERPL-MCNC: 0.2 MG/DL (ref 0–1.2)
BILIRUB UR QL STRIP: NEGATIVE
BUN SERPL-MCNC: 64 MG/DL (ref 6–20)
BUN/CREAT SERPL: 16.5 (ref 7–25)
CALCIUM SPEC-SCNC: 9.1 MG/DL (ref 8.6–10.5)
CHLORIDE SERPL-SCNC: 110 MMOL/L (ref 98–107)
CLARITY UR: CLEAR
CO2 SERPL-SCNC: 20 MMOL/L (ref 22–29)
COLOR UR: YELLOW
CREAT SERPL-MCNC: 3.89 MG/DL (ref 0.76–1.27)
D-LACTATE SERPL-SCNC: 1.1 MMOL/L (ref 0.5–2)
DEPRECATED RDW RBC AUTO: 46.3 FL (ref 37–54)
EGFRCR SERPLBLD CKD-EPI 2021: 18.3 ML/MIN/1.73
EOSINOPHIL # BLD AUTO: 0.16 10*3/MM3 (ref 0–0.4)
EOSINOPHIL NFR BLD AUTO: 1.7 % (ref 0.3–6.2)
ERYTHROCYTE [DISTWIDTH] IN BLOOD BY AUTOMATED COUNT: 13.7 % (ref 12.3–15.4)
FLUAV SUBTYP SPEC NAA+PROBE: NOT DETECTED
FLUBV RNA ISLT QL NAA+PROBE: NOT DETECTED
GLOBULIN UR ELPH-MCNC: 3.8 GM/DL
GLUCOSE BLDC GLUCOMTR-MCNC: 124 MG/DL (ref 70–130)
GLUCOSE BLDC GLUCOMTR-MCNC: 59 MG/DL (ref 70–130)
GLUCOSE BLDC GLUCOMTR-MCNC: 83 MG/DL (ref 70–130)
GLUCOSE SERPL-MCNC: 91 MG/DL (ref 65–99)
GLUCOSE UR STRIP-MCNC: NEGATIVE MG/DL
HCT VFR BLD AUTO: 37.6 % (ref 37.5–51)
HGB BLD-MCNC: 12 G/DL (ref 13–17.7)
HGB UR QL STRIP.AUTO: ABNORMAL
HOLD SPECIMEN: NORMAL
HYALINE CASTS UR QL AUTO: NORMAL /LPF
IMM GRANULOCYTES # BLD AUTO: 0.02 10*3/MM3 (ref 0–0.05)
IMM GRANULOCYTES NFR BLD AUTO: 0.2 % (ref 0–0.5)
KETONES UR QL STRIP: NEGATIVE
LEUKOCYTE ESTERASE UR QL STRIP.AUTO: NEGATIVE
LYMPHOCYTES # BLD AUTO: 1.7 10*3/MM3 (ref 0.7–3.1)
LYMPHOCYTES NFR BLD AUTO: 18.1 % (ref 19.6–45.3)
MCH RBC QN AUTO: 29.3 PG (ref 26.6–33)
MCHC RBC AUTO-ENTMCNC: 31.9 G/DL (ref 31.5–35.7)
MCV RBC AUTO: 91.7 FL (ref 79–97)
MONOCYTES # BLD AUTO: 0.46 10*3/MM3 (ref 0.1–0.9)
MONOCYTES NFR BLD AUTO: 4.9 % (ref 5–12)
NEUTROPHILS NFR BLD AUTO: 7 10*3/MM3 (ref 1.7–7)
NEUTROPHILS NFR BLD AUTO: 74.5 % (ref 42.7–76)
NITRITE UR QL STRIP: NEGATIVE
NRBC BLD AUTO-RTO: 0 /100 WBC (ref 0–0.2)
NT-PROBNP SERPL-MCNC: 5438 PG/ML (ref 0–450)
PH UR STRIP.AUTO: 6 [PH] (ref 5–8)
PLATELET # BLD AUTO: 165 10*3/MM3 (ref 140–450)
PMV BLD AUTO: 12.4 FL (ref 6–12)
POTASSIUM SERPL-SCNC: 4.7 MMOL/L (ref 3.5–5.2)
PROT SERPL-MCNC: 7.4 G/DL (ref 6–8.5)
PROT UR QL STRIP: ABNORMAL
QT INTERVAL: 384 MS
QT INTERVAL: 404 MS
QTC INTERVAL: 437 MS
QTC INTERVAL: 454 MS
RBC # BLD AUTO: 4.1 10*6/MM3 (ref 4.14–5.8)
RBC # UR STRIP: NORMAL /HPF
REF LAB TEST METHOD: NORMAL
SARS-COV-2 RNA PNL SPEC NAA+PROBE: NOT DETECTED
SODIUM SERPL-SCNC: 142 MMOL/L (ref 136–145)
SP GR UR STRIP: 1.01 (ref 1–1.03)
SQUAMOUS #/AREA URNS HPF: NORMAL /HPF
TROPONIN T SERPL-MCNC: 0.12 NG/ML (ref 0–0.03)
TROPONIN T SERPL-MCNC: 0.12 NG/ML (ref 0–0.03)
UROBILINOGEN UR QL STRIP: ABNORMAL
WBC # UR STRIP: NORMAL /HPF
WBC NRBC COR # BLD: 9.4 10*3/MM3 (ref 3.4–10.8)
WHOLE BLOOD HOLD COAG: NORMAL
WHOLE BLOOD HOLD SPECIMEN: NORMAL

## 2023-01-22 PROCEDURE — 25010000002 FUROSEMIDE PER 20 MG: Performed by: NURSE PRACTITIONER

## 2023-01-22 PROCEDURE — 36415 COLL VENOUS BLD VENIPUNCTURE: CPT

## 2023-01-22 PROCEDURE — 87040 BLOOD CULTURE FOR BACTERIA: CPT | Performed by: FAMILY MEDICINE

## 2023-01-22 PROCEDURE — 80053 COMPREHEN METABOLIC PANEL: CPT | Performed by: EMERGENCY MEDICINE

## 2023-01-22 PROCEDURE — 84540 ASSAY OF URINE/UREA-N: CPT | Performed by: STUDENT IN AN ORGANIZED HEALTH CARE EDUCATION/TRAINING PROGRAM

## 2023-01-22 PROCEDURE — 25010000002 CEFTRIAXONE PER 250 MG: Performed by: FAMILY MEDICINE

## 2023-01-22 PROCEDURE — 84300 ASSAY OF URINE SODIUM: CPT | Performed by: STUDENT IN AN ORGANIZED HEALTH CARE EDUCATION/TRAINING PROGRAM

## 2023-01-22 PROCEDURE — 82570 ASSAY OF URINE CREATININE: CPT | Performed by: STUDENT IN AN ORGANIZED HEALTH CARE EDUCATION/TRAINING PROGRAM

## 2023-01-22 PROCEDURE — 85025 COMPLETE CBC W/AUTO DIFF WBC: CPT | Performed by: EMERGENCY MEDICINE

## 2023-01-22 PROCEDURE — 83605 ASSAY OF LACTIC ACID: CPT | Performed by: FAMILY MEDICINE

## 2023-01-22 PROCEDURE — 83880 ASSAY OF NATRIURETIC PEPTIDE: CPT | Performed by: EMERGENCY MEDICINE

## 2023-01-22 PROCEDURE — 84484 ASSAY OF TROPONIN QUANT: CPT | Performed by: NURSE PRACTITIONER

## 2023-01-22 PROCEDURE — 99285 EMERGENCY DEPT VISIT HI MDM: CPT

## 2023-01-22 PROCEDURE — 93005 ELECTROCARDIOGRAM TRACING: CPT | Performed by: EMERGENCY MEDICINE

## 2023-01-22 PROCEDURE — 84484 ASSAY OF TROPONIN QUANT: CPT | Performed by: EMERGENCY MEDICINE

## 2023-01-22 PROCEDURE — 81001 URINALYSIS AUTO W/SCOPE: CPT | Performed by: FAMILY MEDICINE

## 2023-01-22 PROCEDURE — 71045 X-RAY EXAM CHEST 1 VIEW: CPT

## 2023-01-22 PROCEDURE — 84156 ASSAY OF PROTEIN URINE: CPT | Performed by: STUDENT IN AN ORGANIZED HEALTH CARE EDUCATION/TRAINING PROGRAM

## 2023-01-22 PROCEDURE — 82962 GLUCOSE BLOOD TEST: CPT

## 2023-01-22 PROCEDURE — 87636 SARSCOV2 & INF A&B AMP PRB: CPT | Performed by: NURSE PRACTITIONER

## 2023-01-22 PROCEDURE — 93005 ELECTROCARDIOGRAM TRACING: CPT | Performed by: NURSE PRACTITIONER

## 2023-01-22 PROCEDURE — 99223 1ST HOSP IP/OBS HIGH 75: CPT | Performed by: FAMILY MEDICINE

## 2023-01-22 RX ORDER — NICOTINE POLACRILEX 4 MG
15 LOZENGE BUCCAL
Status: DISCONTINUED | OUTPATIENT
Start: 2023-01-22 | End: 2023-01-29 | Stop reason: HOSPADM

## 2023-01-22 RX ORDER — ISOSORBIDE MONONITRATE 60 MG/1
60 TABLET, EXTENDED RELEASE ORAL
Status: DISCONTINUED | OUTPATIENT
Start: 2023-01-23 | End: 2023-01-22

## 2023-01-22 RX ORDER — BUMETANIDE 0.25 MG/ML
2 INJECTION INTRAMUSCULAR; INTRAVENOUS ONCE
Status: COMPLETED | OUTPATIENT
Start: 2023-01-22 | End: 2023-01-22

## 2023-01-22 RX ORDER — ACETAMINOPHEN 325 MG/1
650 TABLET ORAL EVERY 4 HOURS PRN
Status: DISCONTINUED | OUTPATIENT
Start: 2023-01-22 | End: 2023-01-29 | Stop reason: HOSPADM

## 2023-01-22 RX ORDER — BUMETANIDE 0.25 MG/ML
1 INJECTION INTRAMUSCULAR; INTRAVENOUS ONCE
Status: DISCONTINUED | OUTPATIENT
Start: 2023-01-22 | End: 2023-01-22

## 2023-01-22 RX ORDER — ASPIRIN 81 MG/1
81 TABLET, CHEWABLE ORAL NIGHTLY
Status: DISCONTINUED | OUTPATIENT
Start: 2023-01-22 | End: 2023-01-24

## 2023-01-22 RX ORDER — SODIUM CHLORIDE 0.9 % (FLUSH) 0.9 %
10 SYRINGE (ML) INJECTION AS NEEDED
Status: DISCONTINUED | OUTPATIENT
Start: 2023-01-22 | End: 2023-01-29 | Stop reason: HOSPADM

## 2023-01-22 RX ORDER — METOPROLOL TARTRATE 50 MG/1
50 TABLET, FILM COATED ORAL EVERY 12 HOURS SCHEDULED
Status: DISCONTINUED | OUTPATIENT
Start: 2023-01-22 | End: 2023-01-29 | Stop reason: HOSPADM

## 2023-01-22 RX ORDER — HYDROCODONE BITARTRATE AND ACETAMINOPHEN 5; 325 MG/1; MG/1
1 TABLET ORAL EVERY 4 HOURS PRN
Status: DISCONTINUED | OUTPATIENT
Start: 2023-01-22 | End: 2023-01-29 | Stop reason: HOSPADM

## 2023-01-22 RX ORDER — DEXTROSE MONOHYDRATE 25 G/50ML
25 INJECTION, SOLUTION INTRAVENOUS
Status: DISCONTINUED | OUTPATIENT
Start: 2023-01-22 | End: 2023-01-29 | Stop reason: HOSPADM

## 2023-01-22 RX ORDER — ASPIRIN 81 MG/1
324 TABLET, CHEWABLE ORAL ONCE
Status: COMPLETED | OUTPATIENT
Start: 2023-01-22 | End: 2023-01-22

## 2023-01-22 RX ORDER — ATORVASTATIN CALCIUM 40 MG/1
40 TABLET, FILM COATED ORAL NIGHTLY
Status: DISCONTINUED | OUTPATIENT
Start: 2023-01-22 | End: 2023-01-29 | Stop reason: HOSPADM

## 2023-01-22 RX ORDER — ONDANSETRON 2 MG/ML
4 INJECTION INTRAMUSCULAR; INTRAVENOUS EVERY 6 HOURS PRN
Status: DISCONTINUED | OUTPATIENT
Start: 2023-01-22 | End: 2023-01-29 | Stop reason: HOSPADM

## 2023-01-22 RX ORDER — INSULIN LISPRO 100 [IU]/ML
0-9 INJECTION, SOLUTION INTRAVENOUS; SUBCUTANEOUS
Status: DISCONTINUED | OUTPATIENT
Start: 2023-01-22 | End: 2023-01-29 | Stop reason: HOSPADM

## 2023-01-22 RX ORDER — ISOSORBIDE MONONITRATE 60 MG/1
60 TABLET, EXTENDED RELEASE ORAL
Status: DISCONTINUED | OUTPATIENT
Start: 2023-01-22 | End: 2023-01-23

## 2023-01-22 RX ORDER — SODIUM CHLORIDE 9 MG/ML
40 INJECTION, SOLUTION INTRAVENOUS AS NEEDED
Status: DISCONTINUED | OUTPATIENT
Start: 2023-01-22 | End: 2023-01-29 | Stop reason: HOSPADM

## 2023-01-22 RX ORDER — METOPROLOL TARTRATE 50 MG/1
50 TABLET, FILM COATED ORAL EVERY 12 HOURS SCHEDULED
Status: DISCONTINUED | OUTPATIENT
Start: 2023-01-22 | End: 2023-01-22

## 2023-01-22 RX ORDER — DOXYCYCLINE 100 MG/1
100 CAPSULE ORAL EVERY 12 HOURS SCHEDULED
Status: COMPLETED | OUTPATIENT
Start: 2023-01-22 | End: 2023-01-27

## 2023-01-22 RX ORDER — SODIUM CHLORIDE 0.9 % (FLUSH) 0.9 %
10 SYRINGE (ML) INJECTION EVERY 12 HOURS SCHEDULED
Status: DISCONTINUED | OUTPATIENT
Start: 2023-01-22 | End: 2023-01-29 | Stop reason: HOSPADM

## 2023-01-22 RX ORDER — HYDRALAZINE HYDROCHLORIDE 50 MG/1
50 TABLET, FILM COATED ORAL EVERY 8 HOURS SCHEDULED
Status: DISCONTINUED | OUTPATIENT
Start: 2023-01-22 | End: 2023-01-23

## 2023-01-22 RX ORDER — ONDANSETRON 4 MG/1
4 TABLET, FILM COATED ORAL EVERY 6 HOURS PRN
Status: DISCONTINUED | OUTPATIENT
Start: 2023-01-22 | End: 2023-01-29 | Stop reason: HOSPADM

## 2023-01-22 RX ORDER — FUROSEMIDE 10 MG/ML
40 INJECTION INTRAMUSCULAR; INTRAVENOUS ONCE
Status: COMPLETED | OUTPATIENT
Start: 2023-01-22 | End: 2023-01-22

## 2023-01-22 RX ADMIN — HYDROCODONE BITARTRATE AND ACETAMINOPHEN 1 TABLET: 5; 325 TABLET ORAL at 22:29

## 2023-01-22 RX ADMIN — METOPROLOL TARTRATE 50 MG: 50 TABLET, FILM COATED ORAL at 17:14

## 2023-01-22 RX ADMIN — ATORVASTATIN CALCIUM 40 MG: 40 TABLET, FILM COATED ORAL at 21:05

## 2023-01-22 RX ADMIN — ISOSORBIDE MONONITRATE 60 MG: 60 TABLET, EXTENDED RELEASE ORAL at 17:14

## 2023-01-22 RX ADMIN — ASPIRIN 81 MG CHEWABLE TABLET 324 MG: 81 TABLET CHEWABLE at 13:38

## 2023-01-22 RX ADMIN — FUROSEMIDE 40 MG: 10 INJECTION, SOLUTION INTRAMUSCULAR; INTRAVENOUS at 13:40

## 2023-01-22 RX ADMIN — BUMETANIDE 0.5 MG/HR: 0.25 INJECTION INTRAMUSCULAR; INTRAVENOUS at 18:04

## 2023-01-22 RX ADMIN — BUMETANIDE 2 MG: 0.25 INJECTION INTRAMUSCULAR; INTRAVENOUS at 16:44

## 2023-01-22 RX ADMIN — HYDRALAZINE HYDROCHLORIDE 50 MG: 50 TABLET, FILM COATED ORAL at 15:24

## 2023-01-22 RX ADMIN — ASPIRIN 81 MG CHEWABLE TABLET 81 MG: 81 TABLET CHEWABLE at 21:05

## 2023-01-22 RX ADMIN — ACETAMINOPHEN 325MG 650 MG: 325 TABLET ORAL at 21:05

## 2023-01-22 RX ADMIN — Medication 10 ML: at 21:05

## 2023-01-22 RX ADMIN — SODIUM CHLORIDE 1 G: 900 INJECTION INTRAVENOUS at 16:52

## 2023-01-22 RX ADMIN — DOXYCYCLINE 100 MG: 100 CAPSULE ORAL at 21:05

## 2023-01-22 RX ADMIN — NITROGLYCERIN 0.5 INCH: 20 OINTMENT TOPICAL at 13:39

## 2023-01-22 RX ADMIN — ACETAMINOPHEN 325MG 650 MG: 325 TABLET ORAL at 16:45

## 2023-01-22 RX ADMIN — HYDRALAZINE HYDROCHLORIDE 50 MG: 50 TABLET, FILM COATED ORAL at 21:05

## 2023-01-23 ENCOUNTER — APPOINTMENT (OUTPATIENT)
Dept: CARDIOLOGY | Facility: HOSPITAL | Age: 48
DRG: 280 | End: 2023-01-23
Payer: COMMERCIAL

## 2023-01-23 LAB
ANION GAP SERPL CALCULATED.3IONS-SCNC: 15 MMOL/L (ref 5–15)
BASOPHILS # BLD MANUAL: 0 10*3/MM3 (ref 0–0.2)
BASOPHILS NFR BLD MANUAL: 0 % (ref 0–1.5)
BH CV ECHO MEAS - AI P1/2T: 538.4 MSEC
BH CV ECHO MEAS - AO MAX PG: 4.2 MMHG
BH CV ECHO MEAS - AO MEAN PG: 2 MMHG
BH CV ECHO MEAS - AO ROOT DIAM: 3.9 CM
BH CV ECHO MEAS - AO V2 MAX: 102 CM/SEC
BH CV ECHO MEAS - AO V2 VTI: 17.8 CM
BH CV ECHO MEAS - AVA(I,D): 2.9 CM2
BH CV ECHO MEAS - EDV(CUBED): 125 ML
BH CV ECHO MEAS - EDV(MOD-SP2): 102 ML
BH CV ECHO MEAS - EDV(MOD-SP4): 127 ML
BH CV ECHO MEAS - EF(MOD-BP): 50.3 %
BH CV ECHO MEAS - EF(MOD-SP2): 50.2 %
BH CV ECHO MEAS - EF(MOD-SP4): 50.1 %
BH CV ECHO MEAS - ESV(CUBED): 50.7 ML
BH CV ECHO MEAS - ESV(MOD-SP2): 50.8 ML
BH CV ECHO MEAS - ESV(MOD-SP4): 63.4 ML
BH CV ECHO MEAS - FS: 26 %
BH CV ECHO MEAS - IVS/LVPW: 1 CM
BH CV ECHO MEAS - IVSD: 1.5 CM
BH CV ECHO MEAS - LA DIMENSION: 5.3 CM
BH CV ECHO MEAS - LAT PEAK E' VEL: 7.5 CM/SEC
BH CV ECHO MEAS - LV DIASTOLIC VOL/BSA (35-75): 49.7 CM2
BH CV ECHO MEAS - LV MASS(C)D: 322.6 GRAMS
BH CV ECHO MEAS - LV MAX PG: 1.89 MMHG
BH CV ECHO MEAS - LV MEAN PG: 1 MMHG
BH CV ECHO MEAS - LV SYSTOLIC VOL/BSA (12-30): 24.8 CM2
BH CV ECHO MEAS - LV V1 MAX: 68.7 CM/SEC
BH CV ECHO MEAS - LV V1 VTI: 12.5 CM
BH CV ECHO MEAS - LVIDD: 5 CM
BH CV ECHO MEAS - LVIDS: 3.7 CM
BH CV ECHO MEAS - LVOT AREA: 4.2 CM2
BH CV ECHO MEAS - LVOT DIAM: 2.3 CM
BH CV ECHO MEAS - LVPWD: 1.5 CM
BH CV ECHO MEAS - MED PEAK E' VEL: 4.9 CM/SEC
BH CV ECHO MEAS - MV A MAX VEL: 75.5 CM/SEC
BH CV ECHO MEAS - MV DEC SLOPE: 399 CM/SEC2
BH CV ECHO MEAS - MV DEC TIME: 0.19 MSEC
BH CV ECHO MEAS - MV E MAX VEL: 61.8 CM/SEC
BH CV ECHO MEAS - MV E/A: 0.82
BH CV ECHO MEAS - MV MAX PG: 2.49 MMHG
BH CV ECHO MEAS - MV MEAN PG: 1 MMHG
BH CV ECHO MEAS - MV P1/2T: 47.1 MSEC
BH CV ECHO MEAS - MV V2 VTI: 16 CM
BH CV ECHO MEAS - MVA(P1/2T): 4.7 CM2
BH CV ECHO MEAS - MVA(VTI): 3.2 CM2
BH CV ECHO MEAS - PA ACC TIME: 0.08 SEC
BH CV ECHO MEAS - PA PR(ACCEL): 42.6 MMHG
BH CV ECHO MEAS - RAP SYSTOLE: 3 MMHG
BH CV ECHO MEAS - RVSP: 10 MMHG
BH CV ECHO MEAS - SI(MOD-SP2): 20 ML/M2
BH CV ECHO MEAS - SI(MOD-SP4): 24.9 ML/M2
BH CV ECHO MEAS - SV(LVOT): 51.9 ML
BH CV ECHO MEAS - SV(MOD-SP2): 51.2 ML
BH CV ECHO MEAS - SV(MOD-SP4): 63.6 ML
BH CV ECHO MEAS - TAPSE (>1.6): 2.5 CM
BH CV ECHO MEAS - TR MAX PG: 7.2 MMHG
BH CV ECHO MEAS - TR MAX VEL: 131.5 CM/SEC
BH CV ECHO MEASUREMENTS AVERAGE E/E' RATIO: 9.97
BH CV VAS BP RIGHT ARM: NORMAL MMHG
BH CV XLRA - RV BASE: 3.8 CM
BH CV XLRA - RV LENGTH: 8.8 CM
BH CV XLRA - RV MID: 3.4 CM
BH CV XLRA - TDI S': 9.9 CM/SEC
BUN SERPL-MCNC: 65 MG/DL (ref 6–20)
BUN/CREAT SERPL: 15.7 (ref 7–25)
CALCIUM SPEC-SCNC: 8.6 MG/DL (ref 8.6–10.5)
CHLORIDE SERPL-SCNC: 105 MMOL/L (ref 98–107)
CK SERPL-CCNC: 145 U/L (ref 20–200)
CO2 SERPL-SCNC: 20 MMOL/L (ref 22–29)
CREAT SERPL-MCNC: 4.13 MG/DL (ref 0.76–1.27)
CREAT UR-MCNC: 25.2 MG/DL
DEPRECATED RDW RBC AUTO: 45.4 FL (ref 37–54)
EGFRCR SERPLBLD CKD-EPI 2021: 17 ML/MIN/1.73
EOSINOPHIL # BLD MANUAL: 0.07 10*3/MM3 (ref 0–0.4)
EOSINOPHIL NFR BLD MANUAL: 1 % (ref 0.3–6.2)
ERYTHROCYTE [DISTWIDTH] IN BLOOD BY AUTOMATED COUNT: 13.8 % (ref 12.3–15.4)
GLUCOSE BLDC GLUCOMTR-MCNC: 109 MG/DL (ref 70–130)
GLUCOSE BLDC GLUCOMTR-MCNC: 148 MG/DL (ref 70–130)
GLUCOSE BLDC GLUCOMTR-MCNC: 167 MG/DL (ref 70–130)
GLUCOSE BLDC GLUCOMTR-MCNC: 172 MG/DL (ref 70–130)
GLUCOSE SERPL-MCNC: 156 MG/DL (ref 65–99)
HBA1C MFR BLD: 6.4 % (ref 4.8–5.6)
HCT VFR BLD AUTO: 32 % (ref 37.5–51)
HGB BLD-MCNC: 10.5 G/DL (ref 13–17.7)
LEFT ATRIUM VOLUME INDEX: 29.1 ML/M2
LV EF 2D ECHO EST: 55 %
LYMPHOCYTES # BLD MANUAL: 1.07 10*3/MM3 (ref 0.7–3.1)
LYMPHOCYTES NFR BLD MANUAL: 4 % (ref 5–12)
MAXIMAL PREDICTED HEART RATE: 173 BPM
MCH RBC QN AUTO: 29.4 PG (ref 26.6–33)
MCHC RBC AUTO-ENTMCNC: 32.8 G/DL (ref 31.5–35.7)
MCV RBC AUTO: 89.6 FL (ref 79–97)
MONOCYTES # BLD: 0.29 10*3/MM3 (ref 0.1–0.9)
NEUTROPHILS # BLD AUTO: 5.71 10*3/MM3 (ref 1.7–7)
NEUTROPHILS NFR BLD MANUAL: 80 % (ref 42.7–76)
NRBC SPEC MANUAL: 0 /100 WBC (ref 0–0.2)
PLAT MORPH BLD: NORMAL
PLATELET # BLD AUTO: 164 10*3/MM3 (ref 140–450)
PMV BLD AUTO: 12.5 FL (ref 6–12)
POTASSIUM SERPL-SCNC: 4.4 MMOL/L (ref 3.5–5.2)
PROT ?TM UR-MCNC: 255.4 MG/DL
RBC # BLD AUTO: 3.57 10*6/MM3 (ref 4.14–5.8)
RBC MORPH BLD: NORMAL
SODIUM SERPL-SCNC: 140 MMOL/L (ref 136–145)
SODIUM UR-SCNC: 107 MMOL/L
SODIUM UR-SCNC: 119 MMOL/L
STRESS TARGET HR: 147 BPM
UUN 24H UR-MCNC: 192 MG/DL
VARIANT LYMPHS NFR BLD MANUAL: 15 % (ref 19.6–45.3)
WBC MORPH BLD: NORMAL
WBC NRBC COR # BLD: 7.14 10*3/MM3 (ref 3.4–10.8)

## 2023-01-23 PROCEDURE — 83036 HEMOGLOBIN GLYCOSYLATED A1C: CPT | Performed by: FAMILY MEDICINE

## 2023-01-23 PROCEDURE — 63710000001 INSULIN LISPRO (HUMAN) PER 5 UNITS: Performed by: FAMILY MEDICINE

## 2023-01-23 PROCEDURE — 80048 BASIC METABOLIC PNL TOTAL CA: CPT | Performed by: FAMILY MEDICINE

## 2023-01-23 PROCEDURE — 85027 COMPLETE CBC AUTOMATED: CPT | Performed by: FAMILY MEDICINE

## 2023-01-23 PROCEDURE — 82962 GLUCOSE BLOOD TEST: CPT

## 2023-01-23 PROCEDURE — 99233 SBSQ HOSP IP/OBS HIGH 50: CPT | Performed by: INTERNAL MEDICINE

## 2023-01-23 PROCEDURE — 82570 ASSAY OF URINE CREATININE: CPT | Performed by: FAMILY MEDICINE

## 2023-01-23 PROCEDURE — 93306 TTE W/DOPPLER COMPLETE: CPT

## 2023-01-23 PROCEDURE — 84156 ASSAY OF PROTEIN URINE: CPT | Performed by: FAMILY MEDICINE

## 2023-01-23 PROCEDURE — 85007 BL SMEAR W/DIFF WBC COUNT: CPT | Performed by: FAMILY MEDICINE

## 2023-01-23 PROCEDURE — 82550 ASSAY OF CK (CPK): CPT | Performed by: STUDENT IN AN ORGANIZED HEALTH CARE EDUCATION/TRAINING PROGRAM

## 2023-01-23 PROCEDURE — 25010000002 CEFTRIAXONE PER 250 MG: Performed by: FAMILY MEDICINE

## 2023-01-23 PROCEDURE — 84166 PROTEIN E-PHORESIS/URINE/CSF: CPT | Performed by: FAMILY MEDICINE

## 2023-01-23 PROCEDURE — 84300 ASSAY OF URINE SODIUM: CPT | Performed by: FAMILY MEDICINE

## 2023-01-23 PROCEDURE — 99222 1ST HOSP IP/OBS MODERATE 55: CPT | Performed by: INTERNAL MEDICINE

## 2023-01-23 PROCEDURE — 93306 TTE W/DOPPLER COMPLETE: CPT | Performed by: INTERNAL MEDICINE

## 2023-01-23 RX ORDER — ISOSORBIDE MONONITRATE 60 MG/1
120 TABLET, EXTENDED RELEASE ORAL
Status: DISCONTINUED | OUTPATIENT
Start: 2023-01-24 | End: 2023-01-29 | Stop reason: HOSPADM

## 2023-01-23 RX ORDER — LABETALOL HYDROCHLORIDE 5 MG/ML
10 INJECTION, SOLUTION INTRAVENOUS ONCE
Status: COMPLETED | OUTPATIENT
Start: 2023-01-23 | End: 2023-01-23

## 2023-01-23 RX ORDER — NIFEDIPINE 60 MG/1
60 TABLET, EXTENDED RELEASE ORAL
Status: DISCONTINUED | OUTPATIENT
Start: 2023-01-23 | End: 2023-01-26

## 2023-01-23 RX ORDER — METOPROLOL TARTRATE 50 MG/1
50 TABLET, FILM COATED ORAL 2 TIMES DAILY
COMMUNITY
End: 2023-01-29 | Stop reason: HOSPADM

## 2023-01-23 RX ADMIN — HYDRALAZINE HYDROCHLORIDE 75 MG: 50 TABLET, FILM COATED ORAL at 22:21

## 2023-01-23 RX ADMIN — DOXYCYCLINE 100 MG: 100 CAPSULE ORAL at 20:40

## 2023-01-23 RX ADMIN — ISOSORBIDE MONONITRATE 60 MG: 60 TABLET, EXTENDED RELEASE ORAL at 08:40

## 2023-01-23 RX ADMIN — NICARDIPINE HYDROCHLORIDE 15 MG/HR: 25 INJECTION, SOLUTION INTRAVENOUS at 18:44

## 2023-01-23 RX ADMIN — NICARDIPINE HYDROCHLORIDE 12.5 MG/HR: 25 INJECTION, SOLUTION INTRAVENOUS at 22:44

## 2023-01-23 RX ADMIN — Medication 10 ML: at 20:40

## 2023-01-23 RX ADMIN — NICARDIPINE HYDROCHLORIDE 5 MG/HR: 25 INJECTION, SOLUTION INTRAVENOUS at 10:15

## 2023-01-23 RX ADMIN — SODIUM CHLORIDE 1 G: 900 INJECTION INTRAVENOUS at 17:19

## 2023-01-23 RX ADMIN — METOPROLOL TARTRATE 50 MG: 50 TABLET, FILM COATED ORAL at 08:40

## 2023-01-23 RX ADMIN — HYDRALAZINE HYDROCHLORIDE 50 MG: 50 TABLET, FILM COATED ORAL at 05:33

## 2023-01-23 RX ADMIN — ASPIRIN 81 MG CHEWABLE TABLET 81 MG: 81 TABLET CHEWABLE at 20:40

## 2023-01-23 RX ADMIN — INSULIN LISPRO 2 UNITS: 100 INJECTION, SOLUTION INTRAVENOUS; SUBCUTANEOUS at 12:04

## 2023-01-23 RX ADMIN — BUMETANIDE 0.5 MG/HR: 0.25 INJECTION INTRAMUSCULAR; INTRAVENOUS at 10:15

## 2023-01-23 RX ADMIN — NICARDIPINE HYDROCHLORIDE 15 MG/HR: 25 INJECTION, SOLUTION INTRAVENOUS at 20:40

## 2023-01-23 RX ADMIN — ATORVASTATIN CALCIUM 40 MG: 40 TABLET, FILM COATED ORAL at 20:40

## 2023-01-23 RX ADMIN — HYDRALAZINE HYDROCHLORIDE 50 MG: 50 TABLET, FILM COATED ORAL at 14:30

## 2023-01-23 RX ADMIN — DOXYCYCLINE 100 MG: 100 CAPSULE ORAL at 08:40

## 2023-01-23 RX ADMIN — NIFEDIPINE 60 MG: 60 TABLET, EXTENDED RELEASE ORAL at 16:24

## 2023-01-23 RX ADMIN — Medication 10 ML: at 08:41

## 2023-01-23 RX ADMIN — METOPROLOL TARTRATE 50 MG: 50 TABLET, FILM COATED ORAL at 20:40

## 2023-01-23 RX ADMIN — NICARDIPINE HYDROCHLORIDE 5 MG/HR: 25 INJECTION, SOLUTION INTRAVENOUS at 15:15

## 2023-01-23 RX ADMIN — LABETALOL HYDROCHLORIDE 10 MG: 5 INJECTION, SOLUTION INTRAVENOUS at 03:37

## 2023-01-24 ENCOUNTER — APPOINTMENT (OUTPATIENT)
Dept: ULTRASOUND IMAGING | Facility: HOSPITAL | Age: 48
DRG: 280 | End: 2023-01-24

## 2023-01-24 LAB
ANION GAP SERPL CALCULATED.3IONS-SCNC: 14 MMOL/L (ref 5–15)
BASOPHILS # BLD AUTO: 0.05 10*3/MM3 (ref 0–0.2)
BASOPHILS NFR BLD AUTO: 0.7 % (ref 0–1.5)
BUN SERPL-MCNC: 70 MG/DL (ref 6–20)
BUN/CREAT SERPL: 15.4 (ref 7–25)
C3 SERPL-MCNC: 130 MG/DL (ref 82–167)
C4 SERPL-MCNC: 29 MG/DL (ref 14–44)
CALCIUM SPEC-SCNC: 8.3 MG/DL (ref 8.6–10.5)
CHLORIDE SERPL-SCNC: 107 MMOL/L (ref 98–107)
CO2 SERPL-SCNC: 20 MMOL/L (ref 22–29)
CREAT SERPL-MCNC: 4.55 MG/DL (ref 0.76–1.27)
CREAT UR-MCNC: 34.4 MG/DL
DEPRECATED RDW RBC AUTO: 44.2 FL (ref 37–54)
EGFRCR SERPLBLD CKD-EPI 2021: 15.2 ML/MIN/1.73
EOSINOPHIL # BLD AUTO: 0.22 10*3/MM3 (ref 0–0.4)
EOSINOPHIL NFR BLD AUTO: 2.9 % (ref 0.3–6.2)
ERYTHROCYTE [DISTWIDTH] IN BLOOD BY AUTOMATED COUNT: 13.5 % (ref 12.3–15.4)
GLUCOSE BLDC GLUCOMTR-MCNC: 135 MG/DL (ref 70–130)
GLUCOSE BLDC GLUCOMTR-MCNC: 163 MG/DL (ref 70–130)
GLUCOSE BLDC GLUCOMTR-MCNC: 165 MG/DL (ref 70–130)
GLUCOSE BLDC GLUCOMTR-MCNC: 198 MG/DL (ref 70–130)
GLUCOSE SERPL-MCNC: 172 MG/DL (ref 65–99)
HCT VFR BLD AUTO: 33.3 % (ref 37.5–51)
HGB BLD-MCNC: 11 G/DL (ref 13–17.7)
IMM GRANULOCYTES # BLD AUTO: 0.02 10*3/MM3 (ref 0–0.05)
IMM GRANULOCYTES NFR BLD AUTO: 0.3 % (ref 0–0.5)
LYMPHOCYTES # BLD AUTO: 1.53 10*3/MM3 (ref 0.7–3.1)
LYMPHOCYTES NFR BLD AUTO: 20.3 % (ref 19.6–45.3)
MCH RBC QN AUTO: 29.6 PG (ref 26.6–33)
MCHC RBC AUTO-ENTMCNC: 33 G/DL (ref 31.5–35.7)
MCV RBC AUTO: 89.8 FL (ref 79–97)
MONOCYTES # BLD AUTO: 0.41 10*3/MM3 (ref 0.1–0.9)
MONOCYTES NFR BLD AUTO: 5.4 % (ref 5–12)
NEUTROPHILS NFR BLD AUTO: 5.31 10*3/MM3 (ref 1.7–7)
NEUTROPHILS NFR BLD AUTO: 70.4 % (ref 42.7–76)
NRBC BLD AUTO-RTO: 0 /100 WBC (ref 0–0.2)
PLATELET # BLD AUTO: 178 10*3/MM3 (ref 140–450)
PMV BLD AUTO: 12.5 FL (ref 6–12)
POTASSIUM SERPL-SCNC: 4.4 MMOL/L (ref 3.5–5.2)
RBC # BLD AUTO: 3.71 10*6/MM3 (ref 4.14–5.8)
SODIUM SERPL-SCNC: 141 MMOL/L (ref 136–145)
WBC NRBC COR # BLD: 7.54 10*3/MM3 (ref 3.4–10.8)

## 2023-01-24 PROCEDURE — 86160 COMPLEMENT ANTIGEN: CPT | Performed by: INTERNAL MEDICINE

## 2023-01-24 PROCEDURE — 80048 BASIC METABOLIC PNL TOTAL CA: CPT | Performed by: INTERNAL MEDICINE

## 2023-01-24 PROCEDURE — 85025 COMPLETE CBC W/AUTO DIFF WBC: CPT | Performed by: INTERNAL MEDICINE

## 2023-01-24 PROCEDURE — 99232 SBSQ HOSP IP/OBS MODERATE 35: CPT | Performed by: INTERNAL MEDICINE

## 2023-01-24 PROCEDURE — 83516 IMMUNOASSAY NONANTIBODY: CPT | Performed by: INTERNAL MEDICINE

## 2023-01-24 PROCEDURE — 82962 GLUCOSE BLOOD TEST: CPT

## 2023-01-24 PROCEDURE — 86225 DNA ANTIBODY NATIVE: CPT | Performed by: INTERNAL MEDICINE

## 2023-01-24 PROCEDURE — 99232 SBSQ HOSP IP/OBS MODERATE 35: CPT | Performed by: NURSE PRACTITIONER

## 2023-01-24 PROCEDURE — 63710000001 INSULIN LISPRO (HUMAN) PER 5 UNITS: Performed by: FAMILY MEDICINE

## 2023-01-24 PROCEDURE — 25010000002 CEFTRIAXONE PER 250 MG: Performed by: FAMILY MEDICINE

## 2023-01-24 PROCEDURE — 76775 US EXAM ABDO BACK WALL LIM: CPT

## 2023-01-24 PROCEDURE — 86038 ANTINUCLEAR ANTIBODIES: CPT | Performed by: INTERNAL MEDICINE

## 2023-01-24 RX ADMIN — DOXYCYCLINE 100 MG: 100 CAPSULE ORAL at 21:18

## 2023-01-24 RX ADMIN — METOPROLOL TARTRATE 50 MG: 50 TABLET, FILM COATED ORAL at 07:59

## 2023-01-24 RX ADMIN — BUMETANIDE 0.5 MG/HR: 0.25 INJECTION INTRAMUSCULAR; INTRAVENOUS at 05:17

## 2023-01-24 RX ADMIN — DOXYCYCLINE 100 MG: 100 CAPSULE ORAL at 08:00

## 2023-01-24 RX ADMIN — ISOSORBIDE MONONITRATE 120 MG: 60 TABLET, EXTENDED RELEASE ORAL at 07:58

## 2023-01-24 RX ADMIN — Medication 10 ML: at 08:01

## 2023-01-24 RX ADMIN — NICARDIPINE HYDROCHLORIDE 7.5 MG/HR: 25 INJECTION, SOLUTION INTRAVENOUS at 03:44

## 2023-01-24 RX ADMIN — HYDRALAZINE HYDROCHLORIDE 75 MG: 50 TABLET, FILM COATED ORAL at 05:16

## 2023-01-24 RX ADMIN — INSULIN LISPRO 2 UNITS: 100 INJECTION, SOLUTION INTRAVENOUS; SUBCUTANEOUS at 07:58

## 2023-01-24 RX ADMIN — SODIUM CHLORIDE 1 G: 900 INJECTION INTRAVENOUS at 17:33

## 2023-01-24 RX ADMIN — INSULIN LISPRO 2 UNITS: 100 INJECTION, SOLUTION INTRAVENOUS; SUBCUTANEOUS at 12:11

## 2023-01-24 RX ADMIN — ATORVASTATIN CALCIUM 40 MG: 40 TABLET, FILM COATED ORAL at 21:18

## 2023-01-24 RX ADMIN — Medication 10 ML: at 21:21

## 2023-01-24 RX ADMIN — NIFEDIPINE 60 MG: 60 TABLET, EXTENDED RELEASE ORAL at 07:59

## 2023-01-24 RX ADMIN — HYDRALAZINE HYDROCHLORIDE 75 MG: 50 TABLET, FILM COATED ORAL at 13:53

## 2023-01-24 RX ADMIN — HYDRALAZINE HYDROCHLORIDE 75 MG: 50 TABLET, FILM COATED ORAL at 21:18

## 2023-01-24 RX ADMIN — METOPROLOL TARTRATE 50 MG: 50 TABLET, FILM COATED ORAL at 21:18

## 2023-01-24 RX ADMIN — NICARDIPINE HYDROCHLORIDE 10 MG/HR: 25 INJECTION, SOLUTION INTRAVENOUS at 00:54

## 2023-01-25 ENCOUNTER — APPOINTMENT (OUTPATIENT)
Dept: CT IMAGING | Facility: HOSPITAL | Age: 48
DRG: 280 | End: 2023-01-25

## 2023-01-25 LAB
ALBUMIN SERPL-MCNC: 3.4 G/DL (ref 3.5–5.2)
ANA SER QL: NEGATIVE
ANION GAP SERPL CALCULATED.3IONS-SCNC: 14 MMOL/L (ref 5–15)
BASOPHILS # BLD AUTO: 0.06 10*3/MM3 (ref 0–0.2)
BASOPHILS NFR BLD AUTO: 0.7 % (ref 0–1.5)
BUN SERPL-MCNC: 75 MG/DL (ref 6–20)
BUN/CREAT SERPL: 12.8 (ref 7–25)
CALCIUM SPEC-SCNC: 8.4 MG/DL (ref 8.6–10.5)
CHLORIDE SERPL-SCNC: 104 MMOL/L (ref 98–107)
CO2 SERPL-SCNC: 20 MMOL/L (ref 22–29)
CREAT SERPL-MCNC: 5.88 MG/DL (ref 0.76–1.27)
DEPRECATED RDW RBC AUTO: 45.4 FL (ref 37–54)
DSDNA AB SER-ACNC: 2 IU/ML (ref 0–9)
EGFRCR SERPLBLD CKD-EPI 2021: 11.1 ML/MIN/1.73
EOSINOPHIL # BLD AUTO: 0.24 10*3/MM3 (ref 0–0.4)
EOSINOPHIL NFR BLD AUTO: 2.6 % (ref 0.3–6.2)
ERYTHROCYTE [DISTWIDTH] IN BLOOD BY AUTOMATED COUNT: 13.9 % (ref 12.3–15.4)
GLUCOSE BLDC GLUCOMTR-MCNC: 129 MG/DL (ref 70–130)
GLUCOSE BLDC GLUCOMTR-MCNC: 142 MG/DL (ref 70–130)
GLUCOSE BLDC GLUCOMTR-MCNC: 153 MG/DL (ref 70–130)
GLUCOSE BLDC GLUCOMTR-MCNC: 185 MG/DL (ref 70–130)
GLUCOSE SERPL-MCNC: 157 MG/DL (ref 65–99)
HCT VFR BLD AUTO: 31.4 % (ref 37.5–51)
HGB BLD-MCNC: 10.4 G/DL (ref 13–17.7)
IMM GRANULOCYTES # BLD AUTO: 0.04 10*3/MM3 (ref 0–0.05)
IMM GRANULOCYTES NFR BLD AUTO: 0.4 % (ref 0–0.5)
INR PPP: 1.01 (ref 0.84–1.13)
LYMPHOCYTES # BLD AUTO: 2.16 10*3/MM3 (ref 0.7–3.1)
LYMPHOCYTES NFR BLD AUTO: 23.8 % (ref 19.6–45.3)
MCH RBC QN AUTO: 29.9 PG (ref 26.6–33)
MCHC RBC AUTO-ENTMCNC: 33.1 G/DL (ref 31.5–35.7)
MCV RBC AUTO: 90.2 FL (ref 79–97)
MONOCYTES # BLD AUTO: 0.59 10*3/MM3 (ref 0.1–0.9)
MONOCYTES NFR BLD AUTO: 6.5 % (ref 5–12)
NEUTROPHILS NFR BLD AUTO: 5.99 10*3/MM3 (ref 1.7–7)
NEUTROPHILS NFR BLD AUTO: 66 % (ref 42.7–76)
NRBC BLD AUTO-RTO: 0 /100 WBC (ref 0–0.2)
PHOSPHATE SERPL-MCNC: 6 MG/DL (ref 2.5–4.5)
PLATELET # BLD AUTO: 180 10*3/MM3 (ref 140–450)
PMV BLD AUTO: 12.5 FL (ref 6–12)
POTASSIUM SERPL-SCNC: 4.1 MMOL/L (ref 3.5–5.2)
PROTHROMBIN TIME: 13.2 SECONDS (ref 11.4–14.4)
RBC # BLD AUTO: 3.48 10*6/MM3 (ref 4.14–5.8)
SODIUM SERPL-SCNC: 138 MMOL/L (ref 136–145)
WBC NRBC COR # BLD: 9.08 10*3/MM3 (ref 3.4–10.8)

## 2023-01-25 PROCEDURE — 99232 SBSQ HOSP IP/OBS MODERATE 35: CPT | Performed by: INTERNAL MEDICINE

## 2023-01-25 PROCEDURE — 88348 ELECTRON MICROSCOPY DX: CPT | Performed by: INTERNAL MEDICINE

## 2023-01-25 PROCEDURE — 82962 GLUCOSE BLOOD TEST: CPT

## 2023-01-25 PROCEDURE — 88305 TISSUE EXAM BY PATHOLOGIST: CPT | Performed by: INTERNAL MEDICINE

## 2023-01-25 PROCEDURE — 88313 SPECIAL STAINS GROUP 2: CPT | Performed by: INTERNAL MEDICINE

## 2023-01-25 PROCEDURE — 0 LIDOCAINE 1 % SOLUTION: Performed by: RADIOLOGY

## 2023-01-25 PROCEDURE — 85025 COMPLETE CBC W/AUTO DIFF WBC: CPT | Performed by: INTERNAL MEDICINE

## 2023-01-25 PROCEDURE — 77012 CT SCAN FOR NEEDLE BIOPSY: CPT

## 2023-01-25 PROCEDURE — 99152 MOD SED SAME PHYS/QHP 5/>YRS: CPT

## 2023-01-25 PROCEDURE — 0TB13ZX EXCISION OF LEFT KIDNEY, PERCUTANEOUS APPROACH, DIAGNOSTIC: ICD-10-PCS | Performed by: RADIOLOGY

## 2023-01-25 PROCEDURE — 80069 RENAL FUNCTION PANEL: CPT | Performed by: INTERNAL MEDICINE

## 2023-01-25 PROCEDURE — 88346 IMFLUOR 1ST 1ANTB STAIN PX: CPT | Performed by: INTERNAL MEDICINE

## 2023-01-25 PROCEDURE — 85610 PROTHROMBIN TIME: CPT | Performed by: INTERNAL MEDICINE

## 2023-01-25 PROCEDURE — 25010000002 CEFTRIAXONE PER 250 MG: Performed by: FAMILY MEDICINE

## 2023-01-25 PROCEDURE — 25010000002 FENTANYL CITRATE (PF) 50 MCG/ML SOLUTION: Performed by: RADIOLOGY

## 2023-01-25 PROCEDURE — 25010000002 MIDAZOLAM PER 1 MG: Performed by: RADIOLOGY

## 2023-01-25 PROCEDURE — 88350 IMFLUOR EA ADDL 1ANTB STN PX: CPT | Performed by: INTERNAL MEDICINE

## 2023-01-25 RX ORDER — MIDAZOLAM HYDROCHLORIDE 1 MG/ML
INJECTION INTRAMUSCULAR; INTRAVENOUS
Status: DISPENSED
Start: 2023-01-25 | End: 2023-01-26

## 2023-01-25 RX ORDER — MIDAZOLAM HYDROCHLORIDE 1 MG/ML
INJECTION INTRAMUSCULAR; INTRAVENOUS AS NEEDED
Status: COMPLETED | OUTPATIENT
Start: 2023-01-25 | End: 2023-01-25

## 2023-01-25 RX ORDER — BUMETANIDE 0.25 MG/ML
2 INJECTION INTRAMUSCULAR; INTRAVENOUS DAILY
Status: DISCONTINUED | OUTPATIENT
Start: 2023-01-25 | End: 2023-01-29 | Stop reason: HOSPADM

## 2023-01-25 RX ORDER — FENTANYL CITRATE 50 UG/ML
INJECTION, SOLUTION INTRAMUSCULAR; INTRAVENOUS
Status: DISPENSED
Start: 2023-01-25 | End: 2023-01-26

## 2023-01-25 RX ORDER — LIDOCAINE HYDROCHLORIDE 10 MG/ML
20 INJECTION, SOLUTION INFILTRATION; PERINEURAL ONCE
Status: COMPLETED | OUTPATIENT
Start: 2023-01-25 | End: 2023-01-25

## 2023-01-25 RX ORDER — FENTANYL CITRATE 50 UG/ML
INJECTION, SOLUTION INTRAMUSCULAR; INTRAVENOUS AS NEEDED
Status: COMPLETED | OUTPATIENT
Start: 2023-01-25 | End: 2023-01-25

## 2023-01-25 RX ADMIN — METOPROLOL TARTRATE 50 MG: 50 TABLET, FILM COATED ORAL at 07:40

## 2023-01-25 RX ADMIN — HYDRALAZINE HYDROCHLORIDE 75 MG: 50 TABLET, FILM COATED ORAL at 21:27

## 2023-01-25 RX ADMIN — ATORVASTATIN CALCIUM 40 MG: 40 TABLET, FILM COATED ORAL at 21:27

## 2023-01-25 RX ADMIN — HYDRALAZINE HYDROCHLORIDE 75 MG: 50 TABLET, FILM COATED ORAL at 15:39

## 2023-01-25 RX ADMIN — MIDAZOLAM HYDROCHLORIDE 1 MG: 1 INJECTION, SOLUTION INTRAMUSCULAR; INTRAVENOUS at 13:20

## 2023-01-25 RX ADMIN — HYDRALAZINE HYDROCHLORIDE 75 MG: 50 TABLET, FILM COATED ORAL at 05:06

## 2023-01-25 RX ADMIN — BUMETANIDE 0.5 MG/HR: 0.25 INJECTION INTRAMUSCULAR; INTRAVENOUS at 00:33

## 2023-01-25 RX ADMIN — METOPROLOL TARTRATE 50 MG: 50 TABLET, FILM COATED ORAL at 21:27

## 2023-01-25 RX ADMIN — DOXYCYCLINE 100 MG: 100 CAPSULE ORAL at 07:40

## 2023-01-25 RX ADMIN — DOXYCYCLINE 100 MG: 100 CAPSULE ORAL at 21:27

## 2023-01-25 RX ADMIN — SODIUM CHLORIDE 1 G: 900 INJECTION INTRAVENOUS at 16:33

## 2023-01-25 RX ADMIN — Medication 10 ML: at 07:41

## 2023-01-25 RX ADMIN — MIDAZOLAM HYDROCHLORIDE 1 MG: 1 INJECTION, SOLUTION INTRAMUSCULAR; INTRAVENOUS at 13:16

## 2023-01-25 RX ADMIN — FENTANYL CITRATE 50 MCG: 50 INJECTION, SOLUTION INTRAMUSCULAR; INTRAVENOUS at 13:20

## 2023-01-25 RX ADMIN — ISOSORBIDE MONONITRATE 120 MG: 60 TABLET, EXTENDED RELEASE ORAL at 07:39

## 2023-01-25 RX ADMIN — BUMETANIDE 2 MG: 0.25 INJECTION INTRAMUSCULAR; INTRAVENOUS at 15:39

## 2023-01-25 RX ADMIN — Medication 10 ML: at 21:28

## 2023-01-25 RX ADMIN — LIDOCAINE HYDROCHLORIDE 10 ML: 10 INJECTION, SOLUTION INFILTRATION; PERINEURAL at 13:15

## 2023-01-25 RX ADMIN — NIFEDIPINE 60 MG: 60 TABLET, EXTENDED RELEASE ORAL at 07:40

## 2023-01-25 RX ADMIN — FENTANYL CITRATE 50 MCG: 50 INJECTION, SOLUTION INTRAMUSCULAR; INTRAVENOUS at 13:16

## 2023-01-26 LAB
ALBUMIN 24H MFR UR ELPH: 63.1 %
ALPHA1 GLOB 24H MFR UR ELPH: 5.4 %
ALPHA2 GLOB 24H MFR UR ELPH: 7.2 %
ANION GAP SERPL CALCULATED.3IONS-SCNC: 16 MMOL/L (ref 5–15)
B-GLOBULIN 24H MFR UR ELPH: 12.5 %
BUN SERPL-MCNC: 78 MG/DL (ref 6–20)
BUN/CREAT SERPL: 14 (ref 7–25)
CALCIUM SPEC-SCNC: 8.4 MG/DL (ref 8.6–10.5)
CHLORIDE SERPL-SCNC: 106 MMOL/L (ref 98–107)
CO2 SERPL-SCNC: 22 MMOL/L (ref 22–29)
CREAT SERPL-MCNC: 5.57 MG/DL (ref 0.76–1.27)
EGFRCR SERPLBLD CKD-EPI 2021: 11.9 ML/MIN/1.73
GAMMA GLOB 24H MFR UR ELPH: 11.8 %
GLUCOSE BLDC GLUCOMTR-MCNC: 123 MG/DL (ref 70–130)
GLUCOSE BLDC GLUCOMTR-MCNC: 144 MG/DL (ref 70–130)
GLUCOSE BLDC GLUCOMTR-MCNC: 155 MG/DL (ref 70–130)
GLUCOSE BLDC GLUCOMTR-MCNC: 158 MG/DL (ref 70–130)
GLUCOSE SERPL-MCNC: 155 MG/DL (ref 65–99)
LABORATORY COMMENT REPORT: ABNORMAL
M PROTEIN 24H MFR UR ELPH: ABNORMAL %
MYELOPEROXIDASE AB SER IA-ACNC: NORMAL UNITS
POTASSIUM SERPL-SCNC: 4.1 MMOL/L (ref 3.5–5.2)
PROT 24H UR-MRATE: 4550 MG/24 HR (ref 30–150)
PROT UR-MCNC: 227.5 MG/DL
PROTEINASE3 AB SER IA-ACNC: NORMAL UNITS
SODIUM SERPL-SCNC: 144 MMOL/L (ref 136–145)

## 2023-01-26 PROCEDURE — 63710000001 INSULIN LISPRO (HUMAN) PER 5 UNITS: Performed by: FAMILY MEDICINE

## 2023-01-26 PROCEDURE — 99232 SBSQ HOSP IP/OBS MODERATE 35: CPT | Performed by: INTERNAL MEDICINE

## 2023-01-26 PROCEDURE — 80048 BASIC METABOLIC PNL TOTAL CA: CPT | Performed by: INTERNAL MEDICINE

## 2023-01-26 PROCEDURE — 25010000002 CEFTRIAXONE PER 250 MG: Performed by: FAMILY MEDICINE

## 2023-01-26 PROCEDURE — 99232 SBSQ HOSP IP/OBS MODERATE 35: CPT | Performed by: NURSE PRACTITIONER

## 2023-01-26 PROCEDURE — 82962 GLUCOSE BLOOD TEST: CPT

## 2023-01-26 RX ADMIN — METOPROLOL TARTRATE 50 MG: 50 TABLET, FILM COATED ORAL at 09:01

## 2023-01-26 RX ADMIN — BUMETANIDE 2 MG: 0.25 INJECTION INTRAMUSCULAR; INTRAVENOUS at 09:00

## 2023-01-26 RX ADMIN — METOPROLOL TARTRATE 50 MG: 50 TABLET, FILM COATED ORAL at 21:24

## 2023-01-26 RX ADMIN — ATORVASTATIN CALCIUM 40 MG: 40 TABLET, FILM COATED ORAL at 21:24

## 2023-01-26 RX ADMIN — HYDRALAZINE HYDROCHLORIDE 75 MG: 50 TABLET, FILM COATED ORAL at 13:18

## 2023-01-26 RX ADMIN — SODIUM CHLORIDE 1 G: 900 INJECTION INTRAVENOUS at 17:53

## 2023-01-26 RX ADMIN — Medication 10 ML: at 21:24

## 2023-01-26 RX ADMIN — HYDRALAZINE HYDROCHLORIDE 75 MG: 50 TABLET, FILM COATED ORAL at 21:24

## 2023-01-26 RX ADMIN — INSULIN LISPRO 2 UNITS: 100 INJECTION, SOLUTION INTRAVENOUS; SUBCUTANEOUS at 09:01

## 2023-01-26 RX ADMIN — NIFEDIPINE 60 MG: 60 TABLET, EXTENDED RELEASE ORAL at 09:01

## 2023-01-26 RX ADMIN — DOXYCYCLINE 100 MG: 100 CAPSULE ORAL at 09:01

## 2023-01-26 RX ADMIN — DOXYCYCLINE 100 MG: 100 CAPSULE ORAL at 21:24

## 2023-01-26 RX ADMIN — ISOSORBIDE MONONITRATE 120 MG: 60 TABLET, EXTENDED RELEASE ORAL at 09:01

## 2023-01-26 RX ADMIN — HYDRALAZINE HYDROCHLORIDE 75 MG: 50 TABLET, FILM COATED ORAL at 06:10

## 2023-01-26 RX ADMIN — Medication 10 ML: at 09:02

## 2023-01-27 LAB
BACTERIA SPEC AEROBE CULT: NORMAL
BACTERIA SPEC AEROBE CULT: NORMAL
GLUCOSE BLDC GLUCOMTR-MCNC: 138 MG/DL (ref 70–130)
GLUCOSE BLDC GLUCOMTR-MCNC: 152 MG/DL (ref 70–130)
GLUCOSE BLDC GLUCOMTR-MCNC: 170 MG/DL (ref 70–130)
PLA2R IGG SER IA-ACNC: <1.8 RU/ML (ref 0–19.9)

## 2023-01-27 PROCEDURE — 99232 SBSQ HOSP IP/OBS MODERATE 35: CPT | Performed by: INTERNAL MEDICINE

## 2023-01-27 PROCEDURE — 63710000001 INSULIN LISPRO (HUMAN) PER 5 UNITS: Performed by: FAMILY MEDICINE

## 2023-01-27 PROCEDURE — 82962 GLUCOSE BLOOD TEST: CPT

## 2023-01-27 RX ADMIN — METOPROLOL TARTRATE 50 MG: 50 TABLET, FILM COATED ORAL at 09:26

## 2023-01-27 RX ADMIN — DOXYCYCLINE 100 MG: 100 CAPSULE ORAL at 09:26

## 2023-01-27 RX ADMIN — HYDRALAZINE HYDROCHLORIDE 75 MG: 50 TABLET, FILM COATED ORAL at 06:29

## 2023-01-27 RX ADMIN — INSULIN LISPRO 2 UNITS: 100 INJECTION, SOLUTION INTRAVENOUS; SUBCUTANEOUS at 12:53

## 2023-01-27 RX ADMIN — HYDRALAZINE HYDROCHLORIDE 75 MG: 50 TABLET, FILM COATED ORAL at 19:51

## 2023-01-27 RX ADMIN — Medication 10 ML: at 09:27

## 2023-01-27 RX ADMIN — METOPROLOL TARTRATE 50 MG: 50 TABLET, FILM COATED ORAL at 19:51

## 2023-01-27 RX ADMIN — INSULIN LISPRO 2 UNITS: 100 INJECTION, SOLUTION INTRAVENOUS; SUBCUTANEOUS at 17:14

## 2023-01-27 RX ADMIN — ISOSORBIDE MONONITRATE 120 MG: 60 TABLET, EXTENDED RELEASE ORAL at 09:26

## 2023-01-27 RX ADMIN — BUMETANIDE 2 MG: 0.25 INJECTION INTRAMUSCULAR; INTRAVENOUS at 09:26

## 2023-01-27 RX ADMIN — ACETAMINOPHEN 325MG 650 MG: 325 TABLET ORAL at 17:14

## 2023-01-27 RX ADMIN — ATORVASTATIN CALCIUM 40 MG: 40 TABLET, FILM COATED ORAL at 19:52

## 2023-01-27 RX ADMIN — Medication 10 ML: at 19:52

## 2023-01-27 RX ADMIN — NIFEDIPINE 90 MG: 30 TABLET, FILM COATED, EXTENDED RELEASE ORAL at 09:26

## 2023-01-27 RX ADMIN — HYDRALAZINE HYDROCHLORIDE 75 MG: 50 TABLET, FILM COATED ORAL at 15:43

## 2023-01-28 LAB
ANION GAP SERPL CALCULATED.3IONS-SCNC: 14 MMOL/L (ref 5–15)
BUN SERPL-MCNC: 80 MG/DL (ref 6–20)
BUN/CREAT SERPL: 15.1 (ref 7–25)
CALCIUM SPEC-SCNC: 8.5 MG/DL (ref 8.6–10.5)
CHLORIDE SERPL-SCNC: 105 MMOL/L (ref 98–107)
CO2 SERPL-SCNC: 21 MMOL/L (ref 22–29)
CREAT SERPL-MCNC: 5.29 MG/DL (ref 0.76–1.27)
EGFRCR SERPLBLD CKD-EPI 2021: 12.7 ML/MIN/1.73
GLUCOSE BLDC GLUCOMTR-MCNC: 129 MG/DL (ref 70–130)
GLUCOSE BLDC GLUCOMTR-MCNC: 133 MG/DL (ref 70–130)
GLUCOSE BLDC GLUCOMTR-MCNC: 158 MG/DL (ref 70–130)
GLUCOSE SERPL-MCNC: 142 MG/DL (ref 65–99)
POTASSIUM SERPL-SCNC: 4.4 MMOL/L (ref 3.5–5.2)
SODIUM SERPL-SCNC: 140 MMOL/L (ref 136–145)

## 2023-01-28 PROCEDURE — 63710000001 INSULIN LISPRO (HUMAN) PER 5 UNITS: Performed by: FAMILY MEDICINE

## 2023-01-28 PROCEDURE — 99232 SBSQ HOSP IP/OBS MODERATE 35: CPT | Performed by: STUDENT IN AN ORGANIZED HEALTH CARE EDUCATION/TRAINING PROGRAM

## 2023-01-28 PROCEDURE — 82962 GLUCOSE BLOOD TEST: CPT

## 2023-01-28 PROCEDURE — 80048 BASIC METABOLIC PNL TOTAL CA: CPT | Performed by: INTERNAL MEDICINE

## 2023-01-28 RX ADMIN — METOPROLOL TARTRATE 50 MG: 50 TABLET, FILM COATED ORAL at 08:15

## 2023-01-28 RX ADMIN — BUMETANIDE 2 MG: 0.25 INJECTION INTRAMUSCULAR; INTRAVENOUS at 08:14

## 2023-01-28 RX ADMIN — Medication 10 ML: at 08:15

## 2023-01-28 RX ADMIN — HYDRALAZINE HYDROCHLORIDE 75 MG: 50 TABLET, FILM COATED ORAL at 13:09

## 2023-01-28 RX ADMIN — HYDRALAZINE HYDROCHLORIDE 75 MG: 50 TABLET, FILM COATED ORAL at 05:36

## 2023-01-28 RX ADMIN — ATORVASTATIN CALCIUM 40 MG: 40 TABLET, FILM COATED ORAL at 20:24

## 2023-01-28 RX ADMIN — HYDRALAZINE HYDROCHLORIDE 75 MG: 50 TABLET, FILM COATED ORAL at 20:24

## 2023-01-28 RX ADMIN — NIFEDIPINE 90 MG: 30 TABLET, FILM COATED, EXTENDED RELEASE ORAL at 08:15

## 2023-01-28 RX ADMIN — METOPROLOL TARTRATE 50 MG: 50 TABLET, FILM COATED ORAL at 20:26

## 2023-01-28 RX ADMIN — ISOSORBIDE MONONITRATE 120 MG: 60 TABLET, EXTENDED RELEASE ORAL at 08:15

## 2023-01-28 RX ADMIN — INSULIN LISPRO 2 UNITS: 100 INJECTION, SOLUTION INTRAVENOUS; SUBCUTANEOUS at 11:59

## 2023-01-28 RX ADMIN — Medication 10 ML: at 20:27

## 2023-01-29 ENCOUNTER — READMISSION MANAGEMENT (OUTPATIENT)
Dept: CALL CENTER | Facility: HOSPITAL | Age: 48
End: 2023-01-29

## 2023-01-29 VITALS
HEART RATE: 69 BPM | HEIGHT: 73 IN | OXYGEN SATURATION: 94 % | BODY MASS INDEX: 40.61 KG/M2 | DIASTOLIC BLOOD PRESSURE: 91 MMHG | TEMPERATURE: 97.5 F | WEIGHT: 306.4 LBS | RESPIRATION RATE: 17 BRPM | SYSTOLIC BLOOD PRESSURE: 149 MMHG

## 2023-01-29 PROBLEM — I50.23 ACUTE ON CHRONIC SYSTOLIC CONGESTIVE HEART FAILURE: Status: RESOLVED | Noted: 2023-01-22 | Resolved: 2023-01-29

## 2023-01-29 LAB
ANION GAP SERPL CALCULATED.3IONS-SCNC: 17 MMOL/L (ref 5–15)
BUN SERPL-MCNC: 77 MG/DL (ref 6–20)
BUN/CREAT SERPL: 15.8 (ref 7–25)
CALCIUM SPEC-SCNC: 8.7 MG/DL (ref 8.6–10.5)
CHLORIDE SERPL-SCNC: 104 MMOL/L (ref 98–107)
CO2 SERPL-SCNC: 18 MMOL/L (ref 22–29)
CREAT SERPL-MCNC: 4.86 MG/DL (ref 0.76–1.27)
EGFRCR SERPLBLD CKD-EPI 2021: 14 ML/MIN/1.73
GLUCOSE BLDC GLUCOMTR-MCNC: 154 MG/DL (ref 70–130)
GLUCOSE BLDC GLUCOMTR-MCNC: 163 MG/DL (ref 70–130)
GLUCOSE SERPL-MCNC: 143 MG/DL (ref 65–99)
POTASSIUM SERPL-SCNC: 4.1 MMOL/L (ref 3.5–5.2)
SODIUM SERPL-SCNC: 139 MMOL/L (ref 136–145)

## 2023-01-29 PROCEDURE — 82962 GLUCOSE BLOOD TEST: CPT

## 2023-01-29 PROCEDURE — 80048 BASIC METABOLIC PNL TOTAL CA: CPT | Performed by: STUDENT IN AN ORGANIZED HEALTH CARE EDUCATION/TRAINING PROGRAM

## 2023-01-29 PROCEDURE — 63710000001 INSULIN LISPRO (HUMAN) PER 5 UNITS: Performed by: FAMILY MEDICINE

## 2023-01-29 PROCEDURE — 99239 HOSP IP/OBS DSCHRG MGMT >30: CPT | Performed by: NURSE PRACTITIONER

## 2023-01-29 RX ORDER — HYDRALAZINE HYDROCHLORIDE 25 MG/1
75 TABLET, FILM COATED ORAL EVERY 8 HOURS SCHEDULED
Qty: 90 TABLET | Refills: 0 | Status: SHIPPED | OUTPATIENT
Start: 2023-01-29

## 2023-01-29 RX ORDER — GLIPIZIDE 5 MG/1
2.5 TABLET ORAL
Qty: 30 TABLET | Refills: 0 | Status: SHIPPED | OUTPATIENT
Start: 2023-01-29

## 2023-01-29 RX ORDER — ISOSORBIDE MONONITRATE 120 MG/1
120 TABLET, EXTENDED RELEASE ORAL
Qty: 30 TABLET | Refills: 0 | Status: SHIPPED | OUTPATIENT
Start: 2023-01-30

## 2023-01-29 RX ORDER — METOPROLOL TARTRATE 50 MG/1
50 TABLET, FILM COATED ORAL EVERY 12 HOURS SCHEDULED
Qty: 60 TABLET | Refills: 0 | Status: SHIPPED | OUTPATIENT
Start: 2023-01-29 | End: 2023-02-28

## 2023-01-29 RX ORDER — ACETAMINOPHEN 325 MG/1
650 TABLET ORAL EVERY 4 HOURS PRN
Start: 2023-01-29

## 2023-01-29 RX ORDER — NIFEDIPINE 90 MG/1
90 TABLET, EXTENDED RELEASE ORAL
Qty: 30 TABLET | Refills: 0 | Status: SHIPPED | OUTPATIENT
Start: 2023-01-30

## 2023-01-29 RX ORDER — BUMETANIDE 2 MG/1
2 TABLET ORAL DAILY
Qty: 30 TABLET | Refills: 0 | Status: SHIPPED | OUTPATIENT
Start: 2023-01-29

## 2023-01-29 RX ADMIN — BUMETANIDE 2 MG: 0.25 INJECTION INTRAMUSCULAR; INTRAVENOUS at 08:21

## 2023-01-29 RX ADMIN — NIFEDIPINE 90 MG: 30 TABLET, FILM COATED, EXTENDED RELEASE ORAL at 08:21

## 2023-01-29 RX ADMIN — Medication 10 ML: at 08:22

## 2023-01-29 RX ADMIN — INSULIN LISPRO 2 UNITS: 100 INJECTION, SOLUTION INTRAVENOUS; SUBCUTANEOUS at 12:00

## 2023-01-29 RX ADMIN — METOPROLOL TARTRATE 50 MG: 50 TABLET, FILM COATED ORAL at 08:21

## 2023-01-29 RX ADMIN — INSULIN LISPRO 2 UNITS: 100 INJECTION, SOLUTION INTRAVENOUS; SUBCUTANEOUS at 08:20

## 2023-01-29 RX ADMIN — HYDRALAZINE HYDROCHLORIDE 75 MG: 50 TABLET, FILM COATED ORAL at 05:39

## 2023-01-29 RX ADMIN — HYDRALAZINE HYDROCHLORIDE 75 MG: 50 TABLET, FILM COATED ORAL at 14:08

## 2023-01-29 RX ADMIN — ISOSORBIDE MONONITRATE 120 MG: 60 TABLET, EXTENDED RELEASE ORAL at 08:21

## 2023-01-30 RX ORDER — CHLORTHALIDONE 25 MG/1
TABLET ORAL
Qty: 90 TABLET | Refills: 0 | OUTPATIENT
Start: 2023-01-30

## 2023-01-30 NOTE — OUTREACH NOTE
Prep Survey    Flowsheet Row Responses   [facility] patient discharged from? [facility]   Is LACE score < 7 ? No   Eligibility Readm Mgmt   Discharge diagnosis A/C CHF, CAP, CHELY on CKD   Does the patient have one of the following disease processes/diagnoses(primary or secondary)? CHF   Does the patient have Home health ordered? No   Is there a DME ordered? No   Prep survey completed? Yes          ASIF MAY - Registered Nurse

## 2023-02-02 ENCOUNTER — READMISSION MANAGEMENT (OUTPATIENT)
Dept: CALL CENTER | Facility: HOSPITAL | Age: 48
End: 2023-02-02

## 2023-02-02 NOTE — PROGRESS NOTES
Enter Query Response Below      Query Response:       Bacterial pneumonia unspecified       If applicable, please update the problem list.         Patient: Angel Blair        : 1975  Account: [id number]           Admit Date: 2023        How to Respond to this query:       a. Click New Note     b. Answer query within the yellow box.                c. Update the Problem List, if applicable.      If you have any questions about this query contact me at: [phone].    :     Patient with CKD and CHF, treated for pneumonia this admission.  Treated with IV Rocephin ( - ) and PO Doxycycline ( - ).    After study, can the patient's pneumonia be further specifed as;    - Gram negative pneumonia (excluding Haemophilus influenzae).  - Bacterial pneumonia unspecified.  - Other_________.  - Unable to determine.    By submitting this query, we are merely seeking further clarification of documentation to accurately reflect all conditions that you are monitoring, evaluating, treating or that extend the hospitalization or utilize additional resources of care. Please utilize your independent clinical judgment when addressing the question(s) above.     This query and your response, once completed, will be entered into the legal medical record.    Sincerely,  Brittani Coyle RN  Clinical Documentation Integrity Program

## 2023-02-02 NOTE — OUTREACH NOTE
CHF Week 1 Survey    Flowsheet Row Responses   [facility] facility patient discharged from? [place]   Does the patient have one of the following disease processes/diagnoses(primary or secondary)? CHF   CHF Week 1 attempt successful? No   Unsuccessful attempts Attempt 1          ESPERANZA MAY - Registered Nurse

## 2023-02-03 DIAGNOSIS — I50.9 ACUTE ON CHRONIC CONGESTIVE HEART FAILURE, UNSPECIFIED HEART FAILURE TYPE: Primary | ICD-10-CM

## 2023-02-03 DIAGNOSIS — R06.09 DOE (DYSPNEA ON EXERTION): ICD-10-CM

## 2023-02-07 ENCOUNTER — READMISSION MANAGEMENT (OUTPATIENT)
Dept: CALL CENTER | Facility: HOSPITAL | Age: 48
End: 2023-02-07

## 2023-02-07 NOTE — OUTREACH NOTE
CHF Week 1 Survey    Flowsheet Row Responses   [facility] facility patient discharged from? [place]   Does the patient have one of the following disease processes/diagnoses(primary or secondary)? CHF   CHF Week 1 attempt successful? No   Unsuccessful attempts Attempt 2  [All numbers attempted-no answer]          Colette H - Registered Nurse

## 2023-02-08 LAB
LAB AP CASE REPORT: NORMAL
LAB AP CLINICAL INFORMATION: NORMAL
PATH REPORT.FINAL DX SPEC: NORMAL

## 2023-02-09 ENCOUNTER — READMISSION MANAGEMENT (OUTPATIENT)
Dept: CALL CENTER | Facility: HOSPITAL | Age: 48
End: 2023-02-09

## 2023-02-09 NOTE — OUTREACH NOTE
CHF Week 1 Survey    Flowsheet Row Responses   [facility] facility patient discharged from? [facility]   Does the patient have one of the following disease processes/diagnoses(primary or secondary)? CHF   CHF Week 1 attempt successful? No   Unsuccessful attempts Attempt 3          Maxine MAY - Registered Nurse

## 2023-04-13 RX ORDER — NIFEDIPINE 90 MG/1
90 TABLET, EXTENDED RELEASE ORAL
Qty: 90 TABLET | Refills: 3 | Status: ON HOLD | OUTPATIENT
Start: 2023-04-13

## 2023-04-13 RX ORDER — NIFEDIPINE 90 MG/1
90 TABLET, EXTENDED RELEASE ORAL DAILY
Qty: 90 TABLET | Refills: 3 | Status: ON HOLD | OUTPATIENT
Start: 2023-04-13

## 2023-04-18 ENCOUNTER — HOSPITAL ENCOUNTER (INPATIENT)
Facility: HOSPITAL | Age: 48
LOS: 10 days | Discharge: HOME | DRG: 291 | End: 2023-04-29
Attending: EMERGENCY MEDICINE | Admitting: FAMILY MEDICINE

## 2023-04-18 ENCOUNTER — APPOINTMENT (OUTPATIENT)
Dept: GENERAL RADIOLOGY | Facility: HOSPITAL | Age: 48
DRG: 291 | End: 2023-04-18

## 2023-04-18 DIAGNOSIS — I21.4 NSTEMI (NON-ST ELEVATED MYOCARDIAL INFARCTION): ICD-10-CM

## 2023-04-18 DIAGNOSIS — I16.1 HYPERTENSIVE EMERGENCY: ICD-10-CM

## 2023-04-18 DIAGNOSIS — I50.23 ACUTE ON CHRONIC SYSTOLIC CONGESTIVE HEART FAILURE: Primary | ICD-10-CM

## 2023-04-18 DIAGNOSIS — N17.9 ACUTE KIDNEY INJURY: ICD-10-CM

## 2023-04-18 DIAGNOSIS — Z91.14 NONCOMPLIANCE WITH MEDICATION REGIMEN: ICD-10-CM

## 2023-04-18 LAB
ALBUMIN SERPL-MCNC: 3.2 G/DL (ref 3.5–5.2)
ALBUMIN/GLOB SERPL: 0.8 G/DL
ALP SERPL-CCNC: 110 U/L (ref 39–117)
ALT SERPL W P-5'-P-CCNC: 20 U/L (ref 1–41)
ANION GAP SERPL CALCULATED.3IONS-SCNC: 12 MMOL/L (ref 5–15)
APTT PPP: 29.7 SECONDS (ref 60–90)
AST SERPL-CCNC: 30 U/L (ref 1–40)
BASOPHILS # BLD AUTO: 0.09 10*3/MM3 (ref 0–0.2)
BASOPHILS NFR BLD AUTO: 0.8 % (ref 0–1.5)
BILIRUB SERPL-MCNC: 0.2 MG/DL (ref 0–1.2)
BUN SERPL-MCNC: 88 MG/DL (ref 6–20)
BUN/CREAT SERPL: 15.3 (ref 7–25)
CALCIUM SPEC-SCNC: 9 MG/DL (ref 8.6–10.5)
CHLORIDE SERPL-SCNC: 114 MMOL/L (ref 98–107)
CO2 SERPL-SCNC: 18 MMOL/L (ref 22–29)
CREAT SERPL-MCNC: 5.74 MG/DL (ref 0.76–1.27)
DEPRECATED RDW RBC AUTO: 47.2 FL (ref 37–54)
EGFRCR SERPLBLD CKD-EPI 2021: 11.5 ML/MIN/1.73
EOSINOPHIL # BLD AUTO: 0.29 10*3/MM3 (ref 0–0.4)
EOSINOPHIL NFR BLD AUTO: 2.7 % (ref 0.3–6.2)
ERYTHROCYTE [DISTWIDTH] IN BLOOD BY AUTOMATED COUNT: 14.2 % (ref 12.3–15.4)
FLUAV SUBTYP SPEC NAA+PROBE: NOT DETECTED
FLUBV RNA ISLT QL NAA+PROBE: NOT DETECTED
GLOBULIN UR ELPH-MCNC: 4.2 GM/DL
GLUCOSE SERPL-MCNC: 111 MG/DL (ref 65–99)
HCT VFR BLD AUTO: 32.8 % (ref 37.5–51)
HGB BLD-MCNC: 10.4 G/DL (ref 13–17.7)
IMM GRANULOCYTES # BLD AUTO: 0.05 10*3/MM3 (ref 0–0.05)
IMM GRANULOCYTES NFR BLD AUTO: 0.5 % (ref 0–0.5)
INR PPP: 1.07 (ref 0.84–1.13)
LYMPHOCYTES # BLD AUTO: 1.79 10*3/MM3 (ref 0.7–3.1)
LYMPHOCYTES NFR BLD AUTO: 16.9 % (ref 19.6–45.3)
MCH RBC QN AUTO: 28.9 PG (ref 26.6–33)
MCHC RBC AUTO-ENTMCNC: 31.7 G/DL (ref 31.5–35.7)
MCV RBC AUTO: 91.1 FL (ref 79–97)
MONOCYTES # BLD AUTO: 0.61 10*3/MM3 (ref 0.1–0.9)
MONOCYTES NFR BLD AUTO: 5.7 % (ref 5–12)
NEUTROPHILS NFR BLD AUTO: 7.79 10*3/MM3 (ref 1.7–7)
NEUTROPHILS NFR BLD AUTO: 73.4 % (ref 42.7–76)
NRBC BLD AUTO-RTO: 0 /100 WBC (ref 0–0.2)
NT-PROBNP SERPL-MCNC: ABNORMAL PG/ML (ref 0–450)
PLATELET # BLD AUTO: 153 10*3/MM3 (ref 140–450)
PMV BLD AUTO: 13.3 FL (ref 6–12)
POTASSIUM SERPL-SCNC: 4.8 MMOL/L (ref 3.5–5.2)
PROT SERPL-MCNC: 7.4 G/DL (ref 6–8.5)
PROTHROMBIN TIME: 13.8 SECONDS (ref 11.4–14.4)
RBC # BLD AUTO: 3.6 10*6/MM3 (ref 4.14–5.8)
SARS-COV-2 RNA RESP QL NAA+PROBE: NOT DETECTED
SODIUM SERPL-SCNC: 144 MMOL/L (ref 136–145)
TROPONIN T SERPL HS-MCNC: 177 NG/L
UFH PPP CHRO-ACNC: 0.1 IU/ML (ref 0.3–0.7)
WBC NRBC COR # BLD: 10.62 10*3/MM3 (ref 3.4–10.8)

## 2023-04-18 PROCEDURE — 85025 COMPLETE CBC W/AUTO DIFF WBC: CPT | Performed by: EMERGENCY MEDICINE

## 2023-04-18 PROCEDURE — 99285 EMERGENCY DEPT VISIT HI MDM: CPT

## 2023-04-18 PROCEDURE — 71046 X-RAY EXAM CHEST 2 VIEWS: CPT

## 2023-04-18 PROCEDURE — 83880 ASSAY OF NATRIURETIC PEPTIDE: CPT | Performed by: EMERGENCY MEDICINE

## 2023-04-18 PROCEDURE — 25010000002 HEPARIN (PORCINE) 25000-0.45 UT/250ML-% SOLUTION: Performed by: EMERGENCY MEDICINE

## 2023-04-18 PROCEDURE — 85730 THROMBOPLASTIN TIME PARTIAL: CPT | Performed by: EMERGENCY MEDICINE

## 2023-04-18 PROCEDURE — 87636 SARSCOV2 & INF A&B AMP PRB: CPT | Performed by: EMERGENCY MEDICINE

## 2023-04-18 PROCEDURE — 80053 COMPREHEN METABOLIC PANEL: CPT | Performed by: EMERGENCY MEDICINE

## 2023-04-18 PROCEDURE — 85610 PROTHROMBIN TIME: CPT | Performed by: EMERGENCY MEDICINE

## 2023-04-18 PROCEDURE — 85520 HEPARIN ASSAY: CPT | Performed by: EMERGENCY MEDICINE

## 2023-04-18 PROCEDURE — 25010000002 HEPARIN (PORCINE) PER 1000 UNITS: Performed by: EMERGENCY MEDICINE

## 2023-04-18 PROCEDURE — 84484 ASSAY OF TROPONIN QUANT: CPT | Performed by: EMERGENCY MEDICINE

## 2023-04-18 PROCEDURE — 93005 ELECTROCARDIOGRAM TRACING: CPT | Performed by: EMERGENCY MEDICINE

## 2023-04-18 RX ORDER — NITROGLYCERIN 20 MG/100ML
5-200 INJECTION INTRAVENOUS
Status: DISCONTINUED | OUTPATIENT
Start: 2023-04-18 | End: 2023-04-20

## 2023-04-18 RX ORDER — BENZONATATE 100 MG/1
100 CAPSULE ORAL ONCE
Status: COMPLETED | OUTPATIENT
Start: 2023-04-18 | End: 2023-04-18

## 2023-04-18 RX ORDER — BUMETANIDE 0.25 MG/ML
2 INJECTION INTRAMUSCULAR; INTRAVENOUS ONCE
Status: COMPLETED | OUTPATIENT
Start: 2023-04-18 | End: 2023-04-18

## 2023-04-18 RX ORDER — HEPARIN SODIUM 1000 [USP'U]/ML
4000 INJECTION, SOLUTION INTRAVENOUS; SUBCUTANEOUS AS NEEDED
Status: DISCONTINUED | OUTPATIENT
Start: 2023-04-18 | End: 2023-04-18

## 2023-04-18 RX ORDER — HEPARIN SODIUM 1000 [USP'U]/ML
2000 INJECTION, SOLUTION INTRAVENOUS; SUBCUTANEOUS AS NEEDED
Status: DISCONTINUED | OUTPATIENT
Start: 2023-04-18 | End: 2023-04-18

## 2023-04-18 RX ORDER — HEPARIN SODIUM 10000 [USP'U]/100ML
12 INJECTION, SOLUTION INTRAVENOUS
Status: DISCONTINUED | OUTPATIENT
Start: 2023-04-18 | End: 2023-04-19

## 2023-04-18 RX ORDER — ASPIRIN 81 MG/1
324 TABLET, CHEWABLE ORAL ONCE
Status: COMPLETED | OUTPATIENT
Start: 2023-04-18 | End: 2023-04-18

## 2023-04-18 RX ORDER — HEPARIN SODIUM 1000 [USP'U]/ML
4000 INJECTION, SOLUTION INTRAVENOUS; SUBCUTANEOUS ONCE
Status: COMPLETED | OUTPATIENT
Start: 2023-04-18 | End: 2023-04-18

## 2023-04-18 RX ADMIN — ASPIRIN 81 MG 324 MG: 81 TABLET ORAL at 21:42

## 2023-04-18 RX ADMIN — HEPARIN SODIUM 4000 UNITS: 1000 INJECTION INTRAVENOUS; SUBCUTANEOUS at 23:23

## 2023-04-18 RX ADMIN — BUMETANIDE 2 MG: 0.25 INJECTION INTRAMUSCULAR; INTRAVENOUS at 23:49

## 2023-04-18 RX ADMIN — BENZONATATE 100 MG: 100 CAPSULE ORAL at 23:49

## 2023-04-18 RX ADMIN — NITROGLYCERIN 5 MCG/MIN: 20 INJECTION INTRAVENOUS at 23:23

## 2023-04-18 RX ADMIN — HEPARIN SODIUM 8 UNITS/KG/HR: 10000 INJECTION, SOLUTION INTRAVENOUS at 23:23

## 2023-04-19 ENCOUNTER — APPOINTMENT (OUTPATIENT)
Dept: CT IMAGING | Facility: HOSPITAL | Age: 48
DRG: 291 | End: 2023-04-19

## 2023-04-19 PROBLEM — N18.5 CKD (CHRONIC KIDNEY DISEASE) STAGE 5, GFR LESS THAN 15 ML/MIN: Status: ACTIVE | Noted: 2023-04-19

## 2023-04-19 PROBLEM — I50.23 ACUTE ON CHRONIC SYSTOLIC CONGESTIVE HEART FAILURE: Status: ACTIVE | Noted: 2023-04-19

## 2023-04-19 PROBLEM — I16.0 HYPERTENSIVE URGENCY: Status: ACTIVE | Noted: 2023-04-19

## 2023-04-19 LAB
ANION GAP SERPL CALCULATED.3IONS-SCNC: 12 MMOL/L (ref 5–15)
BASOPHILS # BLD AUTO: 0.07 10*3/MM3 (ref 0–0.2)
BASOPHILS NFR BLD AUTO: 0.7 % (ref 0–1.5)
BUN SERPL-MCNC: 83 MG/DL (ref 6–20)
BUN/CREAT SERPL: 14.1 (ref 7–25)
CALCIUM SPEC-SCNC: 8.8 MG/DL (ref 8.6–10.5)
CHLORIDE SERPL-SCNC: 113 MMOL/L (ref 98–107)
CHOLEST SERPL-MCNC: 210 MG/DL (ref 0–200)
CO2 SERPL-SCNC: 17 MMOL/L (ref 22–29)
CREAT SERPL-MCNC: 5.88 MG/DL (ref 0.76–1.27)
DEPRECATED RDW RBC AUTO: 48.1 FL (ref 37–54)
EGFRCR SERPLBLD CKD-EPI 2021: 11.1 ML/MIN/1.73
EOSINOPHIL # BLD AUTO: 0.23 10*3/MM3 (ref 0–0.4)
EOSINOPHIL NFR BLD AUTO: 2.3 % (ref 0.3–6.2)
ERYTHROCYTE [DISTWIDTH] IN BLOOD BY AUTOMATED COUNT: 14.3 % (ref 12.3–15.4)
GLUCOSE BLDC GLUCOMTR-MCNC: 127 MG/DL (ref 70–130)
GLUCOSE BLDC GLUCOMTR-MCNC: 170 MG/DL (ref 70–130)
GLUCOSE BLDC GLUCOMTR-MCNC: 181 MG/DL (ref 70–130)
GLUCOSE SERPL-MCNC: 110 MG/DL (ref 65–99)
HBA1C MFR BLD: 6.4 % (ref 4.8–5.6)
HCT VFR BLD AUTO: 29.9 % (ref 37.5–51)
HDLC SERPL-MCNC: 44 MG/DL (ref 40–60)
HGB BLD-MCNC: 9.3 G/DL (ref 13–17.7)
IMM GRANULOCYTES # BLD AUTO: 0.04 10*3/MM3 (ref 0–0.05)
IMM GRANULOCYTES NFR BLD AUTO: 0.4 % (ref 0–0.5)
INR PPP: 1.12 (ref 0.84–1.13)
LDLC SERPL CALC-MCNC: 140 MG/DL (ref 0–100)
LDLC/HDLC SERPL: 3.12 {RATIO}
LYMPHOCYTES # BLD AUTO: 1.68 10*3/MM3 (ref 0.7–3.1)
LYMPHOCYTES NFR BLD AUTO: 16.9 % (ref 19.6–45.3)
MAGNESIUM SERPL-MCNC: 1.4 MG/DL (ref 1.6–2.6)
MCH RBC QN AUTO: 29.1 PG (ref 26.6–33)
MCHC RBC AUTO-ENTMCNC: 31.1 G/DL (ref 31.5–35.7)
MCV RBC AUTO: 93.4 FL (ref 79–97)
MONOCYTES # BLD AUTO: 0.52 10*3/MM3 (ref 0.1–0.9)
MONOCYTES NFR BLD AUTO: 5.2 % (ref 5–12)
NEUTROPHILS NFR BLD AUTO: 7.43 10*3/MM3 (ref 1.7–7)
NEUTROPHILS NFR BLD AUTO: 74.5 % (ref 42.7–76)
NRBC BLD AUTO-RTO: 0 /100 WBC (ref 0–0.2)
PLATELET # BLD AUTO: 135 10*3/MM3 (ref 140–450)
PMV BLD AUTO: 13 FL (ref 6–12)
POTASSIUM SERPL-SCNC: 4.4 MMOL/L (ref 3.5–5.2)
PROCALCITONIN SERPL-MCNC: 0.24 NG/ML (ref 0–0.25)
PROTHROMBIN TIME: 14.4 SECONDS (ref 11.4–14.4)
QT INTERVAL: 440 MS
QTC INTERVAL: 484 MS
RBC # BLD AUTO: 3.2 10*6/MM3 (ref 4.14–5.8)
SODIUM SERPL-SCNC: 142 MMOL/L (ref 136–145)
TRIGL SERPL-MCNC: 143 MG/DL (ref 0–150)
TROPONIN T SERPL HS-MCNC: 170 NG/L
UFH PPP CHRO-ACNC: 0.1 IU/ML (ref 0.3–0.7)
UFH PPP CHRO-ACNC: 0.15 IU/ML (ref 0.3–0.7)
VLDLC SERPL-MCNC: 26 MG/DL (ref 5–40)
WBC NRBC COR # BLD: 9.97 10*3/MM3 (ref 3.4–10.8)

## 2023-04-19 PROCEDURE — 25010000002 MAGNESIUM SULFATE IN D5W 1G/100ML (PREMIX) 1-5 GM/100ML-% SOLUTION: Performed by: FAMILY MEDICINE

## 2023-04-19 PROCEDURE — 93005 ELECTROCARDIOGRAM TRACING: CPT | Performed by: NURSE PRACTITIONER

## 2023-04-19 PROCEDURE — 99223 1ST HOSP IP/OBS HIGH 75: CPT | Performed by: INTERNAL MEDICINE

## 2023-04-19 PROCEDURE — 80061 LIPID PANEL: CPT | Performed by: NURSE PRACTITIONER

## 2023-04-19 PROCEDURE — 84145 PROCALCITONIN (PCT): CPT | Performed by: INTERNAL MEDICINE

## 2023-04-19 PROCEDURE — 84484 ASSAY OF TROPONIN QUANT: CPT | Performed by: NURSE PRACTITIONER

## 2023-04-19 PROCEDURE — 93010 ELECTROCARDIOGRAM REPORT: CPT | Performed by: INTERNAL MEDICINE

## 2023-04-19 PROCEDURE — 83735 ASSAY OF MAGNESIUM: CPT | Performed by: NURSE PRACTITIONER

## 2023-04-19 PROCEDURE — 82962 GLUCOSE BLOOD TEST: CPT

## 2023-04-19 PROCEDURE — 85610 PROTHROMBIN TIME: CPT | Performed by: NURSE PRACTITIONER

## 2023-04-19 PROCEDURE — 71250 CT THORAX DX C-: CPT

## 2023-04-19 PROCEDURE — 80048 BASIC METABOLIC PNL TOTAL CA: CPT | Performed by: NURSE PRACTITIONER

## 2023-04-19 PROCEDURE — 36415 COLL VENOUS BLD VENIPUNCTURE: CPT

## 2023-04-19 PROCEDURE — 85025 COMPLETE CBC W/AUTO DIFF WBC: CPT | Performed by: EMERGENCY MEDICINE

## 2023-04-19 PROCEDURE — 25010000002 HEPARIN (PORCINE) PER 1000 UNITS

## 2023-04-19 PROCEDURE — 85520 HEPARIN ASSAY: CPT

## 2023-04-19 PROCEDURE — 99254 IP/OBS CNSLTJ NEW/EST MOD 60: CPT | Performed by: INTERNAL MEDICINE

## 2023-04-19 PROCEDURE — 63710000001 INSULIN LISPRO (HUMAN) PER 5 UNITS: Performed by: NURSE PRACTITIONER

## 2023-04-19 PROCEDURE — 83036 HEMOGLOBIN GLYCOSYLATED A1C: CPT | Performed by: NURSE PRACTITIONER

## 2023-04-19 RX ORDER — ATORVASTATIN CALCIUM 40 MG/1
40 TABLET, FILM COATED ORAL NIGHTLY
Status: DISCONTINUED | OUTPATIENT
Start: 2023-04-19 | End: 2023-04-29 | Stop reason: HOSPADM

## 2023-04-19 RX ORDER — SODIUM CHLORIDE 0.9 % (FLUSH) 0.9 %
10 SYRINGE (ML) INJECTION EVERY 12 HOURS SCHEDULED
Status: DISCONTINUED | OUTPATIENT
Start: 2023-04-19 | End: 2023-04-29 | Stop reason: HOSPADM

## 2023-04-19 RX ORDER — ACETAMINOPHEN 325 MG/1
650 TABLET ORAL EVERY 4 HOURS PRN
Status: DISCONTINUED | OUTPATIENT
Start: 2023-04-19 | End: 2023-04-29 | Stop reason: HOSPADM

## 2023-04-19 RX ORDER — MAGNESIUM SULFATE 1 G/100ML
1 INJECTION INTRAVENOUS
Status: COMPLETED | OUTPATIENT
Start: 2023-04-19 | End: 2023-04-19

## 2023-04-19 RX ORDER — DEXTROSE MONOHYDRATE 25 G/50ML
25 INJECTION, SOLUTION INTRAVENOUS
Status: DISCONTINUED | OUTPATIENT
Start: 2023-04-19 | End: 2023-04-29 | Stop reason: HOSPADM

## 2023-04-19 RX ORDER — NIFEDIPINE 30 MG/1
90 TABLET, EXTENDED RELEASE ORAL DAILY
Status: DISCONTINUED | OUTPATIENT
Start: 2023-04-19 | End: 2023-04-29 | Stop reason: HOSPADM

## 2023-04-19 RX ORDER — SODIUM CHLORIDE 0.9 % (FLUSH) 0.9 %
10 SYRINGE (ML) INJECTION AS NEEDED
Status: DISCONTINUED | OUTPATIENT
Start: 2023-04-19 | End: 2023-04-29 | Stop reason: HOSPADM

## 2023-04-19 RX ORDER — CLONIDINE HYDROCHLORIDE 0.1 MG/1
0.1 TABLET ORAL
Status: DISCONTINUED | OUTPATIENT
Start: 2023-04-19 | End: 2023-04-29 | Stop reason: HOSPADM

## 2023-04-19 RX ORDER — NICOTINE POLACRILEX 4 MG
15 LOZENGE BUCCAL
Status: DISCONTINUED | OUTPATIENT
Start: 2023-04-19 | End: 2023-04-29 | Stop reason: HOSPADM

## 2023-04-19 RX ORDER — INSULIN LISPRO 100 [IU]/ML
0-7 INJECTION, SOLUTION INTRAVENOUS; SUBCUTANEOUS
Status: DISCONTINUED | OUTPATIENT
Start: 2023-04-19 | End: 2023-04-29 | Stop reason: HOSPADM

## 2023-04-19 RX ORDER — HEPARIN SODIUM 1000 [USP'U]/ML
2000 INJECTION, SOLUTION INTRAVENOUS; SUBCUTANEOUS ONCE
Status: COMPLETED | OUTPATIENT
Start: 2023-04-19 | End: 2023-04-19

## 2023-04-19 RX ORDER — SODIUM CHLORIDE 9 MG/ML
40 INJECTION, SOLUTION INTRAVENOUS AS NEEDED
Status: DISCONTINUED | OUTPATIENT
Start: 2023-04-19 | End: 2023-04-29 | Stop reason: HOSPADM

## 2023-04-19 RX ORDER — HEPARIN SODIUM 1000 [USP'U]/ML
4000 INJECTION, SOLUTION INTRAVENOUS; SUBCUTANEOUS ONCE
Status: COMPLETED | OUTPATIENT
Start: 2023-04-19 | End: 2023-04-19

## 2023-04-19 RX ORDER — ISOSORBIDE MONONITRATE 60 MG/1
120 TABLET, EXTENDED RELEASE ORAL
Status: DISCONTINUED | OUTPATIENT
Start: 2023-04-19 | End: 2023-04-19

## 2023-04-19 RX ORDER — METOPROLOL TARTRATE 50 MG/1
50 TABLET, FILM COATED ORAL EVERY 12 HOURS SCHEDULED
Status: DISCONTINUED | OUTPATIENT
Start: 2023-04-19 | End: 2023-04-29 | Stop reason: HOSPADM

## 2023-04-19 RX ADMIN — BUMETANIDE 1 MG/HR: 0.25 INJECTION INTRAMUSCULAR; INTRAVENOUS at 12:47

## 2023-04-19 RX ADMIN — CLONIDINE HYDROCHLORIDE 0.1 MG: 0.1 TABLET ORAL at 18:17

## 2023-04-19 RX ADMIN — INSULIN LISPRO 2 UNITS: 100 INJECTION, SOLUTION INTRAVENOUS; SUBCUTANEOUS at 16:58

## 2023-04-19 RX ADMIN — NITROGLYCERIN 80 MCG/MIN: 20 INJECTION INTRAVENOUS at 14:52

## 2023-04-19 RX ADMIN — Medication 10 ML: at 19:57

## 2023-04-19 RX ADMIN — HEPARIN SODIUM 4000 UNITS: 1000 INJECTION INTRAVENOUS; SUBCUTANEOUS at 06:25

## 2023-04-19 RX ADMIN — NIFEDIPINE 90 MG: 30 TABLET, EXTENDED RELEASE ORAL at 09:04

## 2023-04-19 RX ADMIN — CLONIDINE HYDROCHLORIDE 0.1 MG: 0.1 TABLET ORAL at 14:51

## 2023-04-19 RX ADMIN — BUMETANIDE 1 MG/HR: 0.25 INJECTION INTRAMUSCULAR; INTRAVENOUS at 03:56

## 2023-04-19 RX ADMIN — HEPARIN SODIUM 2000 UNITS: 1000 INJECTION INTRAVENOUS; SUBCUTANEOUS at 13:36

## 2023-04-19 RX ADMIN — Medication 10 ML: at 09:05

## 2023-04-19 RX ADMIN — METOPROLOL TARTRATE 50 MG: 50 TABLET ORAL at 09:04

## 2023-04-19 RX ADMIN — METOPROLOL TARTRATE 50 MG: 50 TABLET ORAL at 19:56

## 2023-04-19 RX ADMIN — HYDRALAZINE HYDROCHLORIDE 75 MG: 50 TABLET, FILM COATED ORAL at 19:56

## 2023-04-19 RX ADMIN — INSULIN LISPRO 2 UNITS: 100 INJECTION, SOLUTION INTRAVENOUS; SUBCUTANEOUS at 11:37

## 2023-04-19 RX ADMIN — BUMETANIDE 1 MG/HR: 0.25 INJECTION INTRAMUSCULAR; INTRAVENOUS at 22:14

## 2023-04-19 RX ADMIN — HYDRALAZINE HYDROCHLORIDE 75 MG: 50 TABLET, FILM COATED ORAL at 13:41

## 2023-04-19 RX ADMIN — MAGNESIUM SULFATE HEPTAHYDRATE 1 G: 1 INJECTION, SOLUTION INTRAVENOUS at 11:38

## 2023-04-19 RX ADMIN — MAGNESIUM SULFATE HEPTAHYDRATE 1 G: 1 INJECTION, SOLUTION INTRAVENOUS at 09:19

## 2023-04-19 RX ADMIN — HYDRALAZINE HYDROCHLORIDE 75 MG: 50 TABLET, FILM COATED ORAL at 05:18

## 2023-04-19 RX ADMIN — MAGNESIUM SULFATE HEPTAHYDRATE 1 G: 1 INJECTION, SOLUTION INTRAVENOUS at 10:36

## 2023-04-19 RX ADMIN — ATORVASTATIN CALCIUM 40 MG: 40 TABLET, FILM COATED ORAL at 19:56

## 2023-04-19 NOTE — CONSULTS
Patient Care Team:  Jay Nevarez MD as PCP - General (Emergency Medicine)  Mirza Amezquita MD as Consulting Physician (Cardiology)    Chief complaint: CKD stage V  HTN crisis  Cough      History of Present Illness Angel Blair is a 47 y.o. male with PMH significant for DM 2, HLD, HTN, LEFTY, obesity, CKD V, who presents to the ED with complaint of cough and shortness of breath. His symptoms have been worsening for last few weeks. Patient was last seen by Nephrology in late Jan 2023. He underwent renal bx which showed advance nephrosclerosis and adaptive FSGS with rapid loss of kidney function. His GFR was btq 15-20 ml/min. Unfortunately patient was lost to f/u due to lack of insurance. He tells me he ran out of medications one month ago. He was waiting for his medical insurance to become active starting May 2023. He was not seen by Nephrology as outpatient. Labs on this admission showed progression of chronic kidney GFR 11. C5 5.7-5.8 He is started on bumex gtt and nitroglycerine gtt for bp control and optimization of volume status. He feels sob is improving.    Review of Systems   Constitutional: Negative.    HENT: Negative.    Respiratory: Positive for cough and shortness of breath.    Cardiovascular: Positive for leg swelling.   Gastrointestinal: Negative.    Genitourinary: Negative.    Musculoskeletal: Negative.    Skin: Negative.    Neurological: Negative.    Hematological: Negative.    Psychiatric/Behavioral: Negative.         Past Medical History:   Diagnosis Date   • Diabetes mellitus    • Hyperlipidemia    • Hypertension    • Knee swelling 7/20/22   • Sleep apnea    ,   Past Surgical History:   Procedure Laterality Date   • SOFT TISSUE TUMOR RESECTION  2010   ,   Family History   Problem Relation Age of Onset   • Lung disease Mother    • No Known Problems Father    • Asthma Brother    ,   Social History     Socioeconomic History   • Marital status:    Tobacco Use   • Smoking status:  Never   • Smokeless tobacco: Never   Vaping Use   • Vaping Use: Never used   Substance and Sexual Activity   • Alcohol use: No   • Drug use: No   • Sexual activity: Yes     Partners: Female     E-cigarette/Vaping   • E-cigarette/Vaping Use Never User      E-cigarette/Vaping Substances     E-cigarette/Vaping Devices       ,   Medications Prior to Admission   Medication Sig Dispense Refill Last Dose   • bumetanide (BUMEX) 2 MG tablet Take 1 tablet by mouth Daily. 30 tablet 0 4/19/2023   • glipizide (Glucotrol) 5 MG tablet Take 0.5 tablets by mouth 2 (Two) Times a Day Before Meals. 30 tablet 0 Past Month   • hydrALAZINE (APRESOLINE) 25 MG tablet Take 3 tablets by mouth Every 8 (Eight) Hours. 90 tablet 0 Past Month   • isosorbide mononitrate (IMDUR) 120 MG 24 hr tablet Take 1 tablet by mouth Daily. 30 tablet 0 Past Month   • NIFEdipine XL (PROCARDIA XL) 90 MG 24 hr tablet Take 1 tablet by mouth Daily. 90 tablet 3 4/19/2023   • NIFEdipine XL (PROCARDIA XL) 90 MG 24 hr tablet Take 1 tablet by mouth Daily. 90 tablet 3 Past Month   • acetaminophen (TYLENOL) 325 MG tablet Take 2 tablets by mouth Every 4 (Four) Hours As Needed for Mild Pain.   More than a month   • atorvastatin (LIPITOR) 40 MG tablet Take 1 tablet by mouth Every Night.   More than a month   • metoprolol tartrate (LOPRESSOR) 50 MG tablet Take 1 tablet by mouth Every 12 (Twelve) Hours for 30 days. 60 tablet 0     and Scheduled Meds:  atorvastatin, 40 mg, Oral, Nightly  hydrALAZINE, 75 mg, Oral, Q8H  insulin lispro, 0-7 Units, Subcutaneous, TID AC  metoprolol tartrate, 50 mg, Oral, Q12H  NIFEdipine XL, 90 mg, Oral, Daily  [START ON 4/20/2023] pharmacy consult - MTM, , Does not apply, Daily  sodium chloride, 10 mL, Intravenous, Q12H        Objective     Vital Signs  Temp:  [97.9 °F (36.6 °C)-98.5 °F (36.9 °C)] 98.5 °F (36.9 °C)  Heart Rate:  [70-86] 80  Resp:  [18-20] 18  BP: (157-236)/() 187/116    I/O this shift:  In: 960 [P.O.:960]  Out: 1900  [Urine:1900]  I/O last 3 completed shifts:  In: -   Out: 800 [Urine:800]    Physical Exam  Constitutional:       General: He is not in acute distress.     Appearance: Normal appearance. He is not ill-appearing.   HENT:      Head: Normocephalic and atraumatic.      Nose: Nose normal.      Mouth/Throat:      Mouth: Mucous membranes are moist.   Eyes:      Extraocular Movements: Extraocular movements intact.      Pupils: Pupils are equal, round, and reactive to light.   Cardiovascular:      Rate and Rhythm: Normal rate and regular rhythm.      Pulses: Normal pulses.      Heart sounds: Normal heart sounds. No murmur heard.    No friction rub.   Pulmonary:      Effort: Pulmonary effort is normal.   Abdominal:      General: Abdomen is flat.   Musculoskeletal:      Right lower leg: Edema present.      Left lower leg: Edema present.   Skin:     General: Skin is warm.   Neurological:      General: No focal deficit present.      Mental Status: He is alert and oriented to person, place, and time. Mental status is at baseline.         Results Review:    I reviewed the patient's new clinical results.    WBC WBC   Date Value Ref Range Status   04/19/2023 9.97 3.40 - 10.80 10*3/mm3 Final   04/18/2023 10.62 3.40 - 10.80 10*3/mm3 Final      HGB Hemoglobin   Date Value Ref Range Status   04/19/2023 9.3 (L) 13.0 - 17.7 g/dL Final   04/18/2023 10.4 (L) 13.0 - 17.7 g/dL Final      HCT Hematocrit   Date Value Ref Range Status   04/19/2023 29.9 (L) 37.5 - 51.0 % Final   04/18/2023 32.8 (L) 37.5 - 51.0 % Final      Platlets No results found for: LABPLAT   MCV MCV   Date Value Ref Range Status   04/19/2023 93.4 79.0 - 97.0 fL Final   04/18/2023 91.1 79.0 - 97.0 fL Final          Sodium Sodium   Date Value Ref Range Status   04/19/2023 142 136 - 145 mmol/L Final   04/18/2023 144 136 - 145 mmol/L Final      Potassium Potassium   Date Value Ref Range Status   04/19/2023 4.4 3.5 - 5.2 mmol/L Final   04/18/2023 4.8 3.5 - 5.2 mmol/L Final      Comment:     Slight hemolysis detected by analyzer. Results may be affected.      Chloride Chloride   Date Value Ref Range Status   04/19/2023 113 (H) 98 - 107 mmol/L Final   04/18/2023 114 (H) 98 - 107 mmol/L Final      CO2 CO2   Date Value Ref Range Status   04/19/2023 17.0 (L) 22.0 - 29.0 mmol/L Final   04/18/2023 18.0 (L) 22.0 - 29.0 mmol/L Final      BUN BUN   Date Value Ref Range Status   04/19/2023 83 (H) 6 - 20 mg/dL Final   04/18/2023 88 (H) 6 - 20 mg/dL Final      Creatinine Creatinine   Date Value Ref Range Status   04/19/2023 5.88 (H) 0.76 - 1.27 mg/dL Final   04/18/2023 5.74 (H) 0.76 - 1.27 mg/dL Final      Calcium Calcium   Date Value Ref Range Status   04/19/2023 8.8 8.6 - 10.5 mg/dL Final   04/18/2023 9.0 8.6 - 10.5 mg/dL Final      PO4 No results found for: CAPO4   Albumin Albumin   Date Value Ref Range Status   04/18/2023 3.2 (L) 3.5 - 5.2 g/dL Final      Magnesium Magnesium   Date Value Ref Range Status   04/19/2023 1.4 (L) 1.6 - 2.6 mg/dL Final      Uric Acid No results found for: URICACID         Assessment & Plan       Type 2 diabetes mellitus with hyperglycemia (HCC)    Essential hypertension    Hyperlipidemia    Elevated troponin    Acute on chronic systolic congestive heart failure    Hypertensive urgency    CKD (chronic kidney disease) stage 5, GFR less than 15 ml/min      Assessment & Plan     CKD stage IV/V:  Bx proven severe nephrosclerosis w adaptive FSGS changes and diabetic nephropathy 90 percent fibrosis on bx. Rapid loss of kidney function in last few yrs. Lost to f/u after discharge from [facility] in Jan 2023. Cr worse on this admission likley Acute component vs progression of underlying CKD in the setting of uncontrolled HTN and volume ovelroad       Volume status: Hypervolemia. He ran out of all his meds 1 month ago. Didn't have insurance    HTN crisis; Wasn't taking any meds prior to admission.     Met acidosis: Due to CKD    Anemia: ACD due to CKD. Check iron panel     Recs  Advance  CKD at baseline with possible progression in last few months. He is currently on nitro and bumex gtt.. We talked about potential options for dialysis. He wants to continue to work therefore interested in home dialysis. Discussed with case management regarding his insurance issues. Will discuss with Dr Herrera for possible PD cath insertion on this admission.   Check iron panel, Phos, PTH  Will transition to IV bumex 2.5 mg BID tomorrow. Currently on bumex gtt  AntiHTN meds titrated per cardiology at present  Dose meds to GFR<15  High risk for needing dialysis on this admission.    I discussed the patients findings and my recommendations with patient    Jaren Rai MD  04/19/23  14:09 EDT

## 2023-04-19 NOTE — PLAN OF CARE
Patient arrived on the floor at 0300 hours from ED accompanied by spouse. A&Ox4, mood/affect appropriate. Ambulates with SB assist. Lung sound diminished at bilateral upper lobes, fine crackles noted at lower lobes. Patient denies dyspnea/SOA at this time, currently on/Nitro/Heparin/Bumex drip. Tolerating well. BP/UOP closely monitored, Call light in reach.

## 2023-04-19 NOTE — PROGRESS NOTES
HEPARIN INFUSION  Angel Blair is a  47 y.o. male receiving heparin infusion.     Therapy for (VTE/Cardiac):   Cardiac  Patient Weight: 127 kg  Initial Bolus (Y/N):   Y  Any Bolus (Y/N):   Y        Signs or Symptoms of Bleeding: N/A    Cardiac or Other (Not VTE)   Initial Bolus: 60 units/kg (Max 4,000 units)  Initial rate: 12 units/kg/hr (Max 1,000 units/hr)   Anti Xa Rebolus Infusion Hold time Change infusion Dose (Units/kg/hr) Next Anti Xa or aPTT Level Due   < 0.11 50 Units/kg  (4000 Units Max) None Increase by  3 Units/kg/hr 6 hours   0.11- 0.19 25 Units/kg  (2000 Units Max) None Increase by  2 Units/kg/hr 6 hours   0.2 - 0.29 0 None Increase by  1 Units/kg/hr 6 hours   0.3 - 0.5 0 None No Change 6 hours (after 2 consecutive levels in range check qAM)   0.51 - 0.6 0 None Decrease by  1 Units/kg/hr 6 hours   0.61 - 0.8 0 30 Minutes Decrease by  2 Units/kg/hr 6 hours   0.81 - 1 0 60 Minutes Decrease by  3 Units/kg/hr 6 hours   >1 0 Hold  After Anti Xa less than 0.5 decrease previous rate by  4 Units/kg/hr  Every 2 hours until Anti Xa  less than 0.5 then when infusion restarts in 6 hours     Recommend Xa every 6 hours.     Results from last 7 days   Lab Units 04/19/23  0527 04/18/23  2308 04/18/23  2151   INR  1.12 1.07  --    HEMOGLOBIN g/dL 9.3*  --  10.4*   HEMATOCRIT % 29.9*  --  32.8*   PLATELETS 10*3/mm3 135*  --  153          Date   Time   Anti-Xa Current Rate (Unit/kg/hr) Bolus   (Units) Rate Change   (Unit/kg/hr) New Rate (Unit/kg/hr) Next   Anti-Xa Comments  Pump Check Daily   4/18 2300 0.1 New 4000 +8 8 0600 Dw ED Charge   4/19 0617 0.1 8 4000 +3 11 1200 Dw RN   4/19 1151 0.15 11 2000 +1 12 2000 D/w CRYS Ruffin, PharmD  4/19/2023  13:10 EDT

## 2023-04-19 NOTE — ED PROVIDER NOTES
[place]    EMERGENCY DEPARTMENT ENCOUNTER      Pt Name: Angel Blair  MRN: [id number]  YOB: 1975  Date of evaluation: 4/18/2023  Provider: Moise Parish MD    CHIEF COMPLAINT       Chief Complaint   Patient presents with   • Cough         HISTORY OF PRESENT ILLNESS   Angel Blair is a 47 y.o. male who presents to the emergency department with worsening cough productive of white sputum along with shortness of breath over the course of the past 2 weeks.  Symptoms are worsened with exertion.  He also notes that today he also began having some substernal nonradiating substernal chest pressure with no nausea, vomiting, or diaphoresis.  He has history of systolic CHF and CKD.  He notes he has been out of essentially all of his medications over the past couple of weeks as well, including his diuretics and antihypertensive medications.      Nursing notes were reviewed.    REVIEW OF SYSTEMS     ROS:  A chief complaint appropriate review of systems was completed and is negative except as noted in the HPI.      PAST MEDICAL HISTORY     Past Medical History:   Diagnosis Date   • Diabetes mellitus    • Hyperlipidemia    • Hypertension    • Knee swelling 7/20/22   • Sleep apnea          SURGICAL HISTORY       Past Surgical History:   Procedure Laterality Date   • SOFT TISSUE TUMOR RESECTION  2010         CURRENT MEDICATIONS       Current Facility-Administered Medications:   •  bumetanide (BUMEX) injection 2 mg, 2 mg, Intravenous, Once, Moise Parish MD  •  heparin (porcine) injection 4,000 Units, 4,000 Units, Intravenous, Once, Moise Parish MD  •  heparin 25916 units/250 mL (100 units/mL) in 0.45 % NaCl infusion, 8 Units/kg/hr, Intravenous, Titrated, Moise Parish MD  •  nitroglycerin (TRIDIL) 200 mcg/ml infusion, 5-200 mcg/min, Intravenous, Titrated, Moise Parish MD  •  Pharmacy to Dose Heparin, , Does not apply, Continuous PRN, Moise Parish MD    Current Outpatient  "Medications:   •  acetaminophen (TYLENOL) 325 MG tablet, Take 2 tablets by mouth Every 4 (Four) Hours As Needed for Mild Pain., Disp: , Rfl:   •  atorvastatin (LIPITOR) 40 MG tablet, Take 40 mg by mouth Every Night., Disp: , Rfl:   •  bumetanide (BUMEX) 2 MG tablet, Take 1 tablet by mouth Daily., Disp: 30 tablet, Rfl: 0  •  glipizide (Glucotrol) 5 MG tablet, Take 0.5 tablets by mouth 2 (Two) Times a Day Before Meals., Disp: 30 tablet, Rfl: 0  •  hydrALAZINE (APRESOLINE) 25 MG tablet, Take 3 tablets by mouth Every 8 (Eight) Hours., Disp: 90 tablet, Rfl: 0  •  isosorbide mononitrate (IMDUR) 120 MG 24 hr tablet, Take 1 tablet by mouth Daily., Disp: 30 tablet, Rfl: 0  •  metoprolol tartrate (LOPRESSOR) 50 MG tablet, Take 1 tablet by mouth Every 12 (Twelve) Hours for 30 days., Disp: 60 tablet, Rfl: 0  •  NIFEdipine XL (PROCARDIA XL) 90 MG 24 hr tablet, Take 1 tablet by mouth Daily., Disp: 90 tablet, Rfl: 3  •  NIFEdipine XL (PROCARDIA XL) 90 MG 24 hr tablet, Take 1 tablet by mouth Daily., Disp: 90 tablet, Rfl: 3    ALLERGIES     Lisinopril    FAMILY HISTORY       Family History   Problem Relation Age of Onset   • Lung disease Mother    • No Known Problems Father    • Asthma Brother           SOCIAL HISTORY       Social History     Socioeconomic History   • Marital status:    Tobacco Use   • Smoking status: Never   • Smokeless tobacco: Never   Vaping Use   • Vaping Use: Never used   Substance and Sexual Activity   • Alcohol use: No   • Drug use: No   • Sexual activity: Yes     Partners: Female         PHYSICAL EXAM    (up to 7 for level 4, 8 or more for level 5)     Vitals:    04/18/23 2110   BP: (!) 225/129   BP Location: Left arm   Patient Position: Sitting   Pulse: 79   Resp: 20   Temp: 97.9 °F (36.6 °C)   TempSrc: Oral   SpO2: 98%   Weight: 127 kg (280 lb)   Height: 185.4 cm (73\")       General: Awake, alert, no acute distress.  HEENT: Conjunctivae normal.  Neck: Trachea midline.  Cardiac: Heart regular rate, " rhythm, no murmurs, rubs, or gallops  Lungs: Lungs are clear to auscultation, there is no wheezing, rhonchi, or rales. There is no use of accessory muscles.  Chest wall: There is no tenderness to palpation over the chest wall or over ribs  Abdomen: Abdomen is soft, nontender, nondistended. There are no firm or pulsatile masses, no rebound rigidity or guarding.   Musculoskeletal: No deformity.  Neuro: Alert and oriented x 4.  Dermatology: Skin is warm and dry  Psych: Mentation is grossly normal, cognition is grossly normal. Affect is appropriate.        DIAGNOSTIC RESULTS     EKG: All EKGs are interpreted by the Emergency Department Physician who either signs or Co-signs this chart in the absence of a cardiologist.    ECG 12 Lead Chest Pain   Preliminary Result   Test Reason : Chest Pain   Blood Pressure :   */*   mmHG   Vent. Rate :  73 BPM     Atrial Rate :  73 BPM      P-R Int : 168 ms          QRS Dur :  96 ms       QT Int : 406 ms       P-R-T Axes :  38   6  50 degrees      QTc Int : 447 ms      Normal sinus rhythm   Moderate voltage criteria for LVH, may be normal variant ( R in aVL ,    Robbie product )   Borderline ECG   When compared with ECG of 22-JAN-2023 13:49,   No significant change was found      Referred By:            Confirmed By:             RADIOLOGY:   [x] Radiologist's Report Reviewed:  XR Chest 2 View   Final Result   Impression:   Mild pulmonary edema pattern with small bilateral pleural effusions and cardiomegaly. Superimposed patchy airspace changes are present within the right lung base in the left midlung which may be related to a mild pneumonia and/or atelectasis.         Electronically Signed: Conchis Garcia     4/18/2023 10:08 PM EDT     Workstation ID: [id number]          I ordered and independently reviewed the above noted radiographic studies.        LABS:    I have reviewed and interpreted all of the currently available lab results from this visit (if applicable):  Results for orders  placed or performed during the hospital encounter of 04/18/23   COVID-19 and FLU A/B PCR - Swab, Nasopharynx    Specimen: Nasopharynx; Swab   Result Value Ref Range    COVID19 Not Detected Not Detected - Ref. Range    Influenza A PCR Not Detected Not Detected    Influenza B PCR Not Detected Not Detected   Comprehensive Metabolic Panel    Specimen: Blood   Result Value Ref Range    Glucose 111 (H) 65 - 99 mg/dL    BUN 88 (H) 6 - 20 mg/dL    Creatinine 5.74 (H) 0.76 - 1.27 mg/dL    Sodium 144 136 - 145 mmol/L    Potassium 4.8 3.5 - 5.2 mmol/L    Chloride 114 (H) 98 - 107 mmol/L    CO2 18.0 (L) 22.0 - 29.0 mmol/L    Calcium 9.0 8.6 - 10.5 mg/dL    Total Protein 7.4 6.0 - 8.5 g/dL    Albumin 3.2 (L) 3.5 - 5.2 g/dL    ALT (SGPT) 20 1 - 41 U/L    AST (SGOT) 30 1 - 40 U/L    Alkaline Phosphatase 110 39 - 117 U/L    Total Bilirubin 0.2 0.0 - 1.2 mg/dL    Globulin 4.2 gm/dL    A/G Ratio 0.8 g/dL    BUN/Creatinine Ratio 15.3 7.0 - 25.0    Anion Gap 12.0 5.0 - 15.0 mmol/L    eGFR 11.5 (L) >60.0 mL/min/1.73   Single High Sensitivity Troponin T    Specimen: Blood   Result Value Ref Range    HS Troponin T 177 (C) <15 ng/L   BNP    Specimen: Blood   Result Value Ref Range    proBNP 11,594.0 (H) 0.0 - 450.0 pg/mL   CBC Auto Differential    Specimen: Blood   Result Value Ref Range    WBC 10.62 3.40 - 10.80 10*3/mm3    RBC 3.60 (L) 4.14 - 5.80 10*6/mm3    Hemoglobin 10.4 (L) 13.0 - 17.7 g/dL    Hematocrit 32.8 (L) 37.5 - 51.0 %    MCV 91.1 79.0 - 97.0 fL    MCH 28.9 26.6 - 33.0 pg    MCHC 31.7 31.5 - 35.7 g/dL    RDW 14.2 12.3 - 15.4 %    RDW-SD 47.2 37.0 - 54.0 fl    MPV 13.3 (H) 6.0 - 12.0 fL    Platelets 153 140 - 450 10*3/mm3    Neutrophil % 73.4 42.7 - 76.0 %    Lymphocyte % 16.9 (L) 19.6 - 45.3 %    Monocyte % 5.7 5.0 - 12.0 %    Eosinophil % 2.7 0.3 - 6.2 %    Basophil % 0.8 0.0 - 1.5 %    Immature Grans % 0.5 0.0 - 0.5 %    Neutrophils, Absolute 7.79 (H) 1.70 - 7.00 10*3/mm3    Lymphocytes, Absolute 1.79 0.70 - 3.10 10*3/mm3     Monocytes, Absolute 0.61 0.10 - 0.90 10*3/mm3    Eosinophils, Absolute 0.29 0.00 - 0.40 10*3/mm3    Basophils, Absolute 0.09 0.00 - 0.20 10*3/mm3    Immature Grans, Absolute 0.05 0.00 - 0.05 10*3/mm3    nRBC 0.0 0.0 - 0.2 /100 WBC   ECG 12 Lead Chest Pain   Result Value Ref Range    QT Interval 406 ms    QTC Interval 447 ms        If labs were ordered, I independently reviewed the results and considered them in treating the patient.      EMERGENCY DEPARTMENT COURSE and DIFFERENTIAL DIAGNOSIS/MDM:   Vitals:  AS OF 23:08 EDT    BP - (!) 225/129  HR - 79  TEMP - 97.9 °F (36.6 °C) (Oral)  O2 SATS - 98%        Discussion below represents my analysis of pertinent findings related to patient's condition, differential diagnosis, treatment plan and final disposition.      Differential diagnosis:  The differential diagnosis associated with the patient's presentation includes: CHF, pneumonia, pneumothorax, non-STEMI, unstable angina, pericardial effusion, anemia, electrolyte derangement, renal injury      Independent interpretations (ECG/rhythm strip/X-ray/US/CT scan): Independently interpreted the patient's cardiac monitoring which demonstrates sinus rhythm without dysrhythmia.  Independently interpreted the patient's chest x-ray which shows evidence of volume overload.      Additional sources:  Discussed/obtained information from independent historians:   [] Spouse:   [] Parent:   [] Friend:   [] EMS:   [] Other:  External (non-ED) record review:   [x] Inpatient record: Reviewed documentation from inpatient admission in January.  Patient with CHF exacerbation.   [] Office record:   [] Outpatient record:   [] Prior Outpatient labs:   [] Prior Outpatient radiology:   [] Primary Care record:   [] Outside ED record:   [x] Other: Reviewed echocardiogram report from January.  Ejection fraction 51 to 55%.      Patient's care impacted by:   [x] Diabetes   [x] Hypertension   [] Coronary Artery Disease   [] Cancer   [x] Other:  Congestive heart failure, CKD    Care significantly affected by Social Determinants of Health (housing and economic circumstances, unemployment)    [] Yes     [x] No   If yes, Patient's care significantly limited by  Social Determinants of Health including:    [] Inadequate housing    [] Low income    [] Alcoholism and drug addiction in family    [] Problems related to primary support group    [] Unemployment    [] Problems related to employment    [] Other Social Determinants of Health:       Consideration of admission/observation vs discharge: This patient presents with hypertensive emergency with associated acute kidney injury, non-STEMI, and exacerbation of CHF requiring admission.      I considered prescription management with:    [] Pain medication:   [] Antiviral:   [] Antibiotic:   [x] Other: Patient given Bumex, heparin infusion, nitroglycerin drip      ED Course:    ED Course as of 04/18/23 2308 Tue Apr 18, 2023 2246 I spoke w/ hospitalist Dr. Castro. Discussed presentation, workup, intervention. Accepts for admission. [NS]   4991 This patient presents with acute exacerbation of CHF with associated pulmonary edema and chest pain along with acute kidney injury.  Initial troponin elevated, however no obvious acute changes on ECG and no ongoing chest pain.  Unclear if this elevation is related to renal dysfunction versus non-STEMI.  Patient has been out of his medications for multiple days and is significantly hypertensive.  Started on heparin, nitroglycerin, Bumex in the ED.  Admission to the hospitalist service. [NS]      ED Course User Index  [NS] Moise Parish MD           CRITICAL CARE TIME    Approximately 35 minutes of discontinuous critical care time was provided to this patient by myself absent of any time spent performing procedures.  Patient presents critically ill with hypertensive emergency with acute exacerbation of CHF, acute kidney injury, chest pain with elevated troponin placed in  the cardiovascular, respiratory, neurologic, renal systems at risk: Initiation of nitroglycerin infusion requiring the following interventions for emergent blood pressure control, heparin infusion, IV diuretic, interpretation of lab/ECG/imaging, frequent reassessment, coordination of admission with the following response: Improvement in blood pressure.  Patient at high risk of deterioration and possibly death without these interventions.      FINAL IMPRESSION      1. Acute on chronic systolic congestive heart failure    2. NSTEMI (non-ST elevated myocardial infarction)    3. Acute kidney injury    4. Hypertensive emergency    5. Noncompliance with medication regimen          DISPOSITION/PLAN     ED Disposition     ED Disposition   Decision to Admit    Condition   --    Comment   --               Comment: Please note this report has been produced using speech recognition software.      Moise Parish MD  Attending Emergency Physician           Moise Parish MD  04/18/23 9011

## 2023-04-19 NOTE — PROGRESS NOTES
[facility] Medicine Services  ADMISSION FOLLOW-UP NOTE          Patient admitted after midnight, H&P by my partner performed earlier on today's date reviewed.  Interim findings, labs, and charting also reviewed.        The [facility] Hospital Problem List has been managed and updated to include any new diagnoses:  Active Hospital Problems    Diagnosis  POA   • Acute on chronic systolic congestive heart failure [I50.23]  Yes   • Hypertensive urgency [I16.0]  Yes   • CKD (chronic kidney disease) stage 5, GFR less than 15 ml/min [N18.5]  Yes   • Elevated troponin [R77.8]  Yes   • Hyperlipidemia [E78.5]  Yes   • Essential hypertension [I10]  Yes   • Type 2 diabetes mellitus with hyperglycemia (HCC) [E11.65]  Yes      Resolved Hospital Problems   No resolved problems to display.         ADDITIONAL PLAN:  - detailed assessment and plan from admission reviewed  - Patient seen and examined. No complaints currently.     47 y.o. male with PMH significant for DM 2, HLD, HTN, LEFTY, obesity, CKD V, who presents to the ED with complaint of cough and shortness of breath who was found to have concern for elevated troponin with acute on chronic systolic congestive heart failure and hypertensive urgency.     Acute on chronic systolic congestive heart failure  - Patient has been out of his medication for the past month  - Continue Bumex gtt   - Echo on 1/22/2023 notes EF 51-55%  - Continue metoprolol  - CT chest with pneumonia versus pulmonary edema.  Afebrile, denies fever or chills.  No leukocytosis.  Procalcitonin normal.  Does not currently appear to be infectious, favor edema  - On RA    Hypertensive urgency  Essential hypertension  - Patient has been out of home medication for the past month  - Continue Nitro gtt, wean as tolerated   - Resumed home medications including metoprolol, nifedipine, hydralazine and Imdur  - Cardiology to evaluate today     NSTEMI  Elevated troponin  - Likely NSTEMI type II  secondary to hypertensive urgency and acute on chronic CHF but was started on heparin gtt on admission. Will continue for now until Cardiology evaluation   - Trend troponin (177 -> 170)   - EKG without acute changes   - Patient denies chest pain with the exception of when he coughs     Dyslipidemia  - Continue Lipitor     DM2  - Hold glipizide  - FSBG 3 times daily  - SS insulin  - Hemoglobin A1c 6.4     CKD V  - Nephrology to evaluate today, patient was lost to follow-up since last discharge secondary to no insurance coverage  - Not on dialysis, still makes urine    Hypomagnesemia  -Replace    Expected Discharge   Expected Discharge Date and Time     Expected Discharge Date Expected Discharge Time    Apr 21, 2023            Lorena Díaz,   04/19/23

## 2023-04-19 NOTE — H&P
[facility] Medicine Services  HISTORY AND PHYSICAL    Patient Name: Angel Blair  : 1975  MRN: [id number]  Primary Care Physician: Jay Nevarez MD  Date of admission: 2023    Subjective   Subjective     Chief Complaint:  Cough, shortness of breath    HPI:  Angel Blair is a 47 y.o. male with PMH significant for DM 2, HLD, HTN, LEFTY, obesity, CKD V, who presents to the ED with complaint of cough and shortness of breath.  He states that over the past week he has had cough with progressive shortness of breath.  He states that the shortness of breath significantly increased today.  He states that the cough has been productive of white sputum.  He also has noticed lower extremity edema today.  He admits that he has been out of his routine home medications for the past month.  He has also not been able to follow with nephrology secondary to no insurance.  He denies fever, nausea, vomiting, diarrhea.  He does admit to having chest pain but only with his cough.  Upon arrival to the ED, he is found to have elevated troponin and proBNP.  He is also noted to have worsening renal function.  He has mild anemia.  CXR is concerning for mild pulmonary edema pattern with small bilateral pleural effusions and cardiomegaly with superimposed patchy airspace disease present in the right and left lung.  He was hypertensive.  He was started on a heparin drip and nitroglycerin drip while in the ED and given IV Bumex.  He will be admitted to hospital medicine for further evaluation.    Review of Systems   Constitutional: Positive for activity change and fatigue. Negative for appetite change, chills, diaphoresis, fever and unexpected weight change.   HENT: Negative.  Negative for congestion, sore throat and trouble swallowing.    Eyes: Negative.  Negative for visual disturbance.   Respiratory: Positive for cough and shortness of breath. Negative for chest tightness and wheezing.     Cardiovascular: Positive for chest pain (With cough) and leg swelling. Negative for palpitations.   Gastrointestinal: Negative.  Negative for abdominal distention, abdominal pain, constipation, diarrhea, nausea and vomiting.   Endocrine: Negative.  Negative for polydipsia and polyuria.   Genitourinary: Negative.  Negative for difficulty urinating, dysuria, frequency and urgency.   Musculoskeletal: Negative.  Negative for arthralgias, back pain, gait problem, myalgias and neck pain.   Skin: Negative.  Negative for color change, pallor, rash and wound.   Allergic/Immunologic: Negative.  Negative for immunocompromised state.   Neurological: Negative for dizziness, tremors, syncope, weakness, light-headedness, numbness and headaches.   Hematological: Negative.  Does not bruise/bleed easily.   Psychiatric/Behavioral: Negative.  Negative for confusion. The patient is not nervous/anxious.             Personal History     Past Medical History:   Diagnosis Date   • Diabetes mellitus    • Hyperlipidemia    • Hypertension    • Knee swelling 7/20/22   • Sleep apnea          Past Surgical History:   Procedure Laterality Date   • SOFT TISSUE TUMOR RESECTION  2010       Family History:  family history includes Asthma in his brother; Lung disease in his mother; No Known Problems in his father.     Social History:  reports that he has never smoked. He has never used smokeless tobacco. He reports that he does not drink alcohol and does not use drugs.  Social History     Social History Narrative    , works in factory in [place] 5 kids, 2 grands, one adopted daughter       Medications:  NIFEdipine XL, acetaminophen, atorvastatin, bumetanide, glipizide, hydrALAZINE, isosorbide mononitrate, and metoprolol tartrate    Allergies   Allergen Reactions   • Lisinopril Swelling       Objective   Objective     Vital Signs:   Temp:  [97.9 °F (36.6 °C)] 97.9 °F (36.6 °C)  Heart Rate:  [70-81] 81  Resp:  [20] 20  BP: (184-236)/(110-130)  207/122    Physical Exam   Constitutional: Awake, alert no acute distress  Eyes: PERRLA, sclerae anicteric, no conjunctival injection  HENT: NCAT, mucous membranes moist  Neck: Supple, no thyromegaly, no lymphadenopathy, trachea midline  Respiratory: Expiratory wheeze right lower lobe, decreased left lower lobe, nonlabored respirations   Cardiovascular: RRR, no murmurs, rubs, or gallops, palpable pedal pulses bilaterally  Gastrointestinal: Positive bowel sounds, soft, nontender, nondistended  Musculoskeletal: Mild bilateral ankle edema, no clubbing or cyanosis to extremities  Psychiatric: Appropriate affect, cooperative  Neurologic: Oriented x 3, strength symmetric in all extremities, Cranial Nerves grossly intact to confrontation, speech clear  Skin: No rashes      Result Review:  I have personally reviewed the results from the time of this admission to 4/19/2023 01:37 EDT and agree with these findings:  [x]  Laboratory list / accordion  []  Microbiology  [x]  Radiology  [x]  EKG/Telemetry   []  Cardiology/Vascular   []  Pathology  [x]  Old records  []  Other:  Most notable findings include:     LAB RESULTS:      Lab 04/18/23 2308 04/18/23 2151   WBC  --  10.62   HEMOGLOBIN  --  10.4*   HEMATOCRIT  --  32.8*   PLATELETS  --  153   NEUTROS ABS  --  7.79*   IMMATURE GRANS (ABS)  --  0.05   LYMPHS ABS  --  1.79   MONOS ABS  --  0.61   EOS ABS  --  0.29   MCV  --  91.1   PROTIME 13.8  --    APTT 29.7*  --    HEPARIN ANTI-XA 0.10*  --          Lab 04/18/23 2151   SODIUM 144   POTASSIUM 4.8   CHLORIDE 114*   CO2 18.0*   ANION GAP 12.0   BUN 88*   CREATININE 5.74*   EGFR 11.5*   GLUCOSE 111*   CALCIUM 9.0         Lab 04/18/23 2151   TOTAL PROTEIN 7.4   ALBUMIN 3.2*   GLOBULIN 4.2   ALT (SGPT) 20   AST (SGOT) 30   BILIRUBIN 0.2   ALK PHOS 110         Lab 04/18/23 2308 04/18/23 2151   PROBNP  --  11,594.0*   HSTROP T  --  177*   PROTIME 13.8  --    INR 1.07  --                  Brief Urine Lab Results  (Last result in  the past 365 days)      Color   Clarity   Blood   Leuk Est   Nitrite   Protein   CREAT   Urine HCG        01/23/23 2223             34.4             Microbiology Results (last 10 days)     Procedure Component Value - Date/Time    COVID PRE-OP / PRE-PROCEDURE SCREENING ORDER (NO ISOLATION) - Swab, Nasopharynx [[id number]]  (Normal) Collected: 04/18/23 2152    Lab Status: Final result Specimen: Swab from Nasopharynx Updated: 04/18/23 2224    Narrative:      The following orders were created for panel order COVID PRE-OP / PRE-PROCEDURE SCREENING ORDER (NO ISOLATION) - Swab, Nasopharynx.  Procedure                               Abnormality         Status                     ---------                               -----------         ------                     COVID-19 and FLU A/B PCR...[[id number]]  Normal              Final result                 Please view results for these tests on the individual orders.    COVID-19 and FLU A/B PCR - Swab, Nasopharynx [[id number]]  (Normal) Collected: 04/18/23 2152    Lab Status: Final result Specimen: Swab from Nasopharynx Updated: 04/18/23 2224     COVID19 Not Detected     Influenza A PCR Not Detected     Influenza B PCR Not Detected    Narrative:      Fact sheet for providers: https://www.fda.gov/media/764884/download    Fact sheet for patients: https://www.fda.gov/media/926966/download    Test performed by PCR.          XR Chest 2 View    Result Date: 4/18/2023  XR CHEST 2 VW Date of Exam: 4/18/2023 9:52 PM EDT Indication: cp/sob. Comparison: 1/22/2023 Findings: Patchy airspace disease is seen throughout the lungs bilaterally, most prominent within the left midlung and the right lower lobe. Small bilateral pleural effusions are present. There is indistinctness of the pulmonary vasculature. The heart appears enlarged. No pneumothorax. No acute osseous abnormality identified.     Impression: Impression: Mild pulmonary edema pattern with small bilateral pleural effusions and  cardiomegaly. Superimposed patchy airspace changes are present within the right lung base in the left midlung which may be related to a mild pneumonia and/or atelectasis. Electronically Signed: Conchis Garcia  4/18/2023 10:08 PM EDT  Workstation ID: [id number]      Results for orders placed during the hospital encounter of 01/22/23    Adult Transthoracic Echo Complete With Contrast if Necessary Per Protocol    Interpretation Summary  •  Left ventricular ejection fraction appears to be 51 - 55%.  •  Left ventricular wall thickness is consistent with mild to moderate concentric hypertrophy.  •  The cardiac valves are anatomically and functionally normal.      Assessment & Plan   Assessment & Plan       Type 2 diabetes mellitus with hyperglycemia (HCC)    Essential hypertension    Hyperlipidemia    Elevated troponin    Acute on chronic systolic congestive heart failure    Hypertensive urgency    CKD (chronic kidney disease) stage 5, GFR less than 15 ml/min    47 y.o. male with PMH significant for DM 2, HLD, HTN, LEFTY, obesity, CKD V, who presents to the ED with complaint of cough and shortness of breath who was found to have concern for elevated troponin with acute on chronic systolic congestive heart failure and hypertensive urgency.    Acute on chronic systolic congestive heart failure  - Patient has been out of his medication for the past month  - Daily weight  - Bumex 2 mg given in the ED, will start Bumex drip for now with plan for nephrology and cardiology consult for diuresis recommendations  - Echo on 1/22/2023 notes EF 51-55%  - Continue metoprolol  - CT chest with pneumonia versus pulmonary edema.  Afebrile, denies fever or chills.  No leukocytosis.  Procalcitonin pending.  Does not currently appear to be infectious, low threshold to add on antibiotics.    Hypertensive urgency  Essential hypertension  - Patient has been out of home medication for the past month  - NTG gtt. started in the ED for BP control with goal  rate for -180 tonight and continue to slowly trend down  - Restart home medications including metoprolol, nifedipine, hydralazine and Imdur    NSTEMI  Elevated troponin  - Likely NSTEMI type II secondary to hypertensive urgency and acute on chronic CHF  - Trend troponin  - Trend EKG  - Patient denies chest pain with the exception of when he coughs    Dyslipidemia  - Continue Lipitor    DM2  - Hold glipizide  - FSBG 3 times daily  - SS insulin  - Hemoglobin A1c 6.4 on 1/23/2023    CKD V  - Nephrology consult in the a.m., patient lost to follow-up since last discharge secondary to no insurance coverage  - Not on dialysis, still makes urine    DVT prophylaxis: On heparin drip    CODE STATUS:    Code Status (Patient has no pulse and is not breathing): CPR (Attempt to Resuscitate)  Medical Interventions (Patient has pulse or is breathing): Full Support  Release to patient: Routine Release      Expected Discharge  Expected Discharge Date and Time     Expected Discharge Date Expected Discharge Time    Apr 21, 2023           This note has been completed as part of a split-shared workflow.     Signature: Electronically signed by ROBBY Thomas, 04/19/23, 1:37 AM EDT.  Total APC time spent 63 minutes   Total attending time spent: 25 minutes      Attending   Admission Attestation       I have performed an independent face-to-face diagnostic evaluation including performing an independent physical examination as documented here.  The documented plan of care above was reviewed and developed with the advanced practice clinician (APC).      Brief Summary Statement:   nAgel Blair is a 47 y.o. male with a PMH significant for CKD stage IV, HTN, HLD, diabetes mellitus, LEFTY who comes to the ED due to cough and shortness of breath.  Patient reports cough ongoing for the past 3 weeks.  Over the past few days he has had worsening shortness of breath which became more severe today.  He reports off-and-on chest pain,  worsening lower extremity edema.  He denies orthopnea, nausea, fever, vomiting, chills, diaphoresis.    Remainder of detailed HPI is as noted by APC and has been reviewed and/or edited by me for completeness.    Attending Physical Exam:  Temp:  [97.9 °F (36.6 °C)] 97.9 °F (36.6 °C)  Heart Rate:  [70-81] 76  Resp:  [20] 20  BP: (182-236)/(107-130) 182/107    Constitutional: Awake, alert  Eyes: PERRLA, sclerae anicteric, no conjunctival injection  HENT: NCAT, mucous membranes moist  Neck: Supple, no thyromegaly, no lymphadenopathy, trachea midline  Respiratory: Inspiratory crackles at bilateral lung bases  Cardiovascular: RRR, no murmurs, rubs, or gallops, palpable pedal pulses bilaterally  Gastrointestinal: Positive bowel sounds, soft, nontender, nondistended  Musculoskeletal: 3+ bilateral ankle edema, no clubbing or cyanosis to extremities  Psychiatric: Appropriate affect, cooperative  Neurologic: Oriented x 3, strength symmetric in all extremities, Cranial Nerves grossly intact to confrontation, speech clear  Skin: No rashes      Brief Assessment/Plan :  See detailed assessment and plan developed with APC which I have reviewed and/or edited for completeness.            Yoli Sykes,   04/19/23

## 2023-04-19 NOTE — CONSULTS
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  His EF subsequently improved.  He has severe hypertension hyperlipidemia diabetes.  He had a negative stress PET in 2021.  The patient comes in complaining of cough and shortness of breath.  He acknowledges he ran out of his medications and has not had them.  He is also not been following up with nephrology.  He presented with severe hypertension with systolics up to 232.  The patient states he has been out of his medicine for at least a month but he did continue his metoprolol.  He has sleep apnea and apparently wears his CPAP faithfully.  Prior to a month ago he was having no dyspnea no orthopnea no tightness heaviness squeezing pressure chest jaw throat arms and no edema.  States usually he has very thin legs.  He has significant edema..  Complains of cough for 2 to 3 weeks even coughing so much his abdomen is sore in his chest hurts when he coughs.  No ischemic sounding symptoms    Cardiac risk factors: #1 male sex #2 hypertension 3 hyperlipidemia #4 diabetes    Past medical and surgical history, social and family history reviewed in EMR.    REVIEW OF SYSTEMS:     Past Medical History:   Diagnosis Date   • Diabetes mellitus    • Hyperlipidemia    • Hypertension    • Knee swelling 7/20/22   • Sleep apnea      Past Surgical History:   Procedure Laterality Date   • SOFT TISSUE TUMOR RESECTION  2010     Social History     Socioeconomic History   • Marital status:    Tobacco Use   • Smoking status: Never   • Smokeless tobacco: Never   Vaping Use   • Vaping Use: Never used   Substance and Sexual Activity   •  Alcohol use: No   • Drug use: No   • Sexual activity: Yes     Partners: Female     Family History   Problem Relation Age of Onset   • Lung disease Mother    • No Known Problems Father    • Asthma Brother        H&P ROS reviewed and pertinent CV ROS as noted in HPI.    Cardiac: Patient's had a negative stress PET in 2021.  Has had previously mild LV dysfunction was last known EF 51 to 55%.  He has had previous history of heart failure with uncontrolled hypertension.  He has chronically elevated troponin.  Complains of chest pain only with cough  Respiratory: Complains of dyspnea and cough  Lower Extremities: Complain of increased lower extremity edema  GI: No nausea vomit diarrhea bright blood per rectum or melena  Neuro: No history of stroke TIA or neurologic event  Hematology: No bleeding diathesis ecchymosis or petechiae  Renal: Has chronic progressive renal failure  Musculoskeletal: Does not complain of any joint pain  Endocrine: Has diabetes  Constitutional: No fever or chills  Psych: No depression or suicidal ideas      Physical Exam: General very pleasant large male in bed at a 60 degree angle not dyspneic tachypneic current systolic blood pressure 188 heart rates in the 80s       HEENT: No JVP.  No icterus       Respiratory: Equal bilateral symmetrical expansion overall clear auscultation bilaterally       Cardiovascular: Regular rate and rhythm with no gallop with pitting edema to palpation with edema even of the dorsum of the feet       GI: Obese and soft       Lower Extremities: Edema is present but no lesions       Neuro: Facial expressions are symmetrical moves all 4 extremities       Skin: Warm and dry with pitting edema to palpation       Psych: Pleasant affect oriented x3    Results Review: EKG shows sinus rhythm T wave inversion 1 and aVL this is a chronic finding.  BNP mildly elevated.  Mag was low at 1.4 hemoglobin 9.3.  Creatinine is up to 5.88 high-sensitivity troponin 170.  Review of all  troponin show chronically elevated troponin.  BNP is 11,594.           Vital Sign Min/Max for last 24 hours  Temp  Min: 97.9 °F (36.6 °C)  Max: 98.5 °F (36.9 °C)   BP  Min: 157/106  Max: 236/130   Pulse  Min: 70  Max: 86   Resp  Min: 18  Max: 20   SpO2  Min: 94 %  Max: 99 %   No data recorded      Intake/Output Summary (Last 24 hours) at 4/19/2023 1320  Last data filed at 4/19/2023 1250  Gross per 24 hour   Intake --   Output 2175 ml   Net -2175 ml             Current Facility-Administered Medications:   •  acetaminophen (TYLENOL) tablet 650 mg, 650 mg, Oral, Q4H PRN, Shira Goldman APRN  •  atorvastatin (LIPITOR) tablet 40 mg, 40 mg, Oral, Nightly, Shira Goldman APRN  •  bumetanide (BUMEX) 10 mg in sodium chloride 0.9 % 100 mL (0.1 mg/mL) infusion, 1 mg/hr, Intravenous, Continuous, Shira Goldman APRN, Last Rate: 10 mL/hr at 04/19/23 1247, 1 mg/hr at 04/19/23 1247  •  Calcium Replacement - Follow Nurse / BPA Driven Protocol, , Does not apply, PRN, Lorena Díaz, DO  •  dextrose (D50W) (25 g/50 mL) IV injection 25 g, 25 g, Intravenous, Q15 Min PRN, Shira Goldman APRN  •  dextrose (GLUTOSE) oral gel 15 g, 15 g, Oral, Q15 Min PRN, Shira Goldman APRN  •  glucagon (GLUCAGEN) injection 1 mg, 1 mg, Intramuscular, Q15 Min PRN, Shira Goldman APRN  •  heparin (porcine) injection 2,000 Units, 2,000 Units, Intravenous, Once, Robin Ruffin, PharmD  •  heparin 95744 units/250 mL (100 units/mL) in 0.45 % NaCl infusion, 12 Units/kg/hr, Intravenous, Titrated, Robin Ruffin, PharmD, Last Rate: 13.97 mL/hr at 04/19/23 0625, 11 Units/kg/hr at 04/19/23 0625  •  hydrALAZINE (APRESOLINE) tablet 75 mg, 75 mg, Oral, Q8H, Shira Goldman, ROBBY, 75 mg at 04/19/23 0518  •  Insulin Lispro (humaLOG) injection 0-7 Units, 0-7 Units, Subcutaneous, TID AC, Shira Goldman APRN, 2 Units at 04/19/23 1137  •  Magnesium Standard Dose Replacement - Follow Nurse / BPA Driven Protocol, , Does not apply, PRN, Díaz,  Lorena WYMAN DO  •  metoprolol tartrate (LOPRESSOR) tablet 50 mg, 50 mg, Oral, Q12H, Shira Goldman APRN, 50 mg at 04/19/23 0904  •  NIFEdipine XL (PROCARDIA XL) 24 hr tablet 90 mg, 90 mg, Oral, Daily, Shira Goldman APRN, 90 mg at 04/19/23 0904  •  nitroglycerin (TRIDIL) 200 mcg/ml infusion, 5-200 mcg/min, Intravenous, Titrated, Shira Goldman APRN, Last Rate: 22.5 mL/hr at 04/19/23 1250, 75 mcg/min at 04/19/23 1250  •  [START ON 4/20/2023] Pharmacy Consult - MTM, , Does not apply, Daily, Robin Ruffin, PharmD  •  Pharmacy to Dose Heparin, , Does not apply, Continuous PRN, Moise Parish MD  •  Phosphorus Replacement - Follow Nurse / BPA Driven Protocol, , Does not apply, PRN, Lorena Díaz DO  •  Potassium Replacement - Follow Nurse / BPA Driven Protocol, , Does not apply, PRN, Lorena Díaz DO  •  sodium chloride 0.9 % flush 10 mL, 10 mL, Intravenous, Q12H, Shira Goldman APRN, 10 mL at 04/19/23 0905  •  sodium chloride 0.9 % flush 10 mL, 10 mL, Intravenous, PRN, Shira Goldman APRN  •  sodium chloride 0.9 % infusion 40 mL, 40 mL, Intravenous, PRN, Shira Goldman APRN    Lab Review:   Results from last 7 days   Lab Units 04/19/23 0527 04/18/23 2151   WBC 10*3/mm3 9.97 10.62   HEMOGLOBIN g/dL 9.3* 10.4*   PLATELETS 10*3/mm3 135* 153     Results from last 7 days   Lab Units 04/19/23 0527 04/18/23 2151   SODIUM mmol/L 142 144   POTASSIUM mmol/L 4.4 4.8   CO2 mmol/L 17.0* 18.0*   BUN mg/dL 83* 88*   CREATININE mg/dL 5.88* 5.74*   MAGNESIUM mg/dL 1.4*  --    GLUCOSE mg/dL 110* 111*     Estimated Creatinine Clearance: 21.9 mL/min (A) (by C-G formula based on SCr of 5.88 mg/dL (H)).    Results from last 7 days   Lab Units 04/19/23  0527   HEMOGLOBIN A1C % 6.40*     .lipd  Lab Results   Component Value Date    TRIG 143 04/19/2023    HDL 44 04/19/2023    AST 30 04/18/2023    ALT 20 04/18/2023       Radiology Reports:  Imaging Results (Last 72 Hours)     Procedure Component Value Units  Date/Time    CT Chest Without Contrast Diagnostic [[id number]] Collected: 04/19/23 0048     Updated: 04/19/23 0253    Narrative:      EXAM: CT SCAN OF THE CHEST WITHOUT CONTRAST    INDICATION: Abnormal chest x-ray    TECHNIQUE: CT scan through the chest was performed without the administration of intravenous contrast. CT dose lowering techniques were used, to include: automated exposure control, adjustment for patient size, and / or use of iterative reconstruction.     COMPARISON: 2/18/2021    FINDINGS:  Vasculature: There is no thoracic aortic aneurysm.  There are atherosclerotic calcifications of the thoracic aorta, great vessels and coronary arteries.    Mediastinum / Pleura: Small pleural effusions are present. Calcified subcarinal and right hilar lymph nodes are present.     Airways / Lungs: The central airways are patent.  There is no pneumothorax. There is patchy airspace disease in both lungs, with perihilar predominance.     Bones and Chest Wall: No destructive osseous lesion is identified.    Upper Abdomen: Limited images below the diaphragm demonstrate no acute abnormality.      Impression:        1. Patchy airspace disease in both lungs, with perihilar predominance. This is favored to represent pneumonia in the acute setting. Please note alveolar pulmonary edema is also within the differential.  2. Small pleural effusions.    Electronically signed by:  Trevin Matos M.D.    4/19/2023 12:52 AM Mountain Time    XR Chest 2 View [[id number]] Collected: 04/18/23 2207     Updated: 04/18/23 2211    Narrative:      XR CHEST 2 VW    Date of Exam: 4/18/2023 9:52 PM EDT    Indication: cp/sob.    Comparison: 1/22/2023    Findings:  Patchy airspace disease is seen throughout the lungs bilaterally, most prominent within the left midlung and the right lower lobe. Small bilateral pleural effusions are present. There is indistinctness of the pulmonary vasculature. The heart appears   enlarged. No pneumothorax. No acute  osseous abnormality identified.      Impression:      Impression:  Mild pulmonary edema pattern with small bilateral pleural effusions and cardiomegaly. Superimposed patchy airspace changes are present within the right lung base in the left midlung which may be related to a mild pneumonia and/or atelectasis.      Electronically Signed: Conchis Garcia    4/18/2023 10:08 PM EDT    Workstation ID: [id number]          Assessment/Plan: This patient presents with hypertensive urgency with heart failure-the patient ran out of his medication has not been able to get them.  His current blood pressure is 188 systolic.  I will go ahead and put the patient on clonidine point 1 mg every hour to get his systolic down as we can get him off nitroglycerin.  2 elevated BNP secondary to severe hypertensive heart disease. patient states he does make urine.  He is currently on a Bumex drip.  His lungs are currently clear with no JVP.    3 chronically elevated troponin-no need to continue heparin drip.  There are no ischemic EKG changes  Patient tells me has been seen by nephrology and was given the option of peritoneal dialysis or hemodialysis  Dr. Amezquita will resume care in a..      Андрей Lawrence MD  04/19/23  13:20 EDT

## 2023-04-20 LAB
ANION GAP SERPL CALCULATED.3IONS-SCNC: 13 MMOL/L (ref 5–15)
BUN SERPL-MCNC: 85 MG/DL (ref 6–20)
BUN/CREAT SERPL: 15.5 (ref 7–25)
CALCIUM SPEC-SCNC: 8.7 MG/DL (ref 8.6–10.5)
CHLORIDE SERPL-SCNC: 110 MMOL/L (ref 98–107)
CO2 SERPL-SCNC: 19 MMOL/L (ref 22–29)
CREAT SERPL-MCNC: 5.47 MG/DL (ref 0.76–1.27)
DEPRECATED RDW RBC AUTO: 46.4 FL (ref 37–54)
EGFRCR SERPLBLD CKD-EPI 2021: 12.2 ML/MIN/1.73
ERYTHROCYTE [DISTWIDTH] IN BLOOD BY AUTOMATED COUNT: 14.1 % (ref 12.3–15.4)
GLUCOSE BLDC GLUCOMTR-MCNC: 159 MG/DL (ref 70–130)
GLUCOSE BLDC GLUCOMTR-MCNC: 174 MG/DL (ref 70–130)
GLUCOSE BLDC GLUCOMTR-MCNC: 191 MG/DL (ref 70–130)
GLUCOSE SERPL-MCNC: 146 MG/DL (ref 65–99)
HCT VFR BLD AUTO: 30.7 % (ref 37.5–51)
HGB BLD-MCNC: 9.8 G/DL (ref 13–17.7)
MAGNESIUM SERPL-MCNC: 1.7 MG/DL (ref 1.6–2.6)
MCH RBC QN AUTO: 28.9 PG (ref 26.6–33)
MCHC RBC AUTO-ENTMCNC: 31.9 G/DL (ref 31.5–35.7)
MCV RBC AUTO: 90.6 FL (ref 79–97)
PLATELET # BLD AUTO: 150 10*3/MM3 (ref 140–450)
PMV BLD AUTO: 13.3 FL (ref 6–12)
POTASSIUM SERPL-SCNC: 4.7 MMOL/L (ref 3.5–5.2)
RBC # BLD AUTO: 3.39 10*6/MM3 (ref 4.14–5.8)
SODIUM SERPL-SCNC: 142 MMOL/L (ref 136–145)
WBC NRBC COR # BLD: 8.87 10*3/MM3 (ref 3.4–10.8)

## 2023-04-20 PROCEDURE — 63710000001 INSULIN LISPRO (HUMAN) PER 5 UNITS: Performed by: NURSE PRACTITIONER

## 2023-04-20 PROCEDURE — 99232 SBSQ HOSP IP/OBS MODERATE 35: CPT | Performed by: NURSE PRACTITIONER

## 2023-04-20 PROCEDURE — 85027 COMPLETE CBC AUTOMATED: CPT | Performed by: FAMILY MEDICINE

## 2023-04-20 PROCEDURE — 80048 BASIC METABOLIC PNL TOTAL CA: CPT | Performed by: FAMILY MEDICINE

## 2023-04-20 PROCEDURE — 82962 GLUCOSE BLOOD TEST: CPT

## 2023-04-20 PROCEDURE — 97535 SELF CARE MNGMENT TRAINING: CPT | Performed by: OCCUPATIONAL THERAPIST

## 2023-04-20 PROCEDURE — 99232 SBSQ HOSP IP/OBS MODERATE 35: CPT | Performed by: INTERNAL MEDICINE

## 2023-04-20 PROCEDURE — 83735 ASSAY OF MAGNESIUM: CPT | Performed by: FAMILY MEDICINE

## 2023-04-20 PROCEDURE — 97165 OT EVAL LOW COMPLEX 30 MIN: CPT | Performed by: OCCUPATIONAL THERAPIST

## 2023-04-20 PROCEDURE — 97161 PT EVAL LOW COMPLEX 20 MIN: CPT

## 2023-04-20 PROCEDURE — 97530 THERAPEUTIC ACTIVITIES: CPT

## 2023-04-20 RX ORDER — BENZONATATE 100 MG/1
100 CAPSULE ORAL 3 TIMES DAILY PRN
Status: DISCONTINUED | OUTPATIENT
Start: 2023-04-20 | End: 2023-04-29 | Stop reason: HOSPADM

## 2023-04-20 RX ORDER — BUMETANIDE 0.25 MG/ML
2 INJECTION INTRAMUSCULAR; INTRAVENOUS EVERY 12 HOURS
Status: DISCONTINUED | OUTPATIENT
Start: 2023-04-20 | End: 2023-04-22

## 2023-04-20 RX ORDER — ISOSORBIDE MONONITRATE 30 MG/1
30 TABLET, EXTENDED RELEASE ORAL
Status: DISCONTINUED | OUTPATIENT
Start: 2023-04-20 | End: 2023-04-21

## 2023-04-20 RX ADMIN — BUMETANIDE 1 MG/HR: 0.25 INJECTION INTRAMUSCULAR; INTRAVENOUS at 08:55

## 2023-04-20 RX ADMIN — NIFEDIPINE 90 MG: 30 TABLET, EXTENDED RELEASE ORAL at 08:15

## 2023-04-20 RX ADMIN — INSULIN LISPRO 2 UNITS: 100 INJECTION, SOLUTION INTRAVENOUS; SUBCUTANEOUS at 11:52

## 2023-04-20 RX ADMIN — HYDRALAZINE HYDROCHLORIDE 75 MG: 50 TABLET, FILM COATED ORAL at 21:21

## 2023-04-20 RX ADMIN — HYDRALAZINE HYDROCHLORIDE 75 MG: 50 TABLET, FILM COATED ORAL at 04:26

## 2023-04-20 RX ADMIN — Medication 10 ML: at 21:21

## 2023-04-20 RX ADMIN — ISOSORBIDE MONONITRATE 30 MG: 30 TABLET, EXTENDED RELEASE ORAL at 14:34

## 2023-04-20 RX ADMIN — ATORVASTATIN CALCIUM 40 MG: 40 TABLET, FILM COATED ORAL at 21:21

## 2023-04-20 RX ADMIN — BUMETANIDE 2 MG: 0.25 INJECTION, SOLUTION INTRAMUSCULAR; INTRAVENOUS at 14:35

## 2023-04-20 RX ADMIN — BENZONATATE 100 MG: 100 CAPSULE ORAL at 04:26

## 2023-04-20 RX ADMIN — Medication 10 ML: at 08:28

## 2023-04-20 RX ADMIN — METOPROLOL TARTRATE 50 MG: 50 TABLET ORAL at 21:21

## 2023-04-20 RX ADMIN — CLONIDINE HYDROCHLORIDE 0.1 MG: 0.1 TABLET ORAL at 03:41

## 2023-04-20 RX ADMIN — NITROGLYCERIN 5 MCG/MIN: 20 INJECTION INTRAVENOUS at 06:44

## 2023-04-20 RX ADMIN — INSULIN LISPRO 2 UNITS: 100 INJECTION, SOLUTION INTRAVENOUS; SUBCUTANEOUS at 08:15

## 2023-04-20 RX ADMIN — METOPROLOL TARTRATE 50 MG: 50 TABLET ORAL at 08:15

## 2023-04-20 RX ADMIN — CLONIDINE HYDROCHLORIDE 0.1 MG: 0.1 TABLET ORAL at 01:52

## 2023-04-20 RX ADMIN — CLONIDINE HYDROCHLORIDE 0.1 MG: 0.1 TABLET ORAL at 05:59

## 2023-04-20 RX ADMIN — HYDRALAZINE HYDROCHLORIDE 75 MG: 50 TABLET, FILM COATED ORAL at 14:34

## 2023-04-20 NOTE — PLAN OF CARE
Goal Outcome Evaluation:  Plan of Care Reviewed With: patient, spouse           Outcome Evaluation: Pt. presents below baseline function w/generalized weakness, balance deficits, decreased functional endurance, increased edema and acute pain affecting his ability to safely participate in functional mobility. He performed transfers and ambulated 120' w/front wheeled walker, contact guard assist. Activity limited by fatigue. Dyspnea on exertion/increased effort required during functional mobility. Pt. would benefit from IPPT to address stated deficits. Recommend inpatient rehab upon discharge to maximize safety and independence to facilitate return to PLOF including stair navigation and working a physically demanding job.

## 2023-04-20 NOTE — PROGRESS NOTES
" LOS: 1 day   Patient Care Team:  Jay Nevarez MD as PCP - General (Emergency Medicine)  Mirza Amezquita MD as Consulting Physician (Cardiology)    Chief Complaint: SOB    Subjective         Subjective:  Symptoms:  Stable.  No shortness of breath, chest pain, chest pressure or anxiety.    Diet:  Adequate intake.    Activity level: Normal.    Pain:  He reports no pain.        History taken from: patient    Objective     Vital Sign Min/Max for last 24 hours  Temp  Min: 97.7 °F (36.5 °C)  Max: 98.6 °F (37 °C)   BP  Min: 133/93  Max: 190/108   Pulse  Min: 66  Max: 90   Resp  Min: 16  Max: 18   SpO2  Min: 91 %  Max: 100 %   No data recorded   No data recorded     Flowsheet Rows    Flowsheet Row First Filed Value   Admission Height 185.4 cm (73\") Documented at 04/18/2023 2110   Admission Weight 127 kg (280 lb) Documented at 04/18/2023 2110          I/O this shift:  In: -   Out: 1100 [Urine:1100]  I/O last 3 completed shifts:  In: 2010 [P.O.:2010]  Out: 6525 [Urine:6525]    Objective:  General Appearance:  Comfortable.    Vital signs: (most recent): Blood pressure 140/91, pulse 71, temperature 98.1 °F (36.7 °C), temperature source Oral, resp. rate 16, height 185.4 cm (73\"), weight 129 kg (285 lb), SpO2 94 %.  Vital signs are normal.    Output: Producing urine.    HEENT: Normal HEENT exam.    Lungs:  Normal respiratory rate.  Breath sounds clear to auscultation.  He is not in respiratory distress.  No stridor.  No decreased breath sounds.    Heart: Normal rate.  Regular rhythm.  S1 normal and S2 normal.  No murmur or gallop.   Abdomen: Abdomen is soft.  Bowel sounds are normal.     Extremities: There is no dependent edema or local swelling.    Pulses: Distal pulses are intact.    Neurological: Patient is alert and oriented to person, place and time.  Normal strength.    Pupils:  Pupils are equal, round, and reactive to light.              Results Review:     I reviewed the patient's new clinical results.    WBC WBC "   Date Value Ref Range Status   04/20/2023 8.87 3.40 - 10.80 10*3/mm3 Final   04/19/2023 9.97 3.40 - 10.80 10*3/mm3 Final   04/18/2023 10.62 3.40 - 10.80 10*3/mm3 Final      HGB Hemoglobin   Date Value Ref Range Status   04/20/2023 9.8 (L) 13.0 - 17.7 g/dL Final   04/19/2023 9.3 (L) 13.0 - 17.7 g/dL Final   04/18/2023 10.4 (L) 13.0 - 17.7 g/dL Final      HCT Hematocrit   Date Value Ref Range Status   04/20/2023 30.7 (L) 37.5 - 51.0 % Final   04/19/2023 29.9 (L) 37.5 - 51.0 % Final   04/18/2023 32.8 (L) 37.5 - 51.0 % Final      Platlets No results found for: LABPLAT   MCV MCV   Date Value Ref Range Status   04/20/2023 90.6 79.0 - 97.0 fL Final   04/19/2023 93.4 79.0 - 97.0 fL Final   04/18/2023 91.1 79.0 - 97.0 fL Final          Sodium Sodium   Date Value Ref Range Status   04/20/2023 142 136 - 145 mmol/L Final   04/19/2023 142 136 - 145 mmol/L Final   04/18/2023 144 136 - 145 mmol/L Final      Potassium Potassium   Date Value Ref Range Status   04/20/2023 4.7 3.5 - 5.2 mmol/L Final   04/19/2023 4.4 3.5 - 5.2 mmol/L Final   04/18/2023 4.8 3.5 - 5.2 mmol/L Final     Comment:     Slight hemolysis detected by analyzer. Results may be affected.      Chloride Chloride   Date Value Ref Range Status   04/20/2023 110 (H) 98 - 107 mmol/L Final   04/19/2023 113 (H) 98 - 107 mmol/L Final   04/18/2023 114 (H) 98 - 107 mmol/L Final      CO2 CO2   Date Value Ref Range Status   04/20/2023 19.0 (L) 22.0 - 29.0 mmol/L Final   04/19/2023 17.0 (L) 22.0 - 29.0 mmol/L Final   04/18/2023 18.0 (L) 22.0 - 29.0 mmol/L Final      BUN BUN   Date Value Ref Range Status   04/20/2023 85 (H) 6 - 20 mg/dL Final   04/19/2023 83 (H) 6 - 20 mg/dL Final   04/18/2023 88 (H) 6 - 20 mg/dL Final      Creatinine Creatinine   Date Value Ref Range Status   04/20/2023 5.47 (H) 0.76 - 1.27 mg/dL Final   04/19/2023 5.88 (H) 0.76 - 1.27 mg/dL Final   04/18/2023 5.74 (H) 0.76 - 1.27 mg/dL Final      Calcium Calcium   Date Value Ref Range Status   04/20/2023 8.7  8.6 - 10.5 mg/dL Final   04/19/2023 8.8 8.6 - 10.5 mg/dL Final   04/18/2023 9.0 8.6 - 10.5 mg/dL Final      PO4 No results found for: CAPO4   Albumin Albumin   Date Value Ref Range Status   04/18/2023 3.2 (L) 3.5 - 5.2 g/dL Final      Magnesium Magnesium   Date Value Ref Range Status   04/20/2023 1.7 1.6 - 2.6 mg/dL Final   04/19/2023 1.4 (L) 1.6 - 2.6 mg/dL Final      Uric Acid No results found for: URICACID     Medication Review: yes    Assessment & Plan       Type 2 diabetes mellitus with hyperglycemia (HCC)    Essential hypertension    Hyperlipidemia    Elevated troponin    Acute on chronic systolic congestive heart failure    Hypertensive urgency    CKD (chronic kidney disease) stage 5, GFR less than 15 ml/min      Assessment & Plan     CKD stage V/ESRD:  Bx proven severe nephrosclerosis w adaptive FSGS changes and diabetic nephropathy 90 percent fibrosis on bx. Rapid loss of kidney function in last few yrs. Lost to f/u after discharge from [facility] in Jan 2023. Cr worse on this admission likley Acute component vs progression of underlying CKD in the setting of uncontrolled HTN and volume [place]      Volume status: Hypervolemia. He ran out of all his meds 1 month ago. Didn't have insurance     HTN crisis; Wasn't taking any meds prior to admission.      Met acidosis: Due to CKD    Anemia: ACD due to CKD. Check iron panel      Recs  Advance CKD at baseline with possible progression in last few months to ESRD. Excellent response to Bumex gtt. Will switch to bumex 2 mg IV BID. Discussed with Dr Herrera for PD cath insertion.   AntiHTN meds titrated per cardiology at present  Dose meds to GFR<15  High risk for needing dialysis on this admission.     I discussed the patients findings and my recommendations with patient    Jaren Rai MD  04/20/23  13:14 EDT

## 2023-04-20 NOTE — CONSULTS
General Surgery Consultation Note    Date of Service: 4/20/2023  Angel Blair  [id number]  1975      Referring Provider: Isa Sullivan II, DO    Location of Consult: Inpatient     Reason for Consultation: Chronic renal failure       History of Present Illness:  I am seeing, Angel Blair, in consultation for Isa Sullivan II, DO regarding chronic renal failure requiring the institution of dialysis.  47-year-old gentleman with chronic renal failure, diabetes mellitus, and hypertension stopped taking his medications approximately a month ago and presented to the emergency room with acute worsening in his renal function along with congestive heart failure and a myocardial infarction.  Currently denies fever, chills, scleral icterus, severe chest pain, significant shortness of breath, nausea, vomiting, diarrhea, or constipation.  He does have edema noted.  He has not been on dialysis prior.  He is right-hand dominant.  He denies any prior intra-abdominal operations.     Problems Addressed this Visit        Cardiac and Vasculature    Acute on chronic systolic congestive heart failure - Primary    Relevant Medications    metoprolol tartrate (LOPRESSOR) tablet 50 mg    NIFEdipine XL (PROCARDIA XL) 24 hr tablet 90 mg    isosorbide mononitrate (IMDUR) 24 hr tablet 30 mg   Other Visit Diagnoses     NSTEMI (non-ST elevated myocardial infarction)        Relevant Medications    metoprolol tartrate (LOPRESSOR) tablet 50 mg    NIFEdipine XL (PROCARDIA XL) 24 hr tablet 90 mg    isosorbide mononitrate (IMDUR) 24 hr tablet 30 mg    Acute kidney injury        Relevant Medications    bumetanide (BUMEX) injection 2 mg (Completed)    bumetanide (BUMEX) injection 2 mg    Hypertensive emergency        Relevant Medications    bumetanide (BUMEX) injection 2 mg (Completed)    hydrALAZINE (APRESOLINE) tablet 75 mg    metoprolol tartrate (LOPRESSOR) tablet 50 mg    NIFEdipine XL (PROCARDIA XL) 24 hr tablet 90 mg     cloNIDine (CATAPRES) tablet 0.1 mg    bumetanide (BUMEX) injection 2 mg    Noncompliance with medication regimen          Diagnoses       Codes Comments    Acute on chronic systolic congestive heart failure    -  Primary ICD-10-CM: I50.23  ICD-9-CM: 428.23, 428.0     NSTEMI (non-ST elevated myocardial infarction)     ICD-10-CM: I21.4  ICD-9-CM: 410.70     Acute kidney injury     ICD-10-CM: N17.9  ICD-9-CM: 584.9     Hypertensive emergency     ICD-10-CM: I16.1  ICD-9-CM: 401.9     Noncompliance with medication regimen     ICD-10-CM: Z91.148  ICD-9-CM: V15.81           Past Medical History:   Diagnosis Date   • Diabetes mellitus    • Hyperlipidemia    • Hypertension    • Knee swelling 7/20/22   • Sleep apnea        Past Surgical History:   • SOFT TISSUE TUMOR RESECTION       Allergies   Allergen Reactions   • Lisinopril Swelling       No current facility-administered medications on file prior to encounter.     Current Outpatient Medications on File Prior to Encounter   Medication Sig Dispense Refill   • bumetanide (BUMEX) 2 MG tablet Take 1 tablet by mouth Daily. 30 tablet 0   • glipizide (Glucotrol) 5 MG tablet Take 0.5 tablets by mouth 2 (Two) Times a Day Before Meals. 30 tablet 0   • hydrALAZINE (APRESOLINE) 25 MG tablet Take 3 tablets by mouth Every 8 (Eight) Hours. 90 tablet 0   • isosorbide mononitrate (IMDUR) 120 MG 24 hr tablet Take 1 tablet by mouth Daily. 30 tablet 0   • NIFEdipine XL (PROCARDIA XL) 90 MG 24 hr tablet Take 1 tablet by mouth Daily. 90 tablet 3   • NIFEdipine XL (PROCARDIA XL) 90 MG 24 hr tablet Take 1 tablet by mouth Daily. 90 tablet 3   • acetaminophen (TYLENOL) 325 MG tablet Take 2 tablets by mouth Every 4 (Four) Hours As Needed for Mild Pain.     • atorvastatin (LIPITOR) 40 MG tablet Take 1 tablet by mouth Every Night.     • metoprolol tartrate (LOPRESSOR) 50 MG tablet Take 1 tablet by mouth Every 12 (Twelve) Hours for 30 days. 60 tablet 0         Current Facility-Administered Medications:    •  acetaminophen (TYLENOL) tablet 650 mg, 650 mg, Oral, Q4H PRN, Shira Goldman APRN  •  atorvastatin (LIPITOR) tablet 40 mg, 40 mg, Oral, Nightly, Shira Goldman APRN, 40 mg at 04/19/23 1956  •  benzonatate (TESSALON) capsule 100 mg, 100 mg, Oral, TID PRN, Shira Goldman APRN, 100 mg at 04/20/23 0426  •  bumetanide (BUMEX) injection 2 mg, 2 mg, Intravenous, Q12H, Jaren Rai MD, 2 mg at 04/20/23 1435  •  Calcium Replacement - Follow Nurse / BPA Driven Protocol, , Does not apply, PRN, Lorena Díaz, DO  •  cloNIDine (CATAPRES) tablet 0.1 mg, 0.1 mg, Oral, Q1H PRN, Андрей Lawrence MD, 0.1 mg at 04/20/23 0559  •  dextrose (D50W) (25 g/50 mL) IV injection 25 g, 25 g, Intravenous, Q15 Min PRN, Shira Goldman APRN  •  dextrose (GLUTOSE) oral gel 15 g, 15 g, Oral, Q15 Min PRN, Shira Goldman APRN  •  glucagon (GLUCAGEN) injection 1 mg, 1 mg, Intramuscular, Q15 Min PRN, Shira Goldman APRN  •  hydrALAZINE (APRESOLINE) tablet 75 mg, 75 mg, Oral, Q8H, Shira Goldman APRN, 75 mg at 04/20/23 1434  •  Insulin Lispro (humaLOG) injection 0-7 Units, 0-7 Units, Subcutaneous, TID AC, Shira Goldman APRFLAVIO, 2 Units at 04/20/23 1152  •  isosorbide mononitrate (IMDUR) 24 hr tablet 30 mg, 30 mg, Oral, Q24H, Ale Moran APRN, 30 mg at 04/20/23 1434  •  Magnesium Standard Dose Replacement - Follow Nurse / BPA Driven Protocol, , Does not apply, PRN, Lorena Díaz, DO  •  metoprolol tartrate (LOPRESSOR) tablet 50 mg, 50 mg, Oral, Q12H, Shira Goldman APRN, 50 mg at 04/20/23 0815  •  NIFEdipine XL (PROCARDIA XL) 24 hr tablet 90 mg, 90 mg, Oral, Daily, Shira Goldman APRN, 90 mg at 04/20/23 0815  •  Pharmacy Consult - MTM, , Does not apply, Daily, Robin Ruffin, PharmD  •  Phosphorus Replacement - Follow Nurse / BPA Driven Protocol, , Does not apply, Arjun BRISENO Olivia D, DO  •  Potassium Replacement - Follow Nurse / BPA Driven Protocol, , Does not apply, PRN, Díaz, Lorena D,  "DO  •  sodium chloride 0.9 % flush 10 mL, 10 mL, Intravenous, Q12H, Shira Goldman R, APRN, 10 mL at 04/20/23 0828  •  sodium chloride 0.9 % flush 10 mL, 10 mL, Intravenous, PRN, Shira Goldman R, APRN  •  sodium chloride 0.9 % infusion 40 mL, 40 mL, Intravenous, PRN, Shira Goldman R, APRN    Family History   Problem Relation Age of Onset   • Lung disease Mother    • No Known Problems Father    • Asthma Brother      Social History     Socioeconomic History   • Marital status:    Tobacco Use   • Smoking status: Never   • Smokeless tobacco: Never   Vaping Use   • Vaping Use: Never used   Substance and Sexual Activity   • Alcohol use: No   • Drug use: No   • Sexual activity: Yes     Partners: Female       Review of Systems:  Review of Systems   Constitutional: Negative for appetite change and fever.   HENT: Negative for dental problem and ear pain.    Eyes: Negative for photophobia and visual disturbance.   Respiratory: Negative for apnea and shortness of breath.    Cardiovascular: Positive for leg swelling. Negative for chest pain.   Gastrointestinal: Negative for abdominal pain, nausea and vomiting.   Endocrine: Negative for polyphagia and polyuria.   Genitourinary: Negative for dysuria, frequency and penile pain.   Musculoskeletal: Negative for arthralgias and joint swelling.   Skin: Negative for pallor and rash.   Allergic/Immunologic: Negative for immunocompromised state.   Neurological: Negative for dizziness, syncope and numbness.   Hematological: Negative for adenopathy.   Psychiatric/Behavioral: Negative for agitation, hallucinations and self-injury.     Otherwise the 12 point review of systems is negative.    /94 (BP Location: Right arm, Patient Position: Sitting)   Pulse 79   Temp 98.4 °F (36.9 °C) (Oral)   Resp 16   Ht 185.4 cm (73\")   Wt 129 kg (285 lb)   SpO2 94%   BMI 37.60 kg/m²   Body mass index is 37.6 kg/m².    General: Sitting in the chair no apparent distress  HEENT: PER, no " icterus, normal sclerae  Cardiac: regular rhythm,  no audible rubs  Pulmonary: bilateral breath sounds, nonlabored  Abdominal: Obese, soft, no peritonitis  Neurologic: awake, alert, no obvious focal deficits  Extremities: warm, ++ edema  Skin: no obvious rashes nor worrisome lesions seen     CBC  Results from last 7 days   Lab Units 04/20/23  0432   WBC 10*3/mm3 8.87   HEMOGLOBIN g/dL 9.8*   HEMATOCRIT % 30.7*   PLATELETS 10*3/mm3 150       CMP  Results from last 7 days   Lab Units 04/20/23  0432 04/19/23  0527 04/18/23  2151   SODIUM mmol/L 142   < > 144   POTASSIUM mmol/L 4.7   < > 4.8   CHLORIDE mmol/L 110*   < > 114*   CO2 mmol/L 19.0*   < > 18.0*   BUN mg/dL 85*   < > 88*   CREATININE mg/dL 5.47*   < > 5.74*   CALCIUM mg/dL 8.7   < > 9.0   BILIRUBIN mg/dL  --   --  0.2   ALK PHOS U/L  --   --  110   ALT (SGPT) U/L  --   --  20   AST (SGOT) U/L  --   --  30   GLUCOSE mg/dL 146*   < > 111*    < > = values in this interval not displayed.       Radiology  Imaging Results (Last 72 Hours)     Procedure Component Value Units Date/Time    CT Chest Without Contrast Diagnostic [[id number]] Collected: 04/19/23 0048     Updated: 04/19/23 0253    Narrative:      EXAM: CT SCAN OF THE CHEST WITHOUT CONTRAST    INDICATION: Abnormal chest x-ray    TECHNIQUE: CT scan through the chest was performed without the administration of intravenous contrast. CT dose lowering techniques were used, to include: automated exposure control, adjustment for patient size, and / or use of iterative reconstruction.     COMPARISON: 2/18/2021    FINDINGS:  Vasculature: There is no thoracic aortic aneurysm.  There are atherosclerotic calcifications of the thoracic aorta, great vessels and coronary arteries.    Mediastinum / Pleura: Small pleural effusions are present. Calcified subcarinal and right hilar lymph nodes are present.     Airways / Lungs: The central airways are patent.  There is no pneumothorax. There is patchy airspace disease in both  lungs, with perihilar predominance.     Bones and Chest Wall: No destructive osseous lesion is identified.    Upper Abdomen: Limited images below the diaphragm demonstrate no acute abnormality.      Impression:        1. Patchy airspace disease in both lungs, with perihilar predominance. This is favored to represent pneumonia in the acute setting. Please note alveolar pulmonary edema is also within the differential.  2. Small pleural effusions.    Electronically signed by:  Trevin Matos M.D.    4/19/2023 12:52 AM Mountain Time    XR Chest 2 View [[id number]] Collected: 04/18/23 2207     Updated: 04/18/23 2211    Narrative:      XR CHEST 2 VW    Date of Exam: 4/18/2023 9:52 PM EDT    Indication: cp/sob.    Comparison: 1/22/2023    Findings:  Patchy airspace disease is seen throughout the lungs bilaterally, most prominent within the left midlung and the right lower lobe. Small bilateral pleural effusions are present. There is indistinctness of the pulmonary vasculature. The heart appears   enlarged. No pneumothorax. No acute osseous abnormality identified.      Impression:      Impression:  Mild pulmonary edema pattern with small bilateral pleural effusions and cardiomegaly. Superimposed patchy airspace changes are present within the right lung base in the left midlung which may be related to a mild pneumonia and/or atelectasis.      Electronically Signed: Conchis Garcia    4/18/2023 10:08 PM EDT    Workstation ID: [id number]            Assessment:  Acute kidney injury on chronic renal failure  Diabetes mellitus type 2  Hypertension  Congestive heart failure  Obesity    Plan:  No noted abdominal wall hernia nor significant prior intra-abdominal operations.  I discussed the placement of a peritoneal dialysis catheter via laparoscopy with the patient.  This is something he wishes to pursue currently.  I discussed the associated risks and benefits including, but not limited to: bleeding, infection, injury to adjacent viscera  (spleen, stomach, liver, small bowel, colon etc.), internal hernia formation, abdominal wall hernia, dialysate leak, nonfunction, need for repositioning, future peritonitis, and medical issues from a cardiopulmonary and deep venous thrombosis standpoint.  He understands these risks.  Will follow along.    Nico Herrera MD  04/20/23  16:27 EDT

## 2023-04-20 NOTE — PLAN OF CARE
Goal Outcome Evaluation:  Plan of Care Reviewed With: patient, spouse           Outcome Evaluation: OT educated pt and spouse on ADL retraining and transfer training, issued necessary AE and educated on use. He completed LB dressing with mod assist using AE and completed transfer training with CGA using RW. Pt limited with dyspnea on exertion, decreased balance, decreased AROM requiring AE to complete LB ADLs, decreased safety awareness, decreased BUE strength and decreased occupational endurance. Pt lives in home with upstairs bed and bath and spouse is away at work during the day. Recommend IPR, pt in agreement.

## 2023-04-20 NOTE — PROGRESS NOTES
[facility]  PROGRESS NOTE    Patient Name: Angel Blair  : 1975  MRN: [id number]    Date of Admission: 2023  Primary Care Physician: Jay Nevarez MD    Subjective   Subjective     CC: f/u CHF    HPI: Up in chair. Feels better but still having cough worse when lying down.    ROS:  Gen- No fevers, chills  CV- No chest pain, palpitations  Resp- No cough, dyspnea  GI- No N/V/D, abd pain     Objective   Objective     Vital Signs:   Temp:  [97.7 °F (36.5 °C)-98.6 °F (37 °C)] 98.4 °F (36.9 °C)  Heart Rate:  [66-90] 66  Resp:  [16-18] 16  BP: (133-199)/() 141/85     Physical Exam:  Constitutional: No acute distress, awake, alert, up in chair  HENT: NCAT, mucous membranes moist  Respiratory: Clear to auscultation bilaterally, respiratory effort normal   Cardiovascular: RRR, no murmurs, rubs, or gallops  Gastrointestinal: Positive bowel sounds, soft, nontender, nondistended, morbidly obese  Musculoskeletal: No bilateral ankle edema  Psychiatric: Appropriate affect, cooperative  Neurologic: Oriented x 3, strength symmetric in all extremities, Cranial Nerves grossly intact to confrontation, speech clear  Skin: No rashes    Results Reviewed:  LAB RESULTS:      Lab 23  0432 23  1151 23  0527 23  0138 23  2308 23  2151   WBC 8.87  --  9.97  --   --  10.62   HEMOGLOBIN 9.8*  --  9.3*  --   --  10.4*   HEMATOCRIT 30.7*  --  29.9*  --   --  32.8*   PLATELETS 150  --  135*  --   --  153   NEUTROS ABS  --   --  7.43*  --   --  7.79*   IMMATURE GRANS (ABS)  --   --  0.04  --   --  0.05   LYMPHS ABS  --   --  1.68  --   --  1.79   MONOS ABS  --   --  0.52  --   --  0.61   EOS ABS  --   --  0.23  --   --  0.29   MCV 90.6  --  93.4  --   --  91.1   PROCALCITONIN  --   --   --  0.24  --   --    PROTIME  --   --  14.4  --  13.8  --    APTT  --   --   --   --  29.7*  --    HEPARIN ANTI-XA  --  0.15* 0.10*  --  0.10*  --           Lab 04/20/23  0432 04/19/23  0527 04/18/23 2151   SODIUM 142 142 144   POTASSIUM 4.7 4.4 4.8   CHLORIDE 110* 113* 114*   CO2 19.0* 17.0* 18.0*   ANION GAP 13.0 12.0 12.0   BUN 85* 83* 88*   CREATININE 5.47* 5.88* 5.74*   EGFR 12.2* 11.1* 11.5*   GLUCOSE 146* 110* 111*   CALCIUM 8.7 8.8 9.0   MAGNESIUM 1.7 1.4*  --    HEMOGLOBIN A1C  --  6.40*  --          Lab 04/18/23 2151   TOTAL PROTEIN 7.4   ALBUMIN 3.2*   GLOBULIN 4.2   ALT (SGPT) 20   AST (SGOT) 30   BILIRUBIN 0.2   ALK PHOS 110         Lab 04/19/23  0527 04/19/23  0138 04/18/23  2308 04/18/23 2151   PROBNP  --   --   --  11,594.0*   HSTROP T  --  170*  --  177*   PROTIME 14.4  --  13.8  --    INR 1.12  --  1.07  --          Lab 04/19/23 0527   CHOLESTEROL 210*   LDL CHOL 140*   HDL CHOL 44   TRIGLYCERIDES 143             Brief Urine Lab Results  (Last result in the past 365 days)      Color   Clarity   Blood   Leuk Est   Nitrite   Protein   CREAT   Urine HCG        01/23/23 2223             34.4               Microbiology Results Abnormal     Procedure Component Value - Date/Time    COVID PRE-OP / PRE-PROCEDURE SCREENING ORDER (NO ISOLATION) - Swab, Nasopharynx [[id number]]  (Normal) Collected: 04/18/23 2152    Lab Status: Final result Specimen: Swab from Nasopharynx Updated: 04/18/23 2224    Narrative:      The following orders were created for panel order COVID PRE-OP / PRE-PROCEDURE SCREENING ORDER (NO ISOLATION) - Swab, Nasopharynx.  Procedure                               Abnormality         Status                     ---------                               -----------         ------                     COVID-19 and FLU A/B PCR...[[id number]]  Normal              Final result                 Please view results for these tests on the individual orders.    COVID-19 and FLU A/B PCR - Swab, Nasopharynx [[id number]]  (Normal) Collected: 04/18/23 2152    Lab Status: Final result Specimen: Swab from Nasopharynx Updated: 04/18/23 2224     COVID19 Not Detected      Influenza A PCR Not Detected     Influenza B PCR Not Detected    Narrative:      Fact sheet for providers: https://www.fda.gov/media/103405/download    Fact sheet for patients: https://www.fda.gov/media/106182/download    Test performed by PCR.          XR Chest 2 View    Result Date: 4/18/2023  XR CHEST 2 VW Date of Exam: 4/18/2023 9:52 PM EDT Indication: cp/sob. Comparison: 1/22/2023 Findings: Patchy airspace disease is seen throughout the lungs bilaterally, most prominent within the left midlung and the right lower lobe. Small bilateral pleural effusions are present. There is indistinctness of the pulmonary vasculature. The heart appears enlarged. No pneumothorax. No acute osseous abnormality identified.     Impression: Impression: Mild pulmonary edema pattern with small bilateral pleural effusions and cardiomegaly. Superimposed patchy airspace changes are present within the right lung base in the left midlung which may be related to a mild pneumonia and/or atelectasis. Electronically Signed: Conchis Garcia  4/18/2023 10:08 PM EDT  Workstation ID: [id number]    CT Chest Without Contrast Diagnostic    Result Date: 4/19/2023  EXAM: CT SCAN OF THE CHEST WITHOUT CONTRAST INDICATION: Abnormal chest x-ray TECHNIQUE: CT scan through the chest was performed without the administration of intravenous contrast. CT dose lowering techniques were used, to include: automated exposure control, adjustment for patient size, and / or use of iterative reconstruction. COMPARISON: 2/18/2021 FINDINGS: Vasculature: There is no thoracic aortic aneurysm.  There are atherosclerotic calcifications of the thoracic aorta, great vessels and coronary arteries. Mediastinum / Pleura: Small pleural effusions are present. Calcified subcarinal and right hilar lymph nodes are present. Airways / Lungs: The central airways are patent.  There is no pneumothorax. There is patchy airspace disease in both lungs, with perihilar predominance. Bones and Chest  Wall: No destructive osseous lesion is identified. Upper Abdomen: Limited images below the diaphragm demonstrate no acute abnormality.     Impression: 1. Patchy airspace disease in both lungs, with perihilar predominance. This is favored to represent pneumonia in the acute setting. Please note alveolar pulmonary edema is also within the differential. 2. Small pleural effusions. Electronically signed by:  Trevin Matos M.D.  4/19/2023 12:52 AM Mountain Time      Results for orders placed during the hospital encounter of 01/22/23    Adult Transthoracic Echo Complete With Contrast if Necessary Per Protocol    Interpretation Summary  •  Left ventricular ejection fraction appears to be 51 - 55%.  •  Left ventricular wall thickness is consistent with mild to moderate concentric hypertrophy.  •  The cardiac valves are anatomically and functionally normal.      Current medications:  Scheduled Meds:atorvastatin, 40 mg, Oral, Nightly  hydrALAZINE, 75 mg, Oral, Q8H  insulin lispro, 0-7 Units, Subcutaneous, TID AC  metoprolol tartrate, 50 mg, Oral, Q12H  NIFEdipine XL, 90 mg, Oral, Daily  pharmacy consult - MTM, , Does not apply, Daily  sodium chloride, 10 mL, Intravenous, Q12H      Continuous Infusions:bumetanide, 1 mg/hr, Last Rate: 1 mg/hr (04/20/23 0855)  nitroglycerin, 5-200 mcg/min, Last Rate: Stopped (04/20/23 0828)      PRN Meds:.•  acetaminophen  •  benzonatate  •  Calcium Replacement - Follow Nurse / BPA Driven Protocol  •  cloNIDine  •  dextrose  •  dextrose  •  glucagon (human recombinant)  •  Magnesium Standard Dose Replacement - Follow Nurse / BPA Driven Protocol  •  Phosphorus Replacement - Follow Nurse / BPA Driven Protocol  •  Potassium Replacement - Follow Nurse / BPA Driven Protocol  •  sodium chloride  •  sodium chloride    Assessment & Plan   Assessment & Plan     Active Hospital Problems    Diagnosis  POA   • Acute on chronic systolic congestive heart failure [I50.23]  Yes   • Hypertensive urgency [I16.0]   Yes   • CKD (chronic kidney disease) stage 5, GFR less than 15 ml/min [N18.5]  Yes   • Elevated troponin [R77.8]  Yes   • Hyperlipidemia [E78.5]  Yes   • Essential hypertension [I10]  Yes   • Type 2 diabetes mellitus with hyperglycemia (HCC) [E11.65]  Yes      Resolved Hospital Problems   No resolved problems to display.        Brief Hospital Course to date:  47 y.o. male with PMH significant for DM 2, HLD, HTN, LEFTY, obesity, CKD V, who presents to the ED with complaint of cough and shortness of breath who was found to have concern for elevated troponin with acute on chronic systolic congestive heart failure and hypertensive urgency.     Acute on chronic systolic congestive heart failure  - Patient has been out of his medication for the past month  - Responding well to Bumex gtt, -3L 4/19. Creatinine stable, continue.  - Echo on 1/22/2023 notes EF 51-55%  - Continue metoprolol  - CT chest with pneumonia versus pulmonary edema.  Afebrile, denies fever or chills.      Hypertensive urgency  Essential hypertension  - Patient has been out of home medication for the past month  - Now off cardene gtt. Clonidine added. Continue other home medications including metoprolol, nifedipine, hydralazine and Imdur  - Cardiology follows.     NSTEMI  Elevated troponin  - Likely NSTEMI type II secondary to hypertensive urgency and acute on chronic CHF but was started on heparin gtt on admission. Will continue for now until Cardiology evaluation   - Trend troponin (177 -> 170)   - EKG without acute changes   - Patient denies chest pain with the exception of when he coughs    CKD V  - Nephrology to evaluate today, patient was lost to follow-up since last discharge secondary to no insurance coverage. D/W CM today - apparently will have insurance coverage ~ 5/1.  - Not on dialysis, still makes urine     Dyslipidemia  - Continue Lipitor     DM2  - FSBG 3 times daily     Hypomagnesemia  -Replace    Expected Discharge Location and  Transportation:   Expected Discharge   Expected Discharge Date and Time     Expected Discharge Date Expected Discharge Time    Apr 21, 2023            DVT prophylaxis:  No DVT prophylaxis order currently exists.          CODE STATUS:   Code Status and Medical Interventions:   Ordered at: 04/19/23 0323     Code Status (Patient has no pulse and is not breathing):    CPR (Attempt to Resuscitate)     Medical Interventions (Patient has pulse or is breathing):    Full Support       Isa Sullivan II, DO  04/20/23

## 2023-04-20 NOTE — PROGRESS NOTES
"  [facility] at [facility]   Inpatient Progress Note       LOS: 1 day   Patient Care Team:  Jay Nevarez MD as PCP - General (Emergency Medicine)  Mirza Amezquita MD as Consulting Physician (Cardiology)    Chief Complaint:  Follow-up for CHF and HTN    Subjective     Interval History:   Patient up in chair. No specific CV complaints. Is off IV NTG. BP still elevated. Bumex gtt infusing.       Review of Systems:   Pertinent positives noted in history, exam, and assessment. Otherwise reviewed and negative.      Objective     Vitals:  Blood pressure 141/85, pulse 66, temperature 98.4 °F (36.9 °C), temperature source Oral, resp. rate 16, height 185.4 cm (73\"), weight 129 kg (285 lb), SpO2 95 %.     Intake/Output Summary (Last 24 hours) at 4/20/2023 1009  Last data filed at 4/20/2023 0735  Gross per 24 hour   Intake 2010 ml   Output 5675 ml   Net -3665 ml     Vitals reviewed.   Constitutional:       Appearance: Well-developed and not in distress.      Comments: obese   Neck:      Vascular: No JVD.      Trachea: No tracheal deviation.   Pulmonary:      Effort: Pulmonary effort is normal.      Breath sounds: Normal breath sounds.   Cardiovascular:      Normal rate. Regular rhythm.   Pulses:     Intact distal pulses.   Edema:     Ankle: bilateral trace edema of the ankle.  Abdominal:      Palpations: Abdomen is soft.      Tenderness: There is no abdominal tenderness.   Musculoskeletal:         General: No deformity. Skin:     General: Skin is warm and dry.   Neurological:      Mental Status: Alert and oriented to person, place, and time.            Results Review:     I reviewed the patient's new clinical results.    Results from last 7 days   Lab Units 04/20/23  0432   WBC 10*3/mm3 8.87   HEMOGLOBIN g/dL 9.8*   HEMATOCRIT % 30.7*   PLATELETS 10*3/mm3 150     Results from last 7 days   Lab Units 04/20/23  0432 04/19/23  0527 04/18/23  2151   SODIUM mmol/L 142   < > 144   POTASSIUM mmol/L 4.7   < > 4.8 "   CHLORIDE mmol/L 110*   < > 114*   CO2 mmol/L 19.0*   < > 18.0*   BUN mg/dL 85*   < > 88*   CREATININE mg/dL 5.47*   < > 5.74*   CALCIUM mg/dL 8.7   < > 9.0   BILIRUBIN mg/dL  --   --  0.2   ALK PHOS U/L  --   --  110   ALT (SGPT) U/L  --   --  20   AST (SGOT) U/L  --   --  30   GLUCOSE mg/dL 146*   < > 111*    < > = values in this interval not displayed.     Results from last 7 days   Lab Units 04/20/23  0432   SODIUM mmol/L 142   POTASSIUM mmol/L 4.7   CHLORIDE mmol/L 110*   CO2 mmol/L 19.0*   BUN mg/dL 85*   CREATININE mg/dL 5.47*   GLUCOSE mg/dL 146*   CALCIUM mg/dL 8.7     Results from last 7 days   Lab Units 04/19/23  0527 04/18/23  2308   INR  1.12 1.07     Lab Results   Lab Value Date/Time    TROPONINT 170 (C) 04/19/2023 0138    TROPONINT 177 (C) 04/18/2023 2151    TROPONINT 0.117 (C) 01/22/2023 1350    TROPONINT 0.123 (C) 01/22/2023 1136    TROPONINT 0.066 (C) 02/18/2021 1324    TROPONINT 0.043 (C) 01/21/2021 0609    TROPONINT 0.073 (C) 01/19/2021 1419         Results from last 7 days   Lab Units 04/19/23  0527   CHOLESTEROL mg/dL 210*   TRIGLYCERIDES mg/dL 143   HDL CHOL mg/dL 44   LDL CHOL mg/dL 140*     Results from last 7 days   Lab Units 04/18/23  2151   PROBNP pg/mL 11,594.0*     Results from last 7 days   Lab Units 04/19/23  0138 04/18/23  2151   HSTROP T ng/L 170* 177*         Tele:  sr    Assessment:      Type 2 diabetes mellitus with hyperglycemia (HCC)    Essential hypertension    Hyperlipidemia    Elevated troponin    Acute on chronic systolic congestive heart failure    Hypertensive urgency    CKD (chronic kidney disease) stage 5, GFR less than 15 ml/min      1. HTN urgency  1. Improving   2. Patient notes that he ran out of medications about 1 month ago.   2. NICM  3. CKD  1. Nephrology following  4. Acute on chronic HFpEF  1. Volume management per renal  2. EF normal by last echo  5. Chronically elevated troponin    Plan:  1. Resume Imdur at 30mg daily.   2. Continue other current CV  medications.   3. We will continue to follow along  4. Volume management per Renal    ROBBY Figueroa   Dictated utilizing Dragon dictation

## 2023-04-20 NOTE — PLAN OF CARE
Patient cont to have non productive dry howard cough. SBP remains elevated. On Bumex  drip, UOP > intake. Denies pain/discomfort at this time. Will cont to mx. Call light in reach.

## 2023-04-20 NOTE — THERAPY EVALUATION
Patient Name: Angel Blair  : 1975    MRN: [id number]                              Today's Date: 2023       Admit Date: 2023    Visit Dx:     ICD-10-CM ICD-9-CM   1. Acute on chronic systolic congestive heart failure  I50.23 428.23     428.0   2. NSTEMI (non-ST elevated myocardial infarction)  I21.4 410.70   3. Acute kidney injury  N17.9 584.9   4. Hypertensive emergency  I16.1 401.9   5. Noncompliance with medication regimen  Z91.148 V15.81     Patient Active Problem List   Diagnosis   • Type 2 diabetes mellitus with hyperglycemia (HCC)   • Left-sided back pain   • Obesity, Class III, BMI 40-49.9 (morbid obesity) (HCC)   • Abscess of back   • COVID-19   • Pneumonia due to COVID-19 virus   • Essential hypertension   • CHF exacerbation   • Hyperlipidemia   • CAP (community acquired pneumonia)   • Elevated troponin   • CKD (chronic kidney disease) stage 3, GFR 30-59 ml/min   • Acute on chronic systolic congestive heart failure   • Hypertensive urgency   • CKD (chronic kidney disease) stage 5, GFR less than 15 ml/min     Past Medical History:   Diagnosis Date   • Diabetes mellitus    • Hyperlipidemia    • Hypertension    • Knee swelling 22   • Sleep apnea      Past Surgical History:   Procedure Laterality Date   • SOFT TISSUE TUMOR RESECTION        General Information     Row Name 23 1336          OT Time and Intention    Document Type evaluation  -AR     Mode of Treatment occupational therapy  -AR     Row Name 23 1338          General Information    Patient Profile Reviewed yes  -AR     Prior Level of Function independent:;all household mobility;community mobility;gait;transfer;ADL's;driving;work;using stairs  -AR     Existing Precautions/Restrictions other (see comments);fall  monitor BP, BLE neuropathy  -AR     Barriers to Rehab none identified  -AR     Row Name 23 3537          Living Environment    People in Home spouse  -AR     Row Name 23 4284           Home Main Entrance    Number of Stairs, Main Entrance none  -AR     Row Name 04/20/23 1336          Stairs Within Home, Primary    Number of Stairs, Within Home, Primary other (see comments)  14  -AR     Stair Railings, Within Home, Primary railing on right side (ascending)  -AR     Row Name 04/20/23 1336          Cognition    Orientation Status (Cognition) oriented x 4  -AR     Row Name 04/20/23 1336          Safety Issues, Functional Mobility    Safety Issues Affecting Function (Mobility) awareness of need for assistance;safety precautions follow-through/compliance;safety precaution awareness;positioning of assistive device  -AR     Impairments Affecting Function (Mobility) balance;endurance/activity tolerance;pain;range of motion (ROM);sensation/sensory awareness;strength;shortness of breath  -AR           User Key  (r) = Recorded By, (t) = Taken By, (c) = Cosigned By    Initials Name Provider Type    AR Daisha Schuster, OT Occupational Therapist                 Mobility/ADL's     Row Name 04/20/23 1338          Bed Mobility    Comment, (Bed Mobility) Issued leg  and educated on use of device to assist with bed mobility and transfer training  -AR     Row Name 04/20/23 1338          Transfers    Transfers sit-stand transfer;stand-sit transfer  -AR     Row Name 04/20/23 1338          Sit-Stand Transfer    Sit-Stand [place] (Transfers) contact guard;verbal cues  -AR     Assistive Device (Sit-Stand Transfers) walker, front-wheeled  -AR     Row Name 04/20/23 1338          Stand-Sit Transfer    Stand-Sit [place] (Transfers) contact guard;verbal cues  -AR     Assistive Device (Stand-Sit Transfers) walker, front-wheeled  -AR     Row Name 04/20/23 1338          Functional Mobility    Functional Mobility- Ind. Level contact guard assist  -AR     Functional Mobility- Device walker, front-wheeled  -AR     Functional Mobility-Distance (Feet) 10  -AR     Functional Mobility- Comment Pt ambulated from  restroom to chair  -AR     Row Name 04/20/23 1338          Activities of Daily Living    BADL Assessment/Intervention bathing;lower body dressing  -AR     Row Name 04/20/23 1338          Bathing Assessment/Intervention    Comment, (Bathing) Pt unable to reach B feet d/t pain and ROM limitations. Issued LH sponge and educated on use  -AR     Row Name 04/20/23 1338          Lower Body Dressing Assessment/Training    [place] Level (Lower Body Dressing) doff;socks;minimum assist (75% patient effort);verbal cues;don;moderate assist (50% patient effort)  -AR     Assistive Devices (Lower Body Dressing) reacher;sock-aid  -AR     Position (Lower Body Dressing) unsupported sitting  -AR     Comment, (Lower Body Dressing) Pt unable to reach distal aspects B feet d/t c/o back and BLE hip pain as well as ROM limitations. OT issued self-care kit and educated pt on use.  -AR           User Key  (r) = Recorded By, (t) = Taken By, (c) = Cosigned By    Initials Name Provider Type    AR Daisha Schuster, OT Occupational Therapist               Obj/Interventions     Row Name 04/20/23 1346          Sensory Assessment (Somatosensory)    Sensory Assessment (Somatosensory) left UE  -AR     Sensory Subjective Reports numbness  -AR     Sensory Assessment chronic and intermittent  -AR     Row Name 04/20/23 1346          Vision Assessment/Intervention    Visual Impairment/Limitations corrective lenses full-time  -AR     Row Name 04/20/23 1346          Range of Motion Comprehensive    Comment, General Range of Motion BUE WNL  -AR     Row Name 04/20/23 1346          Strength Comprehensive (MMT)    General Manual Muscle Testing (MMT) Assessment upper extremity strength deficits identified  -AR     Comment, General Manual Muscle Testing (MMT) Assessment BUE 4/5  -AR     Row Name 04/20/23 1346          Balance    Balance Assessment sitting static balance;sitting dynamic balance;standing static balance;standing dynamic balance  -AR     Static  Sitting Balance supervision  -AR     Dynamic Sitting Balance supervision  -AR     Position, Sitting Balance sitting in chair;unsupported  -AR     Static Standing Balance contact guard  -AR     Dynamic Standing Balance contact guard  -AR     Position/Device Used, Standing Balance supported;walker, front-wheeled  -AR           User Key  (r) = Recorded By, (t) = Taken By, (c) = Cosigned By    Initials Name Provider Type    Daisha Kaur, OT Occupational Therapist               Goals/Plan     Row Name 04/20/23 [facility]          Transfer Goal 1 (OT)    Activity/Assistive Device (Transfer Goal 1, OT) sit-to-stand/stand-to-sit;toilet;commode, 3-in-1;walker, rolling  -AR     [place] Level/Cues Needed (Transfer Goal 1, OT) supervision required;verbal cues required  -AR     Time Frame (Transfer Goal 1, OT) long term goal (LTG);by discharge  -AR     Progress/Outcome (Transfer Goal 1, OT) goal ongoing  -AR     Row Name 04/20/23 1352          Dressing Goal 1 (OT)    Activity/Device (Dressing Goal 1, OT) lower body dressing;reacher;sock-aid  -AR     [place]/Cues Needed (Dressing Goal 1, OT) verbal cues required;contact guard required  -AR     Time Frame (Dressing Goal 1, OT) long term goal (LTG);by discharge  -AR     Progress/Outcome (Dressing Goal 1, OT) goal ongoing  -AR     Row Name 04/20/23 1402          Toileting Goal 1 (OT)    Activity/Device (Toileting Goal 1, OT) toileting skills, all;grab bar/safety frame;raised toilet seat  -AR     [place] Level/Cues Needed (Toileting Goal 1, OT) verbal cues required;contact guard required  -AR     Time Frame (Toileting Goal 1, OT) long term goal (LTG);by discharge  -AR     Progress/Outcome (Toileting Goal 1, OT) goal ongoing  -AR     Row Name 04/20/23 7262          Therapy Assessment/Plan (OT)    Planned Therapy Interventions (OT) activity tolerance training;adaptive equipment training;BADL retraining;edema control/reduction;functional balance retraining;IADL  retraining;occupation/activity based interventions;patient/caregiver education/training;ROM/therapeutic exercise;transfer/mobility retraining;strengthening exercise  -AR           User Key  (r) = Recorded By, (t) = Taken By, (c) = Cosigned By    Initials Name Provider Type    Daisha Kaur, OT Occupational Therapist               Clinical Impression     Row Name 04/20/23 0809          Pain Assessment    Pretreatment Pain Rating 4/10  -AR     Posttreatment Pain Rating 4/10  -AR     Pain Location lower  -AR     Pain Location - back  -AR     Pain Intervention(s) Repositioned;Ambulation/increased activity  -AR     Row Name 04/20/23 3017          Plan of Care Review    Plan of Care Reviewed With patient;spouse  -AR     Outcome Evaluation OT educated pt and spouse on ADL retraining and transfer training, issued necessary AE and educated on use. He completed LB dressing with mod assist using AE and completed transfer training with CGA using RW. Pt limited with dyspnea on exertion, decreased balance, decreased AROM requiring AE to complete LB ADLs, decreased safety awareness, decreased BUE strength and decreased occupational endurance. Pt lives in home with upstairs bed and bath and spouse is away at work during the day. Recommend IPR, pt in agreement.  -AR     Row Name 04/20/23 9827          Therapy Assessment/Plan (OT)    Rehab Potential (OT) good, to achieve stated therapy goals  -AR     Criteria for Skilled Therapeutic Interventions Met (OT) yes  -AR     Therapy Frequency (OT) daily  -AR     Row Name 04/20/23 1897          Therapy Plan Review/Discharge Plan (OT)    Anticipated Discharge Disposition (OT) inpatient rehabilitation facility  -AR     Row Name 04/20/23 1342          Vital Signs    Pre Systolic BP Rehab 129  -AR     Pre Treatment Diastolic BP 86  -AR     Post Systolic BP Rehab 129  -AR     Post Treatment Diastolic BP 85  -AR     Pre Patient Position Sitting  -AR     Intra Patient Position Standing   -AR     Post Patient Position Sitting  -AR     Row Name 04/20/23 1347          Positioning and Restraints    Pre-Treatment Position sitting in chair/recliner  -AR     Post Treatment Position chair  -AR     In Chair sitting;call light within reach;encouraged to call for assist;with PT  RN deferred need for exit alarm  -AR           User Key  (r) = Recorded By, (t) = Taken By, (c) = Cosigned By    Initials Name Provider Type    Daisha Kaur OT Occupational Therapist               Outcome Measures     Row Name 04/20/23 1353          How much help from another is currently needed...    Putting on and taking off regular lower body clothing? 2  -AR     Bathing (including washing, rinsing, and drying) 2  -AR     Toileting (which includes using toilet bed pan or urinal) 2  -AR     Putting on and taking off regular upper body clothing 3  -AR     Taking care of personal grooming (such as brushing teeth) 3  -AR     Eating meals 3  -AR     AM-PAC 6 Clicks Score (OT) 15  -AR     Row Name 04/20/23 1353          Functional Assessment    Outcome Measure Options AM-PAC 6 Clicks Daily Activity (OT)  -AR           User Key  (r) = Recorded By, (t) = Taken By, (c) = Cosigned By    Initials Name Provider Type    Daisha Kaur OT Occupational Therapist                Occupational Therapy Education     Title: PT OT SLP Therapies (Done)     Topic: Occupational Therapy (Done)     Point: ADL training (Done)     Description:   Instruct learner(s) on proper safety adaptation and remediation techniques during self care or transfers.   Instruct in proper use of assistive devices.              Learning Progress Summary           Patient JENIFFER Carmichael,BEBE,GARO,H, VU,NR by AR at 4/20/2023 1354   Family JENIFFER Carmichael TB,GARO,H, VU,NR by AR at 4/20/2023 1354                   Point: Home exercise program (Done)     Description:   Instruct learner(s) on appropriate technique for monitoring, assisting and/or progressing therapeutic  exercises/activities.              Learning Progress Summary           Patient Eager, E,TB,D,H, VU,NR by AR at 4/20/2023 1354   Family Eager, E,TB,D,H, VU,NR by AR at 4/20/2023 1354                   Point: Precautions (Done)     Description:   Instruct learner(s) on prescribed precautions during self-care and functional transfers.              Learning Progress Summary           Patient Eager, E,TB,D,H, VU,NR by AR at 4/20/2023 1354   Family Eager, E,TB,D,H, VU,NR by AR at 4/20/2023 1354                   Point: Body mechanics (Done)     Description:   Instruct learner(s) on proper positioning and spine alignment during self-care, functional mobility activities and/or exercises.              Learning Progress Summary           Patient Eager, E,TB,D,H, VU,NR by AR at 4/20/2023 1354   Family Eager, E,TB,D,H, VU,NR by AR at 4/20/2023 1354                               User Key     Initials Effective Dates Name Provider Type Discipline    AR 02/03/23 -  Daisha Schuster, FRED Occupational Therapist OT              OT Recommendation and Plan  Planned Therapy Interventions (OT): activity tolerance training, adaptive equipment training, BADL retraining, edema control/reduction, functional balance retraining, IADL retraining, occupation/activity based interventions, patient/caregiver education/training, ROM/therapeutic exercise, transfer/mobility retraining, strengthening exercise  Therapy Frequency (OT): daily  Plan of Care Review  Plan of Care Reviewed With: patient, spouse  Outcome Evaluation: OT educated pt and spouse on ADL retraining and transfer training, issued necessary AE and educated on use. He completed LB dressing with mod assist using AE and completed transfer training with CGA using RW. Pt limited with dyspnea on exertion, decreased balance, decreased AROM requiring AE to complete LB ADLs, decreased safety awareness, decreased BUE strength and decreased occupational endurance. Pt lives in home with upstairs  bed and bath and spouse is away at work during the day. Recommend IPR, pt in agreement.     Time Calculation:    Time Calculation- OT     Row Name 04/20/23 1354             Time Calculation- OT    OT Start Time 1137  -AR      OT Received On 04/20/23  -AR      OT Goal Re-Cert Due Date 04/30/23  -AR         Timed Charges    61546 - OT Self Care/Mgmt Minutes 11  -AR         Untimed Charges    OT Eval/Re-eval Minutes 59  -AR         Total Minutes    Timed Charges Total Minutes 11  -AR      Untimed Charges Total Minutes 59  -AR       Total Minutes 70  -AR            User Key  (r) = Recorded By, (t) = Taken By, (c) = Cosigned By    Initials Name Provider Type    AR Daisha Schuster OT Occupational Therapist              Therapy Charges for Today     Code Description Service Date Service Provider Modifiers Qty    37258249525 HC OT SELF CARE/MGMT/TRAIN EA 15 MIN 4/20/2023 Daisha Schuster OT GO 1    39878541020 HC OT EVAL LOW COMPLEXITY 4 4/20/2023 Daisha Schuster OT GO 1    92396630246 HC OT THER SUPP EA 15 MIN 4/20/2023 Daisha Schuster OT GO 2               Daisha Schuster OT  4/20/2023

## 2023-04-20 NOTE — CASE MANAGEMENT/SOCIAL WORK
Discharge Planning Assessment  [facility]     Patient Name: Angel Blair  MRN: [id number]  Today's Date: 4/20/2023    Admit Date: 4/18/2023    Plan: Home   Discharge Needs Assessment     Row Name 04/20/23 1656       Living Environment    People in Home spouse    Name(s) of People in Home Wilda ( spouse)    Current Living Arrangements home    Primary Care Provided by self    Provides Primary Care For no one, unable/limited ability to care for self    Family Caregiver if Needed spouse    Quality of Family Relationships involved;supportive    Able to Return to Prior Arrangements yes       Resource/Environmental Concerns    Resource/Environmental Concerns none       Transition Planning    Patient/Family Anticipates Transition to home with family    Transportation Anticipated family or friend will provide       Discharge Needs Assessment    Readmission Within the Last 30 Days no previous admission in last 30 days    Equipment Currently Used at Home none    Concerns to be Addressed adjustment to diagnosis/illness;financial/insurance    Equipment Needed After Discharge none    Outpatient/Agency/Support Group Needs outpatient peritoneal dialysis    Provided Post Acute Provider List? N/A    Current Discharge Risk chronically ill               Discharge Plan     Row Name 04/20/23 6000       Plan    Plan Home    Plan Comments chart reviewed. I met with Mr Blair to initiate d/c planning. He lives with spouse, was independent with his own ADLs, working etc. He states he began to have symptoms approx 1 month ago. He works full time, but states his new medical insurance will not be effective until 5/1/23. I spoke with his employer HR while in room with him, referred to a , I received a call from a  who provided a phone nmber to try to find out if his benefits will cover dialysis ([phone]). This was provided to Mr Blair. Case mgt will f/u and assist him in making phone call to attempt to  obain information.              Continued Care and Services - Admitted Since 4/18/2023    Coordination has not been started for this encounter.       Expected Discharge Date and Time     Expected Discharge Date Expected Discharge Time    Apr 21, 2023          Demographic Summary     Row Name 04/20/23 1653       General Information    Admission Type inpatient    Arrived From home    Referral Source admission list    Reason for Consult discharge planning    Preferred Language English    General Information Comments PCP- Jay Nevarez       Contact Information    Permission Granted to Share Info With     Contact Information Comments Wilda Johnnie [phone]               Functional Status     Row Name 04/20/23 1655       Functional Status    Usual Activity Tolerance good       Assessment of Health Literacy    How often do you have someone help you read hospital materials? Never    How often do you have problems learning about your medical condition because of difficulty understanding written information? Never    How often do you have a problem understanding what is told to you about your medical condition? Never    How confident are you filling out medical forms by yourself? Quite a bit    Health Literacy Good       Functional Status, IADL    Medications independent    Meal Preparation independent    Housekeeping independent    Laundry independent    Shopping independent       Mental Status    General Appearance WDL WDL       Mental Status Summary    Recent Changes in Mental Status/Cognitive Functioning no changes       Employment/    Employment Status employed full-time    Employment/ Comments insurance- none currently, employer insurance effective 5/1/23               Psychosocial    No documentation.                Abuse/Neglect    No documentation.                Legal    No documentation.                Substance Abuse    No documentation.                Patient Forms    No  documentation.                   Sonja C Kellerman, RN

## 2023-04-20 NOTE — THERAPY EVALUATION
Patient Name: Angel Blair  : 1975    MRN: [id number]                              Today's Date: 2023       Admit Date: 2023    Visit Dx:     ICD-10-CM ICD-9-CM   1. Acute on chronic systolic congestive heart failure  I50.23 428.23     428.0   2. NSTEMI (non-ST elevated myocardial infarction)  I21.4 410.70   3. Acute kidney injury  N17.9 584.9   4. Hypertensive emergency  I16.1 401.9   5. Noncompliance with medication regimen  Z91.148 V15.81     Patient Active Problem List   Diagnosis   • Type 2 diabetes mellitus with hyperglycemia (HCC)   • Left-sided back pain   • Obesity, Class III, BMI 40-49.9 (morbid obesity) (HCC)   • Abscess of back   • COVID-19   • Pneumonia due to COVID-19 virus   • Essential hypertension   • CHF exacerbation   • Hyperlipidemia   • CAP (community acquired pneumonia)   • Elevated troponin   • CKD (chronic kidney disease) stage 3, GFR 30-59 ml/min   • Acute on chronic systolic congestive heart failure   • Hypertensive urgency   • CKD (chronic kidney disease) stage 5, GFR less than 15 ml/min     Past Medical History:   Diagnosis Date   • Diabetes mellitus    • Hyperlipidemia    • Hypertension    • Knee swelling 22   • Sleep apnea      Past Surgical History:   Procedure Laterality Date   • SOFT TISSUE TUMOR RESECTION        General Information     Row Name 23 1500          Physical Therapy Time and Intention    Document Type evaluation  -SS     Mode of Treatment physical therapy  -SS     Row Name 23 1500          General Information    Patient Profile Reviewed yes  -SS     Prior Level of Function independent:;all household mobility;community mobility;gait;transfer;work;using stairs  -SS     Existing Precautions/Restrictions fall;other (see comments)  monitor BP, BLE edema  -SS     Barriers to Rehab medically complex  -SS     Row Name 23 1500          Living Environment    People in Home spouse;other (see comments)  works during day  -SS      Row Name 04/20/23 1500          Home Main Entrance    Number of Stairs, Main Entrance none  -SS     Row Name 04/20/23 1500          Stairs Within Home, Primary    Stairs, Within Home, Primary stairs to bedroom/bathroom w/shower  -     Number of Stairs, Within Home, Primary other (see comments)  14  -SS     Stair Railings, Within Home, Primary railing on right side (ascending)  -     Row Name 04/20/23 1500          Cognition    Orientation Status (Cognition) oriented x 4  -SS     Row Name 04/20/23 1500          Safety Issues, Functional Mobility    Safety Issues Affecting Function (Mobility) awareness of need for assistance;insight into deficits/self-awareness;positioning of assistive device;problem-solving;safety precaution awareness;safety precautions follow-through/compliance;sequencing abilities  -     Impairments Affecting Function (Mobility) balance;endurance/activity tolerance;pain;range of motion (ROM);sensation/sensory awareness;strength;shortness of breath;postural/trunk control  -           User Key  (r) = Recorded By, (t) = Taken By, (c) = Cosigned By    Initials Name Provider Type     Mira Ocasio, PT Physical Therapist               Mobility     Row Name 04/20/23 1502          Bed Mobility    Comment, (Bed Mobility) pt up in chair  -[facility] Name 04/20/23 1502          Sit-Stand Transfer    Sit-Stand [place] (Transfers) contact guard;verbal cues  -     Assistive Device (Sit-Stand Transfers) walker, front-wheeled  -     Comment, (Sit-Stand Transfer) VC for hand placement, appropriate alignment, lowering with eccentric control  -[facility] Name 04/20/23 1502          Gait/Stairs (Locomotion)    [place] Level (Gait) contact guard;verbal cues  -     Assistive Device (Gait) walker, front-wheeled  -     Distance in Feet (Gait) 120  -     Deviations/Abnormal Patterns (Gait) bilateral deviations;base of support, wide;татьяна decreased;gait speed decreased  -     Bilateral  Gait Deviations forward flexed posture;heel strike decreased  -     Comment, (Gait/Stairs) Pt. ambulated with a step through, foot flat gait pattern. VC for upright posture, increased heel strike, decreased WB through BUE support. Pt. demonstrates good carryover w/cueing. Activity limited by fatigue. Dyspnea on exertion noted.  -           User Key  (r) = Recorded By, (t) = Taken By, (c) = Cosigned By    Initials Name Provider Type     Mira Ocasio PT Physical Therapist               Obj/Interventions     Row Name 04/20/23 1504          Range of Motion Comprehensive    General Range of Motion bilateral lower extremity ROM WFL  -     Row Name 04/20/23 1504          Strength Comprehensive (MMT)    Comment, General Manual Muscle Testing (MMT) Assessment BLE gross 4/5  -     Row Name 04/20/23 1504          Balance    Balance Assessment sitting static balance;sitting dynamic balance;sit to stand dynamic balance;standing static balance;standing dynamic balance  -     Static Sitting Balance supervision  -     Dynamic Sitting Balance supervision  -     Position, Sitting Balance unsupported;sitting in chair  -     Sit to Stand Dynamic Balance contact guard  -SS     Static Standing Balance contact guard  -SS     Dynamic Standing Balance contact guard  -SS     Position/Device Used, Standing Balance supported;walker, front-wheeled  -     Balance Interventions sitting;standing;sit to stand;supported;dynamic;static  -     Comment, Balance pt does demonstrate one loss of balance posteriorly - able to self correct w/stepping strategy  -     Row Name 04/20/23 1509          Sensory Assessment (Somatosensory)    Sensory Assessment (Somatosensory) LE sensation intact;other (see comments)  B feet - intact but diminished - history of neuropathy  -           User Key  (r) = Recorded By, (t) = Taken By, (c) = Cosigned By    Initials Name Provider Type     Mira Ocasio PT Physical Therapist                Goals/Plan     Row Name 04/20/23 1511          Bed Mobility Goal 1 (PT)    Activity/Assistive Device (Bed Mobility Goal 1, PT) bed mobility activities, all  -SS     [place] Level/Cues Needed (Bed Mobility Goal 1, PT) modified independence  -SS     Time Frame (Bed Mobility Goal 1, PT) long term goal (LTG);10 days  -SS     Row Name 04/20/23 1511          Transfer Goal 1 (PT)    Activity/Assistive Device (Transfer Goal 1, PT) sit-to-stand/stand-to-sit;bed-to-chair/chair-to-bed  -SS     [place] Level/Cues Needed (Transfer Goal 1, PT) modified independence  -SS     Time Frame (Transfer Goal 1, PT) long term goal (LTG);10 days  -SS     Row Name 04/20/23 1511          Gait Training Goal 1 (PT)    Activity/Assistive Device (Gait Training Goal 1, PT) gait (walking locomotion);assistive device use  -SS     [place] Level (Gait Training Goal 1, PT) modified independence  -SS     Distance (Gait Training Goal 1, PT) 150  -SS     Time Frame (Gait Training Goal 1, PT) long term goal (LTG);10 days  -SS     Row Name 04/20/23 1511          Therapy Assessment/Plan (PT)    Planned Therapy Interventions (PT) balance training;bed mobility training;gait training;home exercise program;neuromuscular re-education;patient/family education;postural re-education;ROM (range of motion);stair training;strengthening;stretching;transfer training  -SS           User Key  (r) = Recorded By, (t) = Taken By, (c) = Cosigned By    Initials Name Provider Type    SS Mira Ocasio, RAOUL Physical Therapist               Clinical Impression     Row Name 04/20/23 1506          Pain    Pretreatment Pain Rating 4/10  -SS     Posttreatment Pain Rating 4/10  -SS     Pain Location - Side/Orientation Bilateral  -SS     Pain Location lower  -SS     Pain Location - back  -SS     Pre/Posttreatment Pain Comment with coughing  -SS     Pain Intervention(s) Repositioned;Ambulation/increased activity;Elevated  -SS     Additional Documentation Pain Scale: Numbers  Pre/Post-Treatment (Group)  -     Row Name 04/20/23 1506          Plan of Care Review    Plan of Care Reviewed With patient;spouse  -     Outcome Evaluation Pt. presents below baseline function w/generalized weakness, balance deficits, decreased functional endurance, increased edema and acute pain affecting his ability to safely participate in functional mobility. He performed transfers and ambulated 120' w/front wheeled walker, contact guard assist. Activity limited by fatigue. Dyspnea on exertion/increased effort required during functional mobility. Pt. would benefit from IPPT to address stated deficits. Recommend inpatient rehab upon discharge to maximize safety and independence to facilitate return to OF including stair navigation and working a physically demanding job.  -     Row Name 04/20/23 1506          Therapy Assessment/Plan (PT)    Rehab Potential (PT) good, to achieve stated therapy goals  -     Criteria for Skilled Interventions Met (PT) yes;meets criteria;skilled treatment is necessary  -     Therapy Frequency (PT) daily  -     Row Name 04/20/23 1506          Vital Signs    Pre Systolic BP Rehab 129  -SS     Pre Treatment Diastolic BP 86  -SS     Post Systolic BP Rehab 129  -SS     Post Treatment Diastolic BP 85  -SS     Posttreatment Heart Rate (beats/min) 77  -SS     Pre Patient Position Sitting  -SS     Post Patient Position Sitting  -     Row Name 04/20/23 1506          Positioning and Restraints    Pre-Treatment Position sitting in chair/recliner  -SS     Post Treatment Position chair  -SS     In Chair notified nsg;reclined;call light within reach;encouraged to call for assist;exit alarm on;with family/caregiver;legs elevated  educated pt. and wife importance of elevated BLE and ankle pumps to promote edema management  -           User Key  (r) = Recorded By, (t) = Taken By, (c) = Cosigned By    Initials Name Provider Type     Mira Ocasio, PT Physical Therapist                Outcome Measures     Row Name 04/20/23 1512          How much help from another person do you currently need...    Turning from your back to your side while in flat bed without using bedrails? 3  -SS     Moving from lying on back to sitting on the side of a flat bed without bedrails? 3  -SS     Moving to and from a bed to a chair (including a wheelchair)? 3  -SS     Standing up from a chair using your arms (e.g., wheelchair, bedside chair)? 3  -SS     Climbing 3-5 steps with a railing? 2  -SS     To walk in hospital room? 3  -SS     AM-PAC 6 Clicks Score (PT) 17  -SS     Highest level of mobility 5 --> Static standing  -SS     Row Name 04/20/23 1512 04/20/23 1353       Functional Assessment    Outcome Measure Options AM-PAC 6 Clicks Basic Mobility (PT)  -SS AM-PAC 6 Clicks Daily Activity (OT)  -AR          User Key  (r) = Recorded By, (t) = Taken By, (c) = Cosigned By    Initials Name Provider Type    Daisha Kaur, OT Occupational Therapist    Mira Sargent, PT Physical Therapist                             Physical Therapy Education     Title: PT OT SLP Therapies (In Progress)     Topic: Physical Therapy (In Progress)     Point: Mobility training (Done)     Learning Progress Summary           Patient Eager, E, VU,DU,NR by  at 4/20/2023 1512    Comment: Educated pt. safety/technique w/transfers, ambulation, PT POC, edema management   Significant Other Eager, E, VU,DU,NR by  at 4/20/2023 1512    Comment: Educated pt. safety/technique w/transfers, ambulation, PT POC, edema management                   Point: Home exercise program (Not Started)     Learner Progress:  Not documented in this visit.          Point: Body mechanics (Done)     Learning Progress Summary           Patient Eager, E, VU,DU,NR by  at 4/20/2023 1512    Comment: Educated pt. safety/technique w/transfers, ambulation, PT POC, edema management   Significant Other Eager, E, VU,DU,NR by  at 4/20/2023 1512    Comment: Educated  pt. safety/technique w/transfers, ambulation, PT POC, edema management                   Point: Precautions (Done)     Learning Progress Summary           Patient Eager, E, VU,DU,NR by  at 4/20/2023 1512    Comment: Educated pt. safety/technique w/transfers, ambulation, PT POC, edema management   Significant Other Eager, E, VU,DU,NR by  at 4/20/2023 1512    Comment: Educated pt. safety/technique w/transfers, ambulation, PT POC, edema management                               User Key     Initials Effective Dates Name Provider Type Discipline     06/01/21 -  Mira Ocasio, PT Physical Therapist PT              PT Recommendation and Plan  Planned Therapy Interventions (PT): balance training, bed mobility training, gait training, home exercise program, neuromuscular re-education, patient/family education, postural re-education, ROM (range of motion), stair training, strengthening, stretching, transfer training  Plan of Care Reviewed With: patient, spouse  Outcome Evaluation: Pt. presents below baseline function w/generalized weakness, balance deficits, decreased functional endurance, increased edema and acute pain affecting his ability to safely participate in functional mobility. He performed transfers and ambulated 120' w/front wheeled walker, contact guard assist. Activity limited by fatigue. Dyspnea on exertion/increased effort required during functional mobility. Pt. would benefit from IPPT to address stated deficits. Recommend inpatient rehab upon discharge to maximize safety and independence to facilitate return to OF including stair navigation and working a physically demanding job.     Time Calculation:    PT Charges     Row Name 04/20/23 1513             Time Calculation    Start Time 1319  -      Stop Time 1340  -SS      Time Calculation (min) 21 min  -SS      PT Received On 04/20/23  -      PT Goal Re-Cert Due Date 04/30/23  -         Time Calculation- PT    Total Timed Code Minutes- PT 21  minute(s)  -SS         Timed Charges    52829 - Gait Training Minutes  5  -SS      27385 - PT Therapeutic Activity Minutes 10  -SS         Untimed Charges    PT Eval/Re-eval Minutes 50  -SS         Total Minutes    Timed Charges Total Minutes 15  -SS      Untimed Charges Total Minutes 50  -SS       Total Minutes 65  -SS            User Key  (r) = Recorded By, (t) = Taken By, (c) = Cosigned By    Initials Name Provider Type     Mira Ocasio, PT Physical Therapist              Therapy Charges for Today     Code Description Service Date Service Provider Modifiers Qty    13397404628 HC PT THERAPEUTIC ACT EA 15 MIN 4/20/2023 Mira Ocasio, PT GP 1    28265238676 HC PT EVAL LOW COMPLEXITY 4 4/20/2023 Mira Ocasio, PT GP 1          PT G-Codes  Outcome Measure Options: AM-PAC 6 Clicks Basic Mobility (PT)  AM-PAC 6 Clicks Score (PT): 17  AM-PAC 6 Clicks Score (OT): 15  PT Discharge Summary  Anticipated Discharge Disposition (PT): inpatient rehabilitation facility    Mira Ocasio PT  4/20/2023

## 2023-04-21 LAB
ANION GAP SERPL CALCULATED.3IONS-SCNC: 16 MMOL/L (ref 5–15)
BUN SERPL-MCNC: 85 MG/DL (ref 6–20)
BUN/CREAT SERPL: 14.5 (ref 7–25)
CALCIUM SPEC-SCNC: 8.9 MG/DL (ref 8.6–10.5)
CHLORIDE SERPL-SCNC: 104 MMOL/L (ref 98–107)
CO2 SERPL-SCNC: 18 MMOL/L (ref 22–29)
CREAT SERPL-MCNC: 5.86 MG/DL (ref 0.76–1.27)
EGFRCR SERPLBLD CKD-EPI 2021: 11.2 ML/MIN/1.73
FERRITIN SERPL-MCNC: 362.8 NG/ML (ref 30–400)
GLUCOSE BLDC GLUCOMTR-MCNC: 175 MG/DL (ref 70–130)
GLUCOSE BLDC GLUCOMTR-MCNC: 180 MG/DL (ref 70–130)
GLUCOSE BLDC GLUCOMTR-MCNC: 257 MG/DL (ref 70–130)
GLUCOSE SERPL-MCNC: 198 MG/DL (ref 65–99)
IRON 24H UR-MRATE: 17 MCG/DL (ref 59–158)
IRON SATN MFR SERPL: 7 % (ref 20–50)
POTASSIUM SERPL-SCNC: 4.6 MMOL/L (ref 3.5–5.2)
SODIUM SERPL-SCNC: 138 MMOL/L (ref 136–145)
TIBC SERPL-MCNC: 243 MCG/DL (ref 298–536)
TRANSFERRIN SERPL-MCNC: 163 MG/DL (ref 200–360)

## 2023-04-21 PROCEDURE — 63710000001 INSULIN LISPRO (HUMAN) PER 5 UNITS: Performed by: NURSE PRACTITIONER

## 2023-04-21 PROCEDURE — 80048 BASIC METABOLIC PNL TOTAL CA: CPT | Performed by: INTERNAL MEDICINE

## 2023-04-21 PROCEDURE — 97116 GAIT TRAINING THERAPY: CPT

## 2023-04-21 PROCEDURE — 82728 ASSAY OF FERRITIN: CPT | Performed by: INTERNAL MEDICINE

## 2023-04-21 PROCEDURE — 99232 SBSQ HOSP IP/OBS MODERATE 35: CPT | Performed by: NURSE PRACTITIONER

## 2023-04-21 PROCEDURE — 82962 GLUCOSE BLOOD TEST: CPT

## 2023-04-21 PROCEDURE — 84466 ASSAY OF TRANSFERRIN: CPT | Performed by: INTERNAL MEDICINE

## 2023-04-21 PROCEDURE — 97110 THERAPEUTIC EXERCISES: CPT

## 2023-04-21 PROCEDURE — 83540 ASSAY OF IRON: CPT | Performed by: INTERNAL MEDICINE

## 2023-04-21 PROCEDURE — 99232 SBSQ HOSP IP/OBS MODERATE 35: CPT | Performed by: INTERNAL MEDICINE

## 2023-04-21 RX ORDER — ISOSORBIDE MONONITRATE 60 MG/1
60 TABLET, EXTENDED RELEASE ORAL
Status: DISCONTINUED | OUTPATIENT
Start: 2023-04-22 | End: 2023-04-22

## 2023-04-21 RX ADMIN — INSULIN LISPRO 2 UNITS: 100 INJECTION, SOLUTION INTRAVENOUS; SUBCUTANEOUS at 09:26

## 2023-04-21 RX ADMIN — HYDRALAZINE HYDROCHLORIDE 75 MG: 50 TABLET, FILM COATED ORAL at 19:51

## 2023-04-21 RX ADMIN — CLONIDINE HYDROCHLORIDE 0.1 MG: 0.1 TABLET ORAL at 05:12

## 2023-04-21 RX ADMIN — Medication 10 ML: at 19:52

## 2023-04-21 RX ADMIN — INSULIN LISPRO 4 UNITS: 100 INJECTION, SOLUTION INTRAVENOUS; SUBCUTANEOUS at 11:49

## 2023-04-21 RX ADMIN — NIFEDIPINE 90 MG: 30 TABLET, EXTENDED RELEASE ORAL at 09:24

## 2023-04-21 RX ADMIN — METOPROLOL TARTRATE 50 MG: 50 TABLET ORAL at 09:25

## 2023-04-21 RX ADMIN — INSULIN LISPRO 2 UNITS: 100 INJECTION, SOLUTION INTRAVENOUS; SUBCUTANEOUS at 17:52

## 2023-04-21 RX ADMIN — Medication 10 ML: at 09:27

## 2023-04-21 RX ADMIN — BUMETANIDE 2 MG: 0.25 INJECTION, SOLUTION INTRAMUSCULAR; INTRAVENOUS at 14:05

## 2023-04-21 RX ADMIN — BUMETANIDE 2 MG: 0.25 INJECTION, SOLUTION INTRAMUSCULAR; INTRAVENOUS at 01:15

## 2023-04-21 RX ADMIN — METOPROLOL TARTRATE 50 MG: 50 TABLET ORAL at 19:51

## 2023-04-21 RX ADMIN — ATORVASTATIN CALCIUM 40 MG: 40 TABLET, FILM COATED ORAL at 19:52

## 2023-04-21 RX ADMIN — HYDRALAZINE HYDROCHLORIDE 75 MG: 50 TABLET, FILM COATED ORAL at 05:12

## 2023-04-21 RX ADMIN — ISOSORBIDE MONONITRATE 30 MG: 30 TABLET, EXTENDED RELEASE ORAL at 09:25

## 2023-04-21 RX ADMIN — HYDRALAZINE HYDROCHLORIDE 75 MG: 50 TABLET, FILM COATED ORAL at 14:05

## 2023-04-21 NOTE — PLAN OF CARE
Problem: Adjustment to Illness (Heart Failure)  Goal: Optimal Coping  Outcome: Ongoing, Progressing  Intervention: Support Psychosocial Response  Recent Flowsheet Documentation  Taken 4/20/2023 2123 by Angela Dunaway, RN  Family/Support System Care:   self-care encouraged   support provided     Problem: Cardiac Output Decreased (Heart Failure)  Goal: Optimal Cardiac Output  Outcome: Ongoing, Progressing     Problem: Dysrhythmia (Heart Failure)  Goal: Stable Heart Rate and Rhythm  Outcome: Ongoing, Progressing     Problem: Fluid Imbalance (Heart Failure)  Goal: Fluid Balance  Outcome: Ongoing, Progressing     Problem: Functional Ability Impaired (Heart Failure)  Goal: Optimal Functional Ability  Outcome: Ongoing, Progressing  Intervention: Optimize Functional Ability  Recent Flowsheet Documentation  Taken 4/20/2023 2000 by Angela Dunaway, RN  Activity Management: up in chair     Problem: Diabetes Comorbidity  Goal: Blood Glucose Level Within Targeted Range  Outcome: Ongoing, Progressing     Problem: Sleep Disordered Breathing (Heart Failure)  Goal: Effective Breathing Pattern During Sleep  Outcome: Ongoing, Progressing     Problem: Respiratory Compromise (Heart Failure)  Goal: Effective Oxygenation and Ventilation  Outcome: Ongoing, Progressing

## 2023-04-21 NOTE — CASE MANAGEMENT/SOCIAL WORK
Continued Stay Note  [facility]     Patient Name: Angel Blair  MRN: [id number]  Today's Date: 4/21/2023    Admit Date: 4/18/2023    Plan: Home   Discharge Plan     Row Name 04/21/23 1528       Plan    Plan Comments MSW spoke with pt at bedside and provided resources for the financial counsleing for hospital bill as well as for how to and where to apply for Medicare as well as disability.               Discharge Codes    No documentation.               Expected Discharge Date and Time     Expected Discharge Date Expected Discharge Time    Apr 24, 2023             JING Isidro

## 2023-04-21 NOTE — CASE MANAGEMENT/SOCIAL WORK
Continued Stay Note   [facility]     Patient Name: Angel Blair  MRN: [id number]  Today's Date: 4/21/2023    Admit Date: 4/18/2023    Plan: Home   Discharge Plan     Row Name 04/21/23 0785       Plan    Plan Home    Plan Comments Case mgt f/u. I met with Mr Blair again, we spoke with an  re: insuance coverage for dialysis, unable to get firm answer. the dialysis clinic/provider may have to contact them,  it will be effective 5/1/23. Advised to also apply for Medicare since he should be eligible with diagnosis of ESRD. He is considering applying for social security disability. Case mgt will con't to follow    Row Name 04/21/23 9454       Plan    Plan Comments MSW spoke with pt at bedside and provided resources for the financial counsleing for hospital bill as well as for how to and where to apply for Medicare as well as disability.               Discharge Codes    No documentation.               Expected Discharge Date and Time     Expected Discharge Date Expected Discharge Time    Apr 24, 2023             Sonja C Kellerman, RN

## 2023-04-21 NOTE — PROGRESS NOTES
"General Surgery Daily Progress Note    Subjective:  No new complaints. Inquiring about hemodialysis. Family present.    Objective:  /78 (BP Location: Right arm, Patient Position: Lying)   Pulse 79   Temp 98.8 °F (37.1 °C) (Oral)   Resp 16   Ht 185.4 cm (73\")   Wt 124 kg (274 lb 3.2 oz)   SpO2 94%   BMI 36.18 kg/m²       General Appearance: NAD  Eyes: Anicteric  Neck: Trachea midline   Cardiovascular:  RRR without murmur nor rub  Lungs:  Bilateral respirations unlabored   Abdomen:  Soft, nontender, no masses, no organomegaly   Extremities:  No cyanosis, edema noted. Left antecubital IV in place.  Skin:  No obvious rashes   Neurologic: awake and conversant     CBC:  Results from last 7 days   Lab Units 04/20/23  0432   WBC 10*3/mm3 8.87   HEMOGLOBIN g/dL 9.8*   HEMATOCRIT % 30.7*   PLATELETS 10*3/mm3 150       CMP:  Results from last 7 days   Lab Units 04/20/23  0432 04/19/23  0527 04/18/23  2151   SODIUM mmol/L 142   < > 144   POTASSIUM mmol/L 4.7   < > 4.8   CHLORIDE mmol/L 110*   < > 114*   CO2 mmol/L 19.0*   < > 18.0*   BUN mg/dL 85*   < > 88*   CREATININE mg/dL 5.47*   < > 5.74*   CALCIUM mg/dL 8.7   < > 9.0   BILIRUBIN mg/dL  --   --  0.2   ALK PHOS U/L  --   --  110   ALT (SGPT) U/L  --   --  20   AST (SGOT) U/L  --   --  30   GLUCOSE mg/dL 146*   < > 111*    < > = values in this interval not displayed.         Assessment:  Acute kidney injury on chronic renal failure  Diabetes mellitus type 2  Hypertension  Congestive heart failure  Obesity    Plan:   Family present. Questions about HD and AV fistula creation discussed. He is unclear on what modality he would like to proceed with now.  I will obtain venous mapping of the left (non dominate) upper extremity to evaluate his fistula options. Remove the left upper extremity IV, no further blood draws nor IV's in the left arm.      Nico Herrera MD - 4/21/2023, 12:44 EDT        " No. MELVIN screening performed.  STOP BANG Legend: 0-2 = LOW Risk; 3-4 = INTERMEDIATE Risk; 5-8 = HIGH Risk

## 2023-04-21 NOTE — PLAN OF CARE
Goal Outcome Evaluation:  Plan of Care Reviewed With: patient, spouse        Progress: improving  Outcome Evaluation: Pt. presents below baseline function w/generalized weakness, balance deficits, decreased functional endurance, increased edema and acute pain affecting his ability to safely participate in functional mobility. He performed transfers and [id number] w/front wheeled walker, stand by assist. Activity limited by fatigue. Pt. tolerated ther-ex well. He does fatigue easily and demonstrate dyspnea on exertion. Continue IPPT to progress as tolerated. Recommend home with assist and outpatient PT upon discharge.

## 2023-04-21 NOTE — PROGRESS NOTES
" LOS: 2 days   Patient Care Team:  Jay Nevarez MD as PCP - General (Emergency Medicine)  Mirza Amezquita MD as Consulting Physician (Cardiology)    Chief Complaint: SOB    Subjective         Subjective:  Symptoms:  Stable.  He reports cough.  No shortness of breath, chest pain, chest pressure or anxiety.    Diet:  Adequate intake.    Activity level: Normal.    Pain:  He reports no pain.        History taken from: patient    Objective     Vital Sign Min/Max for last 24 hours  Temp  Min: 97.9 °F (36.6 °C)  Max: 99.5 °F (37.5 °C)   BP  Min: 130/88  Max: 183/99   Pulse  Min: 78  Max: 94   Resp  Min: 16  Max: 16   SpO2  Min: 94 %  Max: 96 %   No data recorded   Weight  Min: 124 kg (274 lb 3.2 oz)  Max: 124 kg (274 lb 3.2 oz)     Flowsheet Rows    Flowsheet Row First Filed Value   Admission Height 185.4 cm (73\") Documented at 04/18/2023 2110   Admission Weight 127 kg (280 lb) Documented at 04/18/2023 2110          I/O this shift:  In: 400 [P.O.:400]  Out: 850 [Urine:850]  I/O last 3 completed shifts:  In: -   Out: 5450 [Urine:5450]    Objective:  General Appearance:  Comfortable.    Vital signs: (most recent): Blood pressure (P) 134/78, pulse (P) 86, temperature 98.8 °F (37.1 °C), temperature source Oral, resp. rate 16, height 185.4 cm (73\"), weight 124 kg (274 lb 3.2 oz), SpO2 94 %.  Vital signs are normal.    Output: Producing urine.    HEENT: Normal HEENT exam.    Lungs:  Normal respiratory rate.  Breath sounds clear to auscultation.  He is not in respiratory distress.  No stridor.  No decreased breath sounds.    Heart: Normal rate.  Regular rhythm.  S1 normal and S2 normal.  No murmur or gallop.   Abdomen: Abdomen is soft.  Bowel sounds are normal.     Extremities: There is no dependent edema or local swelling.    Pulses: Distal pulses are intact.    Neurological: Patient is alert and oriented to person, place and time.  Normal strength.    Pupils:  Pupils are equal, round, and reactive to light.  "             Results Review:     I reviewed the patient's new clinical results.    WBC WBC   Date Value Ref Range Status   04/20/2023 8.87 3.40 - 10.80 10*3/mm3 Final   04/19/2023 9.97 3.40 - 10.80 10*3/mm3 Final   04/18/2023 10.62 3.40 - 10.80 10*3/mm3 Final      HGB Hemoglobin   Date Value Ref Range Status   04/20/2023 9.8 (L) 13.0 - 17.7 g/dL Final   04/19/2023 9.3 (L) 13.0 - 17.7 g/dL Final   04/18/2023 10.4 (L) 13.0 - 17.7 g/dL Final      HCT Hematocrit   Date Value Ref Range Status   04/20/2023 30.7 (L) 37.5 - 51.0 % Final   04/19/2023 29.9 (L) 37.5 - 51.0 % Final   04/18/2023 32.8 (L) 37.5 - 51.0 % Final      Platlets No results found for: LABPLAT   MCV MCV   Date Value Ref Range Status   04/20/2023 90.6 79.0 - 97.0 fL Final   04/19/2023 93.4 79.0 - 97.0 fL Final   04/18/2023 91.1 79.0 - 97.0 fL Final          Sodium Sodium   Date Value Ref Range Status   04/21/2023 138 136 - 145 mmol/L Final   04/20/2023 142 136 - 145 mmol/L Final   04/19/2023 142 136 - 145 mmol/L Final   04/18/2023 144 136 - 145 mmol/L Final      Potassium Potassium   Date Value Ref Range Status   04/21/2023 4.6 3.5 - 5.2 mmol/L Final   04/20/2023 4.7 3.5 - 5.2 mmol/L Final   04/19/2023 4.4 3.5 - 5.2 mmol/L Final   04/18/2023 4.8 3.5 - 5.2 mmol/L Final     Comment:     Slight hemolysis detected by analyzer. Results may be affected.      Chloride Chloride   Date Value Ref Range Status   04/21/2023 104 98 - 107 mmol/L Final   04/20/2023 110 (H) 98 - 107 mmol/L Final   04/19/2023 113 (H) 98 - 107 mmol/L Final   04/18/2023 114 (H) 98 - 107 mmol/L Final      CO2 CO2   Date Value Ref Range Status   04/21/2023 18.0 (L) 22.0 - 29.0 mmol/L Final   04/20/2023 19.0 (L) 22.0 - 29.0 mmol/L Final   04/19/2023 17.0 (L) 22.0 - 29.0 mmol/L Final   04/18/2023 18.0 (L) 22.0 - 29.0 mmol/L Final      BUN BUN   Date Value Ref Range Status   04/21/2023 85 (H) 6 - 20 mg/dL Final   04/20/2023 85 (H) 6 - 20 mg/dL Final   04/19/2023 83 (H) 6 - 20 mg/dL Final    04/18/2023 88 (H) 6 - 20 mg/dL Final      Creatinine Creatinine   Date Value Ref Range Status   04/21/2023 5.86 (H) 0.76 - 1.27 mg/dL Final   04/20/2023 5.47 (H) 0.76 - 1.27 mg/dL Final   04/19/2023 5.88 (H) 0.76 - 1.27 mg/dL Final   04/18/2023 5.74 (H) 0.76 - 1.27 mg/dL Final      Calcium Calcium   Date Value Ref Range Status   04/21/2023 8.9 8.6 - 10.5 mg/dL Final   04/20/2023 8.7 8.6 - 10.5 mg/dL Final   04/19/2023 8.8 8.6 - 10.5 mg/dL Final   04/18/2023 9.0 8.6 - 10.5 mg/dL Final      PO4 No results found for: CAPO4   Albumin Albumin   Date Value Ref Range Status   04/18/2023 3.2 (L) 3.5 - 5.2 g/dL Final      Magnesium Magnesium   Date Value Ref Range Status   04/20/2023 1.7 1.6 - 2.6 mg/dL Final   04/19/2023 1.4 (L) 1.6 - 2.6 mg/dL Final      Uric Acid No results found for: URICACID     Medication Review: yes    Assessment & Plan       Type 2 diabetes mellitus with hyperglycemia (HCC)    Essential hypertension    Hyperlipidemia    Elevated troponin    Acute on chronic systolic congestive heart failure    Hypertensive urgency    CKD (chronic kidney disease) stage 5, GFR less than 15 ml/min      Assessment & Plan     1.  CKD stage V/ESRD:  Bx proven severe nephrosclerosis w adaptive FSGS changes and diabetic nephropathy 90 percent fibrosis on bx. Rapid loss of kidney function in last few yrs. Lost to f/u after discharge from [facility] in Jan 2023. Cr worse on this admission likley Acute component vs progression of underlying CKD in the setting of uncontrolled HTN and volume [place]      2.  Volume status: Hypervolemia. He ran out of all his meds 1 month ago. Didn't have insurance     3.  HTN crisis; Wasn't taking any meds prior to admission.      4.  Met acidosis: Due to CKD    5.  Anemia: ACD due to CKD. Check iron panel      Recommendations  Patient was seen by Dr. Herrera yesterday, patient has decided to do hemodialysis in center.  He does not want PD at home.  I again tried to explain the benefits of PD at home  compared to in center dialysis.  Patient will need a tunnel catheter if the renal function remains same and he is unresponsive to diuretics   AntiHTN meds titrated per cardiology at present  Dose meds to GFR<15  High risk for needing dialysis on this admission.     I discussed the patients findings and my recommendations with patient    Rex Frost MD  04/21/23  14:06 EDT

## 2023-04-21 NOTE — THERAPY TREATMENT NOTE
Patient Name: Angel Blair  : 1975    MRN: [id number]                              Today's Date: 2023       Admit Date: 2023    Visit Dx:     ICD-10-CM ICD-9-CM   1. Acute on chronic systolic congestive heart failure  I50.23 428.23     428.0   2. NSTEMI (non-ST elevated myocardial infarction)  I21.4 410.70   3. Acute kidney injury  N17.9 584.9   4. Hypertensive emergency  I16.1 401.9   5. Noncompliance with medication regimen  Z91.148 V15.81     Patient Active Problem List   Diagnosis   • Type 2 diabetes mellitus with hyperglycemia (HCC)   • Left-sided back pain   • Obesity, Class III, BMI 40-49.9 (morbid obesity) (HCC)   • Abscess of back   • COVID-19   • Pneumonia due to COVID-19 virus   • Essential hypertension   • CHF exacerbation   • Hyperlipidemia   • CAP (community acquired pneumonia)   • Elevated troponin   • CKD (chronic kidney disease) stage 3, GFR 30-59 ml/min   • Acute on chronic systolic congestive heart failure   • Hypertensive urgency   • CKD (chronic kidney disease) stage 5, GFR less than 15 ml/min     Past Medical History:   Diagnosis Date   • Diabetes mellitus    • Hyperlipidemia    • Hypertension    • Knee swelling 22   • Sleep apnea      Past Surgical History:   Procedure Laterality Date   • SOFT TISSUE TUMOR RESECTION        General Information     Row Name 23 9908          Physical Therapy Time and Intention    Document Type therapy note (daily note)  -SS     Mode of Treatment physical therapy  -SS     Row Name 23 9851          General Information    Patient Profile Reviewed yes  -SS     Existing Precautions/Restrictions fall;other (see comments)  BLE edema  -SS     Barriers to Rehab medically complex  -SS     Row Name 23 1919          Cognition    Orientation Status (Cognition) oriented x 4  -SS     Row Name 23 6527          Safety Issues, Functional Mobility    Safety Issues Affecting Function (Mobility) insight into  deficits/self-awareness;safety precaution awareness;safety precautions follow-through/compliance;sequencing abilities  -SS     Impairments Affecting Function (Mobility) balance;endurance/activity tolerance;pain;range of motion (ROM);sensation/sensory awareness;strength;shortness of breath;postural/trunk control  -SS           User Key  (r) = Recorded By, (t) = Taken By, (c) = Cosigned By    Initials Name Provider Type     Mira Ocasio, RAOUL Physical Therapist               Mobility     Row Name 04/21/23 1356          Bed Mobility    Bed Mobility scooting/bridging;sit-supine  -SS     Scooting/Bridging Travis (Bed Mobility) standby assist  -SS     Sit-Supine Travis (Bed Mobility) standby assist  -SS     Assistive Device (Bed Mobility) bed rails;head of bed elevated  -SS     Comment, (Bed Mobility) VC for sequencing  -SS     Row Name 04/21/23 2696          Sit-Stand Transfer    Sit-Stand Travis (Transfers) verbal cues;standby assist  -SS     Assistive Device (Sit-Stand Transfers) walker, front-wheeled  -SS     Comment, (Sit-Stand Transfer) VC for hand placement  -SS     Row Name 04/21/23 5866          Gait/Stairs (Locomotion)    Travis Level (Gait) verbal cues;standby assist  -SS     Assistive Device (Gait) walker, front-wheeled  -SS     Distance in Feet (Gait) 350  -SS     Deviations/Abnormal Patterns (Gait) bilateral deviations;base of support, wide;татьяна decreased;gait speed decreased  -SS     Bilateral Gait Deviations forward flexed posture;heel strike decreased  -SS     Travis Level (Stairs) contact guard;verbal cues  -SS     Handrail Location (Stairs) right side (ascending)  -SS     Number of Steps (Stairs) 5  -SS     Ascending Technique (Stairs) step-over-step  -SS     Descending Technique (Stairs) step-over-step  -SS     Stairs, Impairments ROM decreased;other (see comments)  secondary to edema  -SS     Comment, (Gait/Stairs) Pt. ambulated with a step through gait pattern. VC  for upright posture, heel toe pattern. VC for safety recommendations w/stair navigation. Pt. endorses mild impairments w/descending secondary to increased edema - no LOB noted, just increased time required. Activity limited by fatigue.  -           User Key  (r) = Recorded By, (t) = Taken By, (c) = Cosigned By    Initials Name Provider Type     Mira Ocasio, RAOUL Physical Therapist               Obj/Interventions     Row Name 04/21/23 1407          Range of Motion Comprehensive    Comment, General Range of Motion B foot edema - WFL but limiting end range DF during stair navigation  -     Row Name 04/21/23 1407          Motor Skills    Therapeutic Exercise hip;knee;ankle  -[facility] Name 04/21/23 1407          Hip (Therapeutic Exercise)    Hip (Therapeutic Exercise) strengthening exercise  -     Hip Strengthening (Therapeutic Exercise) bilateral;aBduction;10 repetitions;mini squats;5 lb free weight  -     Row Name 04/21/23 1407          Knee (Therapeutic Exercise)    Knee (Therapeutic Exercise) strengthening exercise  -     Knee Strengthening (Therapeutic Exercise) bilateral;marching while standing;10 repetitions  -[facility] Name 04/21/23 1407          Ankle (Therapeutic Exercise)    Ankle (Therapeutic Exercise) AROM (active range of motion)  -     Ankle AROM (Therapeutic Exercise) bilateral;standing;plantarflexion;10 repetitions  -[facility] Name 04/21/23 1407          Balance    Balance Assessment sitting static balance;sitting dynamic balance;sit to stand dynamic balance;standing static balance;standing dynamic balance  -     Static Sitting Balance supervision  -     Dynamic Sitting Balance supervision  -     Position, Sitting Balance unsupported;sitting edge of bed  -     Sit to Stand Dynamic Balance contact guard  -     Static Standing Balance contact guard  -     Dynamic Standing Balance contact guard  -     Position/Device Used, Standing Balance supported;walker, front-wheeled   "-     Balance Interventions sitting;standing;sit to stand;supported;static;dynamic  -           User Key  (r) = Recorded By, (t) = Taken By, (c) = Cosigned By    Initials Name Provider Type     Mira Ocasio, PT Physical Therapist               Goals/Plan    No documentation.                Clinical Impression     Row Name 04/21/23 1446          Pain    Pretreatment Pain Rating 4/10  -SS     Posttreatment Pain Rating 4/10  -     Pain Location - Side/Orientation Bilateral  -     Pain Location lower  -     Pain Location - extremity;other (see comments)  anterior ankle - \"in the crease\" due to edema L more painful than R  -SS     Pain Intervention(s) Repositioned;Ambulation/increased activity;Elevated  -     Additional Documentation Pain Scale: Numbers Pre/Post-Treatment (Group)  -     Row Name 04/21/23 1446          Plan of Care Review    Plan of Care Reviewed With patient;spouse  -     Progress improving  -     Outcome Evaluation Pt. presents below baseline function w/generalized weakness, balance deficits, decreased functional endurance, increased edema and acute pain affecting his ability to safely participate in functional mobility. He performed transfers and [id number] w/front wheeled walker, stand by assist. Activity limited by fatigue. Pt. tolerated ther-ex well. He does fatigue easily and demonstrate dyspnea on exertion. Continue IPPT to progress as tolerated. Recommend home with assist and outpatient PT upon discharge.  -     Row Name 04/21/23 1446          Therapy Assessment/Plan (PT)    Rehab Potential (PT) good, to achieve stated therapy goals  -     Criteria for Skilled Interventions Met (PT) yes;meets criteria;skilled treatment is necessary  -     Therapy Frequency (PT) daily  -     Row Name 04/21/23 1446          Vital Signs    Pre Systolic BP Rehab 149  -SS     Pre Treatment Diastolic BP 90  -SS     Post Systolic BP Rehab 134  -SS     Post Treatment Diastolic BP 78  -SS  "    Pretreatment Heart Rate (beats/min) 85  -SS     Posttreatment Heart Rate (beats/min) 84  -SS     Post SpO2 (%) 97  -SS     O2 Delivery Post Treatment room air  -SS     Pre Patient Position Sitting  -SS     Post Patient Position Supine  -     Row Name 04/21/23 1446          Positioning and Restraints    Pre-Treatment Position in bed  -SS     Post Treatment Position bed  -SS     In Bed notified nsg;fowlers;call light within reach;encouraged to call for assist;exit alarm on;patient within staff view;with family/caregiver;legs elevated  -           User Key  (r) = Recorded By, (t) = Taken By, (c) = Cosigned By    Initials Name Provider Type     Mira Ocasio PT Physical Therapist               Outcome Measures     Row Name 04/21/23 1457          How much help from another person do you currently need...    Turning from your back to your side while in flat bed without using bedrails? 3  -SS     Moving from lying on back to sitting on the side of a flat bed without bedrails? 3  -SS     Moving to and from a bed to a chair (including a wheelchair)? 3  -SS     Standing up from a chair using your arms (e.g., wheelchair, bedside chair)? 3  -SS     Climbing 3-5 steps with a railing? 3  -SS     To walk in hospital room? 3  -SS     AM-PAC 6 Clicks Score (PT) 18  -SS     Highest level of mobility 6 --> Walked 10 steps or more  -     Row Name 04/21/23 1457          Functional Assessment    Outcome Measure Options AM-PAC 6 Clicks Basic Mobility (PT)  -           User Key  (r) = Recorded By, (t) = Taken By, (c) = Cosigned By    Initials Name Provider Type    Mira Sargent PT Physical Therapist                             Physical Therapy Education     Title: PT OT SLP Therapies (Done)     Topic: Physical Therapy (Done)     Point: Mobility training (Done)     Learning Progress Summary           Patient Jany, E, YG,CASEY,NR by  at 4/21/2023 9695    Comment: Reviewed safety/technique w/bed mobility, transfers,  ambulation, stair navigation, HEP, PT POC    Eager, E, VU,DU,NR by  at 4/20/2023 1512    Comment: Educated pt. safety/technique w/transfers, ambulation, PT POC, edema management   Family Eager, E, VU,DU,NR by  at 4/21/2023 1457    Comment: Reviewed safety/technique w/bed mobility, transfers, ambulation, stair navigation, HEP, PT POC   Significant Other Eager, E, VU,DU,NR by  at 4/20/2023 1512    Comment: Educated pt. safety/technique w/transfers, ambulation, PT POC, edema management                   Point: Home exercise program (Done)     Learning Progress Summary           Patient Eager, E, VU,DU,NR by  at 4/21/2023 1457    Comment: Reviewed safety/technique w/bed mobility, transfers, ambulation, stair navigation, HEP, PT POC   Family Eager, E, VU,DU,NR by  at 4/21/2023 1457    Comment: Reviewed safety/technique w/bed mobility, transfers, ambulation, stair navigation, HEP, PT POC                   Point: Body mechanics (Done)     Learning Progress Summary           Patient Eager, E, VU,DU,NR by  at 4/21/2023 1457    Comment: Reviewed safety/technique w/bed mobility, transfers, ambulation, stair navigation, HEP, PT POC    Eager, E, VU,DU,NR by  at 4/20/2023 1512    Comment: Educated pt. safety/technique w/transfers, ambulation, PT POC, edema management   Family Eager, E, VU,DU,NR by  at 4/21/2023 1457    Comment: Reviewed safety/technique w/bed mobility, transfers, ambulation, stair navigation, HEP, PT POC   Significant Other Eager, E, VU,DU,NR by  at 4/20/2023 1512    Comment: Educated pt. safety/technique w/transfers, ambulation, PT POC, edema management                   Point: Precautions (Done)     Learning Progress Summary           Patient Eager, E, VU,DU,NR by  at 4/21/2023 1457    Comment: Reviewed safety/technique w/bed mobility, transfers, ambulation, stair navigation, HEP, PT POC    Eager, E, VU,DU,NR by  at 4/20/2023 1512    Comment: Educated pt. safety/technique w/transfers,  ambulation, PT POC, edema management   Family JENIFFER Carmichael VU,CASEY,NR by  at 4/21/2023 1987    Comment: Reviewed safety/technique w/bed mobility, transfers, ambulation, stair navigation, HEP, PT POC   Significant Other JENIFFER Carmichael VU,CASEY,NR by  at 4/20/2023 1512    Comment: Educated pt. safety/technique w/transfers, ambulation, PT POC, edema management                               User Key     Initials Effective Dates Name Provider Type Discipline     06/01/21 -  Mira Ocasio, PT Physical Therapist PT              PT Recommendation and Plan  Planned Therapy Interventions (PT): balance training, bed mobility training, gait training, home exercise program, neuromuscular re-education, patient/family education, postural re-education, ROM (range of motion), stair training, strengthening, stretching, transfer training  Plan of Care Reviewed With: patient, spouse  Progress: improving  Outcome Evaluation: Pt. presents below baseline function w/generalized weakness, balance deficits, decreased functional endurance, increased edema and acute pain affecting his ability to safely participate in functional mobility. He performed transfers and [id number]' w/front wheeled walker, stand by assist. Activity limited by fatigue. Pt. tolerated ther-ex well. He does fatigue easily and demonstrate dyspnea on exertion. Continue IPPT to progress as tolerated. Recommend home with assist and outpatient PT upon discharge.     Time Calculation:    PT Charges     Row Name 04/21/23 1458             Time Calculation    Start Time 1325  -SS      Stop Time 1350  -SS      Time Calculation (min) 25 min  -SS      PT Received On 04/21/23  -         Time Calculation- PT    Total Timed Code Minutes- PT 25 minute(s)  -SS         Timed Charges    34641 - PT Therapeutic Exercise Minutes 10  -SS      77989 - Gait Training Minutes  10  -SS      16381 - PT Therapeutic Activity Minutes 5  -SS         Total Minutes    Timed Charges Total Minutes 25  -SS        Total Minutes 25  -SS            User Key  (r) = Recorded By, (t) = Taken By, (c) = Cosigned By    Initials Name Provider Type    SS Mira Ocasio, PT Physical Therapist              Therapy Charges for Today     Code Description Service Date Service Provider Modifiers Qty    70093458336 HC PT THERAPEUTIC ACT EA 15 MIN 4/20/2023 Mira Ocasio, PT GP 1    32495349966 HC PT EVAL LOW COMPLEXITY 4 4/20/2023 Mira Ocasio, PT GP 1    03694263478 HC PT THER PROC EA 15 MIN 4/21/2023 Mira Ocasio, PT GP 1    75544037723 HC GAIT TRAINING EA 15 MIN 4/21/2023 Mira Ocasio, PT GP 1          PT G-Codes  Outcome Measure Options: AM-PAC 6 Clicks Basic Mobility (PT)  AM-PAC 6 Clicks Score (PT): 18  AM-PAC 6 Clicks Score (OT): 15  PT Discharge Summary  Anticipated Discharge Disposition (PT): home with assist, home with outpatient therapy services    Mira Ocasio PT  4/21/2023

## 2023-04-21 NOTE — PROGRESS NOTES
"  [facility] at [facility]   Inpatient Progress Note       LOS: 2 days   Patient Care Team:  Jay Nevarez MD as PCP - General (Emergency Medicine)  Mirza Amezquita MD as Consulting Physician (Cardiology)    Chief Complaint:  Follow-up for CHF and HTN    Subjective     Interval History:   Patient sitting on side of bed. Still with some edema. Notes that he would like to talk to nephrology about pursuing HD instead of PD.       Review of Systems:   Pertinent positives noted in history, exam, and assessment. Otherwise reviewed and negative.      Objective     Vitals:  Blood pressure 157/88, pulse 86, temperature 99.5 °F (37.5 °C), temperature source Axillary, resp. rate 16, height 185.4 cm (73\"), weight 124 kg (274 lb 3.2 oz), SpO2 96 %.     Intake/Output Summary (Last 24 hours) at 4/21/2023 1001  Last data filed at 4/21/2023 0923  Gross per 24 hour   Intake 200 ml   Output 2700 ml   Net -2500 ml     Vitals reviewed.   Constitutional:       Appearance: Well-developed and not in distress.      Comments: obese   Neck:      Vascular: No JVD.      Trachea: No tracheal deviation.   Pulmonary:      Effort: Pulmonary effort is normal.      Breath sounds: Normal breath sounds.   Cardiovascular:      Normal rate. Regular rhythm.   Pulses:     Intact distal pulses.   Edema:     Ankle: bilateral 1+ edema of the ankle.  Abdominal:      Palpations: Abdomen is soft.      Tenderness: There is no abdominal tenderness.   Musculoskeletal:         General: No deformity. Skin:     General: Skin is warm and dry.   Neurological:      Mental Status: Alert and oriented to person, place, and time.            Results Review:     I reviewed the patient's new clinical results.    Results from last 7 days   Lab Units 04/20/23  0432   WBC 10*3/mm3 8.87   HEMOGLOBIN g/dL 9.8*   HEMATOCRIT % 30.7*   PLATELETS 10*3/mm3 150     Results from last 7 days   Lab Units 04/20/23  0432 04/19/23  0527 04/18/23  2151   SODIUM mmol/L 142   " < > 144   POTASSIUM mmol/L 4.7   < > 4.8   CHLORIDE mmol/L 110*   < > 114*   CO2 mmol/L 19.0*   < > 18.0*   BUN mg/dL 85*   < > 88*   CREATININE mg/dL 5.47*   < > 5.74*   CALCIUM mg/dL 8.7   < > 9.0   BILIRUBIN mg/dL  --   --  0.2   ALK PHOS U/L  --   --  110   ALT (SGPT) U/L  --   --  20   AST (SGOT) U/L  --   --  30   GLUCOSE mg/dL 146*   < > 111*    < > = values in this interval not displayed.     Results from last 7 days   Lab Units 04/20/23  0432   SODIUM mmol/L 142   POTASSIUM mmol/L 4.7   CHLORIDE mmol/L 110*   CO2 mmol/L 19.0*   BUN mg/dL 85*   CREATININE mg/dL 5.47*   GLUCOSE mg/dL 146*   CALCIUM mg/dL 8.7     Results from last 7 days   Lab Units 04/19/23  0527 04/18/23  2308   INR  1.12 1.07     Lab Results   Lab Value Date/Time    TROPONINT 170 (C) 04/19/2023 0138    TROPONINT 177 (C) 04/18/2023 2151    TROPONINT 0.117 (C) 01/22/2023 1350    TROPONINT 0.123 (C) 01/22/2023 1136    TROPONINT 0.066 (C) 02/18/2021 1324    TROPONINT 0.043 (C) 01/21/2021 0609    TROPONINT 0.073 (C) 01/19/2021 1419         Results from last 7 days   Lab Units 04/19/23  0527   CHOLESTEROL mg/dL 210*   TRIGLYCERIDES mg/dL 143   HDL CHOL mg/dL 44   LDL CHOL mg/dL 140*     Results from last 7 days   Lab Units 04/18/23  2151   PROBNP pg/mL 11,594.0*     Results from last 7 days   Lab Units 04/19/23 0138 04/18/23  2151   HSTROP T ng/L 170* 177*         Tele:  sr    Assessment:      Type 2 diabetes mellitus with hyperglycemia (HCC)    Essential hypertension    Hyperlipidemia    Elevated troponin    Acute on chronic systolic congestive heart failure    Hypertensive urgency    CKD (chronic kidney disease) stage 5, GFR less than 15 ml/min      1. HTN urgency  1. Improving   2. Patient notes that he ran out of medications about 1 month ago.   2. NICM  3. CKD  1. Nephrology following  2. Patient needing dialysis. Wants to consider HD  4. Acute on chronic HFpEF  1. Volume management per renal  2. EF normal by last echo  5. Chronically  elevated troponin    Plan:  1. Increase Imdur to 60mg daily.   2. Continue other current CV medications.   3. We will continue to follow along  4. Volume management per Renal    ROBBY Figueroa   Dictated utilizing Dragon dictation

## 2023-04-22 ENCOUNTER — APPOINTMENT (OUTPATIENT)
Dept: CARDIOLOGY | Facility: HOSPITAL | Age: 48
DRG: 291 | End: 2023-04-22

## 2023-04-22 LAB
ANION GAP SERPL CALCULATED.3IONS-SCNC: 13 MMOL/L (ref 5–15)
BASOPHILS # BLD AUTO: 0.04 10*3/MM3 (ref 0–0.2)
BASOPHILS NFR BLD AUTO: 0.4 % (ref 0–1.5)
BH CV UPPER VENOUS LEFT AXILLARY AUGMENT: NORMAL
BH CV UPPER VENOUS LEFT AXILLARY COMPRESS: NORMAL
BH CV UPPER VENOUS LEFT AXILLARY PHASIC: NORMAL
BH CV UPPER VENOUS LEFT AXILLARY SPONT: NORMAL
BH CV UPPER VENOUS LEFT BASILIC FOREARM COMPRESS: NORMAL
BH CV UPPER VENOUS LEFT BASILIC FOREARM THROMBUS: NORMAL
BH CV UPPER VENOUS LEFT BASILIC UPPER COMPRESS: NORMAL
BH CV UPPER VENOUS LEFT BRACHIAL COMPRESS: NORMAL
BH CV UPPER VENOUS LEFT CEPHALIC FOREARM COMPRESS: NORMAL
BH CV UPPER VENOUS LEFT CEPHALIC UPPER COLOR: 1
BH CV UPPER VENOUS LEFT CEPHALIC UPPER COMPRESS: NORMAL
BH CV UPPER VENOUS LEFT INTERNAL JUGULAR AUGMENT: NORMAL
BH CV UPPER VENOUS LEFT INTERNAL JUGULAR COMPRESS: NORMAL
BH CV UPPER VENOUS LEFT INTERNAL JUGULAR PHASIC: NORMAL
BH CV UPPER VENOUS LEFT INTERNAL JUGULAR SPONT: NORMAL
BH CV UPPER VENOUS LEFT MED CUBITAL COLOR: 1
BH CV UPPER VENOUS LEFT MED CUBITAL COMPRESS: NORMAL
BH CV UPPER VENOUS LEFT MED CUBITAL THROMBUS: NORMAL
BH CV UPPER VENOUS LEFT SUBCLAVIAN AUGMENT: NORMAL
BH CV UPPER VENOUS LEFT SUBCLAVIAN PHASIC: NORMAL
BH CV UPPER VENOUS LEFT SUBCLAVIAN SPONT: NORMAL
BH CV UPPER VENOUS RIGHT SUBCLAVIAN AUGMENT: NORMAL
BH CV UPPER VENOUS RIGHT SUBCLAVIAN PHASIC: NORMAL
BH CV UPPER VENOUS RIGHT SUBCLAVIAN SPONT: NORMAL
BH CV VAS MEAS BASILIC ANTECUBITAL FOSSA LEFT: 0.2 CM
BH CV VAS MEAS BASILIC FOREARM LEFT - MID: 0.05 CM
BH CV VAS MEAS BASILIC FOREARM LEFT - PROX: 0.14 CM
BH CV VAS MEAS BASILIC UPPER ARM LEFT - DIST: 0.11 CM
BH CV VAS MEAS BASILIC UPPER ARM LEFT - MID: 0.18 CM
BH CV VAS MEAS BASILIC UPPER ARM LEFT - PROX: 0.46 CM
BH CV VAS MEAS CEPHALIC ANTECUBITAL FOSSA LEFT: 0.27 CM
BH CV VAS MEAS CEPHALIC FOREARM LEFT - DIST: 0.24 CM
BH CV VAS MEAS CEPHALIC FOREARM LEFT - MID: 0.11 CM
BH CV VAS MEAS CEPHALIC FOREARM LEFT - PROX: 0.12 CM
BH CV VAS MEAS CEPHALIC UPPER ARM LEFT - DIST: 0.06 CM
BH CV VAS MEAS CEPHALIC UPPER ARM LEFT - MID: 0.12 CM
BH CV VAS MEAS CEPHALIC UPPER ARM LEFT - PROX: 0.28 CM
BH CV VAS MEAS RADIAL UPPER ARM LEFT - DIST: 0.4 CM
BH CV VAS PRELIMINARY FINDINGS SCRIPTING: 1
BUN SERPL-MCNC: 94 MG/DL (ref 6–20)
BUN/CREAT SERPL: 14.4 (ref 7–25)
CALCIUM SPEC-SCNC: 9 MG/DL (ref 8.6–10.5)
CHLORIDE SERPL-SCNC: 106 MMOL/L (ref 98–107)
CO2 SERPL-SCNC: 19 MMOL/L (ref 22–29)
CREAT SERPL-MCNC: 6.51 MG/DL (ref 0.76–1.27)
DEPRECATED RDW RBC AUTO: 47.5 FL (ref 37–54)
EGFRCR SERPLBLD CKD-EPI 2021: 9.9 ML/MIN/1.73
EOSINOPHIL # BLD AUTO: 0.14 10*3/MM3 (ref 0–0.4)
EOSINOPHIL NFR BLD AUTO: 1.3 % (ref 0.3–6.2)
ERYTHROCYTE [DISTWIDTH] IN BLOOD BY AUTOMATED COUNT: 14.3 % (ref 12.3–15.4)
GLUCOSE BLDC GLUCOMTR-MCNC: 183 MG/DL (ref 70–130)
GLUCOSE BLDC GLUCOMTR-MCNC: 194 MG/DL (ref 70–130)
GLUCOSE BLDC GLUCOMTR-MCNC: 217 MG/DL (ref 70–130)
GLUCOSE SERPL-MCNC: 161 MG/DL (ref 65–99)
HCT VFR BLD AUTO: 28.1 % (ref 37.5–51)
HGB BLD-MCNC: 8.9 G/DL (ref 13–17.7)
IMM GRANULOCYTES # BLD AUTO: 0.05 10*3/MM3 (ref 0–0.05)
IMM GRANULOCYTES NFR BLD AUTO: 0.4 % (ref 0–0.5)
LYMPHOCYTES # BLD AUTO: 1.52 10*3/MM3 (ref 0.7–3.1)
LYMPHOCYTES NFR BLD AUTO: 13.6 % (ref 19.6–45.3)
MAXIMAL PREDICTED HEART RATE: 173 BPM
MCH RBC QN AUTO: 29 PG (ref 26.6–33)
MCHC RBC AUTO-ENTMCNC: 31.7 G/DL (ref 31.5–35.7)
MCV RBC AUTO: 91.5 FL (ref 79–97)
MONOCYTES # BLD AUTO: 1.01 10*3/MM3 (ref 0.1–0.9)
MONOCYTES NFR BLD AUTO: 9 % (ref 5–12)
NEUTROPHILS NFR BLD AUTO: 75.3 % (ref 42.7–76)
NEUTROPHILS NFR BLD AUTO: 8.41 10*3/MM3 (ref 1.7–7)
NRBC BLD AUTO-RTO: 0 /100 WBC (ref 0–0.2)
PLATELET # BLD AUTO: 138 10*3/MM3 (ref 140–450)
PMV BLD AUTO: 13.4 FL (ref 6–12)
POTASSIUM SERPL-SCNC: 4.5 MMOL/L (ref 3.5–5.2)
QT INTERVAL: 406 MS
QT INTERVAL: 412 MS
QTC INTERVAL: 447 MS
QTC INTERVAL: 469 MS
RBC # BLD AUTO: 3.07 10*6/MM3 (ref 4.14–5.8)
SODIUM SERPL-SCNC: 138 MMOL/L (ref 136–145)
STRESS TARGET HR: 147 BPM
UPPER ARTERIAL LEFT ARM BRACHIAL LENGTH: 0.74 CM
WBC NRBC COR # BLD: 11.17 10*3/MM3 (ref 3.4–10.8)

## 2023-04-22 PROCEDURE — 82962 GLUCOSE BLOOD TEST: CPT

## 2023-04-22 PROCEDURE — 85025 COMPLETE CBC W/AUTO DIFF WBC: CPT | Performed by: EMERGENCY MEDICINE

## 2023-04-22 PROCEDURE — 93971 EXTREMITY STUDY: CPT | Performed by: INTERNAL MEDICINE

## 2023-04-22 PROCEDURE — 25010000002 IRON SUCROSE PER 1 MG: Performed by: INTERNAL MEDICINE

## 2023-04-22 PROCEDURE — 80048 BASIC METABOLIC PNL TOTAL CA: CPT | Performed by: INTERNAL MEDICINE

## 2023-04-22 PROCEDURE — 99232 SBSQ HOSP IP/OBS MODERATE 35: CPT | Performed by: INTERNAL MEDICINE

## 2023-04-22 PROCEDURE — 93971 EXTREMITY STUDY: CPT

## 2023-04-22 PROCEDURE — 80069 RENAL FUNCTION PANEL: CPT | Performed by: INTERNAL MEDICINE

## 2023-04-22 PROCEDURE — 63710000001 INSULIN LISPRO (HUMAN) PER 5 UNITS: Performed by: NURSE PRACTITIONER

## 2023-04-22 RX ORDER — BUMETANIDE 0.25 MG/ML
2 INJECTION INTRAMUSCULAR; INTRAVENOUS DAILY
Status: DISCONTINUED | OUTPATIENT
Start: 2023-04-23 | End: 2023-04-22

## 2023-04-22 RX ORDER — SODIUM BICARBONATE 650 MG/1
650 TABLET ORAL 3 TIMES DAILY
Status: DISCONTINUED | OUTPATIENT
Start: 2023-04-22 | End: 2023-04-29

## 2023-04-22 RX ORDER — ISOSORBIDE MONONITRATE 60 MG/1
120 TABLET, EXTENDED RELEASE ORAL
Status: DISCONTINUED | OUTPATIENT
Start: 2023-04-23 | End: 2023-04-29 | Stop reason: HOSPADM

## 2023-04-22 RX ADMIN — ATORVASTATIN CALCIUM 40 MG: 40 TABLET, FILM COATED ORAL at 20:07

## 2023-04-22 RX ADMIN — Medication 10 ML: at 09:34

## 2023-04-22 RX ADMIN — Medication 10 ML: at 20:08

## 2023-04-22 RX ADMIN — INSULIN LISPRO 2 UNITS: 100 INJECTION, SOLUTION INTRAVENOUS; SUBCUTANEOUS at 11:44

## 2023-04-22 RX ADMIN — HYDRALAZINE HYDROCHLORIDE 75 MG: 50 TABLET, FILM COATED ORAL at 20:06

## 2023-04-22 RX ADMIN — METOPROLOL TARTRATE 50 MG: 50 TABLET ORAL at 09:24

## 2023-04-22 RX ADMIN — SODIUM BICARBONATE 650 MG TABLET 650 MG: at 20:13

## 2023-04-22 RX ADMIN — BENZONATATE 100 MG: 100 CAPSULE ORAL at 02:19

## 2023-04-22 RX ADMIN — ISOSORBIDE MONONITRATE 60 MG: 60 TABLET, EXTENDED RELEASE ORAL at 09:24

## 2023-04-22 RX ADMIN — BUMETANIDE 2 MG: 0.25 INJECTION, SOLUTION INTRAMUSCULAR; INTRAVENOUS at 02:15

## 2023-04-22 RX ADMIN — INSULIN LISPRO 2 UNITS: 100 INJECTION, SOLUTION INTRAVENOUS; SUBCUTANEOUS at 07:55

## 2023-04-22 RX ADMIN — METOPROLOL TARTRATE 50 MG: 50 TABLET ORAL at 20:07

## 2023-04-22 RX ADMIN — HYDRALAZINE HYDROCHLORIDE 75 MG: 50 TABLET, FILM COATED ORAL at 04:38

## 2023-04-22 RX ADMIN — HYDRALAZINE HYDROCHLORIDE 75 MG: 50 TABLET, FILM COATED ORAL at 14:33

## 2023-04-22 RX ADMIN — INSULIN LISPRO 3 UNITS: 100 INJECTION, SOLUTION INTRAVENOUS; SUBCUTANEOUS at 17:52

## 2023-04-22 RX ADMIN — NIFEDIPINE 90 MG: 30 TABLET, EXTENDED RELEASE ORAL at 09:24

## 2023-04-22 RX ADMIN — IRON SUCROSE 200 MG: 20 INJECTION, SOLUTION INTRAVENOUS at 23:56

## 2023-04-22 NOTE — PLAN OF CARE
BP remains elevated. O2 sat>90% on RA. SR on cardiac mx. Patient denies pain/discomfort or SOA at this time. Will cont to mx. Call light in reach.

## 2023-04-22 NOTE — PROGRESS NOTES
"  [facility] at [facility]   Inpatient Progress Note       LOS: 3 days   Patient Care Team:  Jay Nevarez MD as PCP - General (Emergency Medicine)  Mirza Amezquita MD as Consulting Physician (Cardiology)    Chief Complaint:  Follow-up for CHF and HTN    Subjective     Interval History:   Patient in bed.  No current CV complaints.      Review of Systems:   Pertinent positives noted in history, exam, and assessment. Otherwise reviewed and negative.      Objective     Vitals:  Blood pressure 151/77, pulse 85, temperature 98.7 °F (37.1 °C), temperature source Oral, resp. rate 16, height 185.4 cm (73\"), weight 124 kg (274 lb), SpO2 97 %.     Intake/Output Summary (Last 24 hours) at 4/22/2023 1829  Last data filed at 4/22/2023 1400  Gross per 24 hour   Intake --   Output 1150 ml   Net -1150 ml     Vitals reviewed.   Constitutional:       Appearance: Well-developed and not in distress.      Comments: obese   Neck:      Vascular: No JVD.      Trachea: No tracheal deviation.   Pulmonary:      Effort: Pulmonary effort is normal.      Breath sounds: Normal breath sounds.   Cardiovascular:      Normal rate. Regular rhythm.   Pulses:     Intact distal pulses.   Edema:     Ankle: 1+ edema of the left ankle and trace edema of the right ankle.  Abdominal:      Palpations: Abdomen is soft.      Tenderness: There is no abdominal tenderness.   Musculoskeletal:         General: No deformity. Skin:     General: Skin is warm and dry.   Neurological:      Mental Status: Alert and oriented to person, place, and time.            Results Review:     I reviewed the patient's new clinical results.    Results from last 7 days   Lab Units 04/22/23  0555   WBC 10*3/mm3 11.17*   HEMOGLOBIN g/dL 8.9*   HEMATOCRIT % 28.1*   PLATELETS 10*3/mm3 138*     Results from last 7 days   Lab Units 04/22/23  0528 04/19/23  0527 04/18/23  2151   SODIUM mmol/L 138   < > 144   POTASSIUM mmol/L 4.5   < > 4.8   CHLORIDE mmol/L 106   < > 114* "   CO2 mmol/L 19.0*   < > 18.0*   BUN mg/dL 94*   < > 88*   CREATININE mg/dL 6.51*   < > 5.74*   CALCIUM mg/dL 9.0   < > 9.0   BILIRUBIN mg/dL  --   --  0.2   ALK PHOS U/L  --   --  110   ALT (SGPT) U/L  --   --  20   AST (SGOT) U/L  --   --  30   GLUCOSE mg/dL 161*   < > 111*    < > = values in this interval not displayed.     Results from last 7 days   Lab Units 04/22/23  0528   SODIUM mmol/L 138   POTASSIUM mmol/L 4.5   CHLORIDE mmol/L 106   CO2 mmol/L 19.0*   BUN mg/dL 94*   CREATININE mg/dL 6.51*   GLUCOSE mg/dL 161*   CALCIUM mg/dL 9.0     Results from last 7 days   Lab Units 04/19/23  0527 04/18/23  2308   INR  1.12 1.07     Lab Results   Lab Value Date/Time    TROPONINT 170 (C) 04/19/2023 0138    TROPONINT 177 (C) 04/18/2023 2151    TROPONINT 0.117 (C) 01/22/2023 1350    TROPONINT 0.123 (C) 01/22/2023 1136    TROPONINT 0.066 (C) 02/18/2021 1324    TROPONINT 0.043 (C) 01/21/2021 0609    TROPONINT 0.073 (C) 01/19/2021 1419         Results from last 7 days   Lab Units 04/19/23  0527   CHOLESTEROL mg/dL 210*   TRIGLYCERIDES mg/dL 143   HDL CHOL mg/dL 44   LDL CHOL mg/dL 140*     Results from last 7 days   Lab Units 04/18/23  2151   PROBNP pg/mL 11,594.0*     Results from last 7 days   Lab Units 04/19/23  0138 04/18/23  2151   HSTROP T ng/L 170* 177*         Tele:  sr    Assessment:      Type 2 diabetes mellitus with hyperglycemia (HCC)    Essential hypertension    Hyperlipidemia    Elevated troponin    Acute on chronic systolic congestive heart failure    Hypertensive urgency    CKD (chronic kidney disease) stage 5, GFR less than 15 ml/min      1. HTN urgency  1. Improving   2. Patient notes that he ran out of medications about 1 month ago.   3. Significantly better improvement last 24 to 48 hours  2. NICM  3. CKD  1. Nephrology following  2. Patient needing dialysis.   3. Undergoing further testing for hemodialysis catheter placement.  4. Acute on chronic HFpEF  1. Volume management per renal  2. EF normal by  last echo  5. Chronically elevated troponin    Plan:  1. Increase Imdur to 120 mg daily.   2. Continue other current CV medications.   3. We will continue to follow along  4. Volume management per Renal  5. From a cardiology perspective, he is stable to be discharged home once his renal issues have been sorted out  6. Admitted with elevated troponins, at this point no ischemic evaluation plan, as this is felt to be due to hypertensive crisis and renal failure.    ROBBY Figueroa     I have seen and examined the patient, I have reviewed the note, discussed the case with the advance practice clinician, made necessary changes and I agree with the final note.    Mirza Amezquita MD  04/22/23  19:00 EDT        Dictated utilizing Dragon dictation

## 2023-04-22 NOTE — PROGRESS NOTES
[facility] Medicine Services  PROGRESS NOTE    Patient Name: Angel Blair  : 1975  MRN: [id number]    Date of Admission: 2023  Primary Care Physician: Jay Nevarez MD    Subjective   Subjective     CC: chf    HPI: Lying in bed. Says he feels like he is slowly improving. Now opting for HD.    ROS:  Gen- No fevers, chills  CV- No chest pain, palpitations  Resp- No cough, dyspnea  GI- No N/V/D, abd pain     Objective   Objective     Vital Signs:   Temp:  [98.7 °F (37.1 °C)-99.1 °F (37.3 °C)] 98.7 °F (37.1 °C)  Heart Rate:  [] 87  Resp:  [16] 16  BP: (134-178)/(71-95) 144/83     Physical Exam:  Constitutional: No acute distress, awake, alert, lying in bed  HENT: NCAT, mucous membranes moist  Respiratory: Clear to auscultation bilaterally, respiratory effort normal   Cardiovascular: RRR, no murmurs, rubs, or gallops  Gastrointestinal: Positive bowel sounds, soft, nontender, nondistended, obese abd  Musculoskeletal: No bilateral ankle edema  Psychiatric: Appropriate affect, cooperative  Neurologic: Oriented x 3, strength symmetric in all extremities, Cranial Nerves grossly intact to confrontation, speech clear  Skin: No rashes    Results Reviewed:  LAB RESULTS:      Lab 23  0555 23  0432 23  1151 23  0527 23  0138 23  2308 23  2151   WBC 11.17* 8.87  --  9.97  --   --  10.62   HEMOGLOBIN 8.9* 9.8*  --  9.3*  --   --  10.4*   HEMATOCRIT 28.1* 30.7*  --  29.9*  --   --  32.8*   PLATELETS 138* 150  --  135*  --   --  153   NEUTROS ABS 8.41*  --   --  7.43*  --   --  7.79*   IMMATURE GRANS (ABS) 0.05  --   --  0.04  --   --  0.05   LYMPHS ABS 1.52  --   --  1.68  --   --  1.79   MONOS ABS 1.01*  --   --  0.52  --   --  0.61   EOS ABS 0.14  --   --  0.23  --   --  0.29   MCV 91.5 90.6  --  93.4  --   --  91.1   PROCALCITONIN  --   --   --   --  0.24  --   --    PROTIME  --   --   --  14.4  --  13.8  --    APTT  --   --   --    --   --  29.7*  --    HEPARIN ANTI-XA  --   --  0.15* 0.10*  --  0.10*  --          Lab 04/22/23  0528 04/21/23  1220 04/20/23  0432 04/19/23  0527 04/18/23 2151   SODIUM 138 138 142 142 144   POTASSIUM 4.5 4.6 4.7 4.4 4.8   CHLORIDE 106 104 110* 113* 114*   CO2 19.0* 18.0* 19.0* 17.0* 18.0*   ANION GAP 13.0 16.0* 13.0 12.0 12.0   BUN 94* 85* 85* 83* 88*   CREATININE 6.51* 5.86* 5.47* 5.88* 5.74*   EGFR 9.9* 11.2* 12.2* 11.1* 11.5*   GLUCOSE 161* 198* 146* 110* 111*   CALCIUM 9.0 8.9 8.7 8.8 9.0   MAGNESIUM  --   --  1.7 1.4*  --    HEMOGLOBIN A1C  --   --   --  6.40*  --          Lab 04/18/23 2151   TOTAL PROTEIN 7.4   ALBUMIN 3.2*   GLOBULIN 4.2   ALT (SGPT) 20   AST (SGOT) 30   BILIRUBIN 0.2   ALK PHOS 110         Lab 04/19/23  0527 04/19/23  0138 04/18/23  2308 04/18/23 2151   PROBNP  --   --   --  11,594.0*   HSTROP T  --  170*  --  177*   PROTIME 14.4  --  13.8  --    INR 1.12  --  1.07  --          Lab 04/19/23  0527   CHOLESTEROL 210*   LDL CHOL 140*   HDL CHOL 44   TRIGLYCERIDES 143         Lab 04/21/23  0758   IRON 17*   IRON SATURATION 7*   TIBC 243*   TRANSFERRIN 163*   FERRITIN 362.80         Brief Urine Lab Results  (Last result in the past 365 days)      Color   Clarity   Blood   Leuk Est   Nitrite   Protein   CREAT   Urine HCG        01/23/23 2223             34.4               Microbiology Results Abnormal     Procedure Component Value - Date/Time    COVID PRE-OP / PRE-PROCEDURE SCREENING ORDER (NO ISOLATION) - Swab, Nasopharynx [[id number]]  (Normal) Collected: 04/18/23 2152    Lab Status: Final result Specimen: Swab from Nasopharynx Updated: 04/18/23 6089    Narrative:      The following orders were created for panel order COVID PRE-OP / PRE-PROCEDURE SCREENING ORDER (NO ISOLATION) - Swab, Nasopharynx.  Procedure                               Abnormality         Status                     ---------                               -----------         ------                     COVID-19 and FLU A/B  PCR...[[id number]]  Normal              Final result                 Please view results for these tests on the individual orders.    COVID-19 and FLU A/B PCR - Swab, Nasopharynx [[id number]]  (Normal) Collected: 04/18/23 2152    Lab Status: Final result Specimen: Swab from Nasopharynx Updated: 04/18/23 2224     COVID19 Not Detected     Influenza A PCR Not Detected     Influenza B PCR Not Detected    Narrative:      Fact sheet for providers: https://www.fda.gov/media/746442/download    Fact sheet for patients: https://www.fda.gov/media/108081/download    Test performed by PCR.          No radiology results from the last 24 hrs    Results for orders placed during the hospital encounter of 01/22/23    Adult Transthoracic Echo Complete With Contrast if Necessary Per Protocol    Interpretation Summary  •  Left ventricular ejection fraction appears to be 51 - 55%.  •  Left ventricular wall thickness is consistent with mild to moderate concentric hypertrophy.  •  The cardiac valves are anatomically and functionally normal.      Current medications:  Scheduled Meds:atorvastatin, 40 mg, Oral, Nightly  [START ON 4/23/2023] bumetanide, 2 mg, Intravenous, Daily  hydrALAZINE, 75 mg, Oral, Q8H  insulin lispro, 0-7 Units, Subcutaneous, TID AC  isosorbide mononitrate, 60 mg, Oral, Q24H  metoprolol tartrate, 50 mg, Oral, Q12H  NIFEdipine XL, 90 mg, Oral, Daily  pharmacy consult - MTM, , Does not apply, Daily  sodium chloride, 10 mL, Intravenous, Q12H      Continuous Infusions:   PRN Meds:.•  acetaminophen  •  benzonatate  •  Calcium Replacement - Follow Nurse / BPA Driven Protocol  •  cloNIDine  •  dextrose  •  dextrose  •  glucagon (human recombinant)  •  Magnesium Standard Dose Replacement - Follow Nurse / BPA Driven Protocol  •  Phosphorus Replacement - Follow Nurse / BPA Driven Protocol  •  Potassium Replacement - Follow Nurse / BPA Driven Protocol  •  sodium chloride  •  sodium chloride    Assessment & Plan   Assessment & Plan      Active Hospital Problems    Diagnosis  POA   • Acute on chronic systolic congestive heart failure [I50.23]  Yes   • Hypertensive urgency [I16.0]  Yes   • CKD (chronic kidney disease) stage 5, GFR less than 15 ml/min [N18.5]  Yes   • Elevated troponin [R77.8]  Yes   • Hyperlipidemia [E78.5]  Yes   • Essential hypertension [I10]  Yes   • Type 2 diabetes mellitus with hyperglycemia (HCC) [E11.65]  Yes      Resolved Hospital Problems   No resolved problems to display.        Brief Hospital Course to date:  Angel Blair is a 47 y.o. male with PMH significant for DM 2, HLD, HTN, LEFTY, obesity, CKD V, who presents to the ED with complaint of cough and shortness of breath who was found to have concern for elevated troponin with acute on chronic systolic congestive heart failure and hypertensive urgency.     Acute on chronic systolic congestive heart failure  - Patient has been out of his medication for the past month.  - Cardiology follows. Responding well to Bumex -3L 4/19. Creatinine slightly increasing, will decrease dose of bumex to once daily.  - Echo on 1/22/2023 notes EF 51-55%  - Continue metoprolol     Hypertensive urgency  Essential hypertension  - Patient has been out of home medication for the past month  - Now off cardene gtt. BP much better today. Clonidine added. Continue other home medications including metoprolol, nifedipine, hydralazine and Imdur  - Cardiology/NAL follows.     NSTEMI  Elevated troponin  - Likely NSTEMI type II secondary to hypertensive urgency and acute on chronic CHF but was started on heparin gtt on admission. Will continue for now until Cardiology evaluation   - Trend troponin (177 -> 170)   - EKG without acute changes   - Patient denies chest pain with the exception of when he coughs     CKD V  - Nephrology to evaluate today, patient was lost to follow-up since last discharge secondary to no insurance coverage. D/W CM today - apparently will have insurance coverage ~  5/1.  - Dr. Herrera following, d/w him briefly this am. Patient has now opted for HD. Planning for vein mapping and fistula creation as outpatient.     Dyslipidemia  - Continue Lipitor     DM2  - FSBG 3 times daily     Hypomagnesemia  -Replace    Expected Discharge Location and Transportation:   Expected Discharge   Expected Discharge Date and Time     Expected Discharge Date Expected Discharge Time    Apr 24, 2023            DVT prophylaxis:  No DVT prophylaxis order currently exists.     AM-PAC 6 Clicks Score (PT): 18 (04/21/23 2555)    CODE STATUS:   Code Status and Medical Interventions:   Ordered at: 04/19/23 0323     Code Status (Patient has no pulse and is not breathing):    CPR (Attempt to Resuscitate)     Medical Interventions (Patient has pulse or is breathing):    Full Support       Isa Sullivan II, DO  04/22/23

## 2023-04-23 LAB
ALBUMIN SERPL-MCNC: 2.7 G/DL (ref 3.5–5.2)
ANION GAP SERPL CALCULATED.3IONS-SCNC: 16 MMOL/L (ref 5–15)
BUN SERPL-MCNC: 89 MG/DL (ref 6–20)
BUN/CREAT SERPL: 13.6 (ref 7–25)
CALCIUM SPEC-SCNC: 9.4 MG/DL (ref 8.6–10.5)
CHLORIDE SERPL-SCNC: 104 MMOL/L (ref 98–107)
CO2 SERPL-SCNC: 18 MMOL/L (ref 22–29)
CREAT SERPL-MCNC: 6.55 MG/DL (ref 0.76–1.27)
EGFRCR SERPLBLD CKD-EPI 2021: 9.8 ML/MIN/1.73
GLUCOSE BLDC GLUCOMTR-MCNC: 161 MG/DL (ref 70–130)
GLUCOSE BLDC GLUCOMTR-MCNC: 178 MG/DL (ref 70–130)
GLUCOSE BLDC GLUCOMTR-MCNC: 227 MG/DL (ref 70–130)
GLUCOSE SERPL-MCNC: 153 MG/DL (ref 65–99)
PHOSPHATE SERPL-MCNC: 6.5 MG/DL (ref 2.5–4.5)
POTASSIUM SERPL-SCNC: 4.4 MMOL/L (ref 3.5–5.2)
SODIUM SERPL-SCNC: 138 MMOL/L (ref 136–145)

## 2023-04-23 PROCEDURE — 99232 SBSQ HOSP IP/OBS MODERATE 35: CPT | Performed by: NURSE PRACTITIONER

## 2023-04-23 PROCEDURE — 25010000002 IRON SUCROSE PER 1 MG: Performed by: INTERNAL MEDICINE

## 2023-04-23 PROCEDURE — 99232 SBSQ HOSP IP/OBS MODERATE 35: CPT | Performed by: INTERNAL MEDICINE

## 2023-04-23 PROCEDURE — 82962 GLUCOSE BLOOD TEST: CPT

## 2023-04-23 PROCEDURE — 63710000001 INSULIN LISPRO (HUMAN) PER 5 UNITS: Performed by: NURSE PRACTITIONER

## 2023-04-23 RX ADMIN — ISOSORBIDE MONONITRATE 120 MG: 60 TABLET, EXTENDED RELEASE ORAL at 08:45

## 2023-04-23 RX ADMIN — HYDRALAZINE HYDROCHLORIDE 75 MG: 50 TABLET, FILM COATED ORAL at 05:00

## 2023-04-23 RX ADMIN — INSULIN LISPRO 2 UNITS: 100 INJECTION, SOLUTION INTRAVENOUS; SUBCUTANEOUS at 08:45

## 2023-04-23 RX ADMIN — SODIUM BICARBONATE 650 MG TABLET 650 MG: at 08:45

## 2023-04-23 RX ADMIN — HYDRALAZINE HYDROCHLORIDE 75 MG: 50 TABLET, FILM COATED ORAL at 21:00

## 2023-04-23 RX ADMIN — SODIUM BICARBONATE 650 MG TABLET 650 MG: at 18:45

## 2023-04-23 RX ADMIN — INSULIN LISPRO 3 UNITS: 100 INJECTION, SOLUTION INTRAVENOUS; SUBCUTANEOUS at 14:31

## 2023-04-23 RX ADMIN — METOPROLOL TARTRATE 50 MG: 50 TABLET ORAL at 20:57

## 2023-04-23 RX ADMIN — Medication 10 ML: at 20:57

## 2023-04-23 RX ADMIN — NIFEDIPINE 90 MG: 30 TABLET, EXTENDED RELEASE ORAL at 08:45

## 2023-04-23 RX ADMIN — HYDRALAZINE HYDROCHLORIDE 75 MG: 50 TABLET, FILM COATED ORAL at 14:31

## 2023-04-23 RX ADMIN — ATORVASTATIN CALCIUM 40 MG: 40 TABLET, FILM COATED ORAL at 20:57

## 2023-04-23 RX ADMIN — IRON SUCROSE 200 MG: 20 INJECTION, SOLUTION INTRAVENOUS at 14:39

## 2023-04-23 RX ADMIN — METOPROLOL TARTRATE 50 MG: 50 TABLET ORAL at 08:45

## 2023-04-23 RX ADMIN — SODIUM BICARBONATE 650 MG TABLET 650 MG: at 20:57

## 2023-04-23 NOTE — PROGRESS NOTES
"General Surgery Daily Progress Note    Subjective:  No new complaints.    Objective:  /85 (BP Location: Right arm, Patient Position: Sitting)   Pulse 80   Temp 98 °F (36.7 °C) (Oral)   Resp 16   Ht 185.4 cm (73\")   Wt 125 kg (275 lb 8 oz)   SpO2 96%   BMI 36.35 kg/m²       General Appearance: Sitting in the chair  Eyes: Anicteric  Neck: Trachea midline   Cardiovascular:  RRR without murmur nor rub  Lungs:  Bilateral respirations unlabored   Abdomen:  Soft, nontender, no masses, no organomegaly   Extremities: Left upper extremity 2+ radial and brachial pulsation  Skin:  No obvious rashes   Neurologic: awake and conversant     CBC:  Results from last 7 days   Lab Units 04/22/23  0555   WBC 10*3/mm3 11.17*   HEMOGLOBIN g/dL 8.9*   HEMATOCRIT % 28.1*   PLATELETS 10*3/mm3 138*       CMP:  Results from last 7 days   Lab Units 04/22/23  2348 04/19/23  0527 04/18/23  2151   SODIUM mmol/L 138   < > 144   POTASSIUM mmol/L 4.4   < > 4.8   CHLORIDE mmol/L 104   < > 114*   CO2 mmol/L 18.0*   < > 18.0*   BUN mg/dL 89*   < > 88*   CREATININE mg/dL 6.55*   < > 5.74*   CALCIUM mg/dL 9.4   < > 9.0   BILIRUBIN mg/dL  --   --  0.2   ALK PHOS U/L  --   --  110   ALT (SGPT) U/L  --   --  20   AST (SGOT) U/L  --   --  30   GLUCOSE mg/dL 153*   < > 111*    < > = values in this interval not displayed.         Assessment:  CKD  Superficial thrombophlebitis    Plan:   Nephrology is going to have a tunneled catheter placed for the initiation of hemodialysis.  I will get him scheduled for an outpatient left upper extremity brachial axillary arteriovenous fistula with Artegraft.    Nico Herrera MD - 4/23/2023, 11:31 EDT        "

## 2023-04-23 NOTE — PLAN OF CARE
SBP<160. Patient cont to deny pain/discomfort. SR on cardiac mx. On Diuretic, fall precaution maintained. Will cont to mx. Call light in reach.

## 2023-04-23 NOTE — PROGRESS NOTES
[facility]  PROGRESS NOTE    Patient Name: Angel Blair  : 1975  MRN: [id number]    Date of Admission: 2023  Primary Care Physician: Jay Nevarez MD    Subjective   Subjective     CC: f/u CKD    HPI: Up in chair with nursing in room. Says he is doing fine. Breathing comfortably.     ROS:  Gen- No fevers, chills  CV- No chest pain, palpitations  Resp- No cough, dyspnea  GI- No N/V/D, abd pain    Objective   Objective     Vital Signs:   Temp:  [97.8 °F (36.6 °C)-98.4 °F (36.9 °C)] 98.3 °F (36.8 °C)  Heart Rate:  [79-92] 92  Resp:  [16] 16  BP: (124-157)/(67-82) 157/81     Physical Exam:  Constitutional: No acute distress, awake, alert, up in chair  HENT: NCAT, mucous membranes moist  Respiratory: Clear to auscultation bilaterally, respiratory effort normal   Cardiovascular: RRR, no murmurs, rubs, or gallops  Gastrointestinal: Positive bowel sounds, soft, nontender, nondistended, obese  Musculoskeletal: No bilateral ankle edema  Psychiatric: Appropriate affect, cooperative  Neurologic: Oriented x 3, strength symmetric in all extremities, Cranial Nerves grossly intact to confrontation, speech clear  Skin: No rashes    Results Reviewed:  LAB RESULTS:      Lab 23  0555 23  0432 23  1151 23  0527 23  0138 23  2308 23  2151   WBC 11.17* 8.87  --  9.97  --   --  10.62   HEMOGLOBIN 8.9* 9.8*  --  9.3*  --   --  10.4*   HEMATOCRIT 28.1* 30.7*  --  29.9*  --   --  32.8*   PLATELETS 138* 150  --  135*  --   --  153   NEUTROS ABS 8.41*  --   --  7.43*  --   --  7.79*   IMMATURE GRANS (ABS) 0.05  --   --  0.04  --   --  0.05   LYMPHS ABS 1.52  --   --  1.68  --   --  1.79   MONOS ABS 1.01*  --   --  0.52  --   --  0.61   EOS ABS 0.14  --   --  0.23  --   --  0.29   MCV 91.5 90.6  --  93.4  --   --  91.1   PROCALCITONIN  --   --   --   --  0.24  --   --    PROTIME  --   --   --  14.4  --  13.8  --    APTT  --   --   --    --   --  29.7*  --    HEPARIN ANTI-XA  --   --  0.15* 0.10*  --  0.10*  --          Lab 04/22/23  2348 04/22/23  0528 04/21/23  1220 04/20/23  0432 04/19/23  0527   SODIUM 138 138 138 142 142   POTASSIUM 4.4 4.5 4.6 4.7 4.4   CHLORIDE 104 106 104 110* 113*   CO2 18.0* 19.0* 18.0* 19.0* 17.0*   ANION GAP 16.0* 13.0 16.0* 13.0 12.0   BUN 89* 94* 85* 85* 83*   CREATININE 6.55* 6.51* 5.86* 5.47* 5.88*   EGFR 9.8* 9.9* 11.2* 12.2* 11.1*   GLUCOSE 153* 161* 198* 146* 110*   CALCIUM 9.4 9.0 8.9 8.7 8.8   MAGNESIUM  --   --   --  1.7 1.4*   PHOSPHORUS 6.5*  --   --   --   --    HEMOGLOBIN A1C  --   --   --   --  6.40*         Lab 04/22/23  2348 04/18/23  2151   TOTAL PROTEIN  --  7.4   ALBUMIN 2.7* 3.2*   GLOBULIN  --  4.2   ALT (SGPT)  --  20   AST (SGOT)  --  30   BILIRUBIN  --  0.2   ALK PHOS  --  110         Lab 04/19/23  0527 04/19/23  0138 04/18/23  2308 04/18/23  2151   PROBNP  --   --   --  11,594.0*   HSTROP T  --  170*  --  177*   PROTIME 14.4  --  13.8  --    INR 1.12  --  1.07  --          Lab 04/19/23  0527   CHOLESTEROL 210*   LDL CHOL 140*   HDL CHOL 44   TRIGLYCERIDES 143         Lab 04/21/23  0758   IRON 17*   IRON SATURATION 7*   TIBC 243*   TRANSFERRIN 163*   FERRITIN 362.80         Brief Urine Lab Results  (Last result in the past 365 days)      Color   Clarity   Blood   Leuk Est   Nitrite   Protein   CREAT   Urine HCG        01/23/23 2223             34.4               Microbiology Results Abnormal     Procedure Component Value - Date/Time    COVID PRE-OP / PRE-PROCEDURE SCREENING ORDER (NO ISOLATION) - Swab, Nasopharynx [[id number]]  (Normal) Collected: 04/18/23 2152    Lab Status: Final result Specimen: Swab from Nasopharynx Updated: 04/18/23 2224    Narrative:      The following orders were created for panel order COVID PRE-OP / PRE-PROCEDURE SCREENING ORDER (NO ISOLATION) - Swab, Nasopharynx.  Procedure                               Abnormality         Status                     ---------                                -----------         ------                     COVID-19 and FLU A/B PCR...[[id number]]  Normal              Final result                 Please view results for these tests on the individual orders.    COVID-19 and FLU A/B PCR - Swab, Nasopharynx [[id number]]  (Normal) Collected: 04/18/23 2152    Lab Status: Final result Specimen: Swab from Nasopharynx Updated: 04/18/23 2224     COVID19 Not Detected     Influenza A PCR Not Detected     Influenza B PCR Not Detected    Narrative:      Fact sheet for providers: https://www.fda.gov/media/578735/download    Fact sheet for patients: https://www.fda.gov/media/361229/download    Test performed by PCR.          Duplex Vein Mapping Study Upper Extremity - Left CAR    Addendum Date: 4/22/2023    •  The left basilic vein is patent and of adequate size in the upper arm. •  Chronic superficial thrombophlebitis noted in the left cephalic vein in the forearm. •  Chronic left upper extremity superficial thrombophlebitis noted in the cephalic (upper arm) and median cubital. •  All other left sided vessels appear normal.     Result Date: 4/22/2023  •  Chronic left upper extremity superficial thrombophlebitis noted in the cephalic (upper arm) and median cubital. •  All other left sided vessels appear normal.       Results for orders placed during the hospital encounter of 01/22/23    Adult Transthoracic Echo Complete With Contrast if Necessary Per Protocol    Interpretation Summary  •  Left ventricular ejection fraction appears to be 51 - 55%.  •  Left ventricular wall thickness is consistent with mild to moderate concentric hypertrophy.  •  The cardiac valves are anatomically and functionally normal.      Current medications:  Scheduled Meds:atorvastatin, 40 mg, Oral, Nightly  hydrALAZINE, 75 mg, Oral, Q8H  insulin lispro, 0-7 Units, Subcutaneous, TID AC  iron sucrose, 200 mg, Intravenous, Q24H  isosorbide mononitrate, 120 mg, Oral, Q24H  metoprolol tartrate, 50 mg,  Oral, Q12H  NIFEdipine XL, 90 mg, Oral, Daily  pharmacy consult - MTM, , Does not apply, Daily  sodium bicarbonate, 650 mg, Oral, TID  sodium chloride, 10 mL, Intravenous, Q12H      Continuous Infusions:   PRN Meds:.•  acetaminophen  •  benzonatate  •  Calcium Replacement - Follow Nurse / BPA Driven Protocol  •  cloNIDine  •  dextrose  •  dextrose  •  glucagon (human recombinant)  •  Magnesium Standard Dose Replacement - Follow Nurse / BPA Driven Protocol  •  Phosphorus Replacement - Follow Nurse / BPA Driven Protocol  •  Potassium Replacement - Follow Nurse / BPA Driven Protocol  •  sodium chloride  •  sodium chloride    Assessment & Plan   Assessment & Plan     Active Hospital Problems    Diagnosis  POA   • Acute on chronic systolic congestive heart failure [I50.23]  Yes   • Hypertensive urgency [I16.0]  Yes   • CKD (chronic kidney disease) stage 5, GFR less than 15 ml/min [N18.5]  Yes   • Elevated troponin [R77.8]  Yes   • Hyperlipidemia [E78.5]  Yes   • Essential hypertension [I10]  Yes   • Type 2 diabetes mellitus with hyperglycemia (HCC) [E11.65]  Yes      Resolved Hospital Problems   No resolved problems to display.        Brief Hospital Course to date:  Angel Blair is a 47 y.o. male with PMH significant for DM 2, HLD, HTN, LEFTY, obesity, CKD V, who presents to the ED with complaint of cough and shortness of breath who was found to have concern for elevated troponin with acute on chronic systolic congestive heart failure and hypertensive urgency.     Acute on chronic systolic congestive heart failure  - Patient has been out of his medication for the past month.  - Cardiology follows. Responding well to Bumex -3L 4/19. Creatinine slightly increasing, bumex now stopped.  - Echo on 1/22/2023 notes EF 51-55%  - Continue metoprolol    CKD V, progression to ESRD.  - Nephrology to evaluate today, patient was lost to follow-up since last discharge secondary to no insurance coverage. Now planning for insertion  of temporary HD catheter and initiation of HD in am.  - Dr. Herrera following, d/w him briefly. Patient has now opted for HD. Planning for vein mapping and fistula creation as outpatient.    LUE superficial venous thrombosis  -- Warm compresses to LUE, elevate.     Hypertensive urgency  Essential hypertension  - Patient has been out of home medication for the past month  - Now off cardene gtt. BP much better today. Clonidine added. Continue other home medications including metoprolol, nifedipine, hydralazine and Imdur  - Cardiology/NAL follows.     NSTEMI  Elevated troponin  - Likely NSTEMI type II secondary to hypertensive urgency and acute on chronic CHF but was started on heparin gtt on admission. Will continue for now until Cardiology evaluation   - Trend troponin (177 -> 170)   - EKG without acute changes   - Patient denies chest pain with the exception of when he coughs     Dyslipidemia  - Continue Lipitor     DM2  - FSBG 3 times daily     Hypomagnesemia  -Replace    Expected Discharge Location and Transportation:   Expected Discharge   Expected Discharge Date and Time     Expected Discharge Date Expected Discharge Time    Apr 24, 2023            DVT prophylaxis:  No DVT prophylaxis order currently exists.     AM-PAC 6 Clicks Score (PT): 18 (04/22/23 1000)    CODE STATUS:   Code Status and Medical Interventions:   Ordered at: 04/19/23 0323     Code Status (Patient has no pulse and is not breathing):    CPR (Attempt to Resuscitate)     Medical Interventions (Patient has pulse or is breathing):    Full Support       Isa Sullivan II, DO  04/23/23

## 2023-04-23 NOTE — PROGRESS NOTES
"   LOS: 3 days    Patient Care Team:  Jay Nevarez MD as PCP - General (Emergency Medicine)  Mirza Amezquita MD as Consulting Physician (Cardiology)    Subjective     Stable overnight.  Denies any chest pain or shortness of breath.    Objective     Vital Signs:  Blood pressure 154/76, pulse 90, temperature 97.8 °F (36.6 °C), temperature source Oral, resp. rate 16, height 185.4 cm (73\"), weight 124 kg (274 lb), SpO2 97 %.      Intake/Output Summary (Last 24 hours) at 4/22/2023 2003  Last data filed at 4/22/2023 1846  Gross per 24 hour   Intake --   Output 1500 ml   Net -1500 ml        04/21 0701 - 04/22 0700  In: 650 [P.O.:650]  Out: 2525 [Urine:2525]    Physical Exam:        GENERAL: WD AAM NAD  NEURO: Awake and alert, oriented. No focal deficit  PSYCHIATRIC: NMA. Cooperative with PE  EYE: PE, no icterus, no conjunctivitis  ENT: ommm, dentition intact,  Hearing intact  NECK: Supple , No JVD discernable,  Trachea midline  CV: + Edema, RRR  LUNGS:  Quiet,  Nonlabored resp.  Symmetrical expansion  ABDOMEN: Nondistended, soft nontender.  : No Coyne, no palp bladder  SKIN: Warm and dry without rash      Labs:  Results from last 7 days   Lab Units 04/22/23  0555 04/20/23  0432 04/19/23  0527   WBC 10*3/mm3 11.17* 8.87 9.97   HEMOGLOBIN g/dL 8.9* 9.8* 9.3*   PLATELETS 10*3/mm3 138* 150 135*     Results from last 7 days   Lab Units 04/22/23  0528 04/21/23  1220 04/20/23  0432 04/19/23  0527 04/18/23  2151   SODIUM mmol/L 138 138 142 142 144   POTASSIUM mmol/L 4.5 4.6 4.7 4.4 4.8   CHLORIDE mmol/L 106 104 110* 113* 114*   CO2 mmol/L 19.0* 18.0* 19.0* 17.0* 18.0*   BUN mg/dL 94* 85* 85* 83* 88*   CREATININE mg/dL 6.51* 5.86* 5.47* 5.88* 5.74*   CALCIUM mg/dL 9.0 8.9 8.7 8.8 9.0   MAGNESIUM mg/dL  --   --  1.7 1.4*  --    ALBUMIN g/dL  --   --   --   --  3.2*     Results from last 7 days   Lab Units 04/18/23  0071   ALK PHOS U/L 110   BILIRUBIN mg/dL 0.2   ALT (SGPT) U/L 20   AST (SGOT) U/L 30       "             Estimated Creatinine Clearance: 19.3 mL/min (A) (by C-G formula based on SCr of 6.51 mg/dL (H)).         A/P:    ARF: Likely progression to ESRD.  BUN and creatinine continue to rise with negative volume.   Bx proven severe nephrosclerosis w adaptive FSGS changes and diabetic nephropathy 90 percent fibrosis on bx. Rapid loss of kidney function in last few yrs. Lost to f/u after discharge from [facility] in Jan 2023. Cr worse on this admission likley Acute component vs progression of underlying CKD in the setting of uncontrolled HTN and volume ovelroad.  No emergent need for HD today.  We will hold Bumex for now.  Discussed with patient and he agrees to HD catheter  placement and dialysis initiation on Monday.    HTN: Blood pressure stable.  Continue current medications for now.    Metabolic acidosis: We will give p.o. bicarb.  Adjust with HD on Monday.    Anemia: Hemoglobin below goal.  TSAT 7%.  Will give IV iron.  Transfuse as needed for hemoglobin <7.  Will start FOREST if blood pressure remains stable.    Volume: Negative overnight.  Still with edema but does not have any increased oxygen requirement.  Hold Bumex for now with rising creatinine.  Can restart diuretics once patient on dialysis.    Bone mineralization: We will need PTH level.  Recheck phosphorus in AM.    Nutrition: We will switch to low Phos low potassium renal diet.    High risk and complexity patient.    Guanakito Conway MD  04/22/23  20:03 EDT

## 2023-04-23 NOTE — PROGRESS NOTES
"  [facility]   Inpatient Progress Note       LOS: 4 days   Patient Care Team:  Jay Nevarez MD as PCP - General (Emergency Medicine)  Mirza Amezquita MD as Consulting Physician (Cardiology)    Chief Complaint:  Follow-up for CHF and HTN    Subjective     Interval History:   Patient up in chair. No cardiac complaints.  Will have tunneled catheter for HD placed tomorrow.       Review of Systems:   Pertinent positives noted in history, exam, and assessment. Otherwise reviewed and negative.      Objective     Vitals:  Blood pressure 159/85, pulse 80, temperature 98 °F (36.7 °C), temperature source Oral, resp. rate 16, height 185.4 cm (73\"), weight 125 kg (275 lb 8 oz), SpO2 96 %.     Intake/Output Summary (Last 24 hours) at 4/23/2023 1222  Last data filed at 4/23/2023 0900  Gross per 24 hour   Intake 200 ml   Output 1400 ml   Net -1200 ml     Vitals reviewed.   Constitutional:       Appearance: Well-developed and not in distress.      Comments: obese   Neck:      Vascular: No JVD.      Trachea: No tracheal deviation.   Pulmonary:      Effort: Pulmonary effort is normal.      Breath sounds: Normal breath sounds.   Cardiovascular:      Normal rate. Regular rhythm.   Pulses:     Intact distal pulses.   Edema:     Ankle: 1+ edema of the left ankle and trace edema of the right ankle.  Abdominal:      Palpations: Abdomen is soft.      Tenderness: There is no abdominal tenderness.   Musculoskeletal:         General: No deformity. Skin:     General: Skin is warm and dry.   Neurological:      Mental Status: Alert and oriented to person, place, and time.          Examined and unchanged  Results Review:     I reviewed the patient's new clinical results.    Results from last 7 days   Lab Units 04/22/23  0555   WBC 10*3/mm3 11.17*   HEMOGLOBIN g/dL 8.9*   HEMATOCRIT % 28.1*   PLATELETS 10*3/mm3 138*     Results from last 7 days   Lab Units 04/22/23  2348 04/19/23  0527 04/18/23  2151   SODIUM " mmol/L 138   < > 144   POTASSIUM mmol/L 4.4   < > 4.8   CHLORIDE mmol/L 104   < > 114*   CO2 mmol/L 18.0*   < > 18.0*   BUN mg/dL 89*   < > 88*   CREATININE mg/dL 6.55*   < > 5.74*   CALCIUM mg/dL 9.4   < > 9.0   BILIRUBIN mg/dL  --   --  0.2   ALK PHOS U/L  --   --  110   ALT (SGPT) U/L  --   --  20   AST (SGOT) U/L  --   --  30   GLUCOSE mg/dL 153*   < > 111*    < > = values in this interval not displayed.     Results from last 7 days   Lab Units 04/22/23  2348   SODIUM mmol/L 138   POTASSIUM mmol/L 4.4   CHLORIDE mmol/L 104   CO2 mmol/L 18.0*   BUN mg/dL 89*   CREATININE mg/dL 6.55*   GLUCOSE mg/dL 153*   CALCIUM mg/dL 9.4     Results from last 7 days   Lab Units 04/19/23  0527 04/18/23  2308   INR  1.12 1.07     Lab Results   Lab Value Date/Time    TROPONINT 170 (C) 04/19/2023 0138    TROPONINT 177 (C) 04/18/2023 2151    TROPONINT 0.117 (C) 01/22/2023 1350    TROPONINT 0.123 (C) 01/22/2023 1136    TROPONINT 0.066 (C) 02/18/2021 1324    TROPONINT 0.043 (C) 01/21/2021 0609    TROPONINT 0.073 (C) 01/19/2021 1419         Results from last 7 days   Lab Units 04/19/23  0527   CHOLESTEROL mg/dL 210*   TRIGLYCERIDES mg/dL 143   HDL CHOL mg/dL 44   LDL CHOL mg/dL 140*     Results from last 7 days   Lab Units 04/18/23  2151   PROBNP pg/mL 11,594.0*     Results from last 7 days   Lab Units 04/19/23 0138 04/18/23  2151   HSTROP T ng/L 170* 177*         Tele:  sr    Assessment:      Type 2 diabetes mellitus with hyperglycemia (HCC)    Essential hypertension    Hyperlipidemia    Elevated troponin    Acute on chronic systolic congestive heart failure    Hypertensive urgency    CKD (chronic kidney disease) stage 5, GFR less than 15 ml/min      1. HTN urgency  1. Improving   2. Patient notes that he ran out of medications about 1 month ago.   3. Significantly better improvement last 24 to 48 hours  2. NICM  3. CKD  1. Nephrology following  2. Patient needing dialysis.   3. Undergoing further testing for hemodialysis catheter  placement.  4. Acute on chronic HFpEF  1. Volume management per renal  2. EF normal by last echo  5. Chronically elevated troponin  6. Left arm superficial thrombophlebitis     Plan:     1. Continue current CV medications.   2. We will continue to follow from a distance  3. Volume management per Renal  4. From a cardiology perspective, he is stable to be discharged home once his renal issues have been sorted out  5. Admitted with elevated troponins, at this point no ischemic evaluation plan, as this is felt to be due to hypertensive crisis and renal failure.    ROBBY Figueroa           Dictated utilizing Dragon dictation

## 2023-04-23 NOTE — PROGRESS NOTES
"   LOS: 4 days    Patient Care Team:  Jay Nevarez MD as PCP - General (Emergency Medicine)  Mirza Amezquita MD as Consulting Physician (Cardiology)    Subjective     Stable overnight.  Denies any chest pain or shortness of breath.    Objective     Vital Signs:  Blood pressure 159/85, pulse 80, temperature 98 °F (36.7 °C), temperature source Oral, resp. rate 16, height 185.4 cm (73\"), weight 125 kg (275 lb 8 oz), SpO2 96 %.      Intake/Output Summary (Last 24 hours) at 4/23/2023 1129  Last data filed at 4/23/2023 0900  Gross per 24 hour   Intake 200 ml   Output 1400 ml   Net -1200 ml        04/22 0701 - 04/23 0700  In: -   Out: 1400 [Urine:1400]    Physical Exam:        GENERAL: WD AAM NAD  NEURO: Awake and alert, oriented. No focal deficit  PSYCHIATRIC: NMA. Cooperative with PE  EYE: PE, no icterus, no conjunctivitis  ENT: ommm, dentition intact,  Hearing intact  NECK: Supple , No JVD discernable,  Trachea midline  CV: + Edema, RRR  LUNGS:  Quiet,  Nonlabored resp.  Symmetrical expansion  ABDOMEN: Nondistended, soft nontender.  : No Coyne, no palp bladder  SKIN: Warm and dry without rash      Labs:  Results from last 7 days   Lab Units 04/22/23  0555 04/20/23  0432 04/19/23  0527   WBC 10*3/mm3 11.17* 8.87 9.97   HEMOGLOBIN g/dL 8.9* 9.8* 9.3*   PLATELETS 10*3/mm3 138* 150 135*     Results from last 7 days   Lab Units 04/22/23  2348 04/22/23  0528 04/21/23  1220 04/20/23  0432 04/19/23  0527 04/18/23  2151   SODIUM mmol/L 138 138 138 142 142 144   POTASSIUM mmol/L 4.4 4.5 4.6 4.7 4.4 4.8   CHLORIDE mmol/L 104 106 104 110* 113* 114*   CO2 mmol/L 18.0* 19.0* 18.0* 19.0* 17.0* 18.0*   BUN mg/dL 89* 94* 85* 85* 83* 88*   CREATININE mg/dL 6.55* 6.51* 5.86* 5.47* 5.88* 5.74*   CALCIUM mg/dL 9.4 9.0 8.9 8.7 8.8 9.0   PHOSPHORUS mg/dL 6.5*  --   --   --   --   --    MAGNESIUM mg/dL  --   --   --  1.7 1.4*  --    ALBUMIN g/dL 2.7*  --   --   --   --  3.2*     Results from last 7 days   Lab Units 04/18/23  2577 "   ALK PHOS U/L 110   BILIRUBIN mg/dL 0.2   ALT (SGPT) U/L 20   AST (SGOT) U/L 30                   Estimated Creatinine Clearance: 19.3 mL/min (A) (by C-G formula based on SCr of 6.55 mg/dL (H)).         A/P:    ARF: Likely progression to ESRD.  BUN and creatinine continue to rise with negative volume.   Bx proven severe nephrosclerosis w adaptive FSGS changes and diabetic nephropathy 90 percent fibrosis on bx. Rapid loss of kidney function in last few yrs. Lost to f/u after discharge from [facility] in Jan 2023. Cr worse on this admission likley Acute component vs progression of underlying CKD in the setting of uncontrolled HTN and volume ovelroad.  No emergent need for HD today.  Holding Bumex for now with rising BUN and creatinine.  We will plan for HD catheter  placement and dialysis initiation on Monday..    HTN: Blood pressure stable.  Continue current medications for now.    Metabolic acidosis: We will give p.o. bicarb.  Adjust with HD on Monday.    Anemia: Hemoglobin below goal.  TSAT 7%.  Will give IV iron.  Transfuse as needed for hemoglobin <7.  Will start FOREST if blood pressure remains stable.    Volume: Negative overnight.  Still with edema but does not have any increased oxygen requirement.  Hold Bumex for now with rising creatinine.  Can restart diuretics once patient on dialysis.    Bone mineralization: We will need PTH level.  Recheck phosphorus in AM.    Nutrition: We will switch to low Phos low potassium renal diet.    High risk and complexity patient.    Guanakito Conway MD  04/23/23  11:29 EDT

## 2023-04-23 NOTE — PROGRESS NOTES
"General Surgery Daily Progress Note    Subjective:  No new complaints.    Objective:  /76 (BP Location: Right arm, Patient Position: Lying)   Pulse 90   Temp 97.8 °F (36.6 °C) (Oral)   Resp 16   Ht 185.4 cm (73\")   Wt 124 kg (274 lb)   SpO2 97%   BMI 36.15 kg/m²       General Appearance: Resting in bed comfortably  Eyes: Anicteric  Neck: Trachea midline   Cardiovascular:  RRR without murmur nor rub  Lungs:  Bilateral respirations unlabored   Abdomen:  Soft, nontender, no masses, no organomegaly  Extremities:  No cyanosis or + edema  Skin:  No obvious rashes   Neurologic: awake and conversant     Vein mapping complete - reviewed.    CBC:  Results from last 7 days   Lab Units 04/22/23  0555   WBC 10*3/mm3 11.17*   HEMOGLOBIN g/dL 8.9*   HEMATOCRIT % 28.1*   PLATELETS 10*3/mm3 138*       CMP:  Results from last 7 days   Lab Units 04/22/23  0528 04/19/23  0527 04/18/23  2151   SODIUM mmol/L 138   < > 144   POTASSIUM mmol/L 4.5   < > 4.8   CHLORIDE mmol/L 106   < > 114*   CO2 mmol/L 19.0*   < > 18.0*   BUN mg/dL 94*   < > 88*   CREATININE mg/dL 6.51*   < > 5.74*   CALCIUM mg/dL 9.0   < > 9.0   BILIRUBIN mg/dL  --   --  0.2   ALK PHOS U/L  --   --  110   ALT (SGPT) U/L  --   --  20   AST (SGOT) U/L  --   --  30   GLUCOSE mg/dL 161*   < > 111*    < > = values in this interval not displayed.         Assessment:  CKD     Plan:   Venous mapping reviewed.  No good usable veins in his left upper extremity for native arteriovenous fistula creation.  If he wishes to proceed with long-term hemodialysis versus peritoneal dialysis I would recommend a left upper extremity brachioaxillary fistula with Artegraft.  All his questions were answered.  He understands.  They are going to initiate hemodialysis on Monday via a tunneled catheter.    Nico Herrera MD - 4/22/2023, 20:12 EDT        "

## 2023-04-24 ENCOUNTER — APPOINTMENT (OUTPATIENT)
Dept: INTERVENTIONAL RADIOLOGY/VASCULAR | Facility: HOSPITAL | Age: 48
DRG: 291 | End: 2023-04-24

## 2023-04-24 ENCOUNTER — APPOINTMENT (OUTPATIENT)
Dept: NEPHROLOGY | Facility: HOSPITAL | Age: 48
DRG: 291 | End: 2023-04-24

## 2023-04-24 LAB
ANION GAP SERPL CALCULATED.3IONS-SCNC: 13 MMOL/L (ref 5–15)
BUN SERPL-MCNC: 103 MG/DL (ref 6–20)
BUN/CREAT SERPL: 14.6 (ref 7–25)
CALCIUM SPEC-SCNC: 9.4 MG/DL (ref 8.6–10.5)
CHLORIDE SERPL-SCNC: 104 MMOL/L (ref 98–107)
CO2 SERPL-SCNC: 20 MMOL/L (ref 22–29)
CREAT SERPL-MCNC: 7.05 MG/DL (ref 0.76–1.27)
EGFRCR SERPLBLD CKD-EPI 2021: 9 ML/MIN/1.73
GLUCOSE BLDC GLUCOMTR-MCNC: 127 MG/DL (ref 70–130)
GLUCOSE BLDC GLUCOMTR-MCNC: 159 MG/DL (ref 70–130)
GLUCOSE BLDC GLUCOMTR-MCNC: 169 MG/DL (ref 70–130)
GLUCOSE SERPL-MCNC: 138 MG/DL (ref 65–99)
HAV IGM SERPL QL IA: NORMAL
HBV CORE IGM SERPL QL IA: NORMAL
HBV SURFACE AG SERPL QL IA: NORMAL
HCV AB SER DONR QL: NORMAL
POTASSIUM SERPL-SCNC: 4.6 MMOL/L (ref 3.5–5.2)
SODIUM SERPL-SCNC: 137 MMOL/L (ref 136–145)

## 2023-04-24 PROCEDURE — 77001 FLUOROGUIDE FOR VEIN DEVICE: CPT

## 2023-04-24 PROCEDURE — C1750 CATH, HEMODIALYSIS,LONG-TERM: HCPCS

## 2023-04-24 PROCEDURE — 25010000002 FENTANYL CITRATE (PF) 50 MCG/ML SOLUTION: Performed by: RADIOLOGY

## 2023-04-24 PROCEDURE — 02H633Z INSERTION OF INFUSION DEVICE INTO RIGHT ATRIUM, PERCUTANEOUS APPROACH: ICD-10-PCS | Performed by: RADIOLOGY

## 2023-04-24 PROCEDURE — 80048 BASIC METABOLIC PNL TOTAL CA: CPT | Performed by: INTERNAL MEDICINE

## 2023-04-24 PROCEDURE — 5A1D70Z PERFORMANCE OF URINARY FILTRATION, INTERMITTENT, LESS THAN 6 HOURS PER DAY: ICD-10-PCS | Performed by: INTERNAL MEDICINE

## 2023-04-24 PROCEDURE — 25010000002 IRON SUCROSE PER 1 MG: Performed by: INTERNAL MEDICINE

## 2023-04-24 PROCEDURE — C1894 INTRO/SHEATH, NON-LASER: HCPCS

## 2023-04-24 PROCEDURE — 82962 GLUCOSE BLOOD TEST: CPT

## 2023-04-24 PROCEDURE — 97535 SELF CARE MNGMENT TRAINING: CPT

## 2023-04-24 PROCEDURE — 99232 SBSQ HOSP IP/OBS MODERATE 35: CPT | Performed by: NURSE PRACTITIONER

## 2023-04-24 PROCEDURE — 25010000002 CEFAZOLIN IN DEXTROSE 2-4 GM/100ML-% SOLUTION: Performed by: RADIOLOGY

## 2023-04-24 PROCEDURE — 25010000002 MIDAZOLAM PER 1 MG: Performed by: RADIOLOGY

## 2023-04-24 PROCEDURE — 63710000001 INSULIN LISPRO (HUMAN) PER 5 UNITS: Performed by: NURSE PRACTITIONER

## 2023-04-24 PROCEDURE — 25010000002 HEPARIN (PORCINE) PER 1000 UNITS

## 2023-04-24 PROCEDURE — 99152 MOD SED SAME PHYS/QHP 5/>YRS: CPT

## 2023-04-24 PROCEDURE — 76937 US GUIDE VASCULAR ACCESS: CPT

## 2023-04-24 PROCEDURE — 80074 ACUTE HEPATITIS PANEL: CPT | Performed by: INTERNAL MEDICINE

## 2023-04-24 PROCEDURE — 97530 THERAPEUTIC ACTIVITIES: CPT

## 2023-04-24 PROCEDURE — 0JH60XZ INSERTION OF TUNNELED VASCULAR ACCESS DEVICE INTO CHEST SUBCUTANEOUS TISSUE AND FASCIA, OPEN APPROACH: ICD-10-PCS | Performed by: RADIOLOGY

## 2023-04-24 PROCEDURE — 36558 INSERT TUNNELED CV CATH: CPT

## 2023-04-24 RX ORDER — HEPARIN SODIUM 1000 [USP'U]/ML
2300 INJECTION, SOLUTION INTRAVENOUS; SUBCUTANEOUS AS NEEDED
Status: DISCONTINUED | OUTPATIENT
Start: 2023-04-24 | End: 2023-04-29 | Stop reason: HOSPADM

## 2023-04-24 RX ORDER — ALBUMIN (HUMAN) 12.5 G/50ML
25 SOLUTION INTRAVENOUS AS NEEDED
Status: DISCONTINUED | OUTPATIENT
Start: 2023-04-24 | End: 2023-04-29 | Stop reason: HOSPADM

## 2023-04-24 RX ORDER — HYDROCODONE BITARTRATE AND ACETAMINOPHEN 5; 325 MG/1; MG/1
1 TABLET ORAL EVERY 4 HOURS PRN
Status: DISCONTINUED | OUTPATIENT
Start: 2023-04-24 | End: 2023-04-29 | Stop reason: HOSPADM

## 2023-04-24 RX ORDER — HEPARIN SODIUM 1000 [USP'U]/ML
INJECTION, SOLUTION INTRAVENOUS; SUBCUTANEOUS
Status: COMPLETED
Start: 2023-04-24 | End: 2023-04-24

## 2023-04-24 RX ORDER — FENTANYL CITRATE 50 UG/ML
INJECTION, SOLUTION INTRAMUSCULAR; INTRAVENOUS AS NEEDED
Status: COMPLETED | OUTPATIENT
Start: 2023-04-24 | End: 2023-04-24

## 2023-04-24 RX ORDER — FENTANYL CITRATE 50 UG/ML
INJECTION, SOLUTION INTRAMUSCULAR; INTRAVENOUS
Status: DISPENSED
Start: 2023-04-24 | End: 2023-04-24

## 2023-04-24 RX ORDER — MIDAZOLAM HYDROCHLORIDE 1 MG/ML
INJECTION INTRAMUSCULAR; INTRAVENOUS
Status: DISPENSED
Start: 2023-04-24 | End: 2023-04-24

## 2023-04-24 RX ORDER — HEPARIN SODIUM 200 [USP'U]/100ML
INJECTION, SOLUTION INTRAVENOUS
Status: DISPENSED
Start: 2023-04-24 | End: 2023-04-24

## 2023-04-24 RX ORDER — MIDAZOLAM HYDROCHLORIDE 1 MG/ML
INJECTION INTRAMUSCULAR; INTRAVENOUS AS NEEDED
Status: COMPLETED | OUTPATIENT
Start: 2023-04-24 | End: 2023-04-24

## 2023-04-24 RX ORDER — BUMETANIDE 2 MG/1
2 TABLET ORAL
Status: DISCONTINUED | OUTPATIENT
Start: 2023-04-24 | End: 2023-04-29 | Stop reason: HOSPADM

## 2023-04-24 RX ORDER — CEFAZOLIN SODIUM 2 G/100ML
2 INJECTION, SOLUTION INTRAVENOUS ONCE
Status: COMPLETED | OUTPATIENT
Start: 2023-04-24 | End: 2023-04-24

## 2023-04-24 RX ADMIN — FENTANYL CITRATE 25 MCG: 50 INJECTION, SOLUTION INTRAMUSCULAR; INTRAVENOUS at 09:15

## 2023-04-24 RX ADMIN — CEFAZOLIN SODIUM 2 G: 2 INJECTION, SOLUTION INTRAVENOUS at 08:42

## 2023-04-24 RX ADMIN — MIDAZOLAM HYDROCHLORIDE 0.5 MG: 1 INJECTION, SOLUTION INTRAMUSCULAR; INTRAVENOUS at 09:09

## 2023-04-24 RX ADMIN — FENTANYL CITRATE 25 MCG: 50 INJECTION, SOLUTION INTRAMUSCULAR; INTRAVENOUS at 09:09

## 2023-04-24 RX ADMIN — Medication 10 ML: at 19:33

## 2023-04-24 RX ADMIN — ATORVASTATIN CALCIUM 40 MG: 40 TABLET, FILM COATED ORAL at 19:33

## 2023-04-24 RX ADMIN — IRON SUCROSE 200 MG: 20 INJECTION, SOLUTION INTRAVENOUS at 14:26

## 2023-04-24 RX ADMIN — HYDROCODONE BITARTRATE AND ACETAMINOPHEN 1 TABLET: 5; 325 TABLET ORAL at 19:31

## 2023-04-24 RX ADMIN — HYDRALAZINE HYDROCHLORIDE 75 MG: 50 TABLET, FILM COATED ORAL at 14:18

## 2023-04-24 RX ADMIN — INSULIN LISPRO 2 UNITS: 100 INJECTION, SOLUTION INTRAVENOUS; SUBCUTANEOUS at 18:51

## 2023-04-24 RX ADMIN — LIDOCAINE HYDROCHLORIDE 8 ML: 10; .005 INJECTION, SOLUTION EPIDURAL; INFILTRATION; INTRACAUDAL; PERINEURAL at 09:18

## 2023-04-24 RX ADMIN — HYDRALAZINE HYDROCHLORIDE 75 MG: 50 TABLET, FILM COATED ORAL at 19:32

## 2023-04-24 RX ADMIN — METOPROLOL TARTRATE 50 MG: 50 TABLET ORAL at 19:32

## 2023-04-24 RX ADMIN — BUMETANIDE 2 MG: 2 TABLET ORAL at 18:51

## 2023-04-24 RX ADMIN — HEPARIN SODIUM 4500 UNITS: 1000 INJECTION, SOLUTION INTRAVENOUS; SUBCUTANEOUS at 09:17

## 2023-04-24 RX ADMIN — SODIUM BICARBONATE 650 MG TABLET 650 MG: at 18:51

## 2023-04-24 RX ADMIN — HYDRALAZINE HYDROCHLORIDE 75 MG: 50 TABLET, FILM COATED ORAL at 05:08

## 2023-04-24 NOTE — PROGRESS NOTES
"   LOS: 5 days    Patient Care Team:  Jay Nevarez MD as PCP - General (Emergency Medicine)  Mirza Amezquita MD as Consulting Physician (Cardiology)    Subjective     Stable overnight.  Denies any chest pain or shortness of breath.    Objective     Vital Signs:  Blood pressure 145/70, pulse 89, temperature 98.1 °F (36.7 °C), temperature source Oral, resp. rate 18, height 185.4 cm (73\"), weight 125 kg (275 lb 8 oz), SpO2 96 %.      Intake/Output Summary (Last 24 hours) at 4/24/2023 0842  Last data filed at 4/23/2023 1700  Gross per 24 hour   Intake 750 ml   Output --   Net 750 ml        04/23 0701 - 04/24 0700  In: 750 [P.O.:750]  Out: -     Physical Exam:        GENERAL: WD AAM NAD  NEURO: Awake and alert, oriented. No focal deficit  PSYCHIATRIC: NMA. Cooperative with PE  EYE: PE, no icterus, no conjunctivitis  ENT: ommm, dentition intact,  Hearing intact  NECK: Supple , No JVD discernable,  Trachea midline  CV: + Edema, RRR  LUNGS:  Quiet,  Nonlabored resp.  Symmetrical expansion  ABDOMEN: Nondistended, soft nontender.  : No [place], no palp bladder  SKIN: Warm and dry without rash      Labs:  Results from last 7 days   Lab Units 04/22/23  0555 04/20/23  0432 04/19/23  0527   WBC 10*3/mm3 11.17* 8.87 9.97   HEMOGLOBIN g/dL 8.9* 9.8* 9.3*   PLATELETS 10*3/mm3 138* 150 135*     Results from last 7 days   Lab Units 04/22/23  2348 04/22/23  0528 04/21/23  1220 04/20/23  0432 04/19/23  0527 04/18/23  2151   SODIUM mmol/L 138 138 138 142 142 144   POTASSIUM mmol/L 4.4 4.5 4.6 4.7 4.4 4.8   CHLORIDE mmol/L 104 106 104 110* 113* 114*   CO2 mmol/L 18.0* 19.0* 18.0* 19.0* 17.0* 18.0*   BUN mg/dL 89* 94* 85* 85* 83* 88*   CREATININE mg/dL 6.55* 6.51* 5.86* 5.47* 5.88* 5.74*   CALCIUM mg/dL 9.4 9.0 8.9 8.7 8.8 9.0   PHOSPHORUS mg/dL 6.5*  --   --   --   --   --    MAGNESIUM mg/dL  --   --   --  1.7 1.4*  --    ALBUMIN g/dL 2.7*  --   --   --   --  3.2*     Results from last 7 days   Lab Units 04/18/23  0486   ALK PHOS " U/L 110   BILIRUBIN mg/dL 0.2   ALT (SGPT) U/L 20   AST (SGOT) U/L 30                   Estimated Creatinine Clearance: 19.3 mL/min (A) (by C-G formula based on SCr of 6.55 mg/dL (H)).         A/P:    ARF: Likely progression to ESRD.  BUN and creatinine continue to rise with negative volume.   Bx proven severe nephrosclerosis w adaptive FSGS changes and diabetic nephropathy 90 percent fibrosis on bx. Rapid loss of kidney function in last few yrs. Lost to f/u after discharge from [facility] in Jan 2023. Cr worse on this admission likley Acute component vs progression of underlying CKD in the setting of uncontrolled HTN and volume o[place]road.  No emergent need for HD today.  Holding Bumex for now with rising BUN and creatinine.  Getting HD cath today.  Will initiate dialysis.  We will work on outpatient HD arrangements.    HTN: Blood pressure stable.  Continue current medications for now.    Metabolic acidosis: We will give p.o. bicarb.  Adjust with HD on Monday.    Anemia: Hemoglobin below goal.  TSAT 7%.  Will give IV iron.  Transfuse as needed for hemoglobin <7.  Will start FOREST if blood pressure remains stable.     Volume: Negative overnight.  Still with edema but does not have any increased oxygen requirement.  Hold Bumex for now with rising creatinine.  Can restart diuretics.    Bone mineralization: Phosphorus elevated.  Start binders.     Nutrition: Continue low Phos low potassium renal diet.    High risk and complexity patient.    Guanakito Conway MD  04/24/23  08:42 EDT

## 2023-04-24 NOTE — PLAN OF CARE
Goal Outcome Evaluation:  Plan of Care Reviewed With: patient            Pt is alert and oriented X 4. VSS. RA/CPAP. Denied pain. Void spontaneously using the bathroom despite education to use urinal.NPO at midnight. Morning medication administered with sip of water.

## 2023-04-24 NOTE — THERAPY TREATMENT NOTE
Patient Name: Angel Blair  : 1975    MRN: [id number]                              Today's Date: 2023       Admit Date: 2023    Visit Dx:     ICD-10-CM ICD-9-CM   1. Acute on chronic systolic congestive heart failure  I50.23 428.23     428.0   2. NSTEMI (non-ST elevated myocardial infarction)  I21.4 410.70   3. Acute kidney injury  N17.9 584.9   4. Hypertensive emergency  I16.1 401.9   5. Noncompliance with medication regimen  Z91.148 V15.81     Patient Active Problem List   Diagnosis   • Type 2 diabetes mellitus with hyperglycemia (HCC)   • Left-sided back pain   • Obesity, Class III, BMI 40-49.9 (morbid obesity) (HCC)   • Abscess of back   • COVID-19   • Pneumonia due to COVID-19 virus   • Essential hypertension   • CHF exacerbation   • Hyperlipidemia   • CAP (community acquired pneumonia)   • Elevated troponin   • CKD (chronic kidney disease) stage 3, GFR 30-59 ml/min   • Acute on chronic systolic congestive heart failure   • Hypertensive urgency   • CKD (chronic kidney disease) stage 5, GFR less than 15 ml/min     Past Medical History:   Diagnosis Date   • Diabetes mellitus    • Hyperlipidemia    • Hypertension    • Knee swelling 22   • Sleep apnea      Past Surgical History:   Procedure Laterality Date   • SOFT TISSUE TUMOR RESECTION        General Information     Row Name 23 1513          OT Time and Intention    Document Type therapy note (daily note)  -JY     Mode of Treatment occupational therapy;individual therapy  -JY     Row Name 23 1513          General Information    Patient Profile Reviewed yes  -JY     Existing Precautions/Restrictions fall;other (see comments)  BLE edema, neuropathy, HD cath R upper chest  -JY     Barriers to Rehab medically complex  -JY     Row Name 23 1513          Cognition    Orientation Status (Cognition) oriented x 4  -JY     Row Name 23 1513          Safety Issues, Functional Mobility    Safety Issues Affecting  Function (Mobility) insight into deficits/self-awareness;safety precaution awareness;safety precautions follow-through/compliance  -JY     Impairments Affecting Function (Mobility) balance;endurance/activity tolerance;range of motion (ROM);sensation/sensory awareness;strength  -JPA     Comment, Safety Issues/Impairments (Mobility) pt alert and able to follow commands; appeared less fatigued this date following more dynamic activity, mobility; baseline B LE neuropathy  -JY           User Key  (r) = Recorded By, (t) = Taken By, (c) = Cosigned By    Initials Name Provider Type    Angela Rodgers OT Occupational Therapist                 Mobility/ADL's     Row Name 04/24/23 1517          Bed Mobility    Bed Mobility supine-sit;sit-supine;scooting/bridging  -JPA     Scooting/Bridging [place] (Bed Mobility) supervision  -JPA     Supine-Sit [place] (Bed Mobility) supervision  -JPA     Sit-Supine [place] (Bed Mobility) supervision  -JPA     Assistive Device (Bed Mobility) bed rails;head of bed elevated  -JPA     Comment, (Bed Mobility) pt did not require any physical A for bed mobility this date, did utilize HOB elevated and bed rails for support throughout; denies any dizziness or feeling LH at EOB  -JY     Row Name 04/24/23 1517          Transfers    Transfers sit-stand transfer;stand-sit transfer;toilet transfer  -JPA     Comment, (Transfers) skilled cues to ensure optiimal hand placement for controlled ascend, descend specifically to push up from seated surface and reach back prior to sitting; gross SBA for sit < > Stand at EOB and toilet  -JPA     Row Name 04/24/23 1517          Sit-Stand Transfer    Sit-Stand [place] (Transfers) standby assist;verbal cues  -JY     Assistive Device (Sit-Stand Transfers) walker, front-wheeled  -JPA     Row Name 04/24/23 1517          Stand-Sit Transfer    Stand-Sit [place] (Transfers) standby assist;verbal cues  -JPA     Assistive Device (Stand-Sit Transfers) walker,  "front-wheeled  -FREDRICK     Row Name 04/24/23 1517          Toilet Transfer    Type (Toilet Transfer) sit-stand;stand-sit  -JY     [place] Level (Toilet Transfer) standby assist;verbal cues  -JY     Comment, (Toilet Transfer) mild increase in time to ascend from lower toilet height, utilized grab bar at R side for supplemental support; initiated education on benefits of higher surfaces at toilet with riser or BSC over toilet to increase height and decrease energy expenditure and improve safety in t/fs  -JY     Row Name 04/24/23 1517          Functional Mobility    Functional Mobility- Ind. Level contact guard assist;verbal cues required  -JY     Functional Mobility- Device walker, front-wheeled  -     Functional Mobility-Distance (Feet) --  in room ADL related mobility  -     Functional Mobility- Comment refer to PT for specifics regarding fxl mobility however during in room ADL related mobility pt req'd gross CGA with FWW use; denied any dizziness or feeling LH upon standing or throughout; demonstrated grossly fluid turns, transitions when using AD; cued pt to use AD throughout vs neglect prior to pivot or turn for max stability  -JY     Row Name 04/24/23 1517          Activities of Daily Living    BADL Assessment/Intervention upper body dressing;lower body dressing;grooming  -JY     Row Name 04/24/23 1517          Bathing Assessment/Intervention    [place] Level (Bathing) lower body;distal lower extremities/feet  -JY     Comment, (Bathing) verbally reviewed purpose of AE including LH sponge to assist with LBB however pt deferred authentic LBB indicating \"wash over\" earlier in day  -JY     Row Name 04/24/23 [facility]7          Upper Body Dressing Assessment/Training    [place] Level (Upper Body Dressing) doff;don;pajama/robe;contact guard assist;minimum assist (75% patient effort);verbal cues  -JY     Position (Upper Body Dressing) edge of bed sitting;supported standing  -JY     Comment, (Upper Body " Dressing) gross CGA -min A for proximal and posterior mgmt of gown; anticipate greater I given skill set to manage routine garments  -JY     Row Name 04/24/23 1517          Lower Body Dressing Assessment/Training    [place] Level (Lower Body Dressing) don;socks;standby assist  -JY     Position (Lower Body Dressing) edge of bed sitting  -JY     Comment, (Lower Body Dressing) pt able to demonstrate improved reach to BLEs elevating upward to gross figure 4 position and able to tolerate donning socks w/o physical A, gross SBA for closxe presence for safety and if need for AE existed; verbally reviewed option for  AE as needed if distal reach more difficult  -JY     Row Name 04/24/23 1517          Grooming Assessment/Training    [place] Level (Grooming) wash face, hands;set up  -JY     Position (Grooming) sitting up in bed  -JY           User Key  (r) = Recorded By, (t) = Taken By, (c) = Cosigned By    Initials Name Provider Type    Angela Rodgers OT Occupational Therapist               Obj/Interventions     Row Name 04/24/23 1528          Motor Skills    Motor Skills functional endurance  -JY     Functional Endurance increased activity tolerance to 9 mins initiated in sitting EOB for LBD and grossly in standing to t/f to/from bathroom for simulated toileting and standing sink side for ADLs as pt declined authentic completion indicating prior completion prior to OT arrival; pt skill set suggests ability to stand and complete simple g/h at sink; decreased fatigue and perceived SOA with more dynamic tasks this date  -JY     Row Name 04/24/23 1528          Balance    Balance Assessment sitting static balance;sitting dynamic balance;standing static balance;standing dynamic balance  -JY     Static Sitting Balance supervision  -JY     Dynamic Sitting Balance standby assist;other (see comments)  LBD  -JY     Position, Sitting Balance unsupported;sitting edge of bed  -JY     Static Standing Balance standby assist   -JY     Dynamic Standing Balance contact guard;verbal cues  -JY     Position/Device Used, Standing Balance supported;walker, front-wheeled  -JY     Balance Interventions sitting;standing;static;dynamic;sit to stand;supported;occupation based/functional task  -JY     Comment, Balance no overt LOB during seated or standing tasks, utilized FWW during standing  -JY           User Key  (r) = Recorded By, (t) = Taken By, (c) = Cosigned By    Initials Name Provider Type    Angela Rodgers OT Occupational Therapist               Goals/Plan    No documentation.                Clinical Impression     Row Name 04/24/23 1531          Pain Assessment    Pretreatment Pain Rating 0/10 - no pain  -JY     Posttreatment Pain Rating 0/10 - no pain  -JY     Pre/Posttreatment Pain Comment denies any pain, reported new feeling/awareness of HD tunnel cath at R upper chest; denies pain at LUE yet elevated, pt deferred warm compress  -JY     Pain Intervention(s) Repositioned;Ambulation/increased activity;Rest;Elevated  -JY     Row Name 04/24/23 1531          Plan of Care Review    Plan of Care Reviewed With patient  -JY     Progress improving  -JY     Outcome Evaluation Pt this date demonstrated gains in standing activity tolerance reaching ~ 9 mins in unsupported sitting EOB and supported standing w/ FWW to complete authentic and simulated ADLs and related t/fs, mobility before seated rest break. Pt appeared less fatigued following more dynamic challenges and denied any pain. Pt req'd gross SBA in STS and CGA for mobility to/from bathroom and in room. Pt demonstrated improved I in donning socks w/o AE yet reviewed use for increased A in LB ADLs with pt verbalizing understanding. Pt is still below baseline occupational performance with concern for more taxing IADLs including cooking and home mgmt. Will continnue IPOT POC to address underlying impairments impacting function. Recommend home with A and further therapy pending progress with  mobility, stair navigation.  -FREDRICK     [facility] Name 04/24/23 1531          Therapy Assessment/Plan (OT)    Rehab Potential (OT) good, to achieve stated therapy goals  -     Criteria for Skilled Therapeutic Interventions Met (OT) yes  -JY     Therapy Frequency (OT) daily  -FREDRICK [place] Name 04/24/23 1531          Therapy Plan Review/Discharge Plan (OT)    Anticipated Discharge Disposition (OT) home with assist;home with home health;home with outpatient therapy services  -FREDRICK     [facility] Name 04/24/23 1531          Vital Signs    Pre Systolic BP Rehab 158  -JY     Pre Treatment Diastolic BP 81  -JY     Post Systolic BP Rehab 165  -JY     Post Treatment Diastolic BP 81  -JY     Pretreatment Heart Rate (beats/min) 95  -JY     Posttreatment Heart Rate (beats/min) 94  -JY     Pre SpO2 (%) 97  -JY     O2 Delivery Pre Treatment room air  -JY     O2 Delivery Intra Treatment room air  -JY     Post SpO2 (%) 97  -JY     O2 Delivery Post Treatment room air  -JY     Pre Patient Position Supine  -JY     Intra Patient Position Standing  -JY     Post Patient Position Sitting  -FREDRICK     Row Name 04/24/23 1531          Positioning and Restraints    Pre-Treatment Position in bed  -JY     Post Treatment Position bed  -JY     In Bed notified nsg;fowlers;call light within reach;encouraged to call for assist;side rails up x2;LUE elevated  pt declined warm compress to LUE; RN aware of no exit alarm engaged as OT observed upon arrival  -JY           User Key  (r) = Recorded By, (t) = Taken By, (c) = Cosigned By    Initials Name Provider Type    Angela Rodgers, FRED Occupational Therapist               Outcome Measures     Row Name 04/24/23 1538          How much help from another is currently needed...    Putting on and taking off regular lower body clothing? 3  -JY     Bathing (including washing, rinsing, and drying) 2  -JY     Toileting (which includes using toilet bed pan or urinal) 3  -JY     Putting on and taking off regular upper body clothing 3   -JY     Taking care of personal grooming (such as brushing teeth) 3  -JY     Eating meals 3  -JY     AM-PAC 6 Clicks Score (OT) 17  -JY     Row Name 04/24/23 1538          Functional Assessment    Outcome Measure Options AM-PAC 6 Clicks Daily Activity (OT)  -JY           User Key  (r) = Recorded By, (t) = Taken By, (c) = Cosigned By    Initials Name Provider Type    Angela Rodgers OT Occupational Therapist                Occupational Therapy Education     Title: PT OT SLP Therapies (In Progress)     Topic: Occupational Therapy (In Progress)     Point: ADL training (In Progress)     Description:   Instruct learner(s) on proper safety adaptation and remediation techniques during self care or transfers.   Instruct in proper use of assistive devices.              Learning Progress Summary           Patient Acceptance, E,D, NR by FREDRICK at 4/24/2023 1455    Eager, E,TB,D,H, VU,NR by AR at 4/20/2023 1354   Family Eager, E,TB,D,H, VU,NR by AR at 4/20/2023 1354                   Point: Home exercise program (Done)     Description:   Instruct learner(s) on appropriate technique for monitoring, assisting and/or progressing therapeutic exercises/activities.              Learning Progress Summary           Patient Eager, E,TB,D,H, VU,NR by AR at 4/20/2023 1354   Family Eager, E,TB,D,H, VU,NR by AR at 4/20/2023 1354                   Point: Precautions (In Progress)     Description:   Instruct learner(s) on prescribed precautions during self-care and functional transfers.              Learning Progress Summary           Patient Acceptance, E,D, NR by FREDRICK at 4/24/2023 1455    Eager, E,TB,D,H, VU,NR by AR at 4/20/2023 1354   Family Eager, E,TB,D,H, VU,NR by AR at 4/20/2023 1354                   Point: Body mechanics (In Progress)     Description:   Instruct learner(s) on proper positioning and spine alignment during self-care, functional mobility activities and/or exercises.              Learning Progress Summary           Patient  Acceptance, E,D, NR by FREDRICK at 4/24/2023 1455    Eager, E,TB,D,H, VU,NR by AR at 4/20/2023 1354   Family Eager, E,TB,D,H, VU,NR by AR at 4/20/2023 1354                               User Key     Initials Effective Dates Name Provider Type Discipline    AR 02/03/23 -  Daisha Schuster OT Occupational Therapist OT    FREDRICK 06/16/21 -  Angela Brenner OT Occupational Therapist OT              OT Recommendation and Plan  Therapy Frequency (OT): daily  Plan of Care Review  Plan of Care Reviewed With: patient  Progress: improving  Outcome Evaluation: Pt this date demonstrated gains in standing activity tolerance reaching ~ 9 mins in unsupported sitting EOB and supported standing w/ FWW to complete authentic and simulated ADLs and related t/fs, mobility before seated rest break. Pt appeared less fatigued following more dynamic challenges and denied any pain. Pt req'd gross SBA in STS and CGA for mobility to/from bathroom and in room. Pt demonstrated improved I in donning socks w/o AE yet reviewed use for increased A in LB ADLs with pt verbalizing understanding. Pt is still below baseline occupational performance with concern for more taxing IADLs including cooking and home mgmt. Will continnue IPOT POC to address underlying impairments impacting function. Recommend home with A and further therapy pending progress with mobility, stair navigation.     Time Calculation:    Time Calculation- OT     Row Name 04/24/23 1541             Time Calculation- OT    OT Start Time 1455  -JY      OT Received On 04/24/23  -JY      OT Goal Re-Cert Due Date 04/30/23  -JY         Timed Charges    04244 - OT Therapeutic Activity Minutes 14  -JY      53182 - OT Self Care/Mgmt Minutes 10  -JY         Total Minutes    Timed Charges Total Minutes 24  -JY       Total Minutes 24  -JY            User Key  (r) = Recorded By, (t) = Taken By, (c) = Cosigned By    Initials Name Provider Type    Angela Rodgers OT Occupational Therapist               Therapy Charges for Today     Code Description Service Date Service Provider Modifiers Qty    [id number]  OT THERAPEUTIC ACT EA 15 MIN 4/24/2023 Angela Brenner OT GO 1    [id number]  OT SELF CARE/MGMT/TRAIN EA 15 MIN 4/24/2023 Angela Brenner OT GO 1               Angela Brenner OT  4/24/2023   Improved

## 2023-04-24 NOTE — CASE MANAGEMENT/SOCIAL WORK
Continued Stay Note   [place]     Patient Name: Angel Blair  MRN: [id number]  Today's Date: 4/24/2023    Admit Date: 4/18/2023    Plan: Home   Discharge Plan     Row Name 04/24/23 1634       Plan    Plan Home    Plan Comments Case mgt f/u. Orders noted to start process of arranging outpt dialysis. Mr Blair has decided he would prefer hemodialysis. I spoke with [facility] representative ([employer] at [phone]), faxed referral to [facility] at [phone]. Their staff can check with his insurance to determine if it will cover outpt dialysis, if not they can advise him on process of applying for Medicare.Discussed with Mr Blair and spouse at bedside.   Await call back re: chair time and availability               Discharge Codes    No documentation.               Expected Discharge Date and Time     Expected Discharge Date Expected Discharge Time    Apr 27, 2023             Sonja C Kellerman, RN

## 2023-04-24 NOTE — PROGRESS NOTES
[facility] Medicine Services  PROGRESS NOTE    Patient Name: Angel Blair  : 1975  MRN: [id number]    Date of Admission: 2023  Primary Care Physician: Jay Nevarez MD    Subjective   Subjective     CC: f/u CKD    HPI: resting in bed with wife at bedside. Nervous about cath placement and HD today. No complaints otherwise    ROS:  Gen- No fevers, chills  CV- No chest pain, palpitations  Resp- No cough, dyspnea  GI- No N/V/D, abd pain    Objective   Objective     Vital Signs:   Temp:  [98 °F (36.7 °C)-98.1 °F (36.7 °C)] 98.1 °F (36.7 °C)  Heart Rate:  [80-93] 90  Resp:  [14-23] 18  BP: (132-159)/(68-85) 145/76     Physical Exam:  Constitutional: No acute distress, awake, resting in bed  HENT: NCAT, mucous membranes moist  Respiratory: Clear to auscultation bilaterally, respiratory effort normal   Cardiovascular: RRR, no murmurs, rubs, or gallops  Gastrointestinal: Positive bowel sounds, soft, nontender, nondistended, obese  Musculoskeletal: trace bilateral ankle edema  Psychiatric: Appropriate affect, cooperative  Neurologic: Oriented x 3, KLEIN freely speech clear  Skin: No rashes    Results Reviewed:  LAB RESULTS:      Lab 23  0555 23  0432 23  1151 23  0527 23  0138 23  2308 23  2151   WBC 11.17* 8.87  --  9.97  --   --  10.62   HEMOGLOBIN 8.9* 9.8*  --  9.3*  --   --  10.4*   HEMATOCRIT 28.1* 30.7*  --  29.9*  --   --  32.8*   PLATELETS 138* 150  --  135*  --   --  153   NEUTROS ABS 8.41*  --   --  7.43*  --   --  7.79*   IMMATURE GRANS (ABS) 0.05  --   --  0.04  --   --  0.05   LYMPHS ABS 1.52  --   --  1.68  --   --  1.79   MONOS ABS 1.01*  --   --  0.52  --   --  0.61   EOS ABS 0.14  --   --  0.23  --   --  0.29   MCV 91.5 90.6  --  93.4  --   --  91.1   PROCALCITONIN  --   --   --   --  0.24  --   --    PROTIME  --   --   --  14.4  --  13.8  --    APTT  --   --   --   --   --  29.7*  --    HEPARIN ANTI-XA  --   --   0.15* 0.10*  --  0.10*  --          Lab 04/22/23  2348 04/22/23  0528 04/21/23  1220 04/20/23  0432 04/19/23  0527   SODIUM 138 138 138 142 142   POTASSIUM 4.4 4.5 4.6 4.7 4.4   CHLORIDE 104 106 104 110* 113*   CO2 18.0* 19.0* 18.0* 19.0* 17.0*   ANION GAP 16.0* 13.0 16.0* 13.0 12.0   BUN 89* 94* 85* 85* 83*   CREATININE 6.55* 6.51* 5.86* 5.47* 5.88*   EGFR 9.8* 9.9* 11.2* 12.2* 11.1*   GLUCOSE 153* 161* 198* 146* 110*   CALCIUM 9.4 9.0 8.9 8.7 8.8   MAGNESIUM  --   --   --  1.7 1.4*   PHOSPHORUS 6.5*  --   --   --   --    HEMOGLOBIN A1C  --   --   --   --  6.40*         Lab 04/22/23  2348 04/18/23  2151   TOTAL PROTEIN  --  7.4   ALBUMIN 2.7* 3.2*   GLOBULIN  --  4.2   ALT (SGPT)  --  20   AST (SGOT)  --  30   BILIRUBIN  --  0.2   ALK PHOS  --  110         Lab 04/19/23  0527 04/19/23  0138 04/18/23  2308 04/18/23  2151   PROBNP  --   --   --  11,594.0*   HSTROP T  --  170*  --  177*   PROTIME 14.4  --  13.8  --    INR 1.12  --  1.07  --          Lab 04/19/23  0527   CHOLESTEROL 210*   LDL CHOL 140*   HDL CHOL 44   TRIGLYCERIDES 143         Lab 04/21/23  0758   IRON 17*   IRON SATURATION 7*   TIBC 243*   TRANSFERRIN 163*   FERRITIN 362.80         Brief Urine Lab Results  (Last result in the past 365 days)      Color   Clarity   Blood   Leuk Est   Nitrite   Protein   CREAT   Urine HCG        01/23/23 2223             34.4               Microbiology Results Abnormal     Procedure Component Value - Date/Time    COVID PRE-OP / PRE-PROCEDURE SCREENING ORDER (NO ISOLATION) - Swab, Nasopharynx [[id number]]  (Normal) Collected: 04/18/23 2152    Lab Status: Final result Specimen: Swab from Nasopharynx Updated: 04/18/23 2224    Narrative:      The following orders were created for panel order COVID PRE-OP / PRE-PROCEDURE SCREENING ORDER (NO ISOLATION) - Swab, Nasopharynx.  Procedure                               Abnormality         Status                     ---------                               -----------         ------                      COVID-19 and FLU A/B PCR...[[id number]]  Normal              Final result                 Please view results for these tests on the individual orders.    COVID-19 and FLU A/B PCR - Swab, Nasopharynx [[id number]]  (Normal) Collected: 04/18/23 2152    Lab Status: Final result Specimen: Swab from Nasopharynx Updated: 04/18/23 2224     COVID19 Not Detected     Influenza A PCR Not Detected     Influenza B PCR Not Detected    Narrative:      Fact sheet for providers: https://www.fda.gov/media/342379/download    Fact sheet for patients: https://www.fda.gov/media/802965/download    Test performed by PCR.          Duplex Vein Mapping Study Upper Extremity - Left CAR    Addendum Date: 4/22/2023    •  The left basilic vein is patent and of adequate size in the upper arm. •  Chronic superficial thrombophlebitis noted in the left cephalic vein in the forearm. •  Chronic left upper extremity superficial thrombophlebitis noted in the cephalic (upper arm) and median cubital. •  All other left sided vessels appear normal.     Result Date: 4/22/2023  •  Chronic left upper extremity superficial thrombophlebitis noted in the cephalic (upper arm) and median cubital. •  All other left sided vessels appear normal.       Results for orders placed during the hospital encounter of 01/22/23    Adult Transthoracic Echo Complete With Contrast if Necessary Per Protocol    Interpretation Summary  •  Left ventricular ejection fraction appears to be 51 - 55%.  •  Left ventricular wall thickness is consistent with mild to moderate concentric hypertrophy.  •  The cardiac valves are anatomically and functionally normal.      Current medications:  Scheduled Meds:atorvastatin, 40 mg, Oral, Nightly  bumetanide, 2 mg, Oral, BID  heparin (porcine), , ,   hydrALAZINE, 75 mg, Oral, Q8H  insulin lispro, 0-7 Units, Subcutaneous, TID AC  iron sucrose, 200 mg, Intravenous, Q24H  isosorbide mononitrate, 120 mg, Oral, Q24H  lidocaine-EPINEPHrine, ,  ,   metoprolol tartrate, 50 mg, Oral, Q12H  NIFEdipine XL, 90 mg, Oral, Daily  pharmacy consult - MTM, , Does not apply, Daily  sodium bicarbonate, 650 mg, Oral, TID  sodium chloride, 10 mL, Intravenous, Q12H      Continuous Infusions:   PRN Meds:.•  acetaminophen  •  benzonatate  •  Calcium Replacement - Follow Nurse / BPA Driven Protocol  •  cloNIDine  •  dextrose  •  dextrose  •  fentaNYL citrate (PF)  •  glucagon (human recombinant)  •  Magnesium Standard Dose Replacement - Follow Nurse / BPA Driven Protocol  •  midazolam  •  Phosphorus Replacement - Follow Nurse / BPA Driven Protocol  •  Potassium Replacement - Follow Nurse / BPA Driven Protocol  •  sodium chloride  •  sodium chloride    Assessment & Plan   Assessment & Plan     Active Hospital Problems    Diagnosis  POA   • Acute on chronic systolic congestive heart failure [I50.23]  Yes   • Hypertensive urgency [I16.0]  Yes   • CKD (chronic kidney disease) stage 5, GFR less than 15 ml/min [N18.5]  Yes   • Elevated troponin [R77.8]  Yes   • Hyperlipidemia [E78.5]  Yes   • Essential hypertension [I10]  Yes   • Type 2 diabetes mellitus with hyperglycemia (HCC) [E11.65]  Yes      Resolved Hospital Problems   No resolved problems to display.        Brief Hospital Course to date:  Angel Blair is a 47 y.o. male with PMH significant for DM 2, HLD, HTN, LEFTY, obesity, CKD V, who presents to the ED with complaint of cough and shortness of breath who was found to have concern for elevated troponin with acute on chronic systolic congestive heart failure and hypertensive urgency.     Acute on chronic systolic congestive heart failure  - Patient has been out of his medication for the past month.  - Cardiology followed. Responded well to Bumex -3L 4/19. Creatinine rising and stopped 4/22. Plan to restart after initiation of HD  - Echo on 1/22/2023 notes EF 51-55%  - Continue metoprolol    CKD V, progression to ESRD.  Anemia of CKD  - Nephrology following.  patient was lost to follow-up since last discharge secondary to no insurance coverage. Now planning for insertion of temporary HD catheter and initiation of HD today  - Dr. Herrera following, Planning for vein mapping and fistula creation as outpatient.  - continue IV iron, plan EPO with HD. Transfuse for hgb <7    LUE superficial venous thrombosis  -- Warm compresses to LUE, elevate.     Hypertensive urgency  Essential hypertension  - Patient has been out of home medication for the past month  - Now off cardene gtt. BP much better/ stable. Clonidine added prn.  - Continue metoprolol, nifedipine, hydralazine and Imdur  - NAL follows.  -cardiology has s/o     NSTEMI  Elevated troponin  - Likely NSTEMI type II secondary to hypertensive urgency and acute on chronic CHF but was started on heparin gtt on admission. Stopped per cardiology. Can start SQ heparin for DVT PPX after tunneled cath placement  - Trend troponin (177 -> 170)   - EKG without acute changes   - Patient denies chest pain with the exception of when he coughs     Dyslipidemia  - Continue Lipitor     DM2  - FSBG 3 times daily     Hypomagnesemia  -Replace    Expected Discharge Location and Transportation:   Expected Discharge   Expected Discharge Date and Time     Expected Discharge Date Expected Discharge Time    Apr 27, 2023            DVT prophylaxis:  Medical DVT prophylaxis orders are present.     AM-PAC 6 Clicks Score (PT): 18 (04/22/23 1000)    CODE STATUS:   Code Status and Medical Interventions:   Ordered at: 04/19/23 0323     Code Status (Patient has no pulse and is not breathing):    CPR (Attempt to Resuscitate)     Medical Interventions (Patient has pulse or is breathing):    Full Support       Yasmine Vizcaino, APRN  04/24/23

## 2023-04-24 NOTE — NURSING NOTE
15.5 Divehi Image guided tunneled dialysis catheter placed to right internal jugular by Dr Carter. Patient tolerated well. Sedation time of 19 minutes. Patient was given 50 mcg Fentanyl & 0.5 mg Versed. Report called to nurse Davison.

## 2023-04-24 NOTE — PLAN OF CARE
Goal Outcome Evaluation:  Plan of Care Reviewed With: patient        Progress: improving  Outcome Evaluation: Pt this date demonstrated gains in standing activity tolerance reaching ~ 9 mins in unsupported sitting EOB and supported standing w/ FWW to complete authentic and simulated ADLs and related t/fs, mobility before seated rest break. Pt appeared less fatigued following more dynamic challenges and denied any pain. Pt req'd gross SBA in STS and CGA for mobility to/from bathroom and in room. Pt demonstrated improved I in donning socks w/o AE yet reviewed use for increased A in LB ADLs with pt verbalizing understanding. Pt is still below baseline occupational performance with concern for more taxing IADLs including cooking and home mgmt. Will continnue IPOT POC to address underlying impairments impacting function. Recommend home with A and further therapy pending progress with mobility, stair navigation.

## 2023-04-24 NOTE — PRE-PROCEDURE NOTE
.    [facility]   Interventional Radiology H&P    Patient Name: Angel Blair  : 1975  MRN: [id number]  Primary Care Physician:  Jay Nevarez MD  Referring Physician: No ref. provider found  Date of admission: 2023    Subjective   Subjective     HPI:  Angel Blair is a 47 y.o. male with renal failure    Review of Systems:   Constitutional no fever,  no weight loss       Otolaryngeal no difficulty swallowing   Cardiovascular no chest pain   Pulmonary no cough, no sputum production   Gastrointestinal no constipation, no diarrhea                         Personal History       Past Medical/Surgical History:   Past Medical History:   Diagnosis Date   • Diabetes mellitus    • Hyperlipidemia    • Hypertension    • Knee swelling 22   • Sleep apnea      Past Surgical History:   Procedure Laterality Date   • SOFT TISSUE TUMOR RESECTION         Social History:  reports that he has never smoked. He has never used smokeless tobacco. He reports that he does not drink alcohol and does not use drugs.    Medications:  Medications Prior to Admission   Medication Sig Dispense Refill Last Dose   • bumetanide (BUMEX) 2 MG tablet Take 1 tablet by mouth Daily. 30 tablet 0 2023   • glipizide (Glucotrol) 5 MG tablet Take 0.5 tablets by mouth 2 (Two) Times a Day Before Meals. 30 tablet 0 Past Month   • hydrALAZINE (APRESOLINE) 25 MG tablet Take 3 tablets by mouth Every 8 (Eight) Hours. 90 tablet 0 Past Month   • isosorbide mononitrate (IMDUR) 120 MG 24 hr tablet Take 1 tablet by mouth Daily. 30 tablet 0 Past Month   • NIFEdipine XL (PROCARDIA XL) 90 MG 24 hr tablet Take 1 tablet by mouth Daily. 90 tablet 3 2023   • NIFEdipine XL (PROCARDIA XL) 90 MG 24 hr tablet Take 1 tablet by mouth Daily. 90 tablet 3 Past Month   • acetaminophen (TYLENOL) 325 MG tablet Take 2 tablets by mouth Every 4 (Four) Hours As Needed for Mild Pain.   More than a month   • atorvastatin (LIPITOR) 40 MG tablet  Take 1 tablet by mouth Every Night.   More than a month   • metoprolol tartrate (LOPRESSOR) 50 MG tablet Take 1 tablet by mouth Every 12 (Twelve) Hours for 30 days. 60 tablet 0      Current medications:  atorvastatin, 40 mg, Oral, Nightly  bumetanide, 2 mg, Oral, BID  heparin (porcine), , ,   hydrALAZINE, 75 mg, Oral, Q8H  insulin lispro, 0-7 Units, Subcutaneous, TID AC  iron sucrose, 200 mg, Intravenous, Q24H  isosorbide mononitrate, 120 mg, Oral, Q24H  metoprolol tartrate, 50 mg, Oral, Q12H  NIFEdipine XL, 90 mg, Oral, Daily  pharmacy consult - MTM, , Does not apply, Daily  sodium bicarbonate, 650 mg, Oral, TID  sodium chloride, 10 mL, Intravenous, Q12H      Current IV drips:       Allergies:  Allergies   Allergen Reactions   • Lisinopril Swelling       Objective    Objective     Vitals:   Temp:  [98 °F (36.7 °C)-98.1 °F (36.7 °C)] 98.1 °F (36.7 °C)  Heart Rate:  [80-93] 87  Resp:  [12-23] 15  BP: (120-159)/(67-85) 126/67      Physical Exam:   Constitutional: Awake, alert, No acute distress    Respiratory: Clear to auscultation bilaterally, nonlabored respirations    Cardiovascular: RRR, no murmurs, rubs, or gallops, palpable pedal pulses bilaterally   Gastrointestinal: Positive bowel sounds, soft, nontender, nondistended        ASA SCALE ASSESSMENT:  2-Mild to moderate systemic disease, medically well controlled, with no functional limitation    MALLAMPATI CLASSIFICATION:  2-Able to visualize the soft palate, fauces, uvula. The anterior & posterior tonsilar pillars are hidden by the tongue.       Result Review        Result Review:     Sodium   Date Value Ref Range Status   04/22/2023 138 136 - 145 mmol/L Final   04/22/2023 138 136 - 145 mmol/L Final   04/21/2023 138 136 - 145 mmol/L Final       Potassium   Date Value Ref Range Status   04/22/2023 4.4 3.5 - 5.2 mmol/L Final   04/22/2023 4.5 3.5 - 5.2 mmol/L Final   04/21/2023 4.6 3.5 - 5.2 mmol/L Final       Chloride   Date Value Ref Range Status   04/22/2023 104  98 - 107 mmol/L Final   04/22/2023 106 98 - 107 mmol/L Final   04/21/2023 104 98 - 107 mmol/L Final       No results found for: PLASMABICARB    BUN   Date Value Ref Range Status   04/22/2023 89 (H) 6 - 20 mg/dL Final   04/22/2023 94 (H) 6 - 20 mg/dL Final   04/21/2023 85 (H) 6 - 20 mg/dL Final       Creatinine   Date Value Ref Range Status   04/22/2023 6.55 (H) 0.76 - 1.27 mg/dL Final   04/22/2023 6.51 (H) 0.76 - 1.27 mg/dL Final   04/21/2023 5.86 (H) 0.76 - 1.27 mg/dL Final       Calcium   Date Value Ref Range Status   04/22/2023 9.4 8.6 - 10.5 mg/dL Final   04/22/2023 9.0 8.6 - 10.5 mg/dL Final   04/21/2023 8.9 8.6 - 10.5 mg/dL Final           No components found for: GLUCOSE.*  Results from last 7 days   Lab Units 04/22/23  0555   WBC 10*3/mm3 11.17*   HEMOGLOBIN g/dL 8.9*   HEMATOCRIT % 28.1*   PLATELETS 10*3/mm3 138*      Results from last 7 days   Lab Units 04/19/23  0527   INR  1.12           Assessment / Plan     Assesment:   Renal failure      Plan:   Tunneled dialysis catheter    The risks and benefits of the procedure were discussed with the patient.    Electronically signed by Robin Carter III, MD, 04/24/23, 9:41 AM EDT.

## 2023-04-25 LAB
BASOPHILS # BLD AUTO: 0.07 10*3/MM3 (ref 0–0.2)
BASOPHILS NFR BLD AUTO: 0.7 % (ref 0–1.5)
DEPRECATED RDW RBC AUTO: 49.4 FL (ref 37–54)
EOSINOPHIL # BLD AUTO: 0.3 10*3/MM3 (ref 0–0.4)
EOSINOPHIL NFR BLD AUTO: 3 % (ref 0.3–6.2)
ERYTHROCYTE [DISTWIDTH] IN BLOOD BY AUTOMATED COUNT: 13.9 % (ref 12.3–15.4)
GLUCOSE BLDC GLUCOMTR-MCNC: 133 MG/DL (ref 70–130)
GLUCOSE BLDC GLUCOMTR-MCNC: 153 MG/DL (ref 70–130)
GLUCOSE BLDC GLUCOMTR-MCNC: 159 MG/DL (ref 70–130)
HCT VFR BLD AUTO: 32 % (ref 37.5–51)
HGB BLD-MCNC: 9.4 G/DL (ref 13–17.7)
IMM GRANULOCYTES # BLD AUTO: 0.04 10*3/MM3 (ref 0–0.05)
IMM GRANULOCYTES NFR BLD AUTO: 0.4 % (ref 0–0.5)
LYMPHOCYTES # BLD AUTO: 1.65 10*3/MM3 (ref 0.7–3.1)
LYMPHOCYTES NFR BLD AUTO: 16.7 % (ref 19.6–45.3)
MCH RBC QN AUTO: 28.7 PG (ref 26.6–33)
MCHC RBC AUTO-ENTMCNC: 29.4 G/DL (ref 31.5–35.7)
MCV RBC AUTO: 97.9 FL (ref 79–97)
MONOCYTES # BLD AUTO: 0.85 10*3/MM3 (ref 0.1–0.9)
MONOCYTES NFR BLD AUTO: 8.6 % (ref 5–12)
NEUTROPHILS NFR BLD AUTO: 6.96 10*3/MM3 (ref 1.7–7)
NEUTROPHILS NFR BLD AUTO: 70.6 % (ref 42.7–76)
NRBC BLD AUTO-RTO: 0 /100 WBC (ref 0–0.2)
PLATELET # BLD AUTO: 162 10*3/MM3 (ref 140–450)
PMV BLD AUTO: 12.3 FL (ref 6–12)
RBC # BLD AUTO: 3.27 10*6/MM3 (ref 4.14–5.8)
WBC NRBC COR # BLD: 9.87 10*3/MM3 (ref 3.4–10.8)

## 2023-04-25 PROCEDURE — 85025 COMPLETE CBC W/AUTO DIFF WBC: CPT | Performed by: EMERGENCY MEDICINE

## 2023-04-25 PROCEDURE — 25010000002 IRON SUCROSE PER 1 MG: Performed by: INTERNAL MEDICINE

## 2023-04-25 PROCEDURE — 97116 GAIT TRAINING THERAPY: CPT

## 2023-04-25 PROCEDURE — 82962 GLUCOSE BLOOD TEST: CPT

## 2023-04-25 PROCEDURE — 63710000001 INSULIN LISPRO (HUMAN) PER 5 UNITS: Performed by: NURSE PRACTITIONER

## 2023-04-25 PROCEDURE — 99232 SBSQ HOSP IP/OBS MODERATE 35: CPT | Performed by: NURSE PRACTITIONER

## 2023-04-25 RX ORDER — FOLIC ACID/VIT B COMPLEX AND C 0.8 MG
1 TABLET ORAL DAILY
Status: DISCONTINUED | OUTPATIENT
Start: 2023-04-25 | End: 2023-04-29 | Stop reason: HOSPADM

## 2023-04-25 RX ORDER — CALCIUM ACETATE 667 MG/1
667 CAPSULE ORAL
Status: DISCONTINUED | OUTPATIENT
Start: 2023-04-25 | End: 2023-04-29 | Stop reason: HOSPADM

## 2023-04-25 RX ADMIN — CALCIUM ACETATE 667 MG: 667 CAPSULE ORAL at 17:16

## 2023-04-25 RX ADMIN — CLONIDINE HYDROCHLORIDE 0.1 MG: 0.1 TABLET ORAL at 06:07

## 2023-04-25 RX ADMIN — SODIUM BICARBONATE 650 MG TABLET 650 MG: at 09:01

## 2023-04-25 RX ADMIN — CALCIUM ACETATE 667 MG: 667 CAPSULE ORAL at 11:46

## 2023-04-25 RX ADMIN — INSULIN LISPRO 2 UNITS: 100 INJECTION, SOLUTION INTRAVENOUS; SUBCUTANEOUS at 11:46

## 2023-04-25 RX ADMIN — SODIUM BICARBONATE 650 MG TABLET 650 MG: at 00:32

## 2023-04-25 RX ADMIN — METOPROLOL TARTRATE 50 MG: 50 TABLET ORAL at 20:01

## 2023-04-25 RX ADMIN — NIFEDIPINE 90 MG: 30 TABLET, EXTENDED RELEASE ORAL at 09:00

## 2023-04-25 RX ADMIN — SODIUM BICARBONATE 650 MG TABLET 650 MG: at 16:37

## 2023-04-25 RX ADMIN — SODIUM BICARBONATE 650 MG TABLET 650 MG: at 20:00

## 2023-04-25 RX ADMIN — BUMETANIDE 2 MG: 2 TABLET ORAL at 17:16

## 2023-04-25 RX ADMIN — INSULIN LISPRO 2 UNITS: 100 INJECTION, SOLUTION INTRAVENOUS; SUBCUTANEOUS at 16:37

## 2023-04-25 RX ADMIN — Medication 1 TABLET: at 11:46

## 2023-04-25 RX ADMIN — BUMETANIDE 2 MG: 2 TABLET ORAL at 09:01

## 2023-04-25 RX ADMIN — HYDRALAZINE HYDROCHLORIDE 75 MG: 50 TABLET, FILM COATED ORAL at 20:00

## 2023-04-25 RX ADMIN — ISOSORBIDE MONONITRATE 120 MG: 60 TABLET, EXTENDED RELEASE ORAL at 09:01

## 2023-04-25 RX ADMIN — ATORVASTATIN CALCIUM 40 MG: 40 TABLET, FILM COATED ORAL at 20:01

## 2023-04-25 RX ADMIN — HYDRALAZINE HYDROCHLORIDE 75 MG: 50 TABLET, FILM COATED ORAL at 14:46

## 2023-04-25 RX ADMIN — IRON SUCROSE 200 MG: 20 INJECTION, SOLUTION INTRAVENOUS at 11:46

## 2023-04-25 RX ADMIN — Medication 10 ML: at 11:46

## 2023-04-25 RX ADMIN — Medication 10 ML: at 20:01

## 2023-04-25 RX ADMIN — HYDRALAZINE HYDROCHLORIDE 75 MG: 50 TABLET, FILM COATED ORAL at 04:41

## 2023-04-25 RX ADMIN — METOPROLOL TARTRATE 50 MG: 50 TABLET ORAL at 09:01

## 2023-04-25 NOTE — PLAN OF CARE
Goal Outcome Evaluation:  Plan of Care Reviewed With: patient           Outcome Evaluation: Pt continues with generalized weakness and decreased activity tolerance limiting his functional mobility. Pt ambulated 500' SBA w/o use of AD and navigated 12 steps CGA w/ use of one hand rail. Continue PT POC.

## 2023-04-25 NOTE — PROGRESS NOTES
"   LOS: 6 days    Patient Care Team:  Jay Nevarez MD as PCP - General (Emergency Medicine)  Mirza Amezquita MD as Consulting Physician (Cardiology)    Subjective     Stable overnight.  Denies any chest pain or shortness of breath.    Objective     Vital Signs:  Blood pressure 158/85, pulse 85, temperature 98.1 °F (36.7 °C), temperature source Oral, resp. rate 16, height 185.4 cm (73\"), weight 125 kg (275 lb 6.4 oz), SpO2 95 %.      Intake/Output Summary (Last 24 hours) at 4/25/2023 1055  Last data filed at 4/25/2023 0900  Gross per 24 hour   Intake 790 ml   Output 320 ml   Net 470 ml        04/24 0701 - 04/25 0700  In: 550 [P.O.:550]  Out: 320     Physical Exam:        GENERAL: WD AAM NAD  NEURO: Awake and alert, oriented. No focal deficit  PSYCHIATRIC: NMA. Cooperative with PE  EYE: PE, no icterus, no conjunctivitis  ENT: ommm, dentition intact,  Hearing intact  NECK: Supple , No JVD discernable,  Trachea midline  CV: + Edema, RRR  LUNGS:  Quiet,  Nonlabored resp.  Symmetrical expansion  ABDOMEN: Nondistended, soft nontender.  : No Coyne, no palp bladder  SKIN: Warm and dry without rash      Labs:  Results from last 7 days   Lab Units 04/25/23  0450 04/22/23  0555 04/20/23  0432   WBC 10*3/mm3 9.87 11.17* 8.87   HEMOGLOBIN g/dL 9.4* 8.9* 9.8*   PLATELETS 10*3/mm3 162 138* 150     Results from last 7 days   Lab Units 04/24/23  0701 04/22/23  2348 04/22/23  0528 04/21/23  1220 04/20/23  0432 04/19/23  0527 04/18/23  2151   SODIUM mmol/L 137 138 138 138 142 142 144   POTASSIUM mmol/L 4.6 4.4 4.5 4.6 4.7 4.4 4.8   CHLORIDE mmol/L 104 104 106 104 110* 113* 114*   CO2 mmol/L 20.0* 18.0* 19.0* 18.0* 19.0* 17.0* 18.0*   BUN mg/dL 103* 89* 94* 85* 85* 83* 88*   CREATININE mg/dL 7.05* 6.55* 6.51* 5.86* 5.47* 5.88* 5.74*   CALCIUM mg/dL 9.4 9.4 9.0 8.9 8.7 8.8 9.0   PHOSPHORUS mg/dL  --  6.5*  --   --   --   --   --    MAGNESIUM mg/dL  --   --   --   --  1.7 1.4*  --    ALBUMIN g/dL  --  2.7*  --   --   --   --  " 3.2*     Results from last 7 days   Lab Units 04/18/23  2151   ALK PHOS U/L 110   BILIRUBIN mg/dL 0.2   ALT (SGPT) U/L 20   AST (SGOT) U/L 30                   Estimated Creatinine Clearance: 17.9 mL/min (A) (by C-G formula based on SCr of 7.05 mg/dL (H)).         A/P:    ARF: Likely progression to ESRD.  BUN and creatinine continue to rise with negative volume.   Bx proven severe nephrosclerosis w adaptive FSGS changes and diabetic nephropathy 90 percent fibrosis on bx. Rapid loss of kidney function in last few yrs. Lost to f/u after discharge from [facility] in Jan 2023. Cr worse on this admission likley Acute component vs progression of underlying CKD in the setting of uncontrolled HTN and volume overload.  Tunneled dialysis catheter placed 4/24/2023 with initiation of HD.  No new labs today.  We will plan additional dialysis in AM.  Can restart diuretics as needed.  Case management working on outpatient HD placement.    HTN: Blood pressure stable.  Continue current medications for now.    Metabolic acidosis: We will give p.o. bicarb.  Adjust with HD on Monday.    Anemia: Hemoglobin below goal.  TSAT 7%.  Will give IV iron.  Transfuse as needed for hemoglobin <7.  Start Epogen.    Volume: Stable.  Restart diuretics.  UF as needed on HD.  Would try to avoid aggressive UF to allow retention of underlying residual renal function.  Can increase diuretics as needed to maintain volume status.    Bone mineralization: Phosphorus elevated.  Started binders.  Low Phos diet.    Nutrition: Continue low Phos low potassium renal diet..  Start renal vitamin.    High risk and complexity patient.    Guanakito Conway MD  04/25/23  10:55 EDT

## 2023-04-25 NOTE — PLAN OF CARE
SBP in high 150s. SR on cardiac mx. Patient c/o constant soreness to the tunnel catheter insertion site, adequately controlled with PO medications. Denies any other needs at this time. Will cont to mx. Call light in reach.

## 2023-04-25 NOTE — PROGRESS NOTES
[facility] Medicine Services  PROGRESS NOTE    Patient Name: Angel Blair  : 1975  MRN: [id number]    Date of Admission: 2023  Primary Care Physician: Jay Nevarez MD    Subjective   Subjective     CC: f/u CKD    HPI: No issues overnight.  Only complains of tenderness at tunneled site.  Denies CP/SOB    ROS:  Gen- No fevers, chills  CV- No chest pain, palpitations  Resp- No cough, dyspnea  GI- No N/V/D, abd pain    Objective   Objective     Vital Signs:   Temp:  [97.9 °F (36.6 °C)-98.3 °F (36.8 °C)] 98.2 °F (36.8 °C)  Heart Rate:  [] 85  Resp:  [16-18] 17  BP: (141-179)/(77-97) 141/80     Physical Exam:  Constitutional: No acute distress, awake, alert  HENT: NCAT, mucous membranes moist  Respiratory: Clear to auscultation bilaterally, respiratory effort normal   Cardiovascular: RRR, no murmurs, rubs, or gallops  Gastrointestinal: Positive bowel sounds, soft, nontender, nondistended  Musculoskeletal: No bilateral ankle edema  Psychiatric: Appropriate affect, cooperative  Neurologic: Oriented x 3, KLEIN, speech clear  Skin: No rashes noted.  Right chest tunneled cath with small amount of blood at site      Results Reviewed:  LAB RESULTS:      Lab 23  0450 23  0555 23  0432 23  1151 23  0527 23  0138 23  2308 23  2151   WBC 9.87 11.17* 8.87  --  9.97  --   --  10.62   HEMOGLOBIN 9.4* 8.9* 9.8*  --  9.3*  --   --  10.4*   HEMATOCRIT 32.0* 28.1* 30.7*  --  29.9*  --   --  32.8*   PLATELETS 162 138* 150  --  135*  --   --  153   NEUTROS ABS 6.96 8.41*  --   --  7.43*  --   --  7.79*   IMMATURE GRANS (ABS) 0.04 0.05  --   --  0.04  --   --  0.05   LYMPHS ABS 1.65 1.52  --   --  1.68  --   --  1.79   MONOS ABS 0.85 1.01*  --   --  0.52  --   --  0.61   EOS ABS 0.30 0.14  --   --  0.23  --   --  0.29   MCV 97.9* 91.5 90.6  --  93.4  --   --  91.1   PROCALCITONIN  --   --   --   --   --  0.24  --   --    PROTIME  --   --    --   --  14.4  --  13.8  --    APTT  --   --   --   --   --   --  29.7*  --    HEPARIN ANTI-XA  --   --   --  0.15* 0.10*  --  0.10*  --          Lab 04/24/23  0701 04/22/23  2348 04/22/23  0528 04/21/23  1220 04/20/23  0432 04/19/23  0527   SODIUM 137 138 138 138 142 142   POTASSIUM 4.6 4.4 4.5 4.6 4.7 4.4   CHLORIDE 104 104 106 104 110* 113*   CO2 20.0* 18.0* 19.0* 18.0* 19.0* 17.0*   ANION GAP 13.0 16.0* 13.0 16.0* 13.0 12.0   * 89* 94* 85* 85* 83*   CREATININE 7.05* 6.55* 6.51* 5.86* 5.47* 5.88*   EGFR 9.0* 9.8* 9.9* 11.2* 12.2* 11.1*   GLUCOSE 138* 153* 161* 198* 146* 110*   CALCIUM 9.4 9.4 9.0 8.9 8.7 8.8   MAGNESIUM  --   --   --   --  1.7 1.4*   PHOSPHORUS  --  6.5*  --   --   --   --    HEMOGLOBIN A1C  --   --   --   --   --  6.40*         Lab 04/22/23  2348 04/18/23 2151   TOTAL PROTEIN  --  7.4   ALBUMIN 2.7* 3.2*   GLOBULIN  --  4.2   ALT (SGPT)  --  20   AST (SGOT)  --  30   BILIRUBIN  --  0.2   ALK PHOS  --  110         Lab 04/19/23  0527 04/19/23  0138 04/18/23  2308 04/18/23  2151   PROBNP  --   --   --  11,594.0*   HSTROP T  --  170*  --  177*   PROTIME 14.4  --  13.8  --    INR 1.12  --  1.07  --          Lab 04/19/23  0527   CHOLESTEROL 210*   LDL CHOL 140*   HDL CHOL 44   TRIGLYCERIDES 143         Lab 04/21/23  0758   IRON 17*   IRON SATURATION 7*   TIBC 243*   TRANSFERRIN 163*   FERRITIN 362.80         Brief Urine Lab Results  (Last result in the past 365 days)      Color   Clarity   Blood   Leuk Est   Nitrite   Protein   CREAT   Urine HCG        01/23/23 2223             34.4               Microbiology Results Abnormal     Procedure Component Value - Date/Time    COVID PRE-OP / PRE-PROCEDURE SCREENING ORDER (NO ISOLATION) - Swab, Nasopharynx [[id number]]  (Normal) Collected: 04/18/23 2152    Lab Status: Final result Specimen: Swab from Nasopharynx Updated: 04/18/23 2224    Narrative:      The following orders were created for panel order COVID PRE-OP / PRE-PROCEDURE SCREENING ORDER (NO  ISOLATION) - Swab, Nasopharynx.  Procedure                               Abnormality         Status                     ---------                               -----------         ------                     COVID-19 and FLU A/B PCR...[[id number]]  Normal              Final result                 Please view results for these tests on the individual orders.    COVID-19 and FLU A/B PCR - Swab, Nasopharynx [[id number]]  (Normal) Collected: 04/18/23 2152    Lab Status: Final result Specimen: Swab from Nasopharynx Updated: 04/18/23 2224     COVID19 Not Detected     Influenza A PCR Not Detected     Influenza B PCR Not Detected    Narrative:      Fact sheet for providers: https://www.fda.gov/media/539850/download    Fact sheet for patients: https://www.fda.gov/media/501337/download    Test performed by PCR.          IR Tunneled Catheter    Result Date: 4/24/2023  IR TUNNELED CATHETER Date of Exam: 4/24/2023 8:45 AM EDT Indication: Need for dialysis. Comparison: None available. Technique: The procedure, its benefits, risks and alternatives were explained to the patient and informed consent was obtained after answering the patient's questions. A time-out was performed and the patient's name, date of birth, procedure and correct site of procedure were verified. Conscious sedation was administered by a registered nurse. The blood pressure, pulse and oxygen saturation were monitored throughout the procedure and remained stable. The patient was placed in the supine position in the angiography suite. The right internal jugular vein was evaluated sonographically and found to be patent. Skin was marked prepped draped and anesthetized with 1% Xylocaine. Using maximal sterile barrier  technique and under local anesthesia a micropuncture was performed under ultrasound guidance. An 018 wire was inserted. Following placement of an 035 wire, serial dilatation was performed and a 16 Mongolian introducer sheath was placed. A site was chosen  laterally over the right anterior chest wall and the skin was anesthetized with 1% Xylocaine and the tunnel was anesthetized. 1 cm incision was made. The catheter was tunneled to the right IJ site and subsequently deployed through the peel-away sheath with the tip in the right atrium. The right IJ site was closed with a single 3-0 Vicryl suture in the deep fascia. The catheter was sutured to the skin utilizing 2-0 nylon suture. The catheter was flushed with heparin solution. A spot radiograph was obtained documenting catheter placement. Permanent ultrasound images were recorded. The patient tolerated the procedure well and was transferred to the wilks in stable condition for postprocedure monitoring and recovery. There were no immediate complications. Total fluoroscopy time: 30 seconds Total physician moderate sedation time: 19 minutes     Impression: Impression: Technically successful placement of a tunneled dialysis catheter under ultrasound and fluoroscopy. Electronically Signed: Robin Carter  4/24/2023 10:05 AM EDT  Workstation ID: [id number]      Results for orders placed during the hospital encounter of 01/22/23    Adult Transthoracic Echo Complete With Contrast if Necessary Per Protocol    Interpretation Summary  •  Left ventricular ejection fraction appears to be 51 - 55%.  •  Left ventricular wall thickness is consistent with mild to moderate concentric hypertrophy.  •  The cardiac valves are anatomically and functionally normal.      Current medications:  Scheduled Meds:atorvastatin, 40 mg, Oral, Nightly  b complex-vitamin c-folic acid, 1 tablet, Oral, Daily  bumetanide, 2 mg, Oral, BID  calcium acetate, 667 mg, Oral, TID With Meals  [START ON 4/26/2023] epoetin yaron/yaron-epbx, 10,000 Units, Subcutaneous, Once per day on Mon Wed Fri  hydrALAZINE, 75 mg, Oral, Q8H  insulin lispro, 0-7 Units, Subcutaneous, TID AC  iron sucrose, 200 mg, Intravenous, Q24H  isosorbide mononitrate, 120 mg, Oral, Q24H  metoprolol  tartrate, 50 mg, Oral, Q12H  NIFEdipine XL, 90 mg, Oral, Daily  pharmacy consult - MTM, , Does not apply, Daily  sodium bicarbonate, 650 mg, Oral, TID  sodium chloride, 10 mL, Intravenous, Q12H      Continuous Infusions:   PRN Meds:.•  acetaminophen  •  albumin human  •  benzonatate  •  Calcium Replacement - Follow Nurse / BPA Driven Protocol  •  cloNIDine  •  dextrose  •  dextrose  •  glucagon (human recombinant)  •  heparin (porcine)  •  HYDROcodone-acetaminophen  •  Magnesium Standard Dose Replacement - Follow Nurse / BPA Driven Protocol  •  Phosphorus Replacement - Follow Nurse / BPA Driven Protocol  •  Potassium Replacement - Follow Nurse / BPA Driven Protocol  •  sodium chloride  •  sodium chloride    Assessment & Plan   Assessment & Plan     Active Hospital Problems    Diagnosis  POA   • Acute on chronic systolic congestive heart failure [I50.23]  Yes   • Hypertensive urgency [I16.0]  Yes   • CKD (chronic kidney disease) stage 5, GFR less than 15 ml/min [N18.5]  Yes   • Elevated troponin [R77.8]  Yes   • Hyperlipidemia [E78.5]  Yes   • Essential hypertension [I10]  Yes   • Type 2 diabetes mellitus with hyperglycemia (HCC) [E11.65]  Yes      Resolved Hospital Problems   No resolved problems to display.        Brief Hospital Course to date:  Angel Blair is a 47 y.o. male with PMH significant for DM 2, HLD, HTN, LEFTY, obesity, CKD V, who presents to the ED with complaint of cough and shortness of breath who was found to have concern for elevated troponin with acute on chronic systolic congestive heart failure and hypertensive urgency.     Acute on chronic systolic congestive heart failure  - Patient has been out of his medication for the past month.  - Cardiology followed. Responded well to Bumex -3L 4/19. Creatinine rising and stopped 4/22. Restarted bumex after HD started  - Echo on 1/22/2023 notes EF 51-55%  - Continue metoprolol    CKD V, progression to ESRD.  Anemia of CKD  - Nephrology following.  patient was lost to follow-up since last discharge secondary to no insurance coverage. Now planning for insertion of temporary HD catheter and initiation of HD today  - Dr. Herrera following, Planning for vein mapping and fistula creation as outpatient.  -- continue IV iron, plan EPO with HD. Transfuse for hgb <7  -- per pt he has a chair at [facility] dialysis [facility]  -- continue phos binder    LUE superficial venous thrombosis  -- Warm compresses to LUE, elevate.     Hypertensive urgency  Essential hypertension  - Patient has been out of home medication for the past month  - Now off cardene gtt. BP much better/ stable. Clonidine added prn.  - Continue metoprolol, nifedipine, hydralazine and Imdur  - NAL follows.  - cardiology has s/o     NSTEMI  Elevated troponin  - Likely NSTEMI type II secondary to hypertensive urgency and acute on chronic CHF but was started on heparin gtt on admission. Stopped per cardiology. Can start SQ heparin for DVT PPX after tunneled cath placement  - Trend troponin (177 -> 170)   - EKG without acute changes   - Patient denies chest pain with the exception of when he coughs     Dyslipidemia  - Continue Lipitor     DM2  - FSBG 3 times daily     Hypomagnesemia  -Replace    Expected Discharge Location and Transportation:   Expected Discharge   Expected Discharge Date: 04/27/23       DVT prophylaxis:  Medical DVT prophylaxis orders are present.     AM-PAC 6 Clicks Score (PT): (P) 22 (04/25/23 3916)    CODE STATUS:   Code Status and Medical Interventions:   Ordered at: 04/19/23 6980     Code Status (Patient has no pulse and is not breathing):    CPR (Attempt to Resuscitate)     Medical Interventions (Patient has pulse or is breathing):    Full Support       Kiana Chen, APRN  04/25/23

## 2023-04-25 NOTE — THERAPY TREATMENT NOTE
Patient Name: Angel Blair  : 1975    MRN: [id number]                              Today's Date: 2023       Admit Date: 2023    Visit Dx:     ICD-10-CM ICD-9-CM   1. Acute on chronic systolic congestive heart failure  I50.23 428.23     428.0   2. NSTEMI (non-ST elevated myocardial infarction)  I21.4 410.70   3. Acute kidney injury  N17.9 584.9   4. Hypertensive emergency  I16.1 401.9   5. Noncompliance with medication regimen  Z91.148 V15.81     Patient Active Problem List   Diagnosis   • Type 2 diabetes mellitus with hyperglycemia (HCC)   • Left-sided back pain   • Obesity, Class III, BMI 40-49.9 (morbid obesity) (HCC)   • Abscess of back   • COVID-19   • Pneumonia due to COVID-19 virus   • Essential hypertension   • CHF exacerbation   • Hyperlipidemia   • CAP (community acquired pneumonia)   • Elevated troponin   • CKD (chronic kidney disease) stage 3, GFR 30-59 ml/min   • Acute on chronic systolic congestive heart failure   • Hypertensive urgency   • CKD (chronic kidney disease) stage 5, GFR less than 15 ml/min     Past Medical History:   Diagnosis Date   • Diabetes mellitus    • Hyperlipidemia    • Hypertension    • Knee swelling 22   • Sleep apnea      Past Surgical History:   Procedure Laterality Date   • SOFT TISSUE TUMOR RESECTION        General Information     Row Name 23 1042          Physical Therapy Time and Intention    Document Type therapy note (daily note)  -FW     Mode of Treatment physical therapy  -FW     Row Name 23 1042          General Information    Patient Profile Reviewed yes  -FW     Existing Precautions/Restrictions fall;other (see comments)  BLE edema, neuropathy, HD cath R upper chest  -FW     Row Name 23 1042          Cognition    Orientation Status (Cognition) oriented x 4  -FW     Row Name 23 1042          Safety Issues, Functional Mobility    Impairments Affecting Function (Mobility) balance;endurance/activity tolerance;range  of motion (ROM);sensation/sensory awareness;strength  -FW           User Key  (r) = Recorded By, (t) = Taken By, (c) = Cosigned By    Initials Name Provider Type    FW Triston Hughes PT Physical Therapist               Mobility     Row Name 04/25/23 1043          Bed Mobility    Supine-Sit [place] (Bed Mobility) supervision  -FW     Sit-Supine [place] (Bed Mobility) supervision  -FW     Row Name 04/25/23 1043          Sit-Stand Transfer    Sit-Stand [place] (Transfers) standby assist  -FW     Assistive Device (Sit-Stand Transfers) walker, front-wheeled  -FW     Row Name 04/25/23 1043          Gait/Stairs (Locomotion)    [place] Level (Gait) verbal cues;standby assist  -FW     Distance in Feet (Gait) 500  -FW     Deviations/Abnormal Patterns (Gait) bilateral deviations;base of support, wide;татьяна decreased;gait speed decreased  -FW     Bilateral Gait Deviations heel strike decreased  -FW     [place] Level (Stairs) contact guard  -FW     Handrail Location (Stairs) right side (ascending)  -FW     Number of Steps (Stairs) 12  -FW     Ascending Technique (Stairs) step-over-step  -FW     Descending Technique (Stairs) step-over-step  -FW     Comment, (Gait/Stairs) pt w/ complaints of mild fatigue as gait progressed; no LOB or major instabilities throughout  -FW           User Key  (r) = Recorded By, (t) = Taken By, (c) = Cosigned By    Initials Name Provider Type    FW Triston Hughes PT Physical Therapist               Obj/Interventions     Row Name 04/25/23 1046          Balance    Balance Assessment sitting static balance;sitting dynamic balance;standing static balance;standing dynamic balance  -FW     Static Sitting Balance supervision  -FW     Dynamic Sitting Balance standby assist  -FW     Position, Sitting Balance unsupported  -FW     Static Standing Balance standby assist  -FW     Dynamic Standing Balance standby assist;contact guard  -FW     Position/Device Used, Standing  Balance unsupported  -FW           User Key  (r) = Recorded By, (t) = Taken By, (c) = Cosigned By    Initials Name Provider Type    Triston Harris PT Physical Therapist               Goals/Plan    No documentation.                Clinical Impression     Row Name 04/25/23 1047          Pain    Pretreatment Pain Rating 4/10  -FW     Posttreatment Pain Rating 4/10  -FW     Pre/Posttreatment Pain Comment at [facility] site  -FW     Row Name 04/25/23 1047          Plan of Care Review    Plan of Care Reviewed With patient  -FW     Outcome Evaluation Pt continues with generalized weakness and decreased activity tolerance limiting his functional mobility. Pt ambulated 500' SBA w/o use of AD and navigated 12 steps CGA w/ use of one hand rail. Continue PT POC.  -FW     Row Name 04/25/23 1047          Vital Signs    Post Systolic BP Rehab 166  -FW     Post Treatment Diastolic BP 97  -FW     Pre SpO2 (%) 95  -FW     O2 Delivery Pre Treatment room air  -FW     Post SpO2 (%) 94  -FW     O2 Delivery Post Treatment room air  -FW     Pre Patient Position Supine  -FW     Intra Patient Position Standing  -FW     Post Patient Position Supine  -FW     Row Name 04/25/23 1047          Positioning and Restraints    Pre-Treatment Position in bed  -FW     Post Treatment Position bed  -FW     In Bed notified nsg;supine;call light within reach;encouraged to call for assist  -FW           User Key  (r) = Recorded By, (t) = Taken By, (c) = Cosigned By    Initials Name Provider Type    Triston Harris PT Physical Therapist               Outcome Measures     Row Name 04/25/23 1050          How much help from another person do you currently need...    Turning from your back to your side while in flat bed without using bedrails? 4  -FW     Moving from lying on back to sitting on the side of a flat bed without bedrails? 4  -FW     Moving to and from a bed to a chair (including a wheelchair)? 3  -FW     Standing up from a chair using your  arms (e.g., wheelchair, bedside chair)? 4  -FW     Climbing 3-5 steps with a railing? 3  -FW     To walk in hospital room? 3  -FW     AM-PAC 6 Clicks Score (PT) 21  -FW     Highest level of mobility 6 --> Walked 10 steps or more  -     Row Name 04/25/23 1050          Functional Assessment    Outcome Measure Options AM-PAC 6 Clicks Basic Mobility (PT)  -           User Key  (r) = Recorded By, (t) = Taken By, (c) = Cosigned By    Initials Name Provider Type    FW Triston Hughes, PT Physical Therapist                             Physical Therapy Education     Title: PT OT SLP Therapies (In Progress)     Topic: Physical Therapy (Done)     Point: Mobility training (Done)     Learning Progress Summary           Patient Acceptance, E, VU by  at 4/25/2023 1050    Eager, E, VU,DU,NR by  at 4/21/2023 1457    Comment: Reviewed safety/technique w/bed mobility, transfers, ambulation, stair navigation, HEP, PT POC    Eager, E, VU,DU,NR by  at 4/20/2023 1512    Comment: Educated pt. safety/technique w/transfers, ambulation, PT POC, edema management   Family Eager, E, VU,DU,NR by  at 4/21/2023 1457    Comment: Reviewed safety/technique w/bed mobility, transfers, ambulation, stair navigation, HEP, PT POC   Significant Other Eager, E, VU,DU,NR by  at 4/20/2023 1512    Comment: Educated pt. safety/technique w/transfers, ambulation, PT POC, edema management                   Point: Home exercise program (Done)     Learning Progress Summary           Patient Eager, E, VU,DU,NR by  at 4/21/2023 1457    Comment: Reviewed safety/technique w/bed mobility, transfers, ambulation, stair navigation, HEP, PT POC   Family Eager, E, VU,DU,NR by  at 4/21/2023 1457    Comment: Reviewed safety/technique w/bed mobility, transfers, ambulation, stair navigation, HEP, PT POC                   Point: Body mechanics (Done)     Learning Progress Summary           Patient Acceptance, E, VU by  at 4/25/2023 1050    Eager, E, VU,DU,NR  by  at 4/21/2023 1457    Comment: Reviewed safety/technique w/bed mobility, transfers, ambulation, stair navigation, HEP, PT POC    Eager, E, VU,DU,NR by  at 4/20/2023 1512    Comment: Educated pt. safety/technique w/transfers, ambulation, PT POC, edema management   Family Eager, E, VU,DU,NR by  at 4/21/2023 1457    Comment: Reviewed safety/technique w/bed mobility, transfers, ambulation, stair navigation, HEP, PT POC   Significant Other Eager, E, VU,DU,NR by  at 4/20/2023 1512    Comment: Educated pt. safety/technique w/transfers, ambulation, PT POC, edema management                   Point: Precautions (Done)     Learning Progress Summary           Patient Acceptance, E, VU by  at 4/25/2023 1050    Eager, E, VU,DU,NR by  at 4/21/2023 1457    Comment: Reviewed safety/technique w/bed mobility, transfers, ambulation, stair navigation, HEP, PT POC    Eager, E, VU,DU,NR by  at 4/20/2023 1512    Comment: Educated pt. safety/technique w/transfers, ambulation, PT POC, edema management   Family Eager, E, VU,DU,NR by  at 4/21/2023 1457    Comment: Reviewed safety/technique w/bed mobility, transfers, ambulation, stair navigation, HEP, PT POC   Significant Other Eager, E, VU,DU,NR by  at 4/20/2023 1512    Comment: Educated pt. safety/technique w/transfers, ambulation, PT POC, edema management                               User Key     Initials Effective Dates Name Provider Type Discipline     05/05/22 -  Triston Hughes, PT Physical Therapist PT     06/01/21 -  Mira Ocasio PT Physical Therapist PT              PT Recommendation and Plan     Plan of Care Reviewed With: patient  Outcome Evaluation: Pt continues with generalized weakness and decreased activity tolerance limiting his functional mobility. Pt ambulated 500' SBA w/o use of AD and navigated 12 steps CGA w/ use of one hand rail. Continue PT POC.     Time Calculation:    PT Charges     Row Name 04/25/23 1051             Time Calculation     Start Time 1020  -FW      PT Received On 04/25/23  -FW         Timed Charges    00645 - Gait Training Minutes  12  -FW      02592 - PT Therapeutic Activity Minutes 8  -FW         Total Minutes    Timed Charges Total Minutes 20  -FW       Total Minutes 20  -FW            User Key  (r) = Recorded By, (t) = Taken By, (c) = Cosigned By    Initials Name Provider Type    FW Triston Hughes, PT Physical Therapist              Therapy Charges for Today     Code Description Service Date Service Provider Modifiers Qty    [id number] HC GAIT TRAINING EA 15 MIN 4/25/2023 Triston Hughes, PT GP 1          PT G-Codes  Outcome Measure Options: AM-PAC 6 Clicks Basic Mobility (PT)  AM-PAC 6 Clicks Score (PT): 21  AM-PAC 6 Clicks Score (OT): 17  PT Discharge Summary  Anticipated Discharge Disposition (PT): home with assist, home with outpatient therapy services    Triston Hughes, RAOUL  4/25/2023

## 2023-04-26 ENCOUNTER — APPOINTMENT (OUTPATIENT)
Dept: NEPHROLOGY | Facility: HOSPITAL | Age: 48
DRG: 291 | End: 2023-04-26

## 2023-04-26 LAB
ANION GAP SERPL CALCULATED.3IONS-SCNC: 14 MMOL/L (ref 5–15)
BUN SERPL-MCNC: 74 MG/DL (ref 6–20)
BUN/CREAT SERPL: 11.9 (ref 7–25)
CALCIUM SPEC-SCNC: 9.5 MG/DL (ref 8.6–10.5)
CHLORIDE SERPL-SCNC: 103 MMOL/L (ref 98–107)
CO2 SERPL-SCNC: 21 MMOL/L (ref 22–29)
CREAT SERPL-MCNC: 6.21 MG/DL (ref 0.76–1.27)
EGFRCR SERPLBLD CKD-EPI 2021: 10.4 ML/MIN/1.73
GLUCOSE BLDC GLUCOMTR-MCNC: 126 MG/DL (ref 70–130)
GLUCOSE BLDC GLUCOMTR-MCNC: 130 MG/DL (ref 70–130)
GLUCOSE BLDC GLUCOMTR-MCNC: 172 MG/DL (ref 70–130)
GLUCOSE SERPL-MCNC: 124 MG/DL (ref 65–99)
POTASSIUM SERPL-SCNC: 4.4 MMOL/L (ref 3.5–5.2)
SODIUM SERPL-SCNC: 138 MMOL/L (ref 136–145)

## 2023-04-26 PROCEDURE — 99232 SBSQ HOSP IP/OBS MODERATE 35: CPT | Performed by: NURSE PRACTITIONER

## 2023-04-26 PROCEDURE — 97535 SELF CARE MNGMENT TRAINING: CPT

## 2023-04-26 PROCEDURE — 25010000002 IRON SUCROSE PER 1 MG: Performed by: INTERNAL MEDICINE

## 2023-04-26 PROCEDURE — 25010000002 EPOETIN ALFA-EPBX 10000 UNIT/ML SOLUTION: Performed by: INTERNAL MEDICINE

## 2023-04-26 PROCEDURE — 80048 BASIC METABOLIC PNL TOTAL CA: CPT | Performed by: INTERNAL MEDICINE

## 2023-04-26 PROCEDURE — 25010000002 HEPARIN (PORCINE) PER 1000 UNITS: Performed by: INTERNAL MEDICINE

## 2023-04-26 PROCEDURE — 63710000001 INSULIN LISPRO (HUMAN) PER 5 UNITS: Performed by: NURSE PRACTITIONER

## 2023-04-26 PROCEDURE — 82962 GLUCOSE BLOOD TEST: CPT

## 2023-04-26 RX ADMIN — METOPROLOL TARTRATE 50 MG: 50 TABLET ORAL at 08:57

## 2023-04-26 RX ADMIN — BUMETANIDE 2 MG: 2 TABLET ORAL at 17:26

## 2023-04-26 RX ADMIN — IRON SUCROSE 200 MG: 20 INJECTION, SOLUTION INTRAVENOUS at 13:30

## 2023-04-26 RX ADMIN — Medication 10 ML: at 20:27

## 2023-04-26 RX ADMIN — HYDRALAZINE HYDROCHLORIDE 75 MG: 50 TABLET, FILM COATED ORAL at 20:27

## 2023-04-26 RX ADMIN — HEPARIN SODIUM 2300 UNITS: 1000 INJECTION INTRAVENOUS; SUBCUTANEOUS at 12:37

## 2023-04-26 RX ADMIN — ATORVASTATIN CALCIUM 40 MG: 40 TABLET, FILM COATED ORAL at 20:27

## 2023-04-26 RX ADMIN — SODIUM BICARBONATE 650 MG TABLET 650 MG: at 17:26

## 2023-04-26 RX ADMIN — HYDRALAZINE HYDROCHLORIDE 75 MG: 50 TABLET, FILM COATED ORAL at 13:24

## 2023-04-26 RX ADMIN — METOPROLOL TARTRATE 50 MG: 50 TABLET ORAL at 20:27

## 2023-04-26 RX ADMIN — CALCIUM ACETATE 667 MG: 667 CAPSULE ORAL at 08:57

## 2023-04-26 RX ADMIN — SODIUM BICARBONATE 650 MG TABLET 650 MG: at 20:27

## 2023-04-26 RX ADMIN — INSULIN LISPRO 2 UNITS: 100 INJECTION, SOLUTION INTRAVENOUS; SUBCUTANEOUS at 17:27

## 2023-04-26 RX ADMIN — EPOETIN ALFA-EPBX 10000 UNITS: 10000 INJECTION, SOLUTION INTRAVENOUS; SUBCUTANEOUS at 12:37

## 2023-04-26 RX ADMIN — SODIUM BICARBONATE 650 MG TABLET 650 MG: at 08:57

## 2023-04-26 RX ADMIN — CALCIUM ACETATE 667 MG: 667 CAPSULE ORAL at 17:26

## 2023-04-26 RX ADMIN — ISOSORBIDE MONONITRATE 120 MG: 60 TABLET, EXTENDED RELEASE ORAL at 08:56

## 2023-04-26 RX ADMIN — HYDRALAZINE HYDROCHLORIDE 75 MG: 50 TABLET, FILM COATED ORAL at 04:59

## 2023-04-26 RX ADMIN — BUMETANIDE 2 MG: 2 TABLET ORAL at 08:57

## 2023-04-26 RX ADMIN — Medication 1 TABLET: at 08:56

## 2023-04-26 RX ADMIN — Medication 10 ML: at 08:57

## 2023-04-26 RX ADMIN — NIFEDIPINE 90 MG: 30 TABLET, EXTENDED RELEASE ORAL at 08:56

## 2023-04-26 NOTE — CASE MANAGEMENT/SOCIAL WORK
Continued Stay Note  [facility]     Patient Name: Angel Blair  MRN: [id number]  Today's Date: 4/26/2023    Admit Date: 4/18/2023    Plan: Home   Discharge Plan     Row Name 04/26/23 0921       Plan    Plan Home    Patient/Family in Agreement with Plan yes    Plan Comments Voicemail was received from [employer], they are waiting to hear from patients insurance which does not become active until 5/1. CM will continue to follow.    Final Discharge Disposition Code 01 - home or self-care               Discharge Codes    No documentation.               Expected Discharge Date and Time     Expected Discharge Date Expected Discharge Time    Apr 27, 2023             Emani Jiang RN

## 2023-04-26 NOTE — CASE MANAGEMENT/SOCIAL WORK
Continued Stay Note   [facility]     Patient Name: Angel Blair  MRN: [id number]  Today's Date: 4/26/2023    Admit Date: 4/18/2023    Plan: Home   Discharge Plan     Row Name 04/26/23 1411       Plan    Plan Comments CM spoke with [facility] who reports they have contacted pts pending insurances and he does not have dialysis coverage. They will be reaching out to pt to evaluate him for possible medicare coverage.               Discharge Codes    No documentation.               Expected Discharge Date and Time     Expected Discharge Date Expected Discharge Time    Apr 27, 2023             Barbara Dejesus RN

## 2023-04-26 NOTE — PLAN OF CARE
Goal Outcome Evaluation:  Plan of Care Reviewed With: patient        Progress: improving  Outcome Evaluation: Pt improved to supervision with all ambulation and functional tasks. No further skilled OT services warranted at this time. OT will dc.

## 2023-04-26 NOTE — PLAN OF CARE
SBP in 150s. SR on cardiac mx.  Patient denies pain/discomfort at this time. Will cont to mx. Call light in reach.

## 2023-04-26 NOTE — PLAN OF CARE
Goal Outcome Evaluation:      VSS, alert and orientated, no complaints of pain throughout shift. Pt had dialysis today and 2L were removed. Pt will have another dialysis tomorrow.

## 2023-04-26 NOTE — PROGRESS NOTES
Nutrition Services    Patient Name:  Angel Blair  YOB: 1975  MRN: [id number]  Admit Date:  4/18/2023    Pt identified 2/2 LOS. No nutrition risk noted at this time. PO intakes=80% X last 15 meals documented. Please consult for intake avg <50% or significant weight change.      Electronically signed by:  Samanta Moreno RD  04/26/23 14:08 EDT

## 2023-04-26 NOTE — THERAPY DISCHARGE NOTE
Acute Care - Occupational Therapy Discharge  [facility]    Patient Name: Angel Blair  : 1975    MRN: [id number]                              Today's Date: 2023       Admit Date: 2023    Visit Dx:     ICD-10-CM ICD-9-CM   1. Acute on chronic systolic congestive heart failure  I50.23 428.23     428.0   2. NSTEMI (non-ST elevated myocardial infarction)  I21.4 410.70   3. Acute kidney injury  N17.9 584.9   4. Hypertensive emergency  I16.1 401.9   5. Noncompliance with medication regimen  Z91.148 V15.81     Patient Active Problem List   Diagnosis   • Type 2 diabetes mellitus with hyperglycemia (HCC)   • Left-sided back pain   • Obesity, Class III, BMI 40-49.9 (morbid obesity) (HCC)   • Abscess of back   • COVID-19   • Pneumonia due to COVID-19 virus   • Essential hypertension   • CHF exacerbation   • Hyperlipidemia   • CAP (community acquired pneumonia)   • Elevated troponin   • CKD (chronic kidney disease) stage 3, GFR 30-59 ml/min   • Acute on chronic systolic congestive heart failure   • Hypertensive urgency   • CKD (chronic kidney disease) stage 5, GFR less than 15 ml/min     Past Medical History:   Diagnosis Date   • Diabetes mellitus    • Hyperlipidemia    • Hypertension    • Knee swelling 22   • Sleep apnea      Past Surgical History:   Procedure Laterality Date   • SOFT TISSUE TUMOR RESECTION        General Information     Row Name 23 1529          OT Time and Intention    Document Type discharge treatment  -AN     Mode of Treatment occupational therapy  -AN     Row Name 23 1529          General Information    Patient Profile Reviewed yes  -AN     Existing Precautions/Restrictions no known precautions/restrictions  -AN     Barriers to Rehab none identified  -AN     Row Name 23 1529          Cognition    Orientation Status (Cognition) oriented x 4  -AN     Row Name 23 1529          Safety Issues, Functional Mobility    Impairments Affecting Function  (Mobility) strength;endurance/activity tolerance  -AN           User Key  (r) = Recorded By, (t) = Taken By, (c) = Cosigned By    Initials Name Provider Type    Olya Vargas OT Occupational Therapist               Mobility/ADL's     Row Name 04/26/23 1529          Bed Mobility    Bed Mobility supine-sit  -AN     Supine-Sit [place] (Bed Mobility) independent  -AN     Assistive Device (Bed Mobility) head of bed elevated  -AN     Row Name 04/26/23 1529          Transfers    Transfers sit-stand transfer;stand-sit transfer  -AN     Row Name 04/26/23 1529          Sit-Stand Transfer    Sit-Stand [place] (Transfers) modified independence  -AN     Row Name 04/26/23 1529          Stand-Sit Transfer    Stand-Sit [place] (Transfers) modified independence  -AN     Row Name 04/26/23 1529          Functional Mobility    Functional Mobility- Ind. Level supervision required  -AN     Functional Mobility-Distance (Feet) --  >household distance  -AN     Functional Mobility- Comment pt ambulated >household distance (3 laps around unit) with supervision, no assistive device and no LOB throughout.  -AN     Row Name 04/26/23 1529          Activities of Daily Living    BADL Assessment/Intervention upper body dressing;lower body dressing  -AN     Row Name 04/26/23 1529          Upper Body Dressing Assessment/Training    [place] Level (Upper Body Dressing) don;set up  gown  -AN     Position (Upper Body Dressing) edge of bed sitting  -AN     Row Name 04/26/23 1529          Lower Body Dressing Assessment/Training    [place] Level (Lower Body Dressing) don;socks;modified independence  -AN     Position (Lower Body Dressing) edge of bed sitting  -AN           User Key  (r) = Recorded By, (t) = Taken By, (c) = Cosigned By    Initials Name Provider Type    Olya Vargas OT Occupational Therapist               Obj/Interventions     Row Name 04/26/23 1534          Motor Skills    Motor Skills functional  endurance  -AN     Functional Endurance increased functional endurance this session  -AN     Row Name 04/26/23 1534          Balance    Balance Assessment sitting static balance;sitting dynamic balance;sit to stand dynamic balance;standing static balance;standing dynamic balance  -AN     Static Sitting Balance independent  -AN     Dynamic Sitting Balance independent  -AN     Position, Sitting Balance supported  -AN     Sit to Stand Dynamic Balance independent  -AN     Static Standing Balance supervision  -AN     Dynamic Standing Balance supervision  -AN     Position/Device Used, Standing Balance unsupported  -AN           User Key  (r) = Recorded By, (t) = Taken By, (c) = Cosigned By    Initials Name Provider Type    AN Olya Solomon OT Occupational Therapist               Goals/Plan     Row Name 04/26/23 1538          Transfer Goal 1 (OT)    Activity/Assistive Device (Transfer Goal 1, OT) sit-to-stand/stand-to-sit;toilet;commode, 3-in-1;walker, rolling  -AN     [place] Level/Cues Needed (Transfer Goal 1, OT) supervision required;verbal cues required  -AN     Time Frame (Transfer Goal 1, OT) long term goal (LTG);by discharge  -AN     Progress/Outcome (Transfer Goal 1, OT) goal met  -AN     Row Name 04/26/23 1538          Dressing Goal 1 (OT)    Activity/Device (Dressing Goal 1, OT) lower body dressing;reacher;sock-aid  -AN     [place]/Cues Needed (Dressing Goal 1, OT) verbal cues required;contact guard required  -AN     Time Frame (Dressing Goal 1, OT) long term goal (LTG);by discharge  -AN     Progress/Outcome (Dressing Goal 1, OT) goal met  -AN     Row Name 04/26/23 1538          Toileting Goal 1 (OT)    Activity/Device (Toileting Goal 1, OT) toileting skills, all;grab bar/safety frame;raised toilet seat  -AN     [place] Level/Cues Needed (Toileting Goal 1, OT) verbal cues required;contact guard required  -AN     Time Frame (Toileting Goal 1, OT) long term goal (LTG);by discharge  -AN      Progress/Outcome (Toileting Goal 1, OT) goal met  -AN           User Key  (r) = Recorded By, (t) = Taken By, (c) = Cosigned By    Initials Name Provider Type    Olya Vargas OT Occupational Therapist               Clinical Impression     Row Name 04/26/23 1533          Pain Assessment    Pretreatment Pain Rating 0/10 - no pain  -AN     Posttreatment Pain Rating 0/10 - no pain  -AN     Pre/Posttreatment Pain Comment asymptomatic  -AN     Pain Intervention(s) Repositioned;Ambulation/increased activity  -AN     Row Name 04/26/23 1538          Plan of Care Review    Plan of Care Reviewed With patient  -AN     Progress improving  -AN     Outcome Evaluation Pt improved to supervision with all ambulation and functional tasks. No further skilled OT services warranted at this time. OT will dc.  -AN     Row Name 04/26/23 1535          Therapy Plan Review/Discharge Plan (OT)    Anticipated Discharge Disposition (OT) home  -AN     Row Name 04/26/23 1539          Vital Signs    O2 Delivery Pre Treatment room air  -AN     O2 Delivery Intra Treatment room air  -AN     O2 Delivery Post Treatment room air  -AN     Pre Patient Position Supine  -AN     Intra Patient Position Standing  -AN     Post Patient Position Sitting  -AN     Row Name 04/26/23 1534          Positioning and Restraints    Pre-Treatment Position in bed  -AN     Post Treatment Position chair  -AN     In Chair notified nsg;reclined;call light within reach;encouraged to call for assist  -AN           User Key  (r) = Recorded By, (t) = Taken By, (c) = Cosigned By    Initials Name Provider Type    Olya Vargas OT Occupational Therapist               Outcome Measures     Row Name 04/26/23 9035          How much help from another is currently needed...    Putting on and taking off regular lower body clothing? 4  -AN     Bathing (including washing, rinsing, and drying) 4  -AN     Toileting (which includes using toilet bed pan or urinal) 4  -AN     Putting  on and taking off regular upper body clothing 4  -AN     Taking care of personal grooming (such as brushing teeth) 4  -AN     Eating meals 4  -AN     AM-PAC 6 Clicks Score (OT) 24  -AN     Row Name 04/26/23 1000          How much help from another person do you currently need...    Turning from your back to your side while in flat bed without using bedrails? 4  -CJ     Moving from lying on back to sitting on the side of a flat bed without bedrails? 4  -CJ     Moving to and from a bed to a chair (including a wheelchair)? 4  -CJ     Standing up from a chair using your arms (e.g., wheelchair, bedside chair)? 4  -CJ     Climbing 3-5 steps with a railing? 3  -CJ     To walk in hospital room? 3  -CJ     AM-PAC 6 Clicks Score (PT) 22  -CJ     Highest level of mobility 7 --> Walked 25 feet or more  -     Row Name 04/26/23 1539          Functional Assessment    Outcome Measure Options AM-PAC 6 Clicks Daily Activity (OT)  -AN           User Key  (r) = Recorded By, (t) = Taken By, (c) = Cosigned By    Initials Name Provider Type    Olya Vargas OT Occupational Therapist    Az Morrissey, RN Registered Nurse              Occupational Therapy Education     Title: PT OT SLP Therapies (Done)     Topic: Occupational Therapy (Done)     Point: ADL training (Done)     Description:   Instruct learner(s) on proper safety adaptation and remediation techniques during self care or transfers.   Instruct in proper use of assistive devices.              Learning Progress Summary           Patient Acceptance, E, VU by AN at 4/26/2023 1539    Acceptance, E,D, NR by JPA at 4/24/2023 1455    Eager, JENIFFER,TB,D,H, VU,NR by AR at 4/20/2023 1354   Family Eager, E,TB,D,H, VU,NR by AR at 4/20/2023 1354                   Point: Home exercise program (Done)     Description:   Instruct learner(s) on appropriate technique for monitoring, assisting and/or progressing therapeutic exercises/activities.              Learning Progress Summary            Patient Eager, E,TB,D,H, VU,NR by AR at 4/20/2023 1354   Family Eager, E,TB,D,H, VU,NR by AR at 4/20/2023 1354                   Point: Precautions (Done)     Description:   Instruct learner(s) on prescribed precautions during self-care and functional transfers.              Learning Progress Summary           Patient Acceptance, E, VU by AN at 4/26/2023 1539    Acceptance, E,D, NR by JY at 4/24/2023 1455    Eager, E,TB,D,H, VU,NR by AR at 4/20/2023 1354   Family Eager, E,TB,D,H, VU,NR by AR at 4/20/2023 1354                   Point: Body mechanics (Done)     Description:   Instruct learner(s) on proper positioning and spine alignment during self-care, functional mobility activities and/or exercises.              Learning Progress Summary           Patient Acceptance, E, VU by AN at 4/26/2023 1539    Acceptance, E,D, NR by JY at 4/24/2023 1455    Eager, E,TB,D,H, VU,NR by AR at 4/20/2023 1354   Family Eager, E,TB,D,H, VU,NR by AR at 4/20/2023 1354                               User Key     Initials Effective Dates Name Provider Type Discipline    AR 02/03/23 -  Daisha Schuster, OT Occupational Therapist OT    JY 06/16/21 -  Angela Brenner OT Occupational Therapist OT    AN 09/21/21 -  Olya Solomon OT Occupational Therapist OT              OT Recommendation and Plan     Plan of Care Review  Plan of Care Reviewed With: patient  Progress: improving  Outcome Evaluation: Pt improved to supervision with all ambulation and functional tasks. No further skilled OT services warranted at this time. OT will dc.  Plan of Care Reviewed With: patient  Outcome Evaluation: Pt improved to supervision with all ambulation and functional tasks. No further skilled OT services warranted at this time. OT will dc.     Time Calculation:    Time Calculation- OT     Row Name 04/26/23 1540             Time Calculation- OT    OT Start Time 1415  -AN      OT Received On 04/26/23  -AN         Timed Charges    67763 - OT Self Care/Mgmt  Minutes 15  -AN         Total Minutes    Timed Charges Total Minutes 15  -AN       Total Minutes 15  -AN            User Key  (r) = Recorded By, (t) = Taken By, (c) = Cosigned By    Initials Name Provider Type    Olya Vargas OT Occupational Therapist              Therapy Charges for Today     Code Description Service Date Service Provider Modifiers Qty    [id number]  OT SELF CARE/MGMT/TRAIN EA 15 MIN 4/26/2023 Olya Solomon OT GO 1             OT Discharge Summary  Anticipated Discharge Disposition (OT): home  Reason for Discharge: All goals achieved  Discharge Destination: Home    Olya Solomon OT  4/26/2023

## 2023-04-26 NOTE — PROGRESS NOTES
"   LOS: 7 days    Patient Care Team:  Jay Nevarez MD as PCP - General (Emergency Medicine)  Mirza Amezquita MD as Consulting Physician (Cardiology)    Subjective     Seen on HD tolerating well so far. Goal UF 2 liter as tolerated. Bp stable. Good access pressure.       Objective     Vital Signs:  Blood pressure 157/88, pulse 91, temperature 98.8 °F (37.1 °C), temperature source Oral, resp. rate 20, height 185.4 cm (73\"), weight 125 kg (275 lb 6.4 oz), SpO2 95 %.      Intake/Output Summary (Last 24 hours) at 4/26/2023 1545  Last data filed at 4/26/2023 1457  Gross per 24 hour   Intake 1050 ml   Output 3330 ml   Net -2280 ml        04/25 0701 - 04/26 0700  In: 990 [P.O.:990]  Out: 800 [Urine:800]    Physical Exam:        GENERAL: WD AAM NAD  NEURO: Awake and alert, oriented. No focal deficit  PSYCHIATRIC: NMA. Cooperative with PE  EYE: PE, no icterus, no conjunctivitis  ENT: ommm, dentition intact,  Hearing intact  NECK: Supple , No JVD discernable,  Trachea midline  CV: + Edema, RRR  LUNGS:  Quiet,  Nonlabored resp.  Symmetrical expansion  ABDOMEN: Nondistended, soft nontender.  : No [place], no palp bladder  SKIN: Warm and dry without rash      Labs:  Results from last 7 days   Lab Units 04/25/23  0450 04/22/23  0555 04/20/23  0432   WBC 10*3/mm3 9.87 11.17* 8.87   HEMOGLOBIN g/dL 9.4* 8.9* 9.8*   PLATELETS 10*3/mm3 162 138* 150     Results from last 7 days   Lab Units 04/26/23  0649 04/24/23  0701 04/22/23  2348 04/22/23  0528 04/21/23  1220 04/20/23  0432   SODIUM mmol/L 138 137 138 138   < > 142   POTASSIUM mmol/L 4.4 4.6 4.4 4.5   < > 4.7   CHLORIDE mmol/L 103 104 104 106   < > 110*   CO2 mmol/L 21.0* 20.0* 18.0* 19.0*   < > 19.0*   BUN mg/dL 74* 103* 89* 94*   < > 85*   CREATININE mg/dL 6.21* 7.05* 6.55* 6.51*   < > 5.47*   CALCIUM mg/dL 9.5 9.4 9.4 9.0   < > 8.7   PHOSPHORUS mg/dL  --   --  6.5*  --   --   --    MAGNESIUM mg/dL  --   --   --   --   --  1.7   ALBUMIN g/dL  --   --  2.7*  --   --   --  "    < > = values in this interval not displayed.                       Estimated Creatinine Clearance: 20.4 mL/min (A) (by C-G formula based on SCr of 6.21 mg/dL (H)).         A/P:    CKD Stage V: Likely progression to ESRD.  BUN and creatinine continue to rise with negative volume.   Bx proven severe nephrosclerosis w adaptive FSGS changes and diabetic nephropathy 90 percent fibrosis on bx. Rapid loss of kidney function in last few yrs. Lost to f/u after discharge from [facility] in Jan 2023. Cr worse on this admission likley Acute component vs progression of underlying CKD in the setting of uncontrolled HTN and volume overload.  Tunneled dialysis catheter placed 4/24/2023 with initiation of HD.  No new labs today.  We will plan additional dialysis in AM.  Can restart diuretics as needed.  Case management working on outpatient HD placement.    HTN: Blood pressure stable.  Continue current medications for now.    Metabolic acidosis: We will give p.o. bicarb.  Adjust with HD on Monday.    Anemia: Hemoglobin below goal.  TSAT 7%.  Will give IV iron.  Transfuse as needed for hemoglobin <7.  Start Epogen.    Volume: Stable.  Restart diuretics.  UF as needed on HD.  Would try to avoid aggressive UF to allow retention of underlying residual renal function.  Can increase diuretics as needed to maintain volume status.    Bone mineralization: Phosphorus elevated.  Started binders.  Low Phos diet.    Nutrition: Continue low Phos low potassium renal diet..  Start renal vitamin.    Recs   Next HD treatment tomorrow.    High risk and complexity patient.    Jaren Rai MD  04/26/23  15:45 EDT

## 2023-04-26 NOTE — PROGRESS NOTES
[facility]  PROGRESS NOTE    Patient Name: Angel Blair  : 1975  MRN: [id number]    Date of Admission: 2023  Primary Care Physician: Jay Nevarez MD    Subjective   Subjective     CC: f/u CKD    HPI:   No issues overnight  No complaints per patient  Hoping to get out of hospital soon    ROS:  Gen- No fevers, chills  CV- No chest pain, palpitations  Resp- No cough, dyspnea  GI- No N/V/D, abd pain    Objective   Objective     Vital Signs:   Temp:  [98.2 °F (36.8 °C)-98.8 °F (37.1 °C)] 98.5 °F (36.9 °C)  Heart Rate:  [80-98] 80  Resp:  [16-18] 18  BP: (141-166)/(80-97) 142/80     Physical Exam:  Constitutional: No acute distress, awake, alert, sitting up on side of bed  HENT: NCAT, mucous membranes moist  Respiratory: Clear to auscultation bilaterally, respiratory effort normal   Cardiovascular: RRR, no murmurs, rubs, or gallops  Gastrointestinal: Positive bowel sounds, soft, nontender, nondistended  Musculoskeletal: Trace bilateral foot edema  Psychiatric: Appropriate affect, cooperative  Neurologic: Oriented x 3, KLEIN, speech clear  Skin: No rashes noted.  Right chest tunneled cath with small amount of blood at site    No change in exam from 23    Results Reviewed:  LAB RESULTS:      Lab 23  0450 23  0555 23  0432 23  1151   WBC 9.87 11.17* 8.87  --    HEMOGLOBIN 9.4* 8.9* 9.8*  --    HEMATOCRIT 32.0* 28.1* 30.7*  --    PLATELETS 162 138* 150  --    NEUTROS ABS 6.96 8.41*  --   --    IMMATURE GRANS (ABS) 0.04 0.05  --   --    LYMPHS ABS 1.65 1.52  --   --    MONOS ABS 0.85 1.01*  --   --    EOS ABS 0.30 0.14  --   --    MCV 97.9* 91.5 90.6  --    HEPARIN ANTI-XA  --   --   --  0.15*         Lab 23  0649 23  0701 23  2348 23  0528 23  1220 23  0432   SODIUM 138 137 138 138 138 142   POTASSIUM 4.4 4.6 4.4 4.5 4.6 4.7   CHLORIDE 103 104 104 106 104 110*   CO2 21.0* 20.0* 18.0* 19.0*  18.0* 19.0*   ANION GAP 14.0 13.0 16.0* 13.0 16.0* 13.0   BUN 74* 103* 89* 94* 85* 85*   CREATININE 6.21* 7.05* 6.55* 6.51* 5.86* 5.47*   EGFR 10.4* 9.0* 9.8* 9.9* 11.2* 12.2*   GLUCOSE 124* 138* 153* 161* 198* 146*   CALCIUM 9.5 9.4 9.4 9.0 8.9 8.7   MAGNESIUM  --   --   --   --   --  1.7   PHOSPHORUS  --   --  6.5*  --   --   --          Lab 04/22/23  2348   ALBUMIN 2.7*                 Lab 04/21/23  0758   IRON 17*   IRON SATURATION 7*   TIBC 243*   TRANSFERRIN 163*   FERRITIN 362.80         Brief Urine Lab Results  (Last result in the past 365 days)      Color   Clarity   Blood   Leuk Est   Nitrite   Protein   CREAT   Urine HCG        01/23/23 2223             34.4               Microbiology Results Abnormal     Procedure Component Value - Date/Time    COVID PRE-OP / PRE-PROCEDURE SCREENING ORDER (NO ISOLATION) - Swab, Nasopharynx [[id number]]  (Normal) Collected: 04/18/23 2152    Lab Status: Final result Specimen: Swab from Nasopharynx Updated: 04/18/23 2224    Narrative:      The following orders were created for panel order COVID PRE-OP / PRE-PROCEDURE SCREENING ORDER (NO ISOLATION) - Swab, Nasopharynx.  Procedure                               Abnormality         Status                     ---------                               -----------         ------                     COVID-19 and FLU A/B PCR...[[id number]]  Normal              Final result                 Please view results for these tests on the individual orders.    COVID-19 and FLU A/B PCR - Swab, Nasopharynx [[id number]]  (Normal) Collected: 04/18/23 2152    Lab Status: Final result Specimen: Swab from Nasopharynx Updated: 04/18/23 2224     COVID19 Not Detected     Influenza A PCR Not Detected     Influenza B PCR Not Detected    Narrative:      Fact sheet for providers: https://www.fda.gov/media/813261/download    Fact sheet for patients: https://www.fda.gov/media/794456/download    Test performed by PCR.          No radiology results from the last  24 hrs    Results for orders placed during the hospital encounter of 01/22/23    Adult Transthoracic Echo Complete With Contrast if Necessary Per Protocol    Interpretation Summary  •  Left ventricular ejection fraction appears to be 51 - 55%.  •  Left ventricular wall thickness is consistent with mild to moderate concentric hypertrophy.  •  The cardiac valves are anatomically and functionally normal.      Current medications:  Scheduled Meds:atorvastatin, 40 mg, Oral, Nightly  b complex-vitamin c-folic acid, 1 tablet, Oral, Daily  bumetanide, 2 mg, Oral, BID  calcium acetate, 667 mg, Oral, TID With Meals  epoetin yaron/yaron-epbx, 10,000 Units, Subcutaneous, Once per day on Mon Wed Fri  hydrALAZINE, 75 mg, Oral, Q8H  insulin lispro, 0-7 Units, Subcutaneous, TID AC  iron sucrose, 200 mg, Intravenous, Q24H  isosorbide mononitrate, 120 mg, Oral, Q24H  metoprolol tartrate, 50 mg, Oral, Q12H  NIFEdipine XL, 90 mg, Oral, Daily  pharmacy consult - MTM, , Does not apply, Daily  sodium bicarbonate, 650 mg, Oral, TID  sodium chloride, 10 mL, Intravenous, Q12H      Continuous Infusions:   PRN Meds:.•  acetaminophen  •  albumin human  •  benzonatate  •  Calcium Replacement - Follow Nurse / BPA Driven Protocol  •  cloNIDine  •  dextrose  •  dextrose  •  glucagon (human recombinant)  •  heparin (porcine)  •  HYDROcodone-acetaminophen  •  Magnesium Standard Dose Replacement - Follow Nurse / BPA Driven Protocol  •  Phosphorus Replacement - Follow Nurse / BPA Driven Protocol  •  Potassium Replacement - Follow Nurse / BPA Driven Protocol  •  sodium chloride  •  sodium chloride    Assessment & Plan   Assessment & Plan     Active Hospital Problems    Diagnosis  POA   • Acute on chronic systolic congestive heart failure [I50.23]  Yes   • Hypertensive urgency [I16.0]  Yes   • CKD (chronic kidney disease) stage 5, GFR less than 15 ml/min [N18.5]  Yes   • Elevated troponin [R77.8]  Yes   • Hyperlipidemia [E78.5]  Yes   • Essential  hypertension [I10]  Yes   • Type 2 diabetes mellitus with hyperglycemia (HCC) [E11.65]  Yes      Resolved Hospital Problems   No resolved problems to display.        Brief Hospital Course to date:  Angel Blair is a 47 y.o. male with PMH significant for DM 2, HLD, HTN, LEFTY, obesity, CKD V, who presents to the ED with complaint of cough and shortness of breath who was found to have concern for elevated troponin with acute on chronic systolic congestive heart failure and hypertensive urgency.     Acute on chronic systolic congestive heart failure  - Patient has been out of his medication for the past month.  - Cardiology followed. Responded well to Bumex -3L 4/19. Creatinine rising and stopped 4/22. Restarted bumex after HD started  - Echo on 1/22/2023 notes EF 51-55%  - Continue metoprolol  -- I&O reviewed    CKD V, progression to ESRD.  Anemia of CKD  - Nephrology following. patient was lost to follow-up since last discharge secondary to no insurance coverage. Now planning for insertion of temporary HD catheter and initiation of HD today  - Dr. Herrera planning for vein mapping and fistula creation as outpatient.  -- continue IV iron, plan EPO with HD. Transfuse for hgb <7  -- per pt he has a chair at [facility] dialysis clinic  -- continue phos binder    LUE superficial venous thrombosis  -- Warm compresses to LUE, elevate.     Hypertensive urgency  Essential hypertension  - Patient has been out of home medication for the past month  - Now off cardene gtt. BP much better/ stable. Clonidine added prn.  - Continue metoprolol, nifedipine, hydralazine and Imdur  - NAL follows.  - cardiology has s/o     NSTEMI  Elevated troponin  - Likely NSTEMI type II secondary to hypertensive urgency and acute on chronic CHF but was started on heparin gtt on admission. Stopped per cardiology. Can start SQ heparin for DVT PPX after tunneled cath placement  - Trend troponin (177 -> 170)   - EKG without acute changes   -- currently  only reports tenderness at tunneled cath site     Dyslipidemia  - Continue Lipitor     DM2  - FSBG 3 times daily +SSI  -- A1C 6.4  Component      Latest Ref Rng 4/25/2023 4/26/2023   Glucose      70 - 130 mg/dL 153 (H)  130    Glucose       159 (H)  124 (H)    Glucose       133 (H)       Hypomagnesemia  -Replace prn    Expected Discharge Location and Transportation: home once outpatient HD chair finalized  Expected Discharge   Expected Discharge Date: 04/27/23       DVT prophylaxis:  Medical DVT prophylaxis orders are present.     AM-PAC 6 Clicks Score (PT): 22 (04/25/23 1452)    CODE STATUS:   Code Status and Medical Interventions:   Ordered at: 04/19/23 0323     Code Status (Patient has no pulse and is not breathing):    CPR (Attempt to Resuscitate)     Medical Interventions (Patient has pulse or is breathing):    Full Support       Kiana Chen, APRN  04/26/23

## 2023-04-27 ENCOUNTER — APPOINTMENT (OUTPATIENT)
Dept: NEPHROLOGY | Facility: HOSPITAL | Age: 48
DRG: 291 | End: 2023-04-27

## 2023-04-27 LAB
GLUCOSE BLDC GLUCOMTR-MCNC: 141 MG/DL (ref 70–130)
GLUCOSE BLDC GLUCOMTR-MCNC: 151 MG/DL (ref 70–130)
GLUCOSE BLDC GLUCOMTR-MCNC: 160 MG/DL (ref 70–130)
GLUCOSE BLDC GLUCOMTR-MCNC: 162 MG/DL (ref 70–130)

## 2023-04-27 PROCEDURE — 25010000002 HEPARIN (PORCINE) PER 1000 UNITS: Performed by: INTERNAL MEDICINE

## 2023-04-27 PROCEDURE — 63710000001 INSULIN LISPRO (HUMAN) PER 5 UNITS: Performed by: NURSE PRACTITIONER

## 2023-04-27 PROCEDURE — 99232 SBSQ HOSP IP/OBS MODERATE 35: CPT | Performed by: INTERNAL MEDICINE

## 2023-04-27 PROCEDURE — 82948 REAGENT STRIP/BLOOD GLUCOSE: CPT

## 2023-04-27 RX ADMIN — SODIUM BICARBONATE 650 MG TABLET 650 MG: at 11:50

## 2023-04-27 RX ADMIN — INSULIN LISPRO 2 UNITS: 100 INJECTION, SOLUTION INTRAVENOUS; SUBCUTANEOUS at 11:58

## 2023-04-27 RX ADMIN — ATORVASTATIN CALCIUM 40 MG: 40 TABLET, FILM COATED ORAL at 20:58

## 2023-04-27 RX ADMIN — SODIUM BICARBONATE 650 MG TABLET 650 MG: at 20:58

## 2023-04-27 RX ADMIN — BUMETANIDE 2 MG: 2 TABLET ORAL at 11:51

## 2023-04-27 RX ADMIN — ISOSORBIDE MONONITRATE 120 MG: 60 TABLET, EXTENDED RELEASE ORAL at 11:51

## 2023-04-27 RX ADMIN — CALCIUM ACETATE 667 MG: 667 CAPSULE ORAL at 11:51

## 2023-04-27 RX ADMIN — BUMETANIDE 2 MG: 2 TABLET ORAL at 17:11

## 2023-04-27 RX ADMIN — HYDRALAZINE HYDROCHLORIDE 75 MG: 50 TABLET, FILM COATED ORAL at 14:03

## 2023-04-27 RX ADMIN — CALCIUM ACETATE 667 MG: 667 CAPSULE ORAL at 17:11

## 2023-04-27 RX ADMIN — Medication 1 TABLET: at 11:51

## 2023-04-27 RX ADMIN — METOPROLOL TARTRATE 50 MG: 50 TABLET ORAL at 21:04

## 2023-04-27 RX ADMIN — INSULIN LISPRO 2 UNITS: 100 INJECTION, SOLUTION INTRAVENOUS; SUBCUTANEOUS at 17:11

## 2023-04-27 RX ADMIN — HYDRALAZINE HYDROCHLORIDE 75 MG: 50 TABLET, FILM COATED ORAL at 05:27

## 2023-04-27 RX ADMIN — SODIUM BICARBONATE 650 MG TABLET 650 MG: at 16:02

## 2023-04-27 RX ADMIN — NIFEDIPINE 90 MG: 30 TABLET, EXTENDED RELEASE ORAL at 11:50

## 2023-04-27 RX ADMIN — Medication 10 ML: at 20:58

## 2023-04-27 RX ADMIN — METOPROLOL TARTRATE 50 MG: 50 TABLET ORAL at 11:51

## 2023-04-27 RX ADMIN — HYDRALAZINE HYDROCHLORIDE 75 MG: 50 TABLET, FILM COATED ORAL at 21:04

## 2023-04-27 RX ADMIN — HEPARIN SODIUM 2300 UNITS: 1000 INJECTION INTRAVENOUS; SUBCUTANEOUS at 11:28

## 2023-04-27 NOTE — PLAN OF CARE
Problem: Adjustment to Illness (Heart Failure)  Goal: Optimal Coping  Outcome: Ongoing, Progressing  Intervention: Support Psychosocial Response  Recent Flowsheet Documentation  Taken 4/27/2023 1600 by Josselin Cardoso RN  Family/Support System Care: support provided  Taken 4/27/2023 1400 by Josselin Cardoso RN  Family/Support System Care: support provided  Taken 4/27/2023 1145 by Josselin Cardoso, RN  Family/Support System Care: support provided     Problem: Cardiac Output Decreased (Heart Failure)  Goal: Optimal Cardiac Output  Outcome: Ongoing, Progressing     Problem: Dysrhythmia (Heart Failure)  Goal: Stable Heart Rate and Rhythm  Outcome: Ongoing, Progressing     Problem: Fluid Imbalance (Heart Failure)  Goal: Fluid Balance  Outcome: Ongoing, Progressing     Problem: Functional Ability Impaired (Heart Failure)  Goal: Optimal Functional Ability  Outcome: Ongoing, Progressing     Problem: Oral Intake Inadequate (Heart Failure)  Goal: Optimal Nutrition Intake  Outcome: Ongoing, Progressing     Problem: Respiratory Compromise (Heart Failure)  Goal: Effective Oxygenation and Ventilation  Outcome: Ongoing, Progressing  Intervention: Promote Airway Secretion Clearance  Recent Flowsheet Documentation  Taken 4/27/2023 1600 by Josselin Cardoso RN  Cough And Deep Breathing: done independently per patient  Taken 4/27/2023 1400 by Josselin Cardoso RN  Cough And Deep Breathing: done independently per patient  Taken 4/27/2023 1145 by Josselin Cardoso RN  Cough And Deep Breathing: done independently per patient     Problem: Sleep Disordered Breathing (Heart Failure)  Goal: Effective Breathing Pattern During Sleep  Outcome: Ongoing, Progressing     Problem: Diabetes Comorbidity  Goal: Blood Glucose Level Within Targeted Range  Outcome: Ongoing, Progressing  Intervention: Monitor and Manage Glycemia  Recent Flowsheet Documentation  Taken 4/27/2023 1145 by Josselin Cardoso, RN  Glycemic Management: blood glucose  monitored     Problem: Heart Failure Comorbidity  Goal: Maintenance of Heart Failure Symptom Control  Outcome: Ongoing, Progressing  Intervention: Maintain Heart Failure-Management  Recent Flowsheet Documentation  Taken 4/27/2023 1145 by Josselin Cardoso, RN  Medication Review/Management: medications reviewed     Problem: Hypertension Comorbidity  Goal: Blood Pressure in Desired Range  Outcome: Ongoing, Progressing  Intervention: Maintain Blood Pressure Management  Recent Flowsheet Documentation  Taken 4/27/2023 1145 by Josselin Cardoso, RN  Medication Review/Management: medications reviewed     Problem: Obstructive Sleep Apnea Risk or Actual Comorbidity Management  Goal: Unobstructed Breathing During Sleep  Outcome: Ongoing, Progressing     Problem: Adult Inpatient Plan of Care  Goal: Plan of Care Review  Outcome: Ongoing, Progressing  Flowsheets (Taken 4/27/2023 1749)  Progress: improving  Plan of Care Reviewed With: patient  Outcome Evaluation: Patient VSS, NSR on tele, BP WNL, A&Ox4. No c/o pain. Up ad chepe. Voiding well. Dialysis today, 2.5L pulled off. SSI given. Continue to monitor.  Goal: Patient-Specific Goal (Individualized)  Outcome: Ongoing, Progressing  Goal: Absence of Hospital-Acquired Illness or Injury  Outcome: Ongoing, Progressing  Intervention: Identify and Manage Fall Risk  Recent Flowsheet Documentation  Taken 4/27/2023 1600 by Josselin Cardoso, RN  Safety Promotion/Fall Prevention:   activity supervised   assistive device/personal items within reach   clutter free environment maintained   toileting scheduled   safety round/check completed   room organization consistent   nonskid shoes/slippers when out of bed  Taken 4/27/2023 1400 by Josselin Cardoso, RN  Safety Promotion/Fall Prevention:   activity supervised   assistive device/personal items within reach   clutter free environment maintained   toileting scheduled   safety round/check completed   nonskid shoes/slippers when out of bed    fall prevention program maintained  Taken 4/27/2023 1145 by Josselin Cardoso RN  Safety Promotion/Fall Prevention:   activity supervised   clutter free environment maintained   assistive device/personal items within reach   toileting scheduled   safety round/check completed   room organization consistent   nonskid shoes/slippers when out of bed  Taken 4/27/2023 1000 by Josselin Cardoso RN  Safety Promotion/Fall Prevention: patient off unit  Taken 4/27/2023 0801 by Josselin Cardoso RN  Safety Promotion/Fall Prevention: patient off unit  Intervention: Prevent Skin Injury  Recent Flowsheet Documentation  Taken 4/27/2023 1600 by Josselin Cardoso RN  Body Position: supine  Taken 4/27/2023 1400 by Josselin Cardoso RN  Body Position: dangle, side of bed  Taken 4/27/2023 1145 by Josselin Cardoso RN  Body Position: supine  Intervention: Prevent and Manage VTE (Venous Thromboembolism) Risk  Recent Flowsheet Documentation  Taken 4/27/2023 1600 by Josselin Cardoso RN  VTE Prevention/Management: (SQ hpearin) other (see comments)  Taken 4/27/2023 1400 by Josselin Cardoso RN  VTE Prevention/Management: (SQ heparin) other (see comments)  Taken 4/27/2023 1145 by Josselin Cardoso RN  VTE Prevention/Management: (SQ heparin) other (see comments)  Intervention: Prevent Infection  Recent Flowsheet Documentation  Taken 4/27/2023 1600 by Josselin Cardoso RN  Infection Prevention: environmental surveillance performed  Taken 4/27/2023 1400 by Josselin Cardoso RN  Infection Prevention: environmental surveillance performed  Taken 4/27/2023 1145 by Josselin Cardoso RN  Infection Prevention: environmental surveillance performed  Goal: Optimal Comfort and Wellbeing  Outcome: Ongoing, Progressing  Intervention: Provide Person-Centered Care  Recent Flowsheet Documentation  Taken 4/27/2023 1600 by Josselin Cardoso RN  Trust Relationship/Rapport: care explained  Taken 4/27/2023 1400 by Josselin Cardoso RN  Trust  Relationship/Rapport: care explained  Taken 4/27/2023 1145 by Josselin Cardoso, RN  Trust Relationship/Rapport:   care explained   choices provided   questions encouraged   questions answered   reassurance provided   thoughts/feelings acknowledged  Goal: Readiness for Transition of Care  Outcome: Ongoing, Progressing     Problem: Fall Injury Risk  Goal: Absence of Fall and Fall-Related Injury  Outcome: Ongoing, Progressing  Intervention: Identify and Manage Contributors  Recent Flowsheet Documentation  Taken 4/27/2023 1145 by Josselin Cardoso, RN  Medication Review/Management: medications reviewed  Intervention: Promote Injury-Free Environment  Recent Flowsheet Documentation  Taken 4/27/2023 1600 by Josselin Cardoso, RN  Safety Promotion/Fall Prevention:   activity supervised   assistive device/personal items within reach   clutter free environment maintained   toileting scheduled   safety round/check completed   room organization consistent   nonskid shoes/slippers when out of bed  Taken 4/27/2023 1400 by Josselin Cardoso, RN  Safety Promotion/Fall Prevention:   activity supervised   assistive device/personal items within reach   clutter free environment maintained   toileting scheduled   safety round/check completed   nonskid shoes/slippers when out of bed   fall prevention program maintained  Taken 4/27/2023 1145 by Josselin Cardoso, RN  Safety Promotion/Fall Prevention:   activity supervised   clutter free environment maintained   assistive device/personal items within reach   toileting scheduled   safety round/check completed   room organization consistent   nonskid shoes/slippers when out of bed  Taken 4/27/2023 1000 by Josselin Cardoso, RN  Safety Promotion/Fall Prevention: patient off unit  Taken 4/27/2023 0801 by Josselin Cardoso, RN  Safety Promotion/Fall Prevention: patient off unit     Problem: Device-Related Complication Risk (Hemodialysis)  Goal: Safe, Effective Therapy Delivery  Outcome:  Ongoing, Progressing  Intervention: Optimize Device Care and Function  Recent Flowsheet Documentation  Taken 4/27/2023 1145 by Josselin Cardoso, RN  Medication Review/Management: medications reviewed     Problem: Hemodynamic Instability (Hemodialysis)  Goal: Effective Tissue Perfusion  Outcome: Ongoing, Progressing     Problem: Infection (Hemodialysis)  Goal: Absence of Infection Signs and Symptoms  Outcome: Ongoing, Progressing   Goal Outcome Evaluation:  Plan of Care Reviewed With: patient        Progress: improving  Outcome Evaluation: Patient VSS, NSR on tele, BP WNL, A&Ox4. No c/o pain. Up ad chepe. Voiding well. Dialysis today, 2.5L pulled off. SSI given. Continue to monitor.

## 2023-04-27 NOTE — PLAN OF CARE
Patient cont to deny pain/discomfort. SBP stable in high 140s. O2 sat>90% on RA.SR on cardiac mx. Will cont to mx. Call light in reach.

## 2023-04-27 NOTE — PLAN OF CARE
HD completed. Tolerated well. UF goal reached. Blood returned. Report given to primary RN.   Problem: Device-Related Complication Risk (Hemodialysis)  Goal: Safe, Effective Therapy Delivery  Outcome: Ongoing, Progressing  Intervention: Optimize Device Care and Function  Description: Maintain flow rate, anticoagulation parameters, pressure ranges and prescribed fluid balance with gradual adjustments to maintain hemodynamic stability and achieve therapy goals.  Monitor laboratory results (e.g., electrolytes, glucose, albumin, coagulation studies, hemoglobin, hematocrit) and clinical status (e.g., cramping; nausea, vomiting, blood pressure fluctuations) for response to therapy; advocate for treatment or adju  Assess vascular access site for patency, securement and position to meet flow demands. Assess perfusion distal to access site to ensure adequate tissue oxygenation.  For arteriovenous fistula, assess presence of thrill to ensure presence of blood flow. Avoid blood pressure readings, lab draws, tight clothing or jewelry on the AV (arteriovenous) fistula extremity to prevent trauma.  Maintain circuit monitoring (e.g., arterial, venous and transmembrane pressure; ultrafiltrate removal; dialysate flow, conductivity and temperature); address alarms promptly to decrease risk to patient and preserve circuit function.  Evaluate circuit for disconnections, cracks, clotting, malfunction, leaks or rupture of hemofilter; intervene promptly to prevent risk to patient.  Consider distal vascular access for antibiotic or vasoactive medication administration to prevent filtration of medication effect prior to being delivered systemically to the patient.  Maintain infusion of anticoagulation; adjust to keep laboratory values within ordered range.  Provide emergency equipment, such as clamps, replacement devices and resuscitative supplies, if malfunction, tubing rupture, clot formation or migration occur.  Recent Flowsheet  Documentation  Taken 4/27/2023 0800 by Clifton Bell, RN  Medication Review/Management: medications reviewed     Problem: Hemodynamic Instability (Hemodialysis)  Goal: Effective Tissue Perfusion  Outcome: Ongoing, Progressing     Problem: Infection (Hemodialysis)  Goal: Absence of Infection Signs and Symptoms  Outcome: Ongoing, Progressing   Goal Outcome Evaluation:

## 2023-04-27 NOTE — PROGRESS NOTES
[facility] Medicine Services  PROGRESS NOTE    Patient Name: Angel Blair  : 1975  MRN: [id number]    Date of Admission: 2023  Primary Care Physician: Jay Neavrez MD    Subjective   Subjective     CC:  F/u renal failure    HPI:  Receiving HD, denies acute complaints. Overall swelling improved. Insurance reportedly will not cover his dialysis outpatient, CM assisting to see other options    ROS:  Gen- No fevers, chills  CV- No chest pain, palpitations  Resp- No cough, dyspnea  GI- No N/V/D, abd pain     Objective   Objective     Vital Signs:   Temp:  [97.7 °F (36.5 °C)-98.2 °F (36.8 °C)] 97.9 °F (36.6 °C)  Heart Rate:  [75-95] 86  Resp:  [16-18] 16  BP: (137-167)/(78-97) 137/78     Physical Exam:  Constitutional: No acute distress, awake, alert, laying in bed receiving HD  HENT: NCAT, mucous membranes moist  Respiratory: Clear to auscultation bilaterally, respiratory effort normal   Cardiovascular: RRR, palpable radial pulses  Gastrointestinal: Positive bowel sounds, soft, nontender, nondistended  Musculoskeletal: Trace LE edema  Psychiatric: Appropriate affect, cooperative  Neurologic: Speech clear and fluent    Results Reviewed:  LAB RESULTS:      Lab 23  0450 23  0555   WBC 9.87 11.17*   HEMOGLOBIN 9.4* 8.9*   HEMATOCRIT 32.0* 28.1*   PLATELETS 162 138*   NEUTROS ABS 6.96 8.41*   IMMATURE GRANS (ABS) 0.04 0.05   LYMPHS ABS 1.65 1.52   MONOS ABS 0.85 1.01*   EOS ABS 0.30 0.14   MCV 97.9* 91.5         Lab 23  0649 23  0701 23  2348 23  0528 23  1220   SODIUM 138 137 138 138 138   POTASSIUM 4.4 4.6 4.4 4.5 4.6   CHLORIDE 103 104 104 106 104   CO2 21.0* 20.0* 18.0* 19.0* 18.0*   ANION GAP 14.0 13.0 16.0* 13.0 16.0*   BUN 74* 103* 89* 94* 85*   CREATININE 6.21* 7.05* 6.55* 6.51* 5.86*   EGFR 10.4* 9.0* 9.8* 9.9* 11.2*   GLUCOSE 124* 138* 153* 161* 198*   CALCIUM 9.5 9.4 9.4 9.0 8.9   PHOSPHORUS  --   --  6.5*  --   --           Lab 04/22/23  2348   ALBUMIN 2.7*                 Lab 04/21/23  0758   IRON 17*   IRON SATURATION 7*   TIBC 243*   TRANSFERRIN 163*   FERRITIN 362.80         Brief Urine Lab Results  (Last result in the past 365 days)      Color   Clarity   Blood   Leuk Est   Nitrite   Protein   CREAT   Urine HCG        01/23/23 2223             34.4               Microbiology Results Abnormal     Procedure Component Value - Date/Time    COVID PRE-OP / PRE-PROCEDURE SCREENING ORDER (NO ISOLATION) - Swab, Nasopharynx [[id number]]  (Normal) Collected: 04/18/23 2152    Lab Status: Final result Specimen: Swab from Nasopharynx Updated: 04/18/23 2224    Narrative:      The following orders were created for panel order COVID PRE-OP / PRE-PROCEDURE SCREENING ORDER (NO ISOLATION) - Swab, Nasopharynx.  Procedure                               Abnormality         Status                     ---------                               -----------         ------                     COVID-19 and FLU A/B PCR...[[id number]]  Normal              Final result                 Please view results for these tests on the individual orders.    COVID-19 and FLU A/B PCR - Swab, Nasopharynx [[id number]]  (Normal) Collected: 04/18/23 2152    Lab Status: Final result Specimen: Swab from Nasopharynx Updated: 04/18/23 2224     COVID19 Not Detected     Influenza A PCR Not Detected     Influenza B PCR Not Detected    Narrative:      Fact sheet for providers: https://www.fda.gov/media/890500/download    Fact sheet for patients: https://www.fda.gov/media/959723/download    Test performed by PCR.          No radiology results from the last 24 hrs    Results for orders placed during the hospital encounter of 01/22/23    Adult Transthoracic Echo Complete With Contrast if Necessary Per Protocol    Interpretation Summary  •  Left ventricular ejection fraction appears to be 51 - 55%.  •  Left ventricular wall thickness is consistent with mild to moderate concentric  hypertrophy.  •  The cardiac valves are anatomically and functionally normal.      Current medications:  Scheduled Meds:atorvastatin, 40 mg, Oral, Nightly  b complex-vitamin c-folic acid, 1 tablet, Oral, Daily  bumetanide, 2 mg, Oral, BID  calcium acetate, 667 mg, Oral, TID With Meals  epoetin yaron/yaron-epbx, 10,000 Units, Subcutaneous, Once per day on Mon Wed Fri  hydrALAZINE, 75 mg, Oral, Q8H  insulin lispro, 0-7 Units, Subcutaneous, TID AC  isosorbide mononitrate, 120 mg, Oral, Q24H  metoprolol tartrate, 50 mg, Oral, Q12H  NIFEdipine XL, 90 mg, Oral, Daily  pharmacy consult - MTM, , Does not apply, Daily  sodium bicarbonate, 650 mg, Oral, TID  sodium chloride, 10 mL, Intravenous, Q12H      Continuous Infusions:   PRN Meds:.•  acetaminophen  •  albumin human  •  benzonatate  •  Calcium Replacement - Follow Nurse / BPA Driven Protocol  •  cloNIDine  •  dextrose  •  dextrose  •  glucagon (human recombinant)  •  heparin (porcine)  •  HYDROcodone-acetaminophen  •  Magnesium Standard Dose Replacement - Follow Nurse / BPA Driven Protocol  •  Phosphorus Replacement - Follow Nurse / BPA Driven Protocol  •  Potassium Replacement - Follow Nurse / BPA Driven Protocol  •  sodium chloride  •  sodium chloride    Assessment & Plan   Assessment & Plan     Active Hospital Problems    Diagnosis  POA   • **CKD (chronic kidney disease) stage 5, GFR less than 15 ml/min [N18.5]  Yes   • Hypertensive urgency [I16.0]  Yes   • Elevated troponin [R77.8]  Yes   • Hyperlipidemia [E78.5]  Yes   • Essential hypertension [I10]  Yes   • Type 2 diabetes mellitus with hyperglycemia (HCC) [E11.65]  Yes      Resolved Hospital Problems   No resolved problems to display.        Brief Hospital Course to date:  Angel Blair is a 47 y.o. male w/ HTN, HLD, DM2, LEFTY, CKD5 who has been out of home meds for ~1mo pta and largely lost to follow up due to insurance issues who presented w/ SOB, cough, was found to be volume overloaded and required  initiation of HD; he has had clinical improvement but remains hospitalized due to difficulties with outpatient HD due to insurance issues    The following problems are new to me today    Assessment/Plan    CKD stage 5 w/ decompensation and new HD start  Anemia of chronic renal disease  -follows w/ nephro, lost to f/u pta due to insurance  -started on HD this admission  -cont phos binder and NaBicarb tabs  -seen by Dr. Herrera, planning opt vein mapping and fistula  -IV iron and epo per nephro  -has HD chair option but needs insurance figured out    Acute decompensated HFpEF  -volume overload 2/2 renal failure; patient denies prior Dx CHF however cards reports NICM; data deficit (no prior EF's documented)  -TTE EF 51-55%  -volume control via HD, also on po bumex    HTN s/p hypertensive urgency  HLD  -cont statin, bumex, hydralazine, imdur, lopressor, procardia    DM type 2, a1c 6.4%, w/o long term use of insulin  -a1c as high at 13% in 2019  -cont SSI    Chronically elevated troponins  -in the setting of renal disease, no chest pain  -seen by cards, no indication for ischemic w/u at this time    LT UE superficial thrombophlebitis  -supportive care    Expected Discharge Location and Transportation: home w/ opt HD  Expected Discharge once outpatient dialysis arranged, otherwise ready to discharge  Expected Discharge Date: 05/01/23       DVT prophylaxis:  Medical DVT prophylaxis orders are present.     AM-PAC 6 Clicks Score (PT): 23 (04/27/23 1145)    CODE STATUS:   Code Status and Medical Interventions:   Ordered at: 04/19/23 0323     Code Status (Patient has no pulse and is not breathing):    CPR (Attempt to Resuscitate)     Medical Interventions (Patient has pulse or is breathing):    Full Support       Trevon Ryan, DO  04/27/23

## 2023-04-27 NOTE — PROGRESS NOTES
"   LOS: 8 days    Patient Care Team:  Jay Nevarez MD as PCP - General (Emergency Medicine)  Mirza Amezquita MD as Consulting Physician (Cardiology)    Subjective     Seen on HD tolerating well so far this is his 3rd HD treatment. Goal UF 2.5-3 liter as tolerated. Bp stable. Good access pressure.         Objective     Vital Signs:  Blood pressure 149/87, pulse 88, temperature 97.9 °F (36.6 °C), temperature source Tympanic, resp. rate 18, height 185.4 cm (73\"), weight 123 kg (271 lb 11.2 oz), SpO2 95 %.      Intake/Output Summary (Last 24 hours) at 4/27/2023 1034  Last data filed at 4/27/2023 0350  Gross per 24 hour   Intake 600 ml   Output 3130 ml   Net -2530 ml        04/26 0701 - 04/27 0700  In: 840 [P.O.:840]  Out: 3130 [Urine:800]    Physical Exam:        GENERAL: WD AAM NAD  NEURO: Awake and alert, oriented. No focal deficit  PSYCHIATRIC: NMA. Cooperative with PE  EYE: PE, no icterus, no conjunctivitis  ENT: ommm, dentition intact,  Hearing intact  NECK: Supple , No JVD discernable,  Trachea midline  CV: + Edema, RRR  LUNGS:  Quiet,  Nonlabored resp.  Symmetrical expansion  ABDOMEN: Nondistended, soft nontender.  : No [place], no palp bladder  SKIN: Warm and dry without rash      Labs:  Results from last 7 days   Lab Units 04/25/23  0450 04/22/23  0555   WBC 10*3/mm3 9.87 11.17*   HEMOGLOBIN g/dL 9.4* 8.9*   PLATELETS 10*3/mm3 162 138*     Results from last 7 days   Lab Units 04/26/23  0649 04/24/23  0701 04/22/23  2348 04/22/23  0528   SODIUM mmol/L 138 137 138 138   POTASSIUM mmol/L 4.4 4.6 4.4 4.5   CHLORIDE mmol/L 103 104 104 106   CO2 mmol/L 21.0* 20.0* 18.0* 19.0*   BUN mg/dL 74* 103* 89* 94*   CREATININE mg/dL 6.21* 7.05* 6.55* 6.51*   CALCIUM mg/dL 9.5 9.4 9.4 9.0   PHOSPHORUS mg/dL  --   --  6.5*  --    ALBUMIN g/dL  --   --  2.7*  --                        Estimated Creatinine Clearance: 20.2 mL/min (A) (by C-G formula based on SCr of 6.21 mg/dL (H)).         A/P:  ESRD: Likely progression to " ESRD.  BUN and creatinine continue to rise with negative volume.   Bx proven severe nephrosclerosis w adaptive FSGS changes and diabetic nephropathy 90 percent fibrosis on bx. Rapid loss of kidney function in last few yrs. Lost to f/u after discharge from [facility] in Jan 2023. Cr worse on this admission likley Acute component vs progression of underlying CKD in the setting of uncontrolled HTN and volume overload.  Tunneled dialysis catheter placed 4/24/2023 with initiation of HD.  No new labs today.  We will plan additional dialysis in AM.  Can restart diuretics as needed.  Case management working on outpatient HD placement.    HTN: Blood pressure stable.  Continue current medications for now.    Metabolic acidosis: We will give p.o. bicarb.  Adjust with HD on Monday.    Anemia: Hemoglobin below goal.  TSAT 7%.  Will give IV iron.  Transfuse as needed for hemoglobin <7.  Start Epogen.    Volume: Stable.  Restart diuretics.  UF as needed on HD.  Would try to avoid aggressive UF to allow retention of underlying residual renal function.  Can increase diuretics as needed to maintain volume status.    Bone mineralization: Phosphorus elevated.  Started binders.  Low Phos diet.    Nutrition: Continue low Phos low potassium renal diet..  Start renal vitamin.    Recs  HD per TTS marleen.   Stable for discharge from renal standpoint. As long as he has HD chair.    High risk and complexity patient.    Jaren Rai MD  04/27/23  10:34 EDT

## 2023-04-28 LAB
BASOPHILS # BLD AUTO: 0.07 10*3/MM3 (ref 0–0.2)
BASOPHILS NFR BLD AUTO: 0.6 % (ref 0–1.5)
DEPRECATED RDW RBC AUTO: 46.3 FL (ref 37–54)
EOSINOPHIL # BLD AUTO: 0.34 10*3/MM3 (ref 0–0.4)
EOSINOPHIL NFR BLD AUTO: 2.9 % (ref 0.3–6.2)
ERYTHROCYTE [DISTWIDTH] IN BLOOD BY AUTOMATED COUNT: 13.5 % (ref 12.3–15.4)
GLUCOSE BLDC GLUCOMTR-MCNC: 122 MG/DL (ref 70–130)
GLUCOSE BLDC GLUCOMTR-MCNC: 131 MG/DL (ref 70–130)
GLUCOSE BLDC GLUCOMTR-MCNC: 145 MG/DL (ref 70–130)
GLUCOSE BLDC GLUCOMTR-MCNC: 146 MG/DL (ref 70–130)
HCT VFR BLD AUTO: 30.4 % (ref 37.5–51)
HGB BLD-MCNC: 9.4 G/DL (ref 13–17.7)
IMM GRANULOCYTES # BLD AUTO: 0.06 10*3/MM3 (ref 0–0.05)
IMM GRANULOCYTES NFR BLD AUTO: 0.5 % (ref 0–0.5)
LYMPHOCYTES # BLD AUTO: 2.23 10*3/MM3 (ref 0.7–3.1)
LYMPHOCYTES NFR BLD AUTO: 19 % (ref 19.6–45.3)
MCH RBC QN AUTO: 29.2 PG (ref 26.6–33)
MCHC RBC AUTO-ENTMCNC: 30.9 G/DL (ref 31.5–35.7)
MCV RBC AUTO: 94.4 FL (ref 79–97)
MONOCYTES # BLD AUTO: 0.92 10*3/MM3 (ref 0.1–0.9)
MONOCYTES NFR BLD AUTO: 7.8 % (ref 5–12)
NEUTROPHILS NFR BLD AUTO: 69.2 % (ref 42.7–76)
NEUTROPHILS NFR BLD AUTO: 8.1 10*3/MM3 (ref 1.7–7)
NRBC BLD AUTO-RTO: 0 /100 WBC (ref 0–0.2)
PLATELET # BLD AUTO: 202 10*3/MM3 (ref 140–450)
PMV BLD AUTO: 12.3 FL (ref 6–12)
RBC # BLD AUTO: 3.22 10*6/MM3 (ref 4.14–5.8)
WBC NRBC COR # BLD: 11.72 10*3/MM3 (ref 3.4–10.8)

## 2023-04-28 PROCEDURE — 82948 REAGENT STRIP/BLOOD GLUCOSE: CPT

## 2023-04-28 PROCEDURE — 99232 SBSQ HOSP IP/OBS MODERATE 35: CPT | Performed by: FAMILY MEDICINE

## 2023-04-28 PROCEDURE — 97116 GAIT TRAINING THERAPY: CPT

## 2023-04-28 PROCEDURE — 85025 COMPLETE CBC W/AUTO DIFF WBC: CPT | Performed by: EMERGENCY MEDICINE

## 2023-04-28 RX ADMIN — BUMETANIDE 2 MG: 2 TABLET ORAL at 09:36

## 2023-04-28 RX ADMIN — CALCIUM ACETATE 667 MG: 667 CAPSULE ORAL at 09:37

## 2023-04-28 RX ADMIN — METOPROLOL TARTRATE 50 MG: 50 TABLET ORAL at 09:37

## 2023-04-28 RX ADMIN — Medication 10 ML: at 09:40

## 2023-04-28 RX ADMIN — SODIUM BICARBONATE 650 MG TABLET 650 MG: at 17:00

## 2023-04-28 RX ADMIN — NIFEDIPINE 90 MG: 30 TABLET, EXTENDED RELEASE ORAL at 09:37

## 2023-04-28 RX ADMIN — HYDRALAZINE HYDROCHLORIDE 75 MG: 50 TABLET, FILM COATED ORAL at 05:18

## 2023-04-28 RX ADMIN — SODIUM BICARBONATE 650 MG TABLET 650 MG: at 21:25

## 2023-04-28 RX ADMIN — ISOSORBIDE MONONITRATE 120 MG: 60 TABLET, EXTENDED RELEASE ORAL at 09:37

## 2023-04-28 RX ADMIN — Medication 1 TABLET: at 09:37

## 2023-04-28 RX ADMIN — ATORVASTATIN CALCIUM 40 MG: 40 TABLET, FILM COATED ORAL at 21:25

## 2023-04-28 RX ADMIN — HYDRALAZINE HYDROCHLORIDE 75 MG: 50 TABLET, FILM COATED ORAL at 14:10

## 2023-04-28 RX ADMIN — METOPROLOL TARTRATE 50 MG: 50 TABLET ORAL at 21:25

## 2023-04-28 RX ADMIN — HYDRALAZINE HYDROCHLORIDE 75 MG: 50 TABLET, FILM COATED ORAL at 21:30

## 2023-04-28 RX ADMIN — SODIUM BICARBONATE 650 MG TABLET 650 MG: at 09:37

## 2023-04-28 RX ADMIN — CALCIUM ACETATE 667 MG: 667 CAPSULE ORAL at 17:01

## 2023-04-28 RX ADMIN — CALCIUM ACETATE 667 MG: 667 CAPSULE ORAL at 11:33

## 2023-04-28 RX ADMIN — BUMETANIDE 2 MG: 2 TABLET ORAL at 17:00

## 2023-04-28 RX ADMIN — Medication 10 ML: at 21:26

## 2023-04-28 NOTE — THERAPY DISCHARGE NOTE
Patient Name: Angel Blair  : 1975    MRN: [id number]                              Today's Date: 2023       Admit Date: 2023    Visit Dx:     ICD-10-CM ICD-9-CM   1. Acute on chronic systolic congestive heart failure  I50.23 428.23     428.0   2. NSTEMI (non-ST elevated myocardial infarction)  I21.4 410.70   3. Acute kidney injury  N17.9 584.9   4. Hypertensive emergency  I16.1 401.9   5. Noncompliance with medication regimen  Z91.148 V15.81     Patient Active Problem List   Diagnosis   • Type 2 diabetes mellitus with hyperglycemia (HCC)   • Left-sided back pain   • Obesity, Class III, BMI 40-49.9 (morbid obesity) (HCC)   • Abscess of back   • COVID-19   • Pneumonia due to COVID-19 virus   • Essential hypertension   • CHF exacerbation   • Hyperlipidemia   • CAP (community acquired pneumonia)   • Elevated troponin   • CKD (chronic kidney disease) stage 3, GFR 30-59 ml/min   • Hypertensive urgency   • CKD (chronic kidney disease) stage 5, GFR less than 15 ml/min     Past Medical History:   Diagnosis Date   • Diabetes mellitus    • Hyperlipidemia    • Hypertension    • Knee swelling 22   • Sleep apnea      Past Surgical History:   Procedure Laterality Date   • SOFT TISSUE TUMOR RESECTION        General Information     Row Name 23 1357          Physical Therapy Time and Intention    Document Type discharge treatment  -SD     Mode of Treatment physical therapy  -SD     Row Name 23 1357          General Information    Existing Precautions/Restrictions no known precautions/restrictions  -SD     Row Name 23 1354          Cognition    Orientation Status (Cognition) oriented x 4  -SD     Row Name 23 1350          Safety Issues, Functional Mobility    Comment, Safety Issues/Impairments (Mobility) no safety issues or mobility impairments  -SD           User Key  (r) = Recorded By, (t) = Taken By, (c) = Cosigned By    Initials Name Provider Type    SHERI Maxwell  RAOUL Vieyra Physical Therapist               Mobility     Row Name 04/28/23 1417          Bed Mobility    Bed Mobility supine-sit  -SD     Supine-Sit [place] (Bed Mobility) independent  -SD     Comment, (Bed Mobility) Pt able to complete without difficulty  -SD     Row Name 04/28/23 1417          Transfers    Comment, (Transfers) pt able to complete without difficulty  -SD     Row Name 04/28/23 1417          Sit-Stand Transfer    Sit-Stand [place] (Transfers) independent  -SD     Row Name 04/28/23 1417          Gait/Stairs (Locomotion)    [place] Level (Gait) independent  -SD     Distance in Feet (Gait) 800  -SD     Deviations/Abnormal Patterns (Gait) bilateral deviations;gait speed decreased  -SD     Comment, (Gait/Stairs) Pt amb in hallway without assistive device with good balance and safety.  -SD           User Key  (r) = Recorded By, (t) = Taken By, (c) = Cosigned By    Initials Name Provider Type    Azalia Noland PT Physical Therapist               Obj/Interventions     Row Name 04/28/23 1419          Balance    Static Sitting Balance independent  -SD     Dynamic Sitting Balance independent  -SD     Position, Sitting Balance unsupported;sitting edge of bed  -SD     Static Standing Balance independent  -SD     Dynamic Standing Balance independent  -SD     Position/Device Used, Standing Balance unsupported  -SD           User Key  (r) = Recorded By, (t) = Taken By, (c) = Cosigned By    Initials Name Provider Type    Azalia Noland PT Physical Therapist               Goals/Plan     Row Name 04/28/23 1420          Bed Mobility Goal 1 (PT)    Activity/Assistive Device (Bed Mobility Goal 1, PT) bed mobility activities, all  -SD     [place] Level/Cues Needed (Bed Mobility Goal 1, PT) modified independence  -SD     Time Frame (Bed Mobility Goal 1, PT) long term goal (LTG);10 days  -SD     Progress/Outcomes (Bed Mobility Goal 1, PT) goal met  -SD     Row Name 04/28/23 1428           Transfer Goal 1 (PT)    Activity/Assistive Device (Transfer Goal 1, PT) sit-to-stand/stand-to-sit;bed-to-chair/chair-to-bed  -SD     [place] Level/Cues Needed (Transfer Goal 1, PT) modified independence  -SD     Time Frame (Transfer Goal 1, PT) long term goal (LTG);10 days  -SD     Progress/Outcome (Transfer Goal 1, PT) goal met  -SD     Row Name 04/28/23 1420          Gait Training Goal 1 (PT)    Activity/Assistive Device (Gait Training Goal 1, PT) gait (walking locomotion);assistive device use  -SD     [place] Level (Gait Training Goal 1, PT) modified independence  -SD     Distance (Gait Training Goal 1, PT) 150  -SD     Time Frame (Gait Training Goal 1, PT) long term goal (LTG);10 days  -SD     Progress/Outcome (Gait Training Goal 1, PT) goal met  -SD           User Key  (r) = Recorded By, (t) = Taken By, (c) = Cosigned By    Initials Name Provider Type    SD Azalia Maxwell, PT Physical Therapist               Clinical Impression     Row Name 04/28/23 1419          Pain    Pretreatment Pain Rating 0/10 - no pain  -SD     Posttreatment Pain Rating 0/10 - no pain  -SD     Row Name 04/28/23 1419          Plan of Care Review    Plan of Care Reviewed With patient  -SD     Progress improving  -SD     Outcome Evaluation Pt has met all goals and is independent with all mobility tasks. Pt encouraged to amb in halls ad chepe. No further IPPT services warranted at this time. D/C PT services. PT rec d/c home with assist as needed.  -SD     Row Name 04/28/23 1419          Vital Signs    Pre Systolic BP Rehab 141  -SD     Pre Treatment Diastolic BP 77  -SD     Pretreatment Heart Rate (beats/min) 79  -SD     Pre SpO2 (%) 94  -SD     O2 Delivery Pre Treatment room air  -SD     Pre Patient Position Supine  -SD     Post Patient Position Standing  -SD     Row Name 04/28/23 1419          Positioning and Restraints    Pre-Treatment Position in bed  -SD     Post Treatment Position other  -SD     Other Position  return to room independently  -SD           User Key  (r) = Recorded By, (t) = Taken By, (c) = Cosigned By    Initials Name Provider Type    Azalia Noland PT Physical Therapist               Outcome Measures     Row Name 04/28/23 1422          How much help from another person do you currently need...    Turning from your back to your side while in flat bed without using bedrails? 4  -SD     Moving from lying on back to sitting on the side of a flat bed without bedrails? 4  -SD     Moving to and from a bed to a chair (including a wheelchair)? 4  -SD     Standing up from a chair using your arms (e.g., wheelchair, bedside chair)? 4  -SD     Climbing 3-5 steps with a railing? 4  -SD     To walk in hospital room? 4  -SD     AM-PAC 6 Clicks Score (PT) 24  -SD     Highest level of mobility 8 --> Walked 250 feet or more  -SD     Row Name 04/28/23 1422          Functional Assessment    Outcome Measure Options AM-PAC 6 Clicks Basic Mobility (PT)  -SD           User Key  (r) = Recorded By, (t) = Taken By, (c) = Cosigned By    Initials Name Provider Type    Azalia Noland PT Physical Therapist              Physical Therapy Education     Title: PT OT SLP Therapies (Done)     Topic: Physical Therapy (Done)     Point: Mobility training (Done)     Learning Progress Summary           Patient Acceptance, E, VU,DU by SD at 4/28/2023 1423    Acceptance, E, VU by  at 4/25/2023 1050    Eager, E, VU,DU,NR by  at 4/21/2023 1457    Comment: Reviewed safety/technique w/bed mobility, transfers, ambulation, stair navigation, HEP, PT POC    Eager, E, VU,DU,NR by  at 4/20/2023 1512    Comment: Educated pt. safety/technique w/transfers, ambulation, PT POC, edema management   Family Eager, E, VU,DU,NR by  at 4/21/2023 1457    Comment: Reviewed safety/technique w/bed mobility, transfers, ambulation, stair navigation, HEP, PT POC   Significant Other Eager, E, VU,DU,NR by  at 4/20/2023 1512    Comment: Educated pt.  safety/technique w/transfers, ambulation, PT POC, edema management                   Point: Home exercise program (Done)     Learning Progress Summary           Patient Acceptance, E, VU,DU by SD at 4/28/2023 1423    Eager, E, VU,DU,NR by SS at 4/21/2023 1457    Comment: Reviewed safety/technique w/bed mobility, transfers, ambulation, stair navigation, HEP, PT POC   Family Eager, E, VU,DU,NR by SS at 4/21/2023 1457    Comment: Reviewed safety/technique w/bed mobility, transfers, ambulation, stair navigation, HEP, PT POC                   Point: Body mechanics (Done)     Learning Progress Summary           Patient Acceptance, E, VU,DU by SD at 4/28/2023 1423    Acceptance, E, VU by FW at 4/25/2023 1050    Eager, E, VU,DU,NR by SS at 4/21/2023 1457    Comment: Reviewed safety/technique w/bed mobility, transfers, ambulation, stair navigation, HEP, PT POC    Eager, E, VU,DU,NR by SS at 4/20/2023 1512    Comment: Educated pt. safety/technique w/transfers, ambulation, PT POC, edema management   Family Eager, E, VU,DU,NR by SS at 4/21/2023 1457    Comment: Reviewed safety/technique w/bed mobility, transfers, ambulation, stair navigation, HEP, PT POC   Significant Other Eager, E, VU,DU,NR by SS at 4/20/2023 1512    Comment: Educated pt. safety/technique w/transfers, ambulation, PT POC, edema management                   Point: Precautions (Done)     Learning Progress Summary           Patient Acceptance, E, VU,DU by SD at 4/28/2023 1423    Acceptance, E, VU by FW at 4/25/2023 1050    Eager, E, VU,DU,NR by SS at 4/21/2023 1457    Comment: Reviewed safety/technique w/bed mobility, transfers, ambulation, stair navigation, HEP, PT POC    Eager, E, VU,DU,NR by SS at 4/20/2023 1512    Comment: Educated pt. safety/technique w/transfers, ambulation, PT POC, edema management   Family Eager, E, VU,DU,NR by SS at 4/21/2023 2531    Comment: Reviewed safety/technique w/bed mobility, transfers, ambulation, stair navigation, HEP, PT POC    Significant Other JENIFFER Carmichael, YG,CASEY,NR by  at 4/20/2023 1512    Comment: Educated pt. safety/technique w/transfers, ambulation, PT POC, edema management                               User Key     Initials Effective Dates Name Provider Type Discipline    SD 03/13/23 -  Azalia Maxwell, PT Physical Therapist PT    FW 05/05/22 -  Triston Hughes, PT Physical Therapist PT    SS 06/01/21 -  Mira Ocasio, PT Physical Therapist PT              PT Recommendation and Plan     Plan of Care Reviewed With: patient  Progress: improving  Outcome Evaluation: Pt has met all goals and is independent with all mobility tasks. Pt encouraged to amb in halls ad chepe. No further IPPT services warranted at this time. D/C PT services. PT rec d/c home with assist as needed.     Time Calculation:    PT Charges     Row Name 04/28/23 1423             Time Calculation    Start Time 1357  -SD      PT Received On 04/28/23  -SD      PT Goal Re-Cert Due Date 04/30/23  -SD         Time Calculation- PT    Total Timed Code Minutes- PT 15 minute(s)  -SD         Timed Charges    53140 - Gait Training Minutes  15  -SD         Total Minutes    Timed Charges Total Minutes 15  -SD       Total Minutes 15  -SD            User Key  (r) = Recorded By, (t) = Taken By, (c) = Cosigned By    Initials Name Provider Type    SD Azalia Maxwell, RAOUL Physical Therapist              Therapy Charges for Today     Code Description Service Date Service Provider Modifiers Qty    [id number] HC GAIT TRAINING EA 15 MIN 4/28/2023 Azalia Maxwell, PT GP 1          PT G-Codes  Outcome Measure Options: AM-PAC 6 Clicks Basic Mobility (PT)  AM-PAC 6 Clicks Score (PT): 24  AM-PAC 6 Clicks Score (OT): 24    PT Discharge Summary  Anticipated Discharge Disposition (PT): home with assist    Azalia Maxwell PT  4/28/2023

## 2023-04-28 NOTE — PLAN OF CARE
Goal Outcome Evaluation:   SBP <160 this shift. No complaints of pain this shift.       Problem: Diabetes Comorbidity  Goal: Blood Glucose Level Within Targeted Range  Outcome: Ongoing, Progressing  Intervention: Monitor and Manage Glycemia  Recent Flowsheet Documentation  Taken 4/28/2023 0800 by Noel Carreno, RN  Glycemic Management: oral hydration promoted     Problem: Adult Inpatient Plan of Care  Goal: Plan of Care Review  Outcome: Ongoing, Progressing

## 2023-04-28 NOTE — PROGRESS NOTES
[facility] Medicine Services  PROGRESS NOTE    Patient Name: Angel Blair  : 1975  MRN: [id number]    Date of Admission: 2023  Primary Care Physician: Jay Nevarez MD    Subjective   Subjective     CC:  Dialysis    HPI:  Patient feeling well today.  Nephrology plans dialysis again tomorrow and likely discharge after that.  He is tolerating p.o. well.  He is ambulating.    ROS:  Gen- No fevers, chills  CV- No chest pain, palpitations  Resp- No cough, dyspnea  GI- No N/V/D, abd pain     Objective   Objective     Vital Signs:   Temp:  [97.9 °F (36.6 °C)-98.8 °F (37.1 °C)] 98.3 °F (36.8 °C)  Heart Rate:  [73-94] 81  Resp:  [16-17] 16  BP: (111-159)/() 141/77     Physical Exam:  Constitutional: No acute distress, awake, alert  HENT: NCAT, mucous membranes moist  Respiratory: Clear to auscultation bilaterally, respiratory effort normal   Cardiovascular: RRR  Gastrointestinal: Positive bowel sounds, soft, nontender, nondistended  Musculoskeletal: Ambulating  Psychiatric: Appropriate affect, cooperative  Neurologic: Oriented x 3, strength symmetric in all extremities, Cranial Nerves grossly intact to confrontation, speech clear  Skin: No rashes    Results Reviewed:  LAB RESULTS:      Lab 23  0347 23  0450 23  0555   WBC 11.72* 9.87 11.17*   HEMOGLOBIN 9.4* 9.4* 8.9*   HEMATOCRIT 30.4* 32.0* 28.1*   PLATELETS 202 162 138*   NEUTROS ABS 8.10* 6.96 8.41*   IMMATURE GRANS (ABS) 0.06* 0.04 0.05   LYMPHS ABS 2.23 1.65 1.52   MONOS ABS 0.92* 0.85 1.01*   EOS ABS 0.34 0.30 0.14   MCV 94.4 97.9* 91.5         Lab 23  0649 23  0701 23  2348 23  0528   SODIUM 138 137 138 138   POTASSIUM 4.4 4.6 4.4 4.5   CHLORIDE 103 104 104 106   CO2 21.0* 20.0* 18.0* 19.0*   ANION GAP 14.0 13.0 16.0* 13.0   BUN 74* 103* 89* 94*   CREATININE 6.21* 7.05* 6.55* 6.51*   EGFR 10.4* 9.0* 9.8* 9.9*   GLUCOSE 124* 138* 153* 161*   CALCIUM 9.5 9.4 9.4 9.0    PHOSPHORUS  --   --  6.5*  --          Lab 04/22/23  2348   ALBUMIN 2.7*                     Brief Urine Lab Results  (Last result in the past 365 days)      Color   Clarity   Blood   Leuk Est   Nitrite   Protein   CREAT   Urine HCG        01/23/23 2223             34.4               Microbiology Results Abnormal     Procedure Component Value - Date/Time    COVID PRE-OP / PRE-PROCEDURE SCREENING ORDER (NO ISOLATION) - Swab, Nasopharynx [[id number]]  (Normal) Collected: 04/18/23 2152    Lab Status: Final result Specimen: Swab from Nasopharynx Updated: 04/18/23 2224    Narrative:      The following orders were created for panel order COVID PRE-OP / PRE-PROCEDURE SCREENING ORDER (NO ISOLATION) - Swab, Nasopharynx.  Procedure                               Abnormality         Status                     ---------                               -----------         ------                     COVID-19 and FLU A/B PCR...[[id number]]  Normal              Final result                 Please view results for these tests on the individual orders.    COVID-19 and FLU A/B PCR - Swab, Nasopharynx [[id number]]  (Normal) Collected: 04/18/23 2152    Lab Status: Final result Specimen: Swab from Nasopharynx Updated: 04/18/23 2224     COVID19 Not Detected     Influenza A PCR Not Detected     Influenza B PCR Not Detected    Narrative:      Fact sheet for providers: https://www.fda.gov/media/389433/download    Fact sheet for patients: https://www.fda.gov/media/632507/download    Test performed by PCR.          No radiology results from the last 24 hrs    Results for orders placed during the hospital encounter of 01/22/23    Adult Transthoracic Echo Complete With Contrast if Necessary Per Protocol    Interpretation Summary  •  Left ventricular ejection fraction appears to be 51 - 55%.  •  Left ventricular wall thickness is consistent with mild to moderate concentric hypertrophy.  •  The cardiac valves are anatomically and functionally  normal.      Current medications:  Scheduled Meds:atorvastatin, 40 mg, Oral, Nightly  b complex-vitamin c-folic acid, 1 tablet, Oral, Daily  bumetanide, 2 mg, Oral, BID  calcium acetate, 667 mg, Oral, TID With Meals  [START ON 5/1/2023] epoetin yaron/yaron-epbx, 10,000 Units, Subcutaneous, Once per day on Mon Wed Fri  hydrALAZINE, 75 mg, Oral, Q8H  insulin lispro, 0-7 Units, Subcutaneous, TID AC  isosorbide mononitrate, 120 mg, Oral, Q24H  metoprolol tartrate, 50 mg, Oral, Q12H  NIFEdipine XL, 90 mg, Oral, Daily  pharmacy consult - MTM, , Does not apply, Daily  sodium bicarbonate, 650 mg, Oral, TID  sodium chloride, 10 mL, Intravenous, Q12H      Continuous Infusions:   PRN Meds:.•  acetaminophen  •  albumin human  •  benzonatate  •  Calcium Replacement - Follow Nurse / BPA Driven Protocol  •  cloNIDine  •  dextrose  •  dextrose  •  glucagon (human recombinant)  •  heparin (porcine)  •  HYDROcodone-acetaminophen  •  Magnesium Standard Dose Replacement - Follow Nurse / BPA Driven Protocol  •  Phosphorus Replacement - Follow Nurse / BPA Driven Protocol  •  Potassium Replacement - Follow Nurse / BPA Driven Protocol  •  sodium chloride  •  sodium chloride    Assessment & Plan   Assessment & Plan     Active Hospital Problems    Diagnosis  POA   • **CKD (chronic kidney disease) stage 5, GFR less than 15 ml/min [N18.5]  Yes   • Hypertensive urgency [I16.0]  Yes   • Elevated troponin [R77.8]  Yes   • Hyperlipidemia [E78.5]  Yes   • Essential hypertension [I10]  Yes   • Type 2 diabetes mellitus with hyperglycemia (HCC) [E11.65]  Yes      Resolved Hospital Problems   No resolved problems to display.        Brief Hospital Course to date:  Angel Blair is a 47 y.o. male w/ HTN, HLD, DM2, LEFTY, CKD5 who has been out of home meds for ~1mo pta and largely lost to follow up due to insurance issues who presented w/ SOB, cough, was found to be volume overloaded and required initiation of HD; he has had clinical improvement but  remains hospitalized due to difficulties with outpatient HD due to insurance issues    The following problems are new to me today    Assessment/Plan    CKD stage 5 w/ decompensation and new HD start  Anemia of chronic renal disease  -follows w/ nephro, lost to f/u pta due to insurance  -started on HD this admission  -cont phos binder and NaBicarb tabs  -seen by Dr. Herrera, planning opt vein mapping and fistula  -IV iron and epo per nephro  -nephrology plans hemodialysis on 4/29/2023 with continuation of outpatient dialysis after on Tuesday Thursday Saturday    Acute decompensated HFpEF  -volume overload 2/2 renal failure; patient denies prior Dx CHF however cards reports NICM; data deficit (no prior EF's documented)  -TTE EF 51-55%  -volume control via HD, also on po bumex    HTN s/p hypertensive urgency  HLD  -cont statin, bumex, hydralazine, imdur, lopressor, procardia    DM type 2, a1c 6.4%, w/o long term use of insulin  -a1c as high at 13% in 2019  -cont SSI    Chronically elevated troponins  -in the setting of renal disease, no chest pain  -seen by cards, no indication for ischemic w/u at this time    LT UE superficial thrombophlebitis  -supportive care    Expected Discharge Location and Transportation: home w/ opt HD  Expected Discharge Expected Discharge Date: 04/29/23       DVT prophylaxis:  Medical DVT prophylaxis orders are present.     AM-PAC 6 Clicks Score (PT): 24 (04/28/23 8944)    CODE STATUS:   Code Status and Medical Interventions:   Ordered at: 04/19/23 0322     Code Status (Patient has no pulse and is not breathing):    CPR (Attempt to Resuscitate)     Medical Interventions (Patient has pulse or is breathing):    Full Support       Jaclyn Hill MD  04/28/23

## 2023-04-28 NOTE — NURSING NOTE
Contacted [facility] regarding pt missing Blue jacket, wallet and keys; staff states there were no belongings left by patient. Contacted [employer] and they state they will come make a [phone] spoke with [employer] staff Genie regarding pt's missing belongings. Provided prior room number, where belongings were located in the room and charge number for [facility]. She states employee works nights and will  Have supervisor follow up regarding missing items to see if they were placed somewhere.

## 2023-04-28 NOTE — CASE MANAGEMENT/SOCIAL WORK
Continued Stay Note   [place]     Patient Name: Angel Blair  MRN: [id number]  Today's Date: 4/28/2023    Admit Date: 4/18/2023    Plan: Home   Discharge Plan     Row Name 04/28/23 1418       Plan    Plan Home    Patient/Family in Agreement with Plan yes    Plan Comments I spoke with Gini at [facility] about Mr. Blair's dialysis plan. [facility] will be providing dialysis for the patient on Tuesdays, Thursdays, and Saturdays at 11:15am. The patient will have dialysis here tomorrow and will likely discharge after. He will need to report to [facility] on Tuesday May 2 at 11am. Patient notified of plan and is agreement. CM to follow and assist as needed.    Final Discharge Disposition Code 01 - home or self-care               Discharge Codes    No documentation.               Expected Discharge Date and Time     Expected Discharge Date Expected Discharge Time    May 1, 2023             Demetrice Cano RN

## 2023-04-28 NOTE — PLAN OF CARE
Problem: Hypertension Comorbidity  Goal: Blood Pressure in Desired Range  Intervention: Maintain Blood Pressure Management  Description: Evaluate adherence to home antihypertensive regimen (e.g., exercise and activity, diet modification, medication).  Provide scheduled antihypertensive medication; consider administration time and effects (e.g., avoid giving diuretic prior to bedtime).  Monitor response to antihypertensive medication therapy (e.g., blood pressure, electrolyte levels, medication effects).  Minimize risk of orthostatic hypotension; encourage caution with position changes, particularly if elderly.  Recent Flowsheet Documentation  Taken 4/28/2023 0400 by Mildred Cronin RN  Medication Review/Management: medications reviewed  Taken 4/28/2023 0200 by Mildred Cronin RN  Medication Review/Management: medications reviewed  Taken 4/28/2023 0000 by Mildred Cronin RN  Medication Review/Management: medications reviewed  Taken 4/27/2023 2200 by Mildred Cronin RN  Medication Review/Management: medications reviewed  Taken 4/27/2023 2000 by Mildred Cronin RN  Medication Review/Management: medications reviewed          Problem: Obstructive Sleep Apnea Risk or Actual Comorbidity Management  Goal: Unobstructed Breathing During Sleep  4/28/2023 0500 by Mildred Cronin RN  Outcome: Ongoing, Progressing  4/28/2023 0459 by Mildred Cronin RN  Outcome: Ongoing, Progressing  4/28/2023 0458 by Mildred Cronin RN  Outcome: Ongoing, Progressing     Problem: Adult Inpatient Plan of Care  Goal: Absence of Hospital-Acquired Illness or Injury  Intervention: Identify and Manage Fall Risk  Description: Perform standard risk assessment on admission using a validated tool or comprehensive approach appropriate to the patient; reassess fall risk frequently, with change in status or transfer to another level of care.  Communicate fall injury risk to interprofessional healthcare team.  Determine need for increased observation,  equipment and environmental modification, such as low bed, signage and supportive, nonskid footwear.  Adjust safety measures to individual developmental age, stage and identified risk factors.  Reinforce the importance of safety and physical activity with patient and family.  Perform regular intentional rounding to assess need for position change, pain assessment and personal needs, including assistance with toileting.  Recent Flowsheet Documentation  Taken 4/28/2023 0400 by Mildred Cronin, RN  Safety Promotion/Fall Prevention:   activity supervised   assistive device/personal items within reach   clutter free environment maintained   fall prevention program maintained   lighting adjusted   mobility aid in reach   nonskid shoes/slippers when out of bed   room organization consistent   safety round/check completed  Taken 4/28/2023 0200 by Mildred Cronin, RN  Safety Promotion/Fall Prevention:   activity supervised   assistive device/personal items within reach   clutter free environment maintained   fall prevention program maintained   lighting adjusted   mobility aid in reach   nonskid shoes/slippers when out of bed   room organization consistent   safety round/check completed  Taken 4/28/2023 0000 by Mildred Cronin, RN  Safety Promotion/Fall Prevention:   activity supervised   assistive device/personal items within reach   clutter free environment maintained   fall prevention program maintained   lighting adjusted   mobility aid in reach   nonskid shoes/slippers when out of bed   room organization consistent   safety round/check completed  Taken 4/27/2023 2200 by Mildred Cronin, RN  Safety Promotion/Fall Prevention:   activity supervised   assistive device/personal items within reach   clutter free environment maintained   fall prevention program maintained   lighting adjusted   mobility aid in reach   nonskid shoes/slippers when out of bed   room organization consistent   safety round/check completed  Taken  4/27/2023 2000 by Mildred Cronin RN  Safety Promotion/Fall Prevention:   activity supervised   assistive device/personal items within reach   clutter free environment maintained   fall prevention program maintained   lighting adjusted   mobility aid in reach   nonskid shoes/slippers when out of bed   room organization consistent   safety round/check completed     Problem: Adult Inpatient Plan of Care  Goal: Absence of Hospital-Acquired Illness or Injury  Intervention: Prevent Skin Injury  Description: Perform a screening for skin injury risk, such as pressure or moisture associated skin damage on admission and at regular intervals throughout hospital stay.  Keep all areas of skin (especially folds) clean and dry.  Maintain adequate skin hydration.  Relieve and redistribute pressure and protect bony prominences; implement measures based on patient-specific risk factors.  Match turning and repositioning schedule to clinical condition.  Encourage weight shift frequently; assist with reposition if unable to complete independently.  Float heels off bed; avoid pressure on the Achilles tendon.  Keep skin free from extended contact with medical devices.  Encourage functional activity and mobility, as early as tolerated.  Use aids (e.g., slide boards, mechanical lift) during transfer.  Recent Flowsheet Documentation  Taken 4/28/2023 0400 by Mildred Cronin RN  Body Position: position changed independently  Taken 4/28/2023 0200 by Mildred Cronin RN  Body Position: position changed independently  Taken 4/28/2023 0000 by Mildred Cronin RN  Body Position: position changed independently  Taken 4/27/2023 2200 by Mildred Cronin RN  Body Position: position changed independently  Taken 4/27/2023 2000 by Mildred Cronin RN  Body Position: position changed independently  Skin Protection:   adhesive use limited   tubing/devices free from skin contact   transparent dressing maintained   skin-to-skin areas padded   skin-to-device  areas padded     Problem: Adult Inpatient Plan of Care  Goal: Absence of Hospital-Acquired Illness or Injury  Intervention: Prevent and Manage VTE (Venous Thromboembolism) Risk  Description: Assess for VTE (venous thromboembolism) risk.  Encourage and assist with early ambulation.  Initiate and maintain compression or other therapy, as indicated, based on identified risk in accordance with organizational protocol and provider order.  Encourage both active and passive leg exercises while in bed, if unable to ambulate.  Recent Flowsheet Documentation  Taken 4/28/2023 0400 by Mildred Cronin, RN  Activity Management: up ad chepe  Taken 4/28/2023 0200 by Mildred Cronin, RN  Activity Management: up ad chepe  Taken 4/28/2023 0000 by Mildred Cronin, RN  Activity Management: up in chair  Taken 4/27/2023 2200 by Mildred Cronin, RN  Activity Management: up ad chepe  Taken 4/27/2023 2000 by Mildred Cronin, RN  Activity Management: activity encouraged

## 2023-04-28 NOTE — PLAN OF CARE
Goal Outcome Evaluation:  Plan of Care Reviewed With: patient        Progress: improving  Outcome Evaluation: Pt has met all goals and is independent with all mobility tasks. Pt encouraged to amb in halls ad chepe. No further IPPT services warranted at this time. D/C PT services. PT rec d/c home with assist as needed.

## 2023-04-28 NOTE — PROGRESS NOTES
"   LOS: 9 days    Patient Care Team:  Jay Nevarez MD as PCP - General (Emergency Medicine)  Mirza Amezquita MD as Consulting Physician (Cardiology)    Subjective     Plan for HD in AM.         Objective     Vital Signs:  Blood pressure 142/80, pulse 82, temperature 98.4 °F (36.9 °C), temperature source Oral, resp. rate 16, height 185.4 cm (73\"), weight 123 kg (271 lb 11.2 oz), SpO2 96 %.      Intake/Output Summary (Last 24 hours) at 4/28/2023 1710  Last data filed at 4/28/2023 1418  Gross per 24 hour   Intake 585 ml   Output 200 ml   Net 385 ml        04/27 0701 - 04/28 0700  In: 720 [P.O.:720]  Out: 2500     Physical Exam:        GENERAL: WD AAM NAD  NEURO: Awake and alert, oriented. No focal deficit  PSYCHIATRIC: NMA. Cooperative with PE  EYE: PE, no icterus, no conjunctivitis  ENT: ommm, dentition intact,  Hearing intact  NECK: Supple , No JVD discernable,  Trachea midline  CV: + Edema, RRR  LUNGS:  Quiet,  Nonlabored resp.  Symmetrical expansion  ABDOMEN: Nondistended, soft nontender.  : No Coyne, no palp bladder  SKIN: Warm and dry without rash      Labs:  Results from last 7 days   Lab Units 04/28/23  0347 04/25/23  0450 04/22/23  0555   WBC 10*3/mm3 11.72* 9.87 11.17*   HEMOGLOBIN g/dL 9.4* 9.4* 8.9*   PLATELETS 10*3/mm3 202 162 138*     Results from last 7 days   Lab Units 04/26/23  0649 04/24/23  0701 04/22/23  2348 04/22/23  0528   SODIUM mmol/L 138 137 138 138   POTASSIUM mmol/L 4.4 4.6 4.4 4.5   CHLORIDE mmol/L 103 104 104 106   CO2 mmol/L 21.0* 20.0* 18.0* 19.0*   BUN mg/dL 74* 103* 89* 94*   CREATININE mg/dL 6.21* 7.05* 6.55* 6.51*   CALCIUM mg/dL 9.5 9.4 9.4 9.0   PHOSPHORUS mg/dL  --   --  6.5*  --    ALBUMIN g/dL  --   --  2.7*  --                        Estimated Creatinine Clearance: 20.2 mL/min (A) (by C-G formula based on SCr of 6.21 mg/dL (H)).         A/P:  ESRD: Likely progression to ESRD.  BUN and creatinine continue to rise with negative volume.   Bx proven severe nephrosclerosis " w adaptive FSGS changes and diabetic nephropathy 90 percent fibrosis on bx. Rapid loss of kidney function in last few yrs. Lost to f/u after discharge from [facility] in Jan 2023. Cr worse on this admission likley Acute component vs progression of underlying CKD in the setting of uncontrolled HTN and volume overload.  Tunneled dialysis catheter placed 4/24/2023 with initiation of HD.  No new labs today.  We will plan additional dialysis in AM.  Can restart diuretics as needed.  Case management working on outpatient HD placement.    HTN: Blood pressure stable.  Continue current medications for now.    Metabolic acidosis: We will give p.o. bicarb.  Adjust with HD on Monday.    Anemia: Hemoglobin below goal.  TSAT 7%.  Will give IV iron.  Transfuse as needed for hemoglobin <7.  Start Epogen.    Volume: Stable.  Restart diuretics.  UF as needed on HD.  Would try to avoid aggressive UF to allow retention of underlying residual renal function.  Can increase diuretics as needed to maintain volume status.    Bone mineralization: Phosphorus elevated.  Started binders.  Low Phos diet.    Nutrition: Continue low Phos low potassium renal diet..  Start renal vitamin.    Recs  HD per TTS marleen.   Stable for discharge from renal standpoint. As long as he has HD chair.    High risk and complexity patient.    Jaren Rai MD  04/28/23  17:10 EDT

## 2023-04-29 ENCOUNTER — APPOINTMENT (OUTPATIENT)
Dept: NEPHROLOGY | Facility: HOSPITAL | Age: 48
DRG: 291 | End: 2023-04-29

## 2023-04-29 ENCOUNTER — READMISSION MANAGEMENT (OUTPATIENT)
Dept: CALL CENTER | Facility: HOSPITAL | Age: 48
End: 2023-04-29

## 2023-04-29 VITALS
HEART RATE: 73 BPM | SYSTOLIC BLOOD PRESSURE: 133 MMHG | TEMPERATURE: 98 F | DIASTOLIC BLOOD PRESSURE: 80 MMHG | HEIGHT: 73 IN | OXYGEN SATURATION: 94 % | RESPIRATION RATE: 16 BRPM | WEIGHT: 271.7 LBS | BODY MASS INDEX: 36.01 KG/M2

## 2023-04-29 LAB
ALBUMIN SERPL-MCNC: 3.2 G/DL (ref 3.5–5.2)
ANION GAP SERPL CALCULATED.3IONS-SCNC: 12 MMOL/L (ref 5–15)
BUN SERPL-MCNC: 61 MG/DL (ref 6–20)
BUN/CREAT SERPL: 10.6 (ref 7–25)
CALCIUM SPEC-SCNC: 9.6 MG/DL (ref 8.6–10.5)
CHLORIDE SERPL-SCNC: 102 MMOL/L (ref 98–107)
CO2 SERPL-SCNC: 26 MMOL/L (ref 22–29)
CREAT SERPL-MCNC: 5.78 MG/DL (ref 0.76–1.27)
EGFRCR SERPLBLD CKD-EPI 2021: 11.4 ML/MIN/1.73
GLUCOSE BLDC GLUCOMTR-MCNC: 134 MG/DL (ref 70–130)
GLUCOSE BLDC GLUCOMTR-MCNC: 144 MG/DL (ref 70–130)
GLUCOSE SERPL-MCNC: 144 MG/DL (ref 65–99)
PHOSPHATE SERPL-MCNC: 5.1 MG/DL (ref 2.5–4.5)
POTASSIUM SERPL-SCNC: 4.3 MMOL/L (ref 3.5–5.2)
SODIUM SERPL-SCNC: 140 MMOL/L (ref 136–145)

## 2023-04-29 PROCEDURE — 99239 HOSP IP/OBS DSCHRG MGMT >30: CPT | Performed by: NURSE PRACTITIONER

## 2023-04-29 PROCEDURE — 82948 REAGENT STRIP/BLOOD GLUCOSE: CPT

## 2023-04-29 PROCEDURE — 80069 RENAL FUNCTION PANEL: CPT | Performed by: INTERNAL MEDICINE

## 2023-04-29 RX ORDER — FOLIC ACID/VIT B COMPLEX AND C 0.8 MG
1 TABLET ORAL DAILY
Qty: 30 TABLET | Refills: 1 | Status: SHIPPED | OUTPATIENT
Start: 2023-04-30

## 2023-04-29 RX ORDER — CALCIUM ACETATE 667 MG/1
667 CAPSULE ORAL
Qty: 180 CAPSULE | Refills: 1 | Status: SHIPPED | OUTPATIENT
Start: 2023-04-29

## 2023-04-29 RX ORDER — CLONIDINE HYDROCHLORIDE 0.1 MG/1
0.1 TABLET ORAL
Qty: 60 TABLET | Refills: 1 | Status: SHIPPED | OUTPATIENT
Start: 2023-04-29

## 2023-04-29 RX ORDER — BUMETANIDE 2 MG/1
2 TABLET ORAL 2 TIMES DAILY
Qty: 60 TABLET | Refills: 1 | Status: SHIPPED | OUTPATIENT
Start: 2023-04-29

## 2023-04-29 RX ORDER — BENZONATATE 100 MG/1
100 CAPSULE ORAL 3 TIMES DAILY PRN
Qty: 30 CAPSULE | Refills: 1 | Status: SHIPPED | OUTPATIENT
Start: 2023-04-29

## 2023-04-29 RX ORDER — MANNITOL 250 MG/ML
25 INJECTION, SOLUTION INTRAVENOUS AS NEEDED
Status: DISCONTINUED | OUTPATIENT
Start: 2023-04-29 | End: 2023-04-29 | Stop reason: HOSPADM

## 2023-04-29 RX ORDER — ALBUMIN (HUMAN) 12.5 G/50ML
12.5 SOLUTION INTRAVENOUS AS NEEDED
Status: DISCONTINUED | OUTPATIENT
Start: 2023-04-29 | End: 2023-04-29 | Stop reason: HOSPADM

## 2023-04-29 RX ADMIN — BUMETANIDE 2 MG: 2 TABLET ORAL at 12:08

## 2023-04-29 RX ADMIN — Medication 10 ML: at 12:09

## 2023-04-29 RX ADMIN — METOPROLOL TARTRATE 50 MG: 50 TABLET ORAL at 12:08

## 2023-04-29 RX ADMIN — HYDRALAZINE HYDROCHLORIDE 75 MG: 50 TABLET, FILM COATED ORAL at 15:40

## 2023-04-29 RX ADMIN — Medication 1 TABLET: at 12:07

## 2023-04-29 RX ADMIN — ISOSORBIDE MONONITRATE 120 MG: 60 TABLET, EXTENDED RELEASE ORAL at 12:08

## 2023-04-29 RX ADMIN — HYDRALAZINE HYDROCHLORIDE 75 MG: 50 TABLET, FILM COATED ORAL at 05:08

## 2023-04-29 RX ADMIN — NIFEDIPINE 90 MG: 30 TABLET, EXTENDED RELEASE ORAL at 12:14

## 2023-04-29 RX ADMIN — CALCIUM ACETATE 667 MG: 667 CAPSULE ORAL at 12:08

## 2023-04-29 NOTE — PLAN OF CARE
Problem: Hypertension Comorbidity  Goal: Blood Pressure in Desired Range  Outcome: Ongoing, Progressing  Intervention: Maintain Blood Pressure Management  Description: Evaluate adherence to home antihypertensive regimen (e.g., exercise and activity, diet modification, medication).  Provide scheduled antihypertensive medication; consider administration time and effects (e.g., avoid giving diuretic prior to bedtime).  Monitor response to antihypertensive medication therapy (e.g., blood pressure, electrolyte levels, medication effects).  Minimize risk of orthostatic hypotension; encourage caution with position changes, particularly if elderly.  Recent Flowsheet Documentation  Taken 4/29/2023 0413 by Mildred Cronin RN  Medication Review/Management: medications reviewed  Taken 4/29/2023 0200 by Mildred Cronin RN  Medication Review/Management: medications reviewed  Taken 4/29/2023 0000 by Mildred Cronni RN  Medication Review/Management: medications reviewed  Taken 4/28/2023 2200 by Mildred Cronin RN  Medication Review/Management: medications reviewed  Taken 4/28/2023 2000 by Mildred Cronin RN  Medication Review/Management: medications reviewed     Problem: Adult Inpatient Plan of Care  Goal: Absence of Hospital-Acquired Illness or Injury  Intervention: Identify and Manage Fall Risk  Description: Perform standard risk assessment on admission using a validated tool or comprehensive approach appropriate to the patient; reassess fall risk frequently, with change in status or transfer to another level of care.  Communicate fall injury risk to interprofessional healthcare team.  Determine need for increased observation, equipment and environmental modification, such as low bed, signage and supportive, nonskid footwear.  Adjust safety measures to individual developmental age, stage and identified risk factors.  Reinforce the importance of safety and physical activity with patient and family.  Perform regular intentional  rounding to assess need for position change, pain assessment and personal needs, including assistance with toileting.  Recent Flowsheet Documentation  Taken 4/29/2023 0413 by Mildred Cronin RN  Safety Promotion/Fall Prevention:   activity supervised   assistive device/personal items within reach   clutter free environment maintained   fall prevention program maintained   lighting adjusted   mobility aid in reach   nonskid shoes/slippers when out of bed   room organization consistent   safety round/check completed  Taken 4/29/2023 0200 by Mildred Cronin RN  Safety Promotion/Fall Prevention:   activity supervised   assistive device/personal items within reach   clutter free environment maintained   fall prevention program maintained   lighting adjusted   mobility aid in reach   nonskid shoes/slippers when out of bed   room organization consistent   safety round/check completed  Taken 4/29/2023 0000 by Mildred Cronin RN  Safety Promotion/Fall Prevention:   activity supervised   assistive device/personal items within reach   clutter free environment maintained   fall prevention program maintained   lighting adjusted   mobility aid in reach   nonskid shoes/slippers when out of bed   room organization consistent   safety round/check completed  Taken 4/28/2023 2200 by Mildred Cronin RN  Safety Promotion/Fall Prevention:   activity supervised   assistive device/personal items within reach   clutter free environment maintained   fall prevention program maintained   lighting adjusted   mobility aid in reach   nonskid shoes/slippers when out of bed   room organization consistent   safety round/check completed  Taken 4/28/2023 2000 by Mildred Cronin RN  Safety Promotion/Fall Prevention:   activity supervised   assistive device/personal items within reach   clutter free environment maintained   fall prevention program maintained   lighting adjusted   mobility aid in reach   nonskid shoes/slippers when out of bed   room  organization consistent   safety round/check completed     Problem: Adult Inpatient Plan of Care  Goal: Absence of Hospital-Acquired Illness or Injury  Intervention: Prevent Skin Injury  Description: Perform a screening for skin injury risk, such as pressure or moisture associated skin damage on admission and at regular intervals throughout hospital stay.  Keep all areas of skin (especially folds) clean and dry.  Maintain adequate skin hydration.  Relieve and redistribute pressure and protect bony prominences; implement measures based on patient-specific risk factors.  Match turning and repositioning schedule to clinical condition.  Encourage weight shift frequently; assist with reposition if unable to complete independently.  Float heels off bed; avoid pressure on the Achilles tendon.  Keep skin free from extended contact with medical devices.  Encourage functional activity and mobility, as early as tolerated.  Use aids (e.g., slide boards, mechanical lift) during transfer.  Recent Flowsheet Documentation  Taken 4/29/2023 0413 by Mildred Cronin RN  Body Position: position changed independently  Taken 4/29/2023 0200 by Mildred Cronin RN  Body Position: position changed independently  Taken 4/29/2023 0000 by Mildred Cronin RN  Body Position: position changed independently  Taken 4/28/2023 2200 by Mildred Cronin RN  Body Position: position changed independently  Taken 4/28/2023 2000 by Mildred Cronin RN  Body Position: position changed independently  Skin Protection:   adhesive use limited   tubing/devices free from skin contact   transparent dressing maintained   skin-to-skin areas padded   skin-to-device areas padded     Problem: Adult Inpatient Plan of Care  Goal: Absence of Hospital-Acquired Illness or Injury  Intervention: Prevent and Manage VTE (Venous Thromboembolism) Risk  Description: Assess for VTE (venous thromboembolism) risk.  Encourage and assist with early ambulation.  Initiate and maintain  compression or other therapy, as indicated, based on identified risk in accordance with organizational protocol and provider order.  Encourage both active and passive leg exercises while in bed, if unable to ambulate.  Recent Flowsheet Documentation  Taken 4/29/2023 0413 by Mildred Cronin, RN  Activity Management: up ad chepe  Taken 4/29/2023 0200 by Mildred Cronin, RN  Activity Management: up ad chepe  Taken 4/29/2023 0000 by Mildred Cronin, RN  Activity Management: up ad chepe  Taken 4/28/2023 2200 by Mildred Cronin, RN  Activity Management: up ad chepe  Taken 4/28/2023 2000 by Mildred Cronin, RN  Activity Management: up ad chepe

## 2023-04-29 NOTE — DISCHARGE SUMMARY
[facility] Medicine Services  DISCHARGE SUMMARY    Patient Name: Angel Blair  : 1975  MRN: [id number]    Date of Admission: 2023 10:50 PM  Date of Discharge: 2023  Primary Care Physician: Jay Nevarez MD    Consults     Date and Time Order Name Status Description    2023  1:18 PM Inpatient General Surgery Consult Completed     2023  3:23 AM Inpatient Nephrology Consult      2023  3:23 AM Inpatient Cardiology Consult Completed           Hospital Course     Presenting Problem:   Acute on chronic systolic congestive heart failure [I50.23]    Active Hospital Problems    Diagnosis  POA   • **CKD (chronic kidney disease) stage 5, GFR less than 15 ml/min [N18.5]  Yes   • Hypertensive urgency [I16.0]  Yes   • Elevated troponin [R77.8]  Yes   • Hyperlipidemia [E78.5]  Yes   • Essential hypertension [I10]  Yes   • Type 2 diabetes mellitus with hyperglycemia (HCC) [E11.65]  Yes      Resolved Hospital Problems   No resolved problems to display.          Hospital Course:  Angel Blair is a 47 y.o. male w/ HTN, HLD, DM2, LEFTY, CKD5 who has been out of home meds for ~1mo pta and largely lost to follow up due to insurance issues who presented w/ SOB, cough, was found to be volume overloaded and required initiation of HD; he has had clinical improvement but remains hospitalized due to difficulties with outpatient HD due to insurance issues     CKD stage 5 w/ decompensation and new HD start  Anemia of chronic renal disease  -follows w/ nephro, lost to f/u pta due to insurance  -started on HD this admission  -cont phos binder and NaBicarb tabs  -seen by Dr. Herrera, planning opt vein mapping and fistula  -IV iron and epo per nephro  -nephrology plans hemodialysis on 2023 with continuation of outpatient dialysis after on      Acute decompensated HFpEF  -volume overload 2/2 renal failure; patient denies prior Dx CHF however  cards reports NICM; data deficit (no prior EF's documented)  -TTE EF 51-55%  -volume control via HD, also on po bumex     HTN s/p hypertensive urgency  HLD  -cont statin, bumex, hydralazine, imdur, lopressor, procardia     DM type 2, a1c 6.4%, w/o long term use of insulin  -a1c as high at 13% in 2019  -cont SSI     Chronically elevated troponins  -in the setting of renal disease, no chest pain  -seen by cards, no indication for ischemic w/u at this time     LT UE superficial thrombophlebitis  -supportive care    Patient states he is ready to be discharged home and will be transported by his wife.  Discharge follow-up recommendations and appointments are listed below.    Discharge Follow Up Recommendations for outpatient labs/diagnostics:  -- Follow-up with your family doctor in 1 week-call the office Monday to schedule an appointment  --Follow-up with nephrology and dialysis on Tuesday  --Follow-up with Dr. Amezquita in 1 to 2 months  -- Increase your Bumex dose to 2 times a day  --Take Nephro-Satish daily  --Take Phos binder as prescribed  --Will receive Retacrit in dialysis 3 days a week    Day of Discharge     HPI:   Patient sitting up in dialysis tolerating well.  He states he has no complaints and ready to be discharged home.  No adverse events overnight. +BM.     Vital Signs:   Temp:  [98 °F (36.7 °C)-98.7 °F (37.1 °C)] 98.1 °F (36.7 °C)  Heart Rate:  [79-91] 81  Resp:  [16] 16  BP: (107-167)/(71-80) 127/80      Physical Exam:  Constitutional: Alert, WD, WN male sitting up in bed receiving dialysis in NAD  Eyes: EOMI, sclerae anicteric, no conjunctival injection  Head: NCAT  ENT: [place], moist mucous membranes   Respiratory: Nonlabored, symmetrical chest expansion, CTAB, 98% RA  Cardiovascular: RRR, no M/R/G, +DP pulses bilaterally, indwelling dialysis cath in right upper chest  Gastrointestinal: Soft, NT, ND +BS  Musculoskeletal: KLEIN; no LE edema bilaterally  Neurologic: Oriented x4, strength symmetric in all  extremities, follows all commands, CN II-XII intact, speech clear  Skin: No rashes on exposed skin  Psychiatric: Pleasant and cooperative; normal affect      Pertinent  and/or Most Recent Results     LAB RESULTS:      Lab 04/28/23  0347 04/25/23  0450   WBC 11.72* 9.87   HEMOGLOBIN 9.4* 9.4*   HEMATOCRIT 30.4* 32.0*   PLATELETS 202 162   NEUTROS ABS 8.10* 6.96   IMMATURE GRANS (ABS) 0.06* 0.04   LYMPHS ABS 2.23 1.65   MONOS ABS 0.92* 0.85   EOS ABS 0.34 0.30   MCV 94.4 97.9*         Lab 04/29/23  0839 04/26/23  0649 04/24/23  0701 04/22/23  2348   SODIUM 140 138 137 138   POTASSIUM 4.3 4.4 4.6 4.4   CHLORIDE 102 103 104 104   CO2 26.0 21.0* 20.0* 18.0*   ANION GAP 12.0 14.0 13.0 16.0*   BUN 61* 74* 103* 89*   CREATININE 5.78* 6.21* 7.05* 6.55*   EGFR 11.4* 10.4* 9.0* 9.8*   GLUCOSE 144* 124* 138* 153*   CALCIUM 9.6 9.5 9.4 9.4   PHOSPHORUS 5.1*  --   --  6.5*         Lab 04/29/23  0839 04/22/23  2348   ALBUMIN 3.2* 2.7*                     Brief Urine Lab Results  (Last result in the past 365 days)      Color   Clarity   Blood   Leuk Est   Nitrite   Protein   CREAT   Urine HCG        01/23/23 2223             34.4             Microbiology Results (last 10 days)     ** No results found for the last 240 hours. **          XR Chest 2 View    Result Date: 4/18/2023  XR CHEST 2 VW Date of Exam: 4/18/2023 9:52 PM EDT Indication: cp/sob. Comparison: 1/22/2023 Findings: Patchy airspace disease is seen throughout the lungs bilaterally, most prominent within the left midlung and the right lower lobe. Small bilateral pleural effusions are present. There is indistinctness of the pulmonary vasculature. The heart appears enlarged. No pneumothorax. No acute osseous abnormality identified.     Impression: Mild pulmonary edema pattern with small bilateral pleural effusions and cardiomegaly. Superimposed patchy airspace changes are present within the right lung base in the left midlung which may be related to a mild pneumonia and/or  atelectasis. Electronically Signed: Conchis Garcia  4/18/2023 10:08 PM EDT  Workstation ID: [id number]    CT Chest Without Contrast Diagnostic    Result Date: 4/19/2023  EXAM: CT SCAN OF THE CHEST WITHOUT CONTRAST INDICATION: Abnormal chest x-ray TECHNIQUE: CT scan through the chest was performed without the administration of intravenous contrast. CT dose lowering techniques were used, to include: automated exposure control, adjustment for patient size, and / or use of iterative reconstruction. COMPARISON: 2/18/2021 FINDINGS: Vasculature: There is no thoracic aortic aneurysm.  There are atherosclerotic calcifications of the thoracic aorta, great vessels and coronary arteries. Mediastinum / Pleura: Small pleural effusions are present. Calcified subcarinal and right hilar lymph nodes are present. Airways / Lungs: The central airways are patent.  There is no pneumothorax. There is patchy airspace disease in both lungs, with perihilar predominance. Bones and Chest Wall: No destructive osseous lesion is identified. Upper Abdomen: Limited images below the diaphragm demonstrate no acute abnormality.     1. Patchy airspace disease in both lungs, with perihilar predominance. This is favored to represent pneumonia in the acute setting. Please note alveolar pulmonary edema is also within the differential. 2. Small pleural effusions. Electronically signed by:  Trevin Matos M.D.  4/19/2023 12:52 AM Mountain Time    IR Tunneled Catheter    Result Date: 4/24/2023  IR TUNNELED CATHETER Date of Exam: 4/24/2023 8:45 AM EDT Indication: Need for dialysis. Comparison: None available. Technique: The procedure, its benefits, risks and alternatives were explained to the patient and informed consent was obtained after answering the patient's questions. A time-out was performed and the patient's name, date of birth, procedure and correct site of procedure were verified. Conscious sedation was administered by a registered nurse. The blood  pressure, pulse and oxygen saturation were monitored throughout the procedure and remained stable. The patient was placed in the supine position in the angiography suite. The right internal jugular vein was evaluated sonographically and found to be patent. Skin was marked prepped draped and anesthetized with 1% Xylocaine. Using maximal sterile barrier  technique and under local anesthesia a micropuncture was performed under ultrasound guidance. An 018 wire was inserted. Following placement of an 035 wire, serial dilatation was performed and a 16 [place] introducer sheath was placed. A site was chosen laterally over the right anterior chest wall and the skin was anesthetized with 1% Xylocaine and the tunnel was anesthetized. 1 cm incision was made. The catheter was tunneled to the right IJ site and subsequently deployed through the peel-away sheath with the tip in the right atrium. The right IJ site was closed with a single 3-0 Vicryl suture in the deep fascia. The catheter was sutured to the skin utilizing 2-0 nylon suture. The catheter was flushed with heparin solution. A spot radiograph was obtained documenting catheter placement. Permanent ultrasound images were recorded. The patient tolerated the procedure well and was transferred to the [facility] in stable condition for postprocedure monitoring and recovery. There were no immediate complications. Total fluoroscopy time: 30 seconds Total physician moderate sedation time: 19 minutes     Impression: Technically successful placement of a tunneled dialysis catheter under ultrasound and fluoroscopy. Electronically Signed: Robin Carter  4/24/2023 10:05 AM EDT  Workstation ID: [id number]    Duplex Vein Mapping Study Upper Extremity - Left CAR    Addendum Date: 4/22/2023    •  The left basilic vein is patent and of adequate size in the upper arm. •  Chronic superficial thrombophlebitis noted in the left cephalic vein in the forearm. •  Chronic left upper extremity superficial  thrombophlebitis noted in the cephalic (upper arm) and median cubital. •  All other left sided vessels appear normal.     Result Date: 4/22/2023  •  Chronic left upper extremity superficial thrombophlebitis noted in the cephalic (upper arm) and median cubital. •  All other left sided vessels appear normal.       Results for orders placed during the hospital encounter of 04/18/23    Duplex Vein Mapping Study Upper Extremity - Left CAR    Interpretation Summary  •  The left basilic vein is patent and of adequate size in the upper arm.  •  Chronic superficial thrombophlebitis noted in the left cephalic vein in the forearm.  •  Chronic left upper extremity superficial thrombophlebitis noted in the cephalic (upper arm) and median cubital.  •  All other left sided vessels appear normal.      Results for orders placed during the hospital encounter of 04/18/23    Duplex Vein Mapping Study Upper Extremity - Left CAR    Interpretation Summary  •  The left basilic vein is patent and of adequate size in the upper arm.  •  Chronic superficial thrombophlebitis noted in the left cephalic vein in the forearm.  •  Chronic left upper extremity superficial thrombophlebitis noted in the cephalic (upper arm) and median cubital.  •  All other left sided vessels appear normal.      Results for orders placed during the hospital encounter of 01/22/23    Adult Transthoracic Echo Complete With Contrast if Necessary Per Protocol    Interpretation Summary  •  Left ventricular ejection fraction appears to be 51 - 55%.  •  Left ventricular wall thickness is consistent with mild to moderate concentric hypertrophy.  •  The cardiac valves are anatomically and functionally normal.      Plan for Follow-up of Pending Labs/Results:     Discharge Details        Discharge Medications      New Medications      Instructions Start Date   b complex-vitamin c-folic acid 0.8 MG tablet tablet   1 tablet, Oral, Daily   Start Date: April 30, 2023     benzonatate  100 MG capsule  Commonly known as: TESSALON   100 mg, Oral, 3 Times Daily PRN      calcium acetate 667 MG capsule capsule  Commonly known as: PHOS BINDER)   667 mg, Oral, 3 Times Daily With Meals      cloNIDine 0.1 MG tablet  Commonly known as: CATAPRES   0.1 mg, Oral, Every 1 Hour PRN      epoetin yaron-epbx 50328 UNIT/ML injection  Commonly known as: RETACRIT   10,000 Units, Subcutaneous, 3 Times Weekly   Start Date: May 1, 2023        Changes to Medications      Instructions Start Date   bumetanide 2 MG tablet  Commonly known as: BUMEX  What changed: when to take this   2 mg, Oral, 2 Times Daily      NIFEdipine XL 90 MG 24 hr tablet  Commonly known as: PROCARDIA XL  What changed: Another medication with the same name was removed. Continue taking this medication, and follow the directions you see here.   90 mg, Oral, Daily         Continue These Medications      Instructions Start Date   acetaminophen 325 MG tablet  Commonly known as: TYLENOL   650 mg, Oral, Every 4 Hours PRN      atorvastatin 40 MG tablet  Commonly known as: LIPITOR   40 mg, Oral, Nightly      glipizide 5 MG tablet  Commonly known as: Glucotrol   2.5 mg, Oral, 2 Times Daily Before Meals      hydrALAZINE 25 MG tablet  Commonly known as: APRESOLINE   75 mg, Oral, Every 8 Hours Scheduled      isosorbide mononitrate 120 MG 24 hr tablet  Commonly known as: IMDUR   120 mg, Oral, Every 24 Hours Scheduled      metoprolol tartrate 50 MG tablet  Commonly known as: LOPRESSOR   50 mg, Oral, Every 12 Hours Scheduled             Allergies   Allergen Reactions   • Lisinopril Swelling         Discharge Disposition:  Home or Self Care    Diet:  Hospital:  Diet Order   Procedures   • Diet: Renal Diets, Cardiac Diets, Diabetic Diets; Healthy Heart (2-3 Na+); Consistent Carbohydrate; Low Phosphorus, Low Potassium; Texture: Regular Texture (IDDSI 7); Fluid Consistency: Thin (IDDSI 0)       Activity:  Activity Instructions     Activity as Tolerated             Restrictions or Other Recommendations:  None       CODE STATUS:    Code Status and Medical Interventions:   Ordered at: 04/19/23 0323     Code Status (Patient has no pulse and is not breathing):    CPR (Attempt to Resuscitate)     Medical Interventions (Patient has pulse or is breathing):    Full Support       Additional Instructions for the Follow-ups that You Need to Schedule     Discharge Follow-up with PCP   As directed       Currently Documented PCP:    Jay Nevarez MD    PCP Phone Number:    [phone]     Follow Up Details: 1 week; please call the office Monday to schedule an appointment         Discharge Follow-up with Specified Provider: Follow-up with Dr. Amezquita in 1 to 2 months   As directed      To: Follow-up with Dr. Amezquita in 1 to 2 months         Discharge Follow-up with Specified Provider: Follow-up with nephrology; Tuesday while getting dialysis   As directed      To: Follow-up with nephrology; Tuesday while getting dialysis                     ROBBY Lozano  04/29/23      Time Spent on Discharge:  I spent  40 minutes on this discharge activity which included: face-to-face encounter with the patient, reviewing the data in the system, coordination of the care with the nursing staff as well as consultants, documentation, and entering orders.

## 2023-04-29 NOTE — PROGRESS NOTES
"   LOS: 10 days    Patient Care Team:  Jay Nevarez MD as PCP - General (Emergency Medicine)  Mirza Amezquita MD as Consulting Physician (Cardiology)    Subjective     Seen on HD tolerating well so far. Goal UF 3 liter as tolerated. Bp better . Good access pressure.           Objective     Vital Signs:  Blood pressure 117/76, pulse 81, temperature 98.1 °F (36.7 °C), temperature source Oral, resp. rate 16, height 185.4 cm (73\"), weight 123 kg (271 lb 11.2 oz), SpO2 98 %.      Intake/Output Summary (Last 24 hours) at 4/29/2023 1115  Last data filed at 4/29/2023 0700  Gross per 24 hour   Intake 225 ml   Output 725 ml   Net -500 ml        04/28 0701 - 04/29 0700  In: 585 [P.O.:585]  Out: 925 [Urine:925]    Physical Exam:        GENERAL: WD AAM NAD  NEURO: Awake and alert, oriented. No focal deficit  PSYCHIATRIC: NMA. Cooperative with PE  EYE: PE, no icterus, no conjunctivitis  ENT: ommm, dentition intact,  Hearing intact  NECK: Supple , No JVD discernable,  Trachea midline  CV: + Edema, RRR  LUNGS:  Quiet,  Nonlabored resp.  Symmetrical expansion  ABDOMEN: Nondistended, soft nontender.  : No Coyne, no palp bladder  SKIN: Warm and dry without rash  Right IJ TDC      Labs:  Results from last 7 days   Lab Units 04/28/23  0347 04/25/23  0450   WBC 10*3/mm3 11.72* 9.87   HEMOGLOBIN g/dL 9.4* 9.4*   PLATELETS 10*3/mm3 202 162     Results from last 7 days   Lab Units 04/29/23  0839 04/26/23  0649 04/24/23  0701 04/22/23  2348   SODIUM mmol/L 140 138 137 138   POTASSIUM mmol/L 4.3 4.4 4.6 4.4   CHLORIDE mmol/L 102 103 104 104   CO2 mmol/L 26.0 21.0* 20.0* 18.0*   BUN mg/dL 61* 74* 103* 89*   CREATININE mg/dL 5.78* 6.21* 7.05* 6.55*   CALCIUM mg/dL 9.6 9.5 9.4 9.4   PHOSPHORUS mg/dL 5.1*  --   --  6.5*   ALBUMIN g/dL 3.2*  --   --  2.7*                       Estimated Creatinine Clearance: 21.7 mL/min (A) (by C-G formula based on SCr of 5.78 mg/dL (H)).         A/P:  ESRD: Likely progression to ESRD.  BUN and " creatinine continue to rise with negative volume.   Bx proven severe nephrosclerosis w adaptive FSGS changes and diabetic nephropathy 90 percent fibrosis on bx. Rapid loss of kidney function in last few yrs. Lost to f/u after discharge from [facility] in Jan 2023. Cr worse on this admission likley Acute component vs progression of underlying CKD in the setting of uncontrolled HTN and volume overload.  Tunneled dialysis catheter placed 4/24/2023 with initiation of HD.  No new labs today.  We will plan additional dialysis in AM.  Can restart diuretics as needed.  Case management working on outpatient HD placement.    HTN: Blood pressure stable.  Continue current medications for now.    Metabolic acidosis: We will give p.o. bicarb.  Adjust with HD on Monday.    Anemia: Hemoglobin below goal.  TSAT 7%.  Will give IV iron.  Transfuse as needed for hemoglobin <7.  Start Epogen.    Volume: Stable.  Restart diuretics.  UF as needed on HD.  Would try to avoid aggressive UF to allow retention of underlying residual renal function.  Can increase diuretics as needed to maintain volume status.    Bone mineralization: Phosphorus elevated.  Started binders.  Low Phos diet.    Nutrition: Continue low Phos low potassium renal diet..  Start renal vitamin.    Recs  HD per TTS marleen.   Stable for discharge from renal standpoint.   Continue antiHTN including diuretics on discharge  D/C Na-bicarb on discharge.     High risk and complexity patient.    Jaren Rai MD  04/29/23  11:15 EDT

## 2023-04-29 NOTE — PLAN OF CARE
Goal Outcome Evaluation: Pt tolerated tx well.UF:2600 mls removed   BLP:62 Called report to CRYS [facility]  Problem: Hemodynamic Instability (Hemodialysis)  Goal: Effective Tissue Perfusion  Outcome: Ongoing, Progressing     Problem: Device-Related Complication Risk (Hemodialysis)  Goal: Safe, Effective Therapy Delivery  Outcome: Ongoing, Progressing     Problem: Infection (Hemodialysis)  Goal: Absence of Infection Signs and Symptoms  Outcome: Ongoing, Progressing

## 2023-04-30 NOTE — OUTREACH NOTE
Prep Survey    Flowsheet Row Responses   [facility] patient discharged from? [place]   Is LACE score < 7 ? No   Eligibility Readm Mgmt   Discharge diagnosis CKD   Does the patient have one of the following disease processes/diagnoses(primary or secondary)? Other   Does the patient have Home health ordered? No   Is there a DME ordered? No   Comments regarding appointments [facility] Sat 11:15   Prep survey completed? Yes          MICHELE BUNCH - Registered Nurse

## 2023-05-04 ENCOUNTER — READMISSION MANAGEMENT (OUTPATIENT)
Dept: CALL CENTER | Facility: HOSPITAL | Age: 48
End: 2023-05-04

## 2023-05-04 NOTE — OUTREACH NOTE
Medical Week 1 Survey    Flowsheet Row Responses   [facility] patient discharged from? [place]   Does the patient have one of the following disease processes/diagnoses(primary or secondary)? Other   Week 1 attempt successful? Yes   Call start time 1057   Call end time 1114   Discharge diagnosis CKD   Is patient permission given to speak with other caregiver? Yes   Person spoke with today (if not patient) and relationship Wilda   Meds reviewed with patient/caregiver? Yes   Is the patient having any side effects they believe may be caused by any medication additions or changes? No   Does the patient have all medications ordered at discharge? No   What is keeping the patient from filling the prescriptions? Script on hold per patient   Nursing Interventions Nurse called pharmacy   Prescription comments Insurance issues with many rx not covered, discussed with pharm and coupons available for the pt.   Is the patient taking all medications as directed (includes completed medication regime)? N/A   Comments regarding appointments [facility] Sat 11:15   Does the patient have a primary care provider?  Yes   Does the patient have an appointment with their PCP within 7 days of discharge? Yes   Has the patient kept scheduled appointments due by today? Yes   Has home health visited the patient within 72 hours of discharge? N/A   Psychosocial issues? No   Did the patient receive a copy of their discharge instructions? Yes   Nursing interventions Reviewed instructions with patient   What is the patient's perception of their health status since discharge? Improving   Is the patient/caregiver able to teach back signs and symptoms related to disease process for when to call PCP? Yes   Is the patient/caregiver able to teach back signs and symptoms related to disease process for when to call 911? Yes   Is the patient/caregiver able to teach back the hierarchy of who to call/visit for symptoms/problems? PCP, Specialist,  Home health nurse, Urgent Care, ED, 911 Yes   Additional teach back comments Pt stopped at pharm Tuesday evening with insurance info.   Week 1 call completed? Yes   Is the patient interested in additional calls from an ambulatory ?  NOTE:  applies to high risk patients requiring additional follow-up. No   Wrap up additional comments Pharmacy states insurance is not covering the RXs. Encouraged PCP appt this week.  [Ins effective May 1, RX written April 29.]          Eliza MAR - Registered Nurse

## 2023-05-11 ENCOUNTER — READMISSION MANAGEMENT (OUTPATIENT)
Dept: CALL CENTER | Facility: HOSPITAL | Age: 48
End: 2023-05-11

## 2023-05-11 NOTE — OUTREACH NOTE
Medical Week 2 Survey    Flowsheet Row Responses   [facility] [place] patient discharged from? [place]   Does the patient have one of the following disease processes/diagnoses(primary or secondary)? Other   Week 2 attempt successful? No  [Called both number,  unsuccessful]   Unsuccessful attempts Attempt 1          Maxine MAY - Registered Nurse

## 2023-05-25 ENCOUNTER — PRIOR AUTHORIZATION (OUTPATIENT)
Dept: CARDIOLOGY | Facility: CLINIC | Age: 48
End: 2023-05-25

## 2023-05-25 NOTE — TELEPHONE ENCOUNTER
GOPAL FLOWER     Key: [id number] -     PA Case ID: [id number]    Need help? Call us at [phone]    Status  Sent to Plan today  Drug  NIFEdipine ER 90MG er tablets  Form  [employer] Electronic Prior Authorization Form (2017 NCPDP)  ? Prescriber Instructions

## 2023-06-01 ENCOUNTER — TELEPHONE (OUTPATIENT)
Dept: CARDIOLOGY | Facility: CLINIC | Age: 48
End: 2023-06-01

## 2023-06-01 NOTE — TELEPHONE ENCOUNTER
Multiple phone calls made in response to fax received from [facility] advising denial for nifedipine, they advise I must call [phone], that is all the information that staff would/could give me. Per their employee, the reason for the received fax was so they could give us the above number.    Above number called:  [facility]  [phone]    MEMBER ID:  [id number]    APPEAL cannot be done over the phone, she gave a fax number for an appeal of:  [phone]

## 2024-01-24 ENCOUNTER — TRANSCRIBE ORDERS (OUTPATIENT)
Dept: ADMINISTRATIVE | Facility: HOSPITAL | Age: 49
End: 2024-01-24
Payer: COMMERCIAL

## 2024-01-24 DIAGNOSIS — Z01.818 OTHER SPECIFIED PRE-OPERATIVE EXAMINATION: Primary | ICD-10-CM

## 2024-01-31 ENCOUNTER — HOSPITAL ENCOUNTER (OUTPATIENT)
Dept: CARDIOLOGY | Facility: HOSPITAL | Age: 49
Discharge: HOME | End: 2024-01-31
Admitting: SURGERY
Payer: COMMERCIAL

## 2024-01-31 VITALS — BODY MASS INDEX: 33.8 KG/M2 | WEIGHT: 255 LBS | HEIGHT: 73 IN

## 2024-01-31 DIAGNOSIS — Z01.818 OTHER SPECIFIED PRE-OPERATIVE EXAMINATION: ICD-10-CM

## 2024-01-31 PROCEDURE — 93986 DUP-SCAN HEMO COMPL UNI STD: CPT

## 2024-02-01 LAB
BH CV UPPER VENOUS LEFT AXILLARY AUGMENT: NORMAL
BH CV UPPER VENOUS LEFT AXILLARY COMPRESS: NORMAL
BH CV UPPER VENOUS LEFT AXILLARY PHASIC: NORMAL
BH CV UPPER VENOUS LEFT AXILLARY SPONT: NORMAL
BH CV UPPER VENOUS LEFT BASILIC FOREARM COMPRESS: NORMAL
BH CV UPPER VENOUS LEFT BASILIC UPPER COMPRESS: NORMAL
BH CV UPPER VENOUS LEFT BRACHIAL COMPRESS: NORMAL
BH CV UPPER VENOUS LEFT CEPHALIC FOREARM COLOR: 1
BH CV UPPER VENOUS LEFT CEPHALIC FOREARM COMPRESS: NORMAL
BH CV UPPER VENOUS LEFT CEPHALIC FOREARM THROMBUS: NORMAL
BH CV UPPER VENOUS LEFT CEPHALIC UPPER COLOR: 1
BH CV UPPER VENOUS LEFT CEPHALIC UPPER COMPRESS: NORMAL
BH CV UPPER VENOUS LEFT CEPHALIC UPPER THROMBUS: NORMAL
BH CV UPPER VENOUS LEFT RADIAL COMPRESS: NORMAL
BH CV UPPER VENOUS LEFT SUBCLAVIAN AUGMENT: NORMAL
BH CV UPPER VENOUS LEFT SUBCLAVIAN PHASIC: NORMAL
BH CV UPPER VENOUS LEFT SUBCLAVIAN SPONT: NORMAL
BH CV UPPER VENOUS LEFT ULNAR COMPRESS: NORMAL
BH CV VAS MEAS BASILIC ANTECUBITAL FOSSA LEFT: 0.15 CM
BH CV VAS MEAS BASILIC FOREARM LEFT - PROX: 0.1 CM
BH CV VAS MEAS BASILIC UPPER ARM LEFT - DIST: 0.22 CM
BH CV VAS MEAS BASILIC UPPER ARM LEFT - MID: 0.17 CM
BH CV VAS MEAS BASILIC UPPER ARM LEFT - PROX: 0.27 CM
BH CV VAS MEAS CEPHALIC ANTECUBITAL FOSSA LEFT: 0.1 CM
BH CV VAS MEAS CEPHALIC FOREARM LEFT - DIST: 0.94 CM
BH CV VAS MEAS CEPHALIC FOREARM LEFT - MID: 0.09 CM
BH CV VAS MEAS CEPHALIC FOREARM LEFT - PROX: 0.09 CM
BH CV VAS MEAS CEPHALIC UPPER ARM LEFT - DIST: 0.1 CM
BH CV VAS MEAS CEPHALIC UPPER ARM LEFT - MID: 0.1 CM
BH CV VAS MEAS CEPHALIC UPPER ARM LEFT - PROX: 0.14 CM
BH CV VAS MEAS RADIAL UPPER ARM LEFT - DIST: 0.27 CM
UPPER ARTERIAL LEFT ARM BRACHIAL LENGTH: 0.41 CM

## 2024-04-08 ENCOUNTER — PRE-ADMISSION TESTING (OUTPATIENT)
Dept: PREADMISSION TESTING | Facility: HOSPITAL | Age: 49
End: 2024-04-08
Payer: COMMERCIAL

## 2024-04-08 VITALS — WEIGHT: 264.66 LBS | BODY MASS INDEX: 35.08 KG/M2 | HEIGHT: 73 IN

## 2024-04-08 LAB
DEPRECATED RDW RBC AUTO: 48.4 FL (ref 37–54)
ERYTHROCYTE [DISTWIDTH] IN BLOOD BY AUTOMATED COUNT: 13.3 % (ref 12.3–15.4)
HBA1C MFR BLD: 5.2 % (ref 4.8–5.6)
HCT VFR BLD AUTO: 32.5 % (ref 37.5–51)
HGB BLD-MCNC: 10.4 G/DL (ref 13–17.7)
INR PPP: 0.97 (ref 0.89–1.12)
MCH RBC QN AUTO: 31.6 PG (ref 26.6–33)
MCHC RBC AUTO-ENTMCNC: 32 G/DL (ref 31.5–35.7)
MCV RBC AUTO: 98.8 FL (ref 79–97)
PLATELET # BLD AUTO: 128 10*3/MM3 (ref 140–450)
PMV BLD AUTO: 12.6 FL (ref 6–12)
PROTHROMBIN TIME: 13 SECONDS (ref 12.2–14.5)
QT INTERVAL: 432 MS
QTC INTERVAL: 409 MS
RBC # BLD AUTO: 3.29 10*6/MM3 (ref 4.14–5.8)
WBC NRBC COR # BLD AUTO: 6.29 10*3/MM3 (ref 3.4–10.8)

## 2024-04-08 PROCEDURE — 36415 COLL VENOUS BLD VENIPUNCTURE: CPT

## 2024-04-08 PROCEDURE — 93005 ELECTROCARDIOGRAM TRACING: CPT

## 2024-04-08 PROCEDURE — 85610 PROTHROMBIN TIME: CPT

## 2024-04-08 PROCEDURE — 83036 HEMOGLOBIN GLYCOSYLATED A1C: CPT

## 2024-04-08 PROCEDURE — 93010 ELECTROCARDIOGRAM REPORT: CPT | Performed by: INTERNAL MEDICINE

## 2024-04-08 PROCEDURE — 85027 COMPLETE CBC AUTOMATED: CPT

## 2024-04-08 RX ORDER — POLYETHYLENE GLYCOL-3350 AND ELECTROLYTES 236; 6.74; 5.86; 2.97; 22.74 G/274.31G; G/274.31G; G/274.31G; G/274.31G; G/274.31G
POWDER, FOR SOLUTION ORAL SEE ADMIN INSTRUCTIONS
COMMUNITY
Start: 2024-02-19

## 2024-04-08 NOTE — PAT
An arrival time for procedure was not provided during PAT visit. If patient had any questions or concerns about their arrival time, they were instructed to contact their surgeon/physician.  Additionally, if the patient referred to an arrival time that was acquired from their my chart account, patient was encouraged to verify that time with their surgeon/physician. Arrival times are NOT provided in Pre Admission Testing Department.    Patient viewed general PAT education video as instructed in their preoperative information received from their surgeon.  Patient stated the general PAT education video was viewed in its entirety and survey completed.  Copies of PAT general education handouts (Incentive Spirometry, Meds to Beds Program, Patient Belongings, Pre-op skin preparation instructions, Blood Glucose testing, Visitor policy, Surgery FAQ, Code H) distributed to patient if not printed. Education related to the PAT pass and skin preparation for surgery (if applicable) completed in PAT as a reinforcement to PAT education video. Patient instructed to return PAT pass provided today as well as completed skin preparation sheet (if applicable) on the day of procedure.     Additionally if patient had not viewed video yet but intended to view it at home or in our waiting area, then referred them to the handout with QR code/link provided during PAT visit.  Instructed patient to complete survey after viewing the video in its entirety.  Encouraged patient/family to read PAT general education handouts thoroughly and notify PAT staff with any questions or concerns. Patient verbalized understanding of all information and priority content.    Patient denies any current skin issues.     Patient to apply Chlorhexadine wipes  to surgical area (as instructed) the night before procedure and the AM of procedure. Wipes provided.    Patient to apply Chlorhexadine wipes  to surgical area (as instructed) the night before procedure and the AM  of procedure. Wipes provided.    EKG IN CHART.

## 2024-04-12 ENCOUNTER — ANESTHESIA EVENT (OUTPATIENT)
Dept: PERIOP | Facility: HOSPITAL | Age: 49
End: 2024-04-12
Payer: COMMERCIAL

## 2024-04-12 RX ORDER — FAMOTIDINE 10 MG/ML
20 INJECTION, SOLUTION INTRAVENOUS ONCE
Status: CANCELLED | OUTPATIENT
Start: 2024-04-12 | End: 2024-04-12

## 2024-04-12 RX ORDER — SODIUM CHLORIDE, SODIUM LACTATE, POTASSIUM CHLORIDE, CALCIUM CHLORIDE 600; 310; 30; 20 MG/100ML; MG/100ML; MG/100ML; MG/100ML
9 INJECTION, SOLUTION INTRAVENOUS CONTINUOUS
Status: CANCELLED | OUTPATIENT
Start: 2024-04-12

## 2024-04-15 ENCOUNTER — ANESTHESIA (OUTPATIENT)
Dept: PERIOP | Facility: HOSPITAL | Age: 49
End: 2024-04-15
Payer: COMMERCIAL

## 2024-04-15 ENCOUNTER — HOSPITAL ENCOUNTER (OUTPATIENT)
Facility: HOSPITAL | Age: 49
Setting detail: HOSPITAL OUTPATIENT SURGERY
Discharge: HOME | End: 2024-04-15
Attending: SURGERY | Admitting: SURGERY
Payer: COMMERCIAL

## 2024-04-15 ENCOUNTER — ANESTHESIA EVENT CONVERTED (OUTPATIENT)
Dept: ANESTHESIOLOGY | Facility: HOSPITAL | Age: 49
End: 2024-04-15
Payer: COMMERCIAL

## 2024-04-15 VITALS
RESPIRATION RATE: 16 BRPM | DIASTOLIC BLOOD PRESSURE: 83 MMHG | OXYGEN SATURATION: 97 % | SYSTOLIC BLOOD PRESSURE: 137 MMHG | HEART RATE: 64 BPM | TEMPERATURE: 97.8 F

## 2024-04-15 DIAGNOSIS — N18.5 CKD (CHRONIC KIDNEY DISEASE) STAGE 5, GFR LESS THAN 15 ML/MIN: Primary | ICD-10-CM

## 2024-04-15 LAB
ALBUMIN SERPL-MCNC: 3.9 G/DL (ref 3.5–5.2)
ALBUMIN/GLOB SERPL: 1.1 G/DL
ALP SERPL-CCNC: 73 U/L (ref 39–117)
ALT SERPL W P-5'-P-CCNC: 16 U/L (ref 1–41)
ANION GAP SERPL CALCULATED.3IONS-SCNC: 14 MMOL/L (ref 5–15)
AST SERPL-CCNC: 15 U/L (ref 1–40)
BILIRUB SERPL-MCNC: 0.2 MG/DL (ref 0–1.2)
BUN SERPL-MCNC: 69 MG/DL (ref 6–20)
BUN/CREAT SERPL: 8.9 (ref 7–25)
CALCIUM SPEC-SCNC: 8.8 MG/DL (ref 8.6–10.5)
CHLORIDE SERPL-SCNC: 95 MMOL/L (ref 98–107)
CO2 SERPL-SCNC: 26 MMOL/L (ref 22–29)
CREAT SERPL-MCNC: 7.77 MG/DL (ref 0.76–1.27)
EGFRCR SERPLBLD CKD-EPI 2021: 7.9 ML/MIN/1.73
GLOBULIN UR ELPH-MCNC: 3.4 GM/DL
GLUCOSE BLDC GLUCOMTR-MCNC: 132 MG/DL (ref 70–130)
GLUCOSE SERPL-MCNC: 138 MG/DL (ref 65–99)
POTASSIUM SERPL-SCNC: 4 MMOL/L (ref 3.5–5.2)
PROT SERPL-MCNC: 7.3 G/DL (ref 6–8.5)
SODIUM SERPL-SCNC: 135 MMOL/L (ref 136–145)

## 2024-04-15 PROCEDURE — 82948 REAGENT STRIP/BLOOD GLUCOSE: CPT

## 2024-04-15 PROCEDURE — 25010000002 FENTANYL CITRATE (PF) 50 MCG/ML SOLUTION: Performed by: NURSE ANESTHETIST, CERTIFIED REGISTERED

## 2024-04-15 PROCEDURE — 25010000002 PROPOFOL 10 MG/ML EMULSION: Performed by: NURSE ANESTHETIST, CERTIFIED REGISTERED

## 2024-04-15 PROCEDURE — 25010000002 VANCOMYCIN: Performed by: SURGERY

## 2024-04-15 PROCEDURE — S0260 H&P FOR SURGERY: HCPCS | Performed by: PHYSICIAN ASSISTANT

## 2024-04-15 PROCEDURE — 25010000002 DEXAMETHASONE PER 1 MG: Performed by: NURSE ANESTHETIST, CERTIFIED REGISTERED

## 2024-04-15 PROCEDURE — 25010000002 ONDANSETRON PER 1 MG: Performed by: NURSE ANESTHETIST, CERTIFIED REGISTERED

## 2024-04-15 PROCEDURE — 25010000002 HEPARIN (PORCINE) PER 1000 UNITS: Performed by: SURGERY

## 2024-04-15 PROCEDURE — C1768 GRAFT, VASCULAR: HCPCS | Performed by: SURGERY

## 2024-04-15 PROCEDURE — 25810000003 SODIUM CHLORIDE 0.9 % SOLUTION: Performed by: ANESTHESIOLOGY

## 2024-04-15 PROCEDURE — 80053 COMPREHEN METABOLIC PANEL: CPT | Performed by: ANESTHESIOLOGY

## 2024-04-15 PROCEDURE — 25810000003 SODIUM CHLORIDE 0.9 % SOLUTION: Performed by: NURSE ANESTHETIST, CERTIFIED REGISTERED

## 2024-04-15 DEVICE — GRFT VASC COLLGN 5MM 30CM: Type: IMPLANTABLE DEVICE | Site: ARM | Status: FUNCTIONAL

## 2024-04-15 DEVICE — FLOSEAL WITH RECOTHROM - 5ML
Type: IMPLANTABLE DEVICE | Site: ARM | Status: FUNCTIONAL
Brand: FLOSEAL HEMOSTATIC MATRIX

## 2024-04-15 DEVICE — LIGACLIP MCA MULTIPLE CLIP APPLIERS, 20 MEDIUM CLIPS
Type: IMPLANTABLE DEVICE | Site: ARM | Status: FUNCTIONAL
Brand: LIGACLIP

## 2024-04-15 DEVICE — LIGACLIP MCA MULTIPLE CLIP APPLIERS, 20 SMALL CLIPS
Type: IMPLANTABLE DEVICE | Site: ARM | Status: FUNCTIONAL
Brand: LIGACLIP

## 2024-04-15 RX ORDER — DOCUSATE SODIUM 250 MG
250 CAPSULE ORAL DAILY
Qty: 10 CAPSULE | Refills: 0 | Status: SHIPPED | OUTPATIENT
Start: 2024-04-15

## 2024-04-15 RX ORDER — SODIUM CHLORIDE 0.9 % (FLUSH) 0.9 %
10 SYRINGE (ML) INJECTION AS NEEDED
Status: DISCONTINUED | OUTPATIENT
Start: 2024-04-15 | End: 2024-04-15 | Stop reason: HOSPADM

## 2024-04-15 RX ORDER — PHENYLEPHRINE HCL IN 0.9% NACL 1 MG/10 ML
SYRINGE (ML) INTRAVENOUS AS NEEDED
Status: DISCONTINUED | OUTPATIENT
Start: 2024-04-15 | End: 2024-04-15 | Stop reason: SURG

## 2024-04-15 RX ORDER — FENTANYL CITRATE 50 UG/ML
50 INJECTION, SOLUTION INTRAMUSCULAR; INTRAVENOUS
Status: DISCONTINUED | OUTPATIENT
Start: 2024-04-15 | End: 2024-04-15 | Stop reason: HOSPADM

## 2024-04-15 RX ORDER — PROPOFOL 10 MG/ML
VIAL (ML) INTRAVENOUS AS NEEDED
Status: DISCONTINUED | OUTPATIENT
Start: 2024-04-15 | End: 2024-04-15 | Stop reason: SURG

## 2024-04-15 RX ORDER — SODIUM CHLORIDE 9 MG/ML
INJECTION, SOLUTION INTRAVENOUS CONTINUOUS PRN
Status: DISCONTINUED | OUTPATIENT
Start: 2024-04-15 | End: 2024-04-15 | Stop reason: SURG

## 2024-04-15 RX ORDER — DEXAMETHASONE SODIUM PHOSPHATE 4 MG/ML
INJECTION, SOLUTION INTRA-ARTICULAR; INTRALESIONAL; INTRAMUSCULAR; INTRAVENOUS; SOFT TISSUE AS NEEDED
Status: DISCONTINUED | OUTPATIENT
Start: 2024-04-15 | End: 2024-04-15 | Stop reason: SURG

## 2024-04-15 RX ORDER — SODIUM CHLORIDE 9 MG/ML
40 INJECTION, SOLUTION INTRAVENOUS AS NEEDED
Status: DISCONTINUED | OUTPATIENT
Start: 2024-04-15 | End: 2024-04-15 | Stop reason: HOSPADM

## 2024-04-15 RX ORDER — HYDROMORPHONE HYDROCHLORIDE 1 MG/ML
0.5 INJECTION, SOLUTION INTRAMUSCULAR; INTRAVENOUS; SUBCUTANEOUS
Status: DISCONTINUED | OUTPATIENT
Start: 2024-04-15 | End: 2024-04-15 | Stop reason: HOSPADM

## 2024-04-15 RX ORDER — DROPERIDOL 2.5 MG/ML
0.62 INJECTION, SOLUTION INTRAMUSCULAR; INTRAVENOUS ONCE AS NEEDED
Status: DISCONTINUED | OUTPATIENT
Start: 2024-04-15 | End: 2024-04-15 | Stop reason: HOSPADM

## 2024-04-15 RX ORDER — LIDOCAINE HYDROCHLORIDE 10 MG/ML
INJECTION, SOLUTION EPIDURAL; INFILTRATION; INTRACAUDAL; PERINEURAL AS NEEDED
Status: DISCONTINUED | OUTPATIENT
Start: 2024-04-15 | End: 2024-04-15 | Stop reason: SURG

## 2024-04-15 RX ORDER — SODIUM CHLORIDE 9 MG/ML
9 INJECTION, SOLUTION INTRAVENOUS ONCE
Status: COMPLETED | OUTPATIENT
Start: 2024-04-15 | End: 2024-04-15

## 2024-04-15 RX ORDER — SODIUM CHLORIDE 0.9 % (FLUSH) 0.9 %
10 SYRINGE (ML) INJECTION EVERY 12 HOURS SCHEDULED
Status: DISCONTINUED | OUTPATIENT
Start: 2024-04-15 | End: 2024-04-15 | Stop reason: HOSPADM

## 2024-04-15 RX ORDER — LIDOCAINE HYDROCHLORIDE 10 MG/ML
0.5 INJECTION, SOLUTION EPIDURAL; INFILTRATION; INTRACAUDAL; PERINEURAL ONCE AS NEEDED
Status: COMPLETED | OUTPATIENT
Start: 2024-04-15 | End: 2024-04-15

## 2024-04-15 RX ORDER — HYDROCODONE BITARTRATE AND ACETAMINOPHEN 5; 325 MG/1; MG/1
1 TABLET ORAL EVERY 8 HOURS PRN
Qty: 10 TABLET | Refills: 0 | Status: SHIPPED | OUTPATIENT
Start: 2024-04-15

## 2024-04-15 RX ORDER — MIDAZOLAM HYDROCHLORIDE 1 MG/ML
1 INJECTION INTRAMUSCULAR; INTRAVENOUS
Status: DISCONTINUED | OUTPATIENT
Start: 2024-04-15 | End: 2024-04-15 | Stop reason: HOSPADM

## 2024-04-15 RX ORDER — MAGNESIUM HYDROXIDE 1200 MG/15ML
LIQUID ORAL AS NEEDED
Status: DISCONTINUED | OUTPATIENT
Start: 2024-04-15 | End: 2024-04-15 | Stop reason: HOSPADM

## 2024-04-15 RX ORDER — FAMOTIDINE 20 MG/1
20 TABLET, FILM COATED ORAL ONCE
Status: COMPLETED | OUTPATIENT
Start: 2024-04-15 | End: 2024-04-15

## 2024-04-15 RX ORDER — FENTANYL CITRATE 50 UG/ML
INJECTION, SOLUTION INTRAMUSCULAR; INTRAVENOUS AS NEEDED
Status: DISCONTINUED | OUTPATIENT
Start: 2024-04-15 | End: 2024-04-15 | Stop reason: SURG

## 2024-04-15 RX ORDER — ONDANSETRON 2 MG/ML
INJECTION INTRAMUSCULAR; INTRAVENOUS AS NEEDED
Status: DISCONTINUED | OUTPATIENT
Start: 2024-04-15 | End: 2024-04-15 | Stop reason: SURG

## 2024-04-15 RX ADMIN — PROPOFOL 200 MG: 10 INJECTION, EMULSION INTRAVENOUS at 08:19

## 2024-04-15 RX ADMIN — SODIUM CHLORIDE: 9 INJECTION, SOLUTION INTRAVENOUS at 08:10

## 2024-04-15 RX ADMIN — FAMOTIDINE 20 MG: 20 TABLET, FILM COATED ORAL at 07:13

## 2024-04-15 RX ADMIN — Medication 50 MCG: at 08:41

## 2024-04-15 RX ADMIN — DEXAMETHASONE SODIUM PHOSPHATE 4 MG: 4 INJECTION, SOLUTION INTRA-ARTICULAR; INTRALESIONAL; INTRAMUSCULAR; INTRAVENOUS; SOFT TISSUE at 08:24

## 2024-04-15 RX ADMIN — Medication 1750 MG: at 07:14

## 2024-04-15 RX ADMIN — Medication 100 MCG: at 08:47

## 2024-04-15 RX ADMIN — LIDOCAINE HYDROCHLORIDE 0.2 ML: 10 INJECTION, SOLUTION EPIDURAL; INFILTRATION; INTRACAUDAL; PERINEURAL at 07:13

## 2024-04-15 RX ADMIN — SODIUM CHLORIDE 9 ML/HR: 9 INJECTION, SOLUTION INTRAVENOUS at 07:13

## 2024-04-15 RX ADMIN — ONDANSETRON 4 MG: 2 INJECTION INTRAMUSCULAR; INTRAVENOUS at 11:05

## 2024-04-15 RX ADMIN — FENTANYL CITRATE 100 MCG: 50 INJECTION, SOLUTION INTRAMUSCULAR; INTRAVENOUS at 08:19

## 2024-04-15 RX ADMIN — LIDOCAINE HYDROCHLORIDE 50 MG: 10 INJECTION, SOLUTION EPIDURAL; INFILTRATION; INTRACAUDAL; PERINEURAL at 08:19

## 2024-04-15 NOTE — ANESTHESIA PREPROCEDURE EVALUATION
Anesthesia Evaluation     Patient summary reviewed and Nursing notes reviewed   no history of anesthetic complications:   NPO Solid Status: > 8 hours  NPO Liquid Status: > 2 hours           Airway   Mallampati: II  TM distance: >3 FB  Neck ROM: full  No difficulty expected  Dental - normal exam     Pulmonary - normal exam   (+) ,sleep apnea (per emr, no cpap)  (-) not a smoker  Cardiovascular - normal exam  Exercise tolerance: good (4-7 METS)    ECG reviewed  Patient on routine beta blocker and Beta blocker given within 24 hours of surgery    (+) hypertension, CHF , hyperlipidemia  (-) dysrhythmias, cardiac stents    ROS comment: 1/2023-·  Left ventricular ejection fraction appears to be 51 - 55%.  ·  Left ventricular wall thickness is consistent with mild to moderate concentric hypertrophy.  ·  The cardiac valves are anatomically and functionally normal.    7/2021-Myocardial perfusion imaging indicates a normal myocardial perfusion study with no evidence of ischemia. Impressions are consistent with a low risk study. REST: 37% EF  STRESS: 48% EF     4/2023- cards - 1. Continue current CV medications.   2. We will continue to follow from a distance  3. Volume management per Renal  4. From a cardiology perspective, he is stable to be discharged home once his renal issues have been sorted out  5. Admitted with elevated troponins, at this point no ischemic evaluation plan, as this is felt to be due to hypertensive crisis and renal failure.        Neuro/Psych  (-) seizures, CVA  GI/Hepatic/Renal/Endo    (+) morbid obesity, renal disease- CRI, ESRD and dialysis, diabetes mellitus  (-) GERD    Musculoskeletal     Abdominal    Substance History - negative use     OB/GYN          Other        ROS/Med Hx Other: Hgb 10.4   plt 128   Climbs a flight of stairs without CP, no new SOA  No gerd/n/v/glp1  HD TRSAT, last HD 4/13/24                Anesthesia Plan    ASA 3     general with block     (Risks and benefits of general  anesthesia discussed with patient (including MI, CVA, death, recall, aspiration, oropharyngeal/dental damage), questions answered, agreeable to proceed.    Benefits and risks of nerve block/catheter discussed with patient ((including failed block, damage to nerve/nearby structures, intravascular injection)); questions answered, agreeable to proceed.      )  intravenous induction     Anesthetic plan, risks, benefits, and alternatives have been provided, discussed and informed consent has been obtained with: patient.    Plan discussed with CRNA.    CODE STATUS:

## 2024-04-15 NOTE — ANESTHESIA PROCEDURE NOTES
Airway  Urgency: elective    Date/Time: 4/15/2024 8:21 AM  Airway not difficult    General Information and Staff    Patient location during procedure: OR  CRNA/CAA: Junior VELVET Caballero, CRNA    Indications and Patient Condition  Indications for airway management: airway protection    Preoxygenated: yes  Mask difficulty assessment: 1 - vent by mask    Final Airway Details  Final airway type: supraglottic airway      Successful airway: I-gel  Size 4     Number of attempts at approach: 1  Assessment: lips, teeth, and gum same as pre-op    Additional Comments  LMA placed without difficulty, ventilation with assist, equal breath sounds and symmetric chest rise and fall

## 2024-04-15 NOTE — ANESTHESIA POSTPROCEDURE EVALUATION
Patient: Angel Blair    Procedure Summary       Date: 04/15/24 Room / Location:  [facility] /  [facility]    Anesthesia Start: 0810 Anesthesia Stop: 1123    Procedure: LEFT UPPER EXTREMITY AV FISTULA CREATION WITH ARTEGRAFT, BRACHIOAXILLARY (Left) Diagnosis:     Surgeons: Nico Herrera MD Provider: Coni Breen DO    Anesthesia Type: general with block ASA Status: 3            Anesthesia Type: general with block    Vitals  Vitals Value Taken Time   BP     Temp     Pulse 73 04/15/24 1122   Resp     SpO2 99 % 04/15/24 1122   Vitals shown include unfiled device data.        Post Anesthesia Care and Evaluation    Patient location during evaluation: PACU  Patient participation: complete - patient participated  Level of consciousness: awake and alert  Pain management: adequate    Airway patency: patent  Anesthetic complications: No anesthetic complications  PONV Status: none  Cardiovascular status: hemodynamically stable and acceptable  Respiratory status: nonlabored ventilation, acceptable and nasal cannula  Hydration status: acceptable    Comments: 152/86 [id number] 97.1

## 2024-04-15 NOTE — ANESTHESIA PROCEDURE NOTES
Peripheral Block      Patient reassessed immediately prior to procedure    Patient location during procedure: pre-op  Reason for block: at surgeon's request and post-op pain management  Performed by  CRNA/CAA: Shaun Horn CRNA  Assisted by: Mei Clemons RN  Preanesthetic Checklist  Completed: patient identified, IV checked, site marked, risks and benefits discussed, surgical consent, monitors and equipment checked, pre-op evaluation and timeout performed  Prep:  Sterile barriers:cap, gloves, mask and washed/disinfected hands  Prep: ChloraPrep  Patient monitoring: blood pressure monitoring, continuous pulse oximetry and EKG  Procedure    Sedation: yes  Performed under: local infiltration  Guidance:ultrasound guided    ULTRASOUND INTERPRETATION.  Using ultrasound guidance a 20 G gauge needle was placed in close proximity to the brachial plexus nerve, at which point, under ultrasound guidance anesthetic was injected in the area of the nerve and spread of the anesthesia was seen on ultrasound in close proximity thereto.  There were no abnormalities seen on ultrasound; a digital image was taken; and the patient tolerated the procedure with no complications. Images:still images obtained, printed/placed on chart    Laterality:left  Block Type:supraclavicular  Injection Technique:single-shot  Needle Type:echogenic and short-bevel  Needle Gauge:20 G  Resistance on Injection: none          Post Assessment  Injection Assessment: negative aspiration for heme, no paresthesia on injection and incremental injection  Patient Tolerance:comfortable throughout block  Complications:no  Additional Notes  A high-frequency linear transducer, with sterile cover, was placed in the supraclavicular fossa to identify the subclavian artery and trunks and divisions of the brachial plexus. The insertion site was prepped and draped in sterile fashion. Skin and cutaneous tissue was infiltrated with 2-5 ml of 1% Lidocaine. Using  "ultrasound-guidance, a 20-gauge B-Lawton 4\" Ultraplex 360 non-stimulating echogenic needle was advanced in plane from lateral to medial. Preservative-free normal saline was utilized for hydro-dissection of tissue, and to confirm final needle placement. Local anesthetic in incremental 3-5 ml injections to surround the brachial plexus with particular attention paid to the corner pocket inject near the subclavian artery to assure ulnar nerve coverage. Aspiration every 5 ml to prevent intravascular injection. Injection was completed with negative aspiration of blood and negative intravascular injection. Injection pressures were normal with minimal resistance.            "

## 2024-04-15 NOTE — H&P
"Pre-Op H&P  Angel Blair  [id number]  1975    Chief complaint: \"I need a fistula\"    HPI:    Patient is a 48 y.o.male who presents with end-stage renal disease in need of a AV fistula.  He has a chronic kidney disease stage V.    Review of Systems:  General ROS: negative for chills, fever or skin lesions;  No changes since last office visit.  Neg for recent sick exposure  Cardiovascular ROS: no chest pain or dyspnea on exertion  Respiratory ROS: no cough, shortness of breath, or wheezing    Allergies:   Allergies   Allergen Reactions    Lisinopril Swelling and Angioedema       Home Meds:    No current facility-administered medications on file prior to encounter.     Current Outpatient Medications on File Prior to Encounter   Medication Sig Dispense Refill    acetaminophen (TYLENOL) 325 MG tablet Take 2 tablets by mouth Every 4 (Four) Hours As Needed for Mild Pain.      atorvastatin (LIPITOR) 40 MG tablet Take 1 tablet by mouth Every Night.      b complex-vitamin c-folic acid (NEPHRO-EDDIE) 0.8 MG tablet tablet Take 1 tablet by mouth Daily. 30 tablet 1    calcium acetate (PHOS BINDER,) 667 MG capsule capsule Take 1 capsule by mouth 3 (Three) Times a Day With Meals. 180 capsule 1    cloNIDine (CATAPRES) 0.1 MG tablet Take 1 tablet by mouth Every 1 (One) Hour As Needed for High Blood Pressure (prn systolic blood pressure > 160 mmhg). 60 tablet 1    epoetin yaron-epbx (RETACRIT) 82867 UNIT/ML injection Inject 1 mL under the skin into the appropriate area as directed 3 (Three) Times a Week. Indications: ESRD on Dialysis 6.6 mL     glipizide (Glucotrol) 5 MG tablet Take 0.5 tablets by mouth 2 (Two) Times a Day Before Meals. 30 tablet 0    metoprolol tartrate (LOPRESSOR) 50 MG tablet Take 1 tablet by mouth Every 12 (Twelve) Hours for 30 days. 60 tablet 0    NIFEdipine XL (PROCARDIA XL) 90 MG 24 hr tablet Take 1 tablet by mouth Daily. 90 tablet 3       PMH:   Past Medical History:   Diagnosis Date    Diabetes " mellitus     Hyperlipidemia     Hypertension     Kidney disease, chronic, stage V (end stage, EGFR < 15 ml/min)     T/TH/SAT DIALYSIS    Knee swelling 7/20/22    Sleep apnea     RESOLVED WITH WEIGHT LOSS, NO LONGER REQUIRING CPAP     PSH:    Past Surgical History:   Procedure Laterality Date    SOFT TISSUE TUMOR RESECTION  2010     Patient denies allergy to contrast dye or latex  Immunization History:  Influenza: Yes  Pneumococcal: Yes  Tetanus: Up-to-date    Social History:   Tobacco:   Social History     Tobacco Use   Smoking Status Never   Smokeless Tobacco Never      Alcohol:     Social History     Substance and Sexual Activity   Alcohol Use No       Vitals:           There were no vitals taken for this visit.    Physical Exam:  General Appearance:    Alert, cooperative, no distress, appears stated age   Head:    Normocephalic, without obvious abnormality, atraumatic   Lungs:   Lear to auscultation bilaterally to the bases    Heart: S1-S2 without rubs murmurs or gallops    Abdomen:  Soft, nontender, bowel sounds present throughout.   Breast Exam:    deferred   Genitalia:    deferred   Extremities:   Extremities normal, atraumatic, no cyanosis or edema   Skin:   Skin color, texture, turgor normal, no rashes or lesions   Neurologic:   Grossly intact   Results Review  LABS:  Lab Results   Component Value Date    WBC 6.29 04/08/2024    HGB 10.4 (L) 04/08/2024    HCT 32.5 (L) 04/08/2024    MCV 98.8 (H) 04/08/2024     (L) 04/08/2024    NEUTROABS 8.10 (H) 04/28/2023    GLUCOSE 144 (H) 04/29/2023    BUN 61 (H) 04/29/2023    CREATININE 5.78 (H) 04/29/2023    EGFRIFAFRI 57 (L) 03/03/2021     04/29/2023    K 4.3 04/29/2023     04/29/2023    CO2 26.0 04/29/2023    MG 1.7 04/20/2023    PHOS 5.1 (H) 04/29/2023    CALCIUM 9.6 04/29/2023    ALBUMIN 3.2 (L) 04/29/2023    AST 30 04/18/2023    ALT 20 04/18/2023    BILITOT 0.2 04/18/2023    PTT 29.7 (L) 04/18/2023    INR 0.97 04/08/2024       RADIOLOGY:  No  "radiology results for the last 3 days     I reviewed the patient's new clinical results.    Cancer Staging (if applicable)  Cancer Patient: __ yes __no __unknown; If yes, clinical stage T:__ N:__M:__, stage group or __N/A    Impression: End-stage renal disease  Morbid obesity  Type 2 diabetes mellitus  Anemia, chronic, present on admission    Plan: Left upper extremity AV fistula creation with arterial graft, brachial axillary, left      DOROTHEA Garcia   04/15/24   6:58 AM EDT     I have reviewed the above note, my prior note(s), appropriate imaging, and labs.  I have again discussed the risks and benefits of left upper extremity AVF creation with artegraft, brachioaxillary with the patient.  All of their questions have been answered.  They understand and wish to proceed with surgical intervention.    CBC  Results from last 7 days   Lab Units 04/08/24  0906   WBC 10*3/mm3 6.29   HEMOGLOBIN g/dL 10.4*   HEMATOCRIT % 32.5*   PLATELETS 10*3/mm3 128*       Coagulation Cascade  PTT   Date Value Ref Range Status   04/18/2023 29.7 (L) 60.0 - 90.0 seconds Final     INR   Date Value Ref Range Status   04/08/2024 0.97 0.89 - 1.12 Final     Protime   Date Value Ref Range Status   04/08/2024 13.0 12.2 - 14.5 Seconds Final       No components found for: \"ECHO\"     Results for orders placed during the hospital encounter of 01/22/23    Adult Transthoracic Echo Complete With Contrast if Necessary Per Protocol    Interpretation Summary    Left ventricular ejection fraction appears to be 51 - 55%.    Left ventricular wall thickness is consistent with mild to moderate concentric hypertrophy.    The cardiac valves are anatomically and functionally normal.         "

## 2024-04-15 NOTE — OP NOTE
General Surgery Operative Note    Angel Blair  [id number]  1975    Date of Surgery:  4/15/2024 11:07 EDT    Pre-op Diagnosis: Chronic renal failure    Post-op Diagnosis: Chronic renal failure    Procedure: Arteriovenous fistula creation, left upper extremity brachial axillary, 5 mm Artegraft    Surgeon: Nico Herrera MD    Anesthesia: General anesthetic with left regional block    Fluids: 250 mL of crystalloid    Estimated Blood Loss: Less than 25 mL    Urine Voided: Not recorded     Drains: None    Implant: 5 mm Artegraft    Findings: The brachial artery was adequate.                   The axillary outflow vein was adequate.                   There was a thrill in the outflow vein.    Complications: None apparent.    History:   48-year-old young gentleman with chronic renal failure requiring a arteriovenous fistula for long-term hemodialysis.      I had a discussion regarding the risks and benefits of arteriovenous fistula creation, including but not limited to: bleeding, infection, injury to adjacent viscera (vessels and nerves), permanent neurovascular or neuromuscular deficits, a steal syndrome, non-maturation, chronic pain, need for re-intervention, distal arterial embolization, and medical issues from a cardiopulmonary and deep venous thrombosis standpoint.  All their questions were answered and they wish to proceed.     Procedure:      After informed consent the patient was taken to the operating room and placed in the supine position on the operating table.  Adequate cardiopulmonary monitoring was placed by anesthesia and general anesthesia induced, the patient had a left regional block placed in the preoperative area, the left upper extremity was prepped and draped in the standard sterile fashion, an ioban was placed on the skin, and a time out was observed.     An incision was created over brachial artery on the medial portion of the upper arm.  I dissected down through the  subcutaneous elements to the artery sheath which was opened longitudinally.  The brachial artery was dissected free from the surrounding tissue and encircled with a vessel loop this was of adequate size with minimal calcification.  At this point a separate incision was created over the axillary vein in the standard fashion.  I dissected down through the subcutaneous elements to the sheath around the axillary vein.  This was opened sharply and the axillary vein was of adequate size.  With the [place] tunneler I tunneled in a subcutaneous fashion on the upper lateral arm.  The Artegraft was prepared and oriented with a marking pen and this was brought through the [place] tunneler in standard fashion.  I began with the venous anastomosis.  The axillary vein was clamped proximally and distally, the venotomy was created with 11 blade scalpel, this was extended with Steward scissors to match the aperture of the outflow vein.  In an end-to-side fashion with a running 6-0 Prolene the anastomosis was performed.  Appropriate flushing and de-airing maneuvers were performed with the venous anastomosis was tied down under full venous pressure.  The 5 mm Artegraft was flushed with heparinized saline.  I then turned to the arterial site.  In a very similar fashion the artery was clamped proximally and distally, the arteriotomy was created with an 11 blade scalpel, this was extended with [place]ward scissors to match the aperture of the outflow 5 mm Artegraft.  Using a 6-0 Prolene in an end-to-side running fashion the arterial anastomosis was performed in standard fashion.  The system was appropriately de-aired and flushed.  All clamps were removed and the arterial anastomosis was tied down under full systemic arterial pressure.  Meticulous hemostasis was obtained.  5 mL of Floseal was instilled into the the wounds around the anastomoses.  The Floseal was then irrigated free with Irrisept.  The subcutaneous elements were then reapproximated with  interrupted 3-0 Vicryl.  The subcutaneous elements were reapproximated with interrupted 3-0 Vicryl.  And the skin was reapproximated with a running 4-0 subcuticular Monocryl.  Skin glue, Telfa, and Tegaderm were placed on the wounds..      Sterile dressings were placed on the wound.  All lap and needle counts were correct at the end of the procedure ×2.  The patient was then transferred to the recovery room in stable condition.     Nico Herrera MD     Date: 4/15/2024  Time: 11:07 EDT

## 2024-07-24 ENCOUNTER — HOSPITAL ENCOUNTER (OUTPATIENT)
Dept: CARDIOLOGY | Facility: HOSPITAL | Age: 49
Discharge: HOME | End: 2024-07-24
Admitting: SURGERY
Payer: COMMERCIAL

## 2024-07-24 VITALS — WEIGHT: 252 LBS | HEIGHT: 73 IN | BODY MASS INDEX: 33.4 KG/M2

## 2024-07-24 DIAGNOSIS — N18.6 END STAGE RENAL DISEASE: ICD-10-CM

## 2024-07-24 LAB
BH CV VAS DIALYSIS ARTERIAL ANASTOMOSIS EDV: 259 CM/SEC
BH CV VAS DIALYSIS ARTERIAL ANASTOMOSIS PSV: 440 CM/SEC
BH CV VAS DIALYSIS CONDUIT DIST DEPTH: 0.72 CM
BH CV VAS DIALYSIS CONDUIT DIST DIAMETER: 0.67 CM
BH CV VAS DIALYSIS CONDUIT DIST EDV: 61 CM/SEC
BH CV VAS DIALYSIS CONDUIT DIST FLOW VOL: 566 ML/MIN
BH CV VAS DIALYSIS CONDUIT DIST PSV: 127 CM/SEC
BH CV VAS DIALYSIS CONDUIT MID DEPTH: 0.82 CM
BH CV VAS DIALYSIS CONDUIT MID DIAMETER: 0.77 CM
BH CV VAS DIALYSIS CONDUIT MID EDV: 53 CM/SEC
BH CV VAS DIALYSIS CONDUIT MID FLOW VOL: 813 ML/MIN
BH CV VAS DIALYSIS CONDUIT MID PSV: 80 CM/SEC
BH CV VAS DIALYSIS CONDUIT MID/DIST EDV: 56 CM/SEC
BH CV VAS DIALYSIS CONDUIT MID/DIST FLOW VOL: 748 ML/MIN
BH CV VAS DIALYSIS CONDUIT MID/DIST PSV: 95 CM/SEC
BH CV VAS DIALYSIS CONDUIT PROX DEPTH: 0.75 CM
BH CV VAS DIALYSIS CONDUIT PROX DIAMETER: 0.67 CM
BH CV VAS DIALYSIS CONDUIT PROX EDV: 62 CM/SEC
BH CV VAS DIALYSIS CONDUIT PROX PSV: 93 CM/SEC
BH CV VAS DIALYSIS PRE-INFLOW BRACHIAL EDV: 80 CM/SEC
BH CV VAS DIALYSIS PRE-INFLOW BRACHIAL FLOW VOL: 578 ML/MIN
BH CV VAS DIALYSIS PRE-INFLOW BRACHIAL PSV: 162 CM/SEC
BH CV VAS DIALYSIS VENOUS ANASTOMOSIS EDV: 95 CM/SEC
BH CV VAS DIALYSIS VENOUS ANASTOMOSIS PSV: 145 CM/SEC
BH CV VAS DIALYSIS VENOUS OUTFLOW AXILLARY PSV: 157 CM/SEC
BH CV VAS DIALYSIS VENOUS OUTFLOW SUBCLAVIAN PSV: 47 CM/SEC

## 2024-07-24 PROCEDURE — 93990 DOPPLER FLOW TESTING: CPT

## (undated) DEVICE — SUT PROLN 6/0 BV1 D/A 30IN 8709H

## (undated) DEVICE — SUT SILK 4/0 TIES 18IN A183H

## (undated) DEVICE — SUT SILK 2/0 TIES 18IN A185H

## (undated) DEVICE — STOCKINETTE,DOUBLE PLY,4X48,STERILE: Brand: MEDLINE

## (undated) DEVICE — PK VASC 10

## (undated) DEVICE — 450 ML BOTTLE OF 0.05% CHLORHEXIDINE GLUCONATE IN 99.95% STERILE WATER FOR IRRIGATION, USP AND APPLICATOR.: Brand: IRRISEPT ANTIMICROBIAL WOUND LAVAGE

## (undated) DEVICE — SUT SILK 2/0 SH 30IN K833H

## (undated) DEVICE — SUT MNCRYL PLS ANTIB UD 4/0 PC3 18IN

## (undated) DEVICE — 3M™ TEGADERM™ HP TRANSPARENT FILM DRESSING FRAME STYLE, 9536HP, 4 IN X 4-3/4 IN (10 CM X 12 CM), 50/CT 4CT/CASE: Brand: 3M™ TEGADERM™

## (undated) DEVICE — GLV SURG PREMIERPRO MIC LTX PF SZ8 BRN

## (undated) DEVICE — PATIENT RETURN ELECTRODE, SINGLE-USE, CONTACT QUALITY MONITORING, ADULT, WITH 9FT CORD, FOR PATIENTS WEIGING OVER 33LBS. (15KG): Brand: MEGADYNE

## (undated) DEVICE — APPL CHLORAPREP TINTED 26ML TEAL

## (undated) DEVICE — SUT VIC 3/0 TIES J104T

## (undated) DEVICE — ANTIBACTERIAL UNDYED BRAIDED (POLYGLACTIN 910), SYNTHETIC ABSORBABLE SURGICAL SUTURE: Brand: COATED VICRYL

## (undated) DEVICE — DECANTER BAG 9": Brand: MEDLINE INDUSTRIES, INC.

## (undated) DEVICE — DRAPE,HAND,STERILE: Brand: MEDLINE

## (undated) DEVICE — ADHS SKIN PREMIERPRO EXOFIN TOPICAL HI/VISC .5ML

## (undated) DEVICE — GLV SURG PREMIERPRO MIC LTX PF SZ7.5 BRN

## (undated) DEVICE — PROXIMATE RH ROTATING HEAD SKIN STAPLERS (35 WIDE) CONTAINS 35 STAINLESS STEEL STAPLES: Brand: PROXIMATE

## (undated) DEVICE — 3M™ IOBAN™ 2 ANTIMICROBIAL INCISE DRAPE 6650EZ: Brand: IOBAN™ 2

## (undated) DEVICE — TBG PENCL TELESCP MEGADYNE SMOKE EVAC 10FT

## (undated) DEVICE — TRAP FLD MINIVAC MEGADYNE 100ML

## (undated) DEVICE — ANTIBACTERIAL UNDYED BRAIDED (POLYGLACTIN 910), SYNTHETIC ABSORBABLE SUTURE: Brand: COATED VICRYL

## (undated) DEVICE — ELECTRD BLD EZ CLN STD 2.5IN

## (undated) DEVICE — SUT PROLN 7/0 BV1 D/A 24IN 8702H

## (undated) DEVICE — SUT SILK 3/0 TIES 18IN A184H